# Patient Record
Sex: MALE | Race: WHITE | Employment: OTHER | URBAN - METROPOLITAN AREA
[De-identification: names, ages, dates, MRNs, and addresses within clinical notes are randomized per-mention and may not be internally consistent; named-entity substitution may affect disease eponyms.]

---

## 2017-01-25 PROBLEM — G47.33 OSA (OBSTRUCTIVE SLEEP APNEA): Status: ACTIVE | Noted: 2017-01-25

## 2017-01-28 PROBLEM — D72.825 BANDEMIA: Status: ACTIVE | Noted: 2017-01-28

## 2017-02-06 PROBLEM — I44.0 FIRST DEGREE ATRIOVENTRICULAR BLOCK: Status: ACTIVE | Noted: 2017-02-06

## 2017-09-06 PROBLEM — F44.5 PSEUDOSEIZURE: Status: ACTIVE | Noted: 2017-09-06

## 2017-10-31 PROBLEM — I47.20 VENTRICULAR TACHYCARDIA: Status: ACTIVE | Noted: 2017-10-31

## 2017-12-28 PROBLEM — I49.5 SINUS NODE DYSFUNCTION (HCC): Status: ACTIVE | Noted: 2017-12-28

## 2017-12-28 PROBLEM — Z95.0 S/P CARDIAC PACEMAKER PROCEDURE: Status: ACTIVE | Noted: 2017-12-28

## 2018-01-30 PROBLEM — G44.89 OTHER HEADACHE SYNDROME: Status: ACTIVE | Noted: 2018-01-30

## 2018-08-14 ENCOUNTER — APPOINTMENT (OUTPATIENT)
Dept: CT IMAGING | Age: 38
DRG: 053 | End: 2018-08-14
Attending: INTERNAL MEDICINE
Payer: COMMERCIAL

## 2018-08-14 ENCOUNTER — APPOINTMENT (OUTPATIENT)
Dept: GENERAL RADIOLOGY | Age: 38
DRG: 053 | End: 2018-08-14
Attending: INTERNAL MEDICINE
Payer: COMMERCIAL

## 2018-08-14 ENCOUNTER — HOSPITAL ENCOUNTER (INPATIENT)
Age: 38
LOS: 1 days | Discharge: HOME | DRG: 053 | End: 2018-08-15
Attending: INTERNAL MEDICINE | Admitting: INTERNAL MEDICINE
Payer: COMMERCIAL

## 2018-08-14 PROBLEM — I63.9 ACUTE CVA (CEREBROVASCULAR ACCIDENT) (HCC): Status: ACTIVE | Noted: 2018-08-14

## 2018-08-14 LAB
ALBUMIN SERPL-MCNC: 3.9 G/DL (ref 3.5–5.2)
ALP BLD-CCNC: 56 U/L (ref 40–129)
ALT SERPL-CCNC: 35 U/L (ref 0–40)
ANION GAP SERPL CALCULATED.3IONS-SCNC: 13 MMOL/L (ref 7–16)
AST SERPL-CCNC: 23 U/L (ref 0–39)
BASOPHILS ABSOLUTE: 0.05 E9/L (ref 0–0.2)
BASOPHILS RELATIVE PERCENT: 0.6 % (ref 0–2)
BILIRUB SERPL-MCNC: 0.7 MG/DL (ref 0–1.2)
BUN BLDV-MCNC: 14 MG/DL (ref 6–20)
CALCIUM SERPL-MCNC: 9.2 MG/DL (ref 8.6–10.2)
CHLORIDE BLD-SCNC: 105 MMOL/L (ref 98–107)
CO2: 20 MMOL/L (ref 22–29)
CREAT SERPL-MCNC: 0.9 MG/DL (ref 0.7–1.2)
EOSINOPHILS ABSOLUTE: 0.47 E9/L (ref 0.05–0.5)
EOSINOPHILS RELATIVE PERCENT: 5.2 % (ref 0–6)
GFR AFRICAN AMERICAN: >60
GFR NON-AFRICAN AMERICAN: >60 ML/MIN/1.73
GLUCOSE BLD-MCNC: 84 MG/DL (ref 74–109)
HCT VFR BLD CALC: 42.8 % (ref 37–54)
HEMOGLOBIN: 14.5 G/DL (ref 12.5–16.5)
IMMATURE GRANULOCYTES #: 0.04 E9/L
IMMATURE GRANULOCYTES %: 0.4 % (ref 0–5)
LYMPHOCYTES ABSOLUTE: 2.95 E9/L (ref 1.5–4)
LYMPHOCYTES RELATIVE PERCENT: 32.9 % (ref 20–42)
MCH RBC QN AUTO: 30.8 PG (ref 26–35)
MCHC RBC AUTO-ENTMCNC: 33.9 % (ref 32–34.5)
MCV RBC AUTO: 90.9 FL (ref 80–99.9)
MONOCYTES ABSOLUTE: 0.81 E9/L (ref 0.1–0.95)
MONOCYTES RELATIVE PERCENT: 9 % (ref 2–12)
NEUTROPHILS ABSOLUTE: 4.66 E9/L (ref 1.8–7.3)
NEUTROPHILS RELATIVE PERCENT: 51.9 % (ref 43–80)
PDW BLD-RTO: 13.2 FL (ref 11.5–15)
PLATELET # BLD: 302 E9/L (ref 130–450)
PMV BLD AUTO: 10.3 FL (ref 7–12)
POTASSIUM REFLEX MAGNESIUM: 3.8 MMOL/L (ref 3.5–5)
RBC # BLD: 4.71 E12/L (ref 3.8–5.8)
SODIUM BLD-SCNC: 138 MMOL/L (ref 132–146)
TOTAL PROTEIN: 7 G/DL (ref 6.4–8.3)
TROPONIN: <0.01 NG/ML (ref 0–0.03)
TROPONIN: <0.01 NG/ML (ref 0–0.03)
WBC # BLD: 9 E9/L (ref 4.5–11.5)

## 2018-08-14 PROCEDURE — 2580000003 HC RX 258: Performed by: INTERNAL MEDICINE

## 2018-08-14 PROCEDURE — 6360000002 HC RX W HCPCS: Performed by: INTERNAL MEDICINE

## 2018-08-14 PROCEDURE — 70450 CT HEAD/BRAIN W/O DYE: CPT

## 2018-08-14 PROCEDURE — 2060000000 HC ICU INTERMEDIATE R&B

## 2018-08-14 PROCEDURE — G0378 HOSPITAL OBSERVATION PER HR: HCPCS

## 2018-08-14 PROCEDURE — 85025 COMPLETE CBC W/AUTO DIFF WBC: CPT

## 2018-08-14 PROCEDURE — 71045 X-RAY EXAM CHEST 1 VIEW: CPT

## 2018-08-14 PROCEDURE — 36415 COLL VENOUS BLD VENIPUNCTURE: CPT

## 2018-08-14 PROCEDURE — 80053 COMPREHEN METABOLIC PANEL: CPT

## 2018-08-14 PROCEDURE — 84484 ASSAY OF TROPONIN QUANT: CPT

## 2018-08-14 PROCEDURE — 96372 THER/PROPH/DIAG INJ SC/IM: CPT

## 2018-08-14 RX ORDER — CLONAZEPAM 0.5 MG/1
1 TABLET ORAL 2 TIMES DAILY PRN
Status: DISCONTINUED | OUTPATIENT
Start: 2018-08-14 | End: 2018-08-15 | Stop reason: HOSPADM

## 2018-08-14 RX ORDER — METOPROLOL SUCCINATE 50 MG/1
50 TABLET, EXTENDED RELEASE ORAL 2 TIMES DAILY WITH MEALS
Status: DISCONTINUED | OUTPATIENT
Start: 2018-08-14 | End: 2018-08-15 | Stop reason: HOSPADM

## 2018-08-14 RX ORDER — ZONISAMIDE 50 MG/1
50 CAPSULE ORAL DAILY
Status: DISCONTINUED | OUTPATIENT
Start: 2018-08-14 | End: 2018-08-15 | Stop reason: HOSPADM

## 2018-08-14 RX ORDER — SODIUM CHLORIDE 0.9 % (FLUSH) 0.9 %
10 SYRINGE (ML) INJECTION PRN
Status: DISCONTINUED | OUTPATIENT
Start: 2018-08-14 | End: 2018-08-15 | Stop reason: HOSPADM

## 2018-08-14 RX ORDER — ONDANSETRON 2 MG/ML
4 INJECTION INTRAMUSCULAR; INTRAVENOUS EVERY 6 HOURS PRN
Status: DISCONTINUED | OUTPATIENT
Start: 2018-08-14 | End: 2018-08-15 | Stop reason: HOSPADM

## 2018-08-14 RX ORDER — SODIUM CHLORIDE 0.9 % (FLUSH) 0.9 %
10 SYRINGE (ML) INJECTION EVERY 12 HOURS SCHEDULED
Status: DISCONTINUED | OUTPATIENT
Start: 2018-08-14 | End: 2018-08-15 | Stop reason: HOSPADM

## 2018-08-14 RX ORDER — FLUOXETINE HYDROCHLORIDE 20 MG/1
20 CAPSULE ORAL DAILY
Status: DISCONTINUED | OUTPATIENT
Start: 2018-08-15 | End: 2018-08-15 | Stop reason: HOSPADM

## 2018-08-14 RX ADMIN — ZONISAMIDE 50 MG: 50 CAPSULE ORAL at 21:39

## 2018-08-14 RX ADMIN — Medication 10 ML: at 20:42

## 2018-08-14 RX ADMIN — ENOXAPARIN SODIUM 40 MG: 40 INJECTION SUBCUTANEOUS at 20:43

## 2018-08-14 ASSESSMENT — PAIN SCALES - GENERAL
PAINLEVEL_OUTOF10: 0

## 2018-08-15 VITALS
TEMPERATURE: 98.3 F | OXYGEN SATURATION: 96 % | HEART RATE: 84 BPM | DIASTOLIC BLOOD PRESSURE: 72 MMHG | WEIGHT: 255 LBS | RESPIRATION RATE: 18 BRPM | BODY MASS INDEX: 34.54 KG/M2 | SYSTOLIC BLOOD PRESSURE: 128 MMHG | HEIGHT: 72 IN

## 2018-08-15 LAB
CHOLESTEROL, TOTAL: 204 MG/DL (ref 0–199)
HDLC SERPL-MCNC: 41 MG/DL
LDL CHOLESTEROL CALCULATED: 142 MG/DL (ref 0–99)
MAGNESIUM: 2.2 MG/DL (ref 1.6–2.6)
PRO-BNP: <5 PG/ML (ref 0–125)
TRIGL SERPL-MCNC: 106 MG/DL (ref 0–149)
TROPONIN: <0.01 NG/ML (ref 0–0.03)
VLDLC SERPL CALC-MCNC: 21 MG/DL

## 2018-08-15 PROCEDURE — 97165 OT EVAL LOW COMPLEX 30 MIN: CPT

## 2018-08-15 PROCEDURE — G8978 MOBILITY CURRENT STATUS: HCPCS | Performed by: PHYSICAL THERAPIST

## 2018-08-15 PROCEDURE — 80061 LIPID PANEL: CPT

## 2018-08-15 PROCEDURE — 97161 PT EVAL LOW COMPLEX 20 MIN: CPT | Performed by: PHYSICAL THERAPIST

## 2018-08-15 PROCEDURE — G8988 SELF CARE GOAL STATUS: HCPCS

## 2018-08-15 PROCEDURE — 83735 ASSAY OF MAGNESIUM: CPT

## 2018-08-15 PROCEDURE — G0378 HOSPITAL OBSERVATION PER HR: HCPCS

## 2018-08-15 PROCEDURE — 2580000003 HC RX 258: Performed by: INTERNAL MEDICINE

## 2018-08-15 PROCEDURE — 6360000002 HC RX W HCPCS: Performed by: INTERNAL MEDICINE

## 2018-08-15 PROCEDURE — 6370000000 HC RX 637 (ALT 250 FOR IP): Performed by: INTERNAL MEDICINE

## 2018-08-15 PROCEDURE — 99253 IP/OBS CNSLTJ NEW/EST LOW 45: CPT | Performed by: PSYCHIATRY & NEUROLOGY

## 2018-08-15 PROCEDURE — 83880 ASSAY OF NATRIURETIC PEPTIDE: CPT

## 2018-08-15 PROCEDURE — 36415 COLL VENOUS BLD VENIPUNCTURE: CPT

## 2018-08-15 PROCEDURE — G8979 MOBILITY GOAL STATUS: HCPCS | Performed by: PHYSICAL THERAPIST

## 2018-08-15 PROCEDURE — APPSS180 APP SPLIT SHARED TIME > 60 MINUTES: Performed by: NURSE PRACTITIONER

## 2018-08-15 PROCEDURE — 99254 IP/OBS CNSLTJ NEW/EST MOD 60: CPT | Performed by: INTERNAL MEDICINE

## 2018-08-15 PROCEDURE — G8987 SELF CARE CURRENT STATUS: HCPCS

## 2018-08-15 PROCEDURE — 84484 ASSAY OF TROPONIN QUANT: CPT

## 2018-08-15 PROCEDURE — 96372 THER/PROPH/DIAG INJ SC/IM: CPT

## 2018-08-15 RX ADMIN — FLUOXETINE HYDROCHLORIDE 20 MG: 20 CAPSULE ORAL at 08:35

## 2018-08-15 RX ADMIN — ENOXAPARIN SODIUM 40 MG: 40 INJECTION SUBCUTANEOUS at 08:35

## 2018-08-15 RX ADMIN — METOPROLOL SUCCINATE 50 MG: 50 TABLET, FILM COATED, EXTENDED RELEASE ORAL at 13:24

## 2018-08-15 RX ADMIN — Medication 10 ML: at 08:35

## 2018-08-15 ASSESSMENT — PAIN SCALES - GENERAL
PAINLEVEL_OUTOF10: 0

## 2018-08-15 NOTE — PROGRESS NOTES
Stroke appointment request faxed to clinic. Nurse will inform patient that clinic will contact him with date and time of appointment, communicated with patient.

## 2018-08-15 NOTE — PROGRESS NOTES
OCCUPATIONAL THERAPY INITIAL EVALUATION      Date:8/15/2018  Patient Name: Dominique Vidal  MRN: [id number]  : 1980  [facility]     Evaluating OT: LUIS Dobbs     Recommended Adaptive Equipment: none  AM-PAC Daily Activity Raw Score =  2124  G-code = CJ    Modified Leeds Scale (MRS)  Score     Description  0             No symptoms  1             No significant disability despite symptoms  2             Slight disability; able to look after own affairs  3             Moderate disability; able to ambulate without assist/ requires assist with ADLs  4             Moderate/Severe disability;requires assist to ambulate/assist with ADLs  5             Severe disability;bedridden/incontinent   6               Score:   1    Diagnosis: No diagnosis found. Patient Active Problem List   Diagnosis    Anxiety    Moderate obesity    Hyperlipemia    Irritable bowel syndrome with diarrhea    First degree atrioventricular block    Pseudoseizure    Seizure-like activity (HCC)    Ventricular tachycardia (HCC)    Sinus node dysfunction (HCC)    S/P cardiac pacemaker procedure    Other headache syndrome    Acute CVA [facility]ce[facility]bro[facility] accident) [facility])      Past Medical History:   Past Medical History:   Diagnosis Date    Acute CVA [facility]ce[facility]brovascular accident) ([facility] [address] 2018    Anxiety     Arthritis     First degree AV block     GERD (gastroesophageal reflux disease)     Hyperlipidemia     IBS (irritable bowel syndrome)     LVH (left ventricular hypertrophy)     Movement disorder     Obesity     ENOC (obstructive sleep apnea)     Psychiatric problem     Seizures (HCC)        Precautions:  Falls,  Seizure? Home Living: Pt lives with mother and siblings  in a 2 story, with 1 +1 steps to enter or 4 steps with 1 rail; bed/bath on 1st  Bathroom setup: tub shower   Equipment owned: cane, ww     Prior Level of Function:  Independent  with ADLs and IADLs;  Ambulated with no

## 2018-08-15 NOTE — CONSULTS
fatigue, fevers, chills or night sweats  · Eyes: Denies visual changes or drainage  · ENT: Denies headaches or hearing loss. No mouth sores or sore throat. No epistaxis   · Cardiovascular: + L sided chest pain. Denies palpitations. No lower extremity swelling. · Respiratory: + SOB. Denies cough, orthopnea or PND. No hemoptysis   · Gastrointestinal: Denies hematemesis or anorexia. No hematochezia or melena    · Genitourinary: Denies urgency, dysuria or hematuria. · Musculoskeletal: Denies gait disturbance. BLE weakness. Denies joint complaints  · Integumentary: Denies rash, hives or pruritis   · Neurological: Denies dizziness, headaches or seizures. No numbness or tingling  · Psychiatric: + anxiety/depression  · Endocrine: Denies temperature intolerance. No recent weight change. .  · Hematologic/Lymphatic: Denies abnormal bruising or bleeding. No swollen lymph nodes    PHYSICAL EXAM:   /62   Pulse 68   Temp 97 °F (36.1 °C) (Temporal)   Resp 14   Ht 6' (1.829 m)   Wt 255 lb (115.7 kg)   SpO2 93%   BMI 34.58 kg/m²   CONST:  Well developed, well nourished  male who appears of stated age. Awake, alert and cooperative. No apparent distress. HEENT:   Head- Normocephalic, atraumatic   Eyes- Conjunctivae pink, anicteric  Throat- Oral mucosa pink and moist  Neck-  No stridor, trachea midline, no jugular venous distention. No carotid bruit. CHEST: Chest symmetrical and non-tender to palpation. No accessory muscle use or intercostal retractions  RESPIRATORY: Lung sounds - clear throughout fields   CARDIOVASCULAR:     Heart Inspection- shows no noted pulsations  Heart Palpation- no heaves or thrills; PMI is non-displaced   Heart Ausculation- Regular rate and rhythm, II/VI ALIA. No s3, s4 or rub   PV: No lower extremity edema. No varicosities. Pedal pulses palpable, no clubbing or cyanosis   ABDOMEN: Soft, non-tender to light palpation. Bowel sounds present.  No palpable masses no organomegaly; no

## 2018-08-15 NOTE — CONSULTS
Patient seen and examined. Chart, labs, imaging studies, EKG and rhythm strips reviewed. Full consult to follow. Consulted for CP    PHYSICAL EXAM:   /62   Pulse 68   Temp 97 °F (36.1 °C) (Temporal)   Resp 14   Ht 6' (1.829 m)   Wt 255 lb (115.7 kg)   SpO2 93%   BMI 34.58 kg/m²   CONST:  Well developed, well nourished  male who appears of stated age. Awake, alert and cooperative. No apparent distress. HEENT:   Head- Normocephalic, atraumatic   Eyes- Conjunctivae pink, anicteric  Throat- Oral mucosa pink and moist  Neck-  No stridor, trachea midline, no jugular venous distention. No carotid bruit. CHEST: Chest symmetrical and non-tender to palpation. No accessory muscle use or intercostal retractions  RESPIRATORY: Lung sounds - clear throughout fields   CARDIOVASCULAR:     Heart Inspection- shows no noted pulsations  Heart Palpation- no heaves or thrills; PMI is non-displaced   Heart Ausculation- Regular rate and rhythm, II/VI ALIA. No s3, s4 or rub   PV: No lower extremity edema. No varicosities. Pedal pulses palpable, no clubbing or cyanosis   ABDOMEN: Soft, non-tender to light palpation. Bowel sounds present. No palpable masses no organomegaly; no abdominal bruit  MS: Good muscle strength and tone. No atrophy or abnormal movements. : Deferred  SKIN: Warm and dry no statis dermatitis or ulcers   NEURO / PSYCH: Oriented to person, place and time. Speech clear and appropriate. Follows all commands. Flat affect     IMPRESSION:  1. Non cardiac CP  2. SSS s/p PPM ( [employer]) 12/2017  3. Hx NSVT  4. Hx of positive tilt table test  5. Unremarkable TTE in 2016  6. Low risk treadmill non nuclear stress (Knutson Score 7) 11/2017  7. T2DM recently diagnosed on Metformin  8. Anxiety/Depression  9. Pseudoseizures  10. Migraine HA  11. GERD  12. Obesity BMI 34.5    PLAN:   1. No additional recommendations/plans or indications for additional cardiac testing (from cardiology standpoint).   2. May be discharged (from cardiology standpoint) when okay with others.    3. Cardiology to sign off, please call if needed    Electronically signed by Radha Chapa MD on 8/15/2018 at 11:53 AM

## 2018-08-15 NOTE — PROGRESS NOTES
[address] [facility]  Physical Therapy Initial Evaluation    Name: Justin Rivas  : 1980  MRN: [id number]    Date of Service: 8/15/2018    Evaluating Therapist: Torito Teran PT, [id number]       Equipment Recommendations: no needs    Room #: 6414/4693-R  DIAGNOSIS: CVA  PRECAUTIONS: fall risk    Social:  Pt lives with mother and siblings in a 2 floor plan but first floor setup, 1 + 1 steps or 4 steps with 1 rails to enter. Prior to admission pt walked with no device. Initial Evaluation  Date: 8/15 Treatment      Short Term/ Long Term   Goals   Was pt agreeable to Eval/treatment? yes     Does pt have pain? No c/o pain     Bed Mobility  Rolling: independent  Supine to sit: independent  Sit to supine: independent  Scooting: independent  independent   Transfers Sit to stand: independent  Stand to sit: independent  Stand pivot: independent  independent   Ambulation    200 feet with no device supervision  200+ feet with no device independently. Stair negotiation: ascended and descended 4 steps with 2 rail with supervision  4 +  steps with 1 rail with modified independent   AM-PAC Raw Score 22/24       Pt is alert and Oriented x3  ROM:  L LE grossly WFL  R LE grossly WFL  Strength:   L LE grossly 4/5  R LE grossly 5/5  Balance: standing supervision   Sensation: no c/o numbness/tingling. Endurance: good -        ASSESSMENT  Pt displays functional ability as noted in the objective portion of this evaluation. Comments/Treatment:  Pt found laying in bed agreeable to evaluation. Pt instructed in mobility. Pt with mild unsteadiness initially but improved with further gait; no LOB. Pt would benefit from outpatient PT to address L LE weakness and mild unsteadiness. Pt left laying in bed with call button in reach end of evaluation. Patient education  Pt educated on mobility.     Patient response to education:   Pt verbalized understanding Pt demonstrated skill Pt requires further education in this area   x

## 2018-08-15 NOTE — PLAN OF CARE
Problem: Falls - Risk of:  Goal: Will remain free from falls  Will remain free from falls   Outcome: Met This Shift      Problem: ACTIVITY INTOLERANCE/IMPAIRED MOBILITY  Goal: Mobility/activity is maintained at optimum level for patient  Outcome: Met This Shift

## 2018-08-15 NOTE — H&P
no hepatosplenomegally  MUSCULOSKELETAL:  There is no redness, warmth, or swelling of the joints. Full range of motion noted. Motor strength is 5 out of 5 all extremities bilaterally. Tone is normal.  NEUROLOGIC:  Awake, alert, oriented to name, place and time. Cranial nerves II-XII are grossly intact. Motor is 5 out of 5 bilaterally. Cerebellar finger to nose, heel to shin intact. Sensory is intact.   Babinski down going, Romberg negative, and gait is normal.  SKIN:  no bruising or bleeding    DATA:  CBC:   Lab Results   Component Value Date    WBC 9.0 08/14/2018    RBC 4.71 08/14/2018    HGB 14.5 08/14/2018    HCT 42.8 08/14/2018    MCV 90.9 08/14/2018    MCH 30.8 08/14/2018    MCHC 33.9 08/14/2018    RDW 13.2 08/14/2018     08/14/2018    MPV 10.3 08/14/2018     CMP:    Lab Results   Component Value Date     08/14/2018    K 3.8 08/14/2018     08/14/2018    CO2 20 08/14/2018    BUN 14 08/14/2018    CREATININE 0.9 08/14/2018    GFRAA >60 08/14/2018    LABGLOM >60 08/14/2018    GLUCOSE 84 08/14/2018    PROT 7.0 08/14/2018    LABALBU 3.9 08/14/2018    CALCIUM 9.2 08/14/2018    BILITOT 0.7 08/14/2018    ALKPHOS 56 08/14/2018    AST 23 08/14/2018    ALT 35 08/14/2018     ASSESSMENT AND PLAN:    Looks much better  Ct head neg  Card input noted  Will dc if ok c neuro  F/u c psych

## 2018-08-15 NOTE — CONSULTS
Department of Neurological Sciences  Section of General Neurology - Adult  Consult Note          Sandrita Mayes is 42 y.o. male, right handed. Neurology was consulted for falls, dysarthria, extremity weakness    The patient is a good historian. Past Medical History:     Past Medical History:   Diagnosis Date    Acute CVA (cerebrovascular accident) ([facility] [address] 8/14/2018    Anxiety     Arthritis     First degree AV block     GERD (gastroesophageal reflux disease)     Hyperlipidemia     IBS (irritable bowel syndrome)     LVH (left ventricular hypertrophy)     Movement disorder     Obesity     ENOC (obstructive sleep apnea)     Psychiatric problem     Seizures (HCC)        Past Surgical History:     Past Surgical History:   Procedure Laterality Date    HERNIA REPAIR      INSERTABLE CARDIAC MONITOR  07/14/2017    KNEE SURGERY      TONSILLECTOMY         Allergies:     Patient has no known allergies. Medications:     Prior to Admission medications    Medication Sig Start Date End Date Taking?  Authorizing Provider   metFORMIN (GLUCOPHAGE XR) 500 MG extended release tablet Take 1 tablet by mouth daily (with breakfast) 7/17/18  Yes Andrea Ellsworth MD   metoprolol succinate (TOPROL XL) 50 MG extended release tablet Take 1 tablet by mouth 2 times daily (with meals) 12/29/17  Yes Marcel Rapp MD   zonisamide (ZONEGRAN) 50 MG capsule Take 50 mg by mouth daily  10/17/17  Yes Historical Provider, MD   FLUoxetine (PROZAC) 20 MG capsule Take 20 mg by mouth daily   Yes Historical Provider, MD   clonazePAM (KLONOPIN) 1 MG tablet Take 1 tablet by mouth 2 times daily as needed for Anxiety 9/25/17   Andrea Ellsworth MD       Social History:     Denies ETOH, tobacco, or illicit drugs    Review of Systems:       No vertigo, lightheadedness or loss of consciousness  No incontinence of bowels or bladder  No itching or bruising appreciated  No numbness, tingling or worsening focal arm/leg weakness    ROS is otherwise negative Family History:     No family history on file. History of Present Illness:   Patient is 41 yo M with significant past medical history of psychogenic non-epileptic seizures, anxiety, migraine consulted for recurrent fall, seizure like activity, dysarthria. Patient with chronic left upper extremity weakness, worsening over \"last 3 years\". Denies change in motor function. Denies focal numbness or paresthesias. Patient states that he has fallen \"2-3 times per year\" for the past 3 years. [place] yesterday, stating his right leg \"gave out\". Denies trauma to head or neck. No anticoagulants. No weakness to lower extremities following fall. Patient states that he had an episode of seizure like activity later on in the day while seated as passenger in car. Notes this was identical to previously diagnosed episodes of non-epileptiform seizures. Lasted approximately 2-3 minutes. Denies loss of bowel/bladder function. Denies post ictal symptoms. Patient is aware when he is having episodes, but is \"foggy\" in thinking. Patient states that anxiety provokes episodes, and that he has been increasingly anxious with decreased hours scheduled at work. Patient notes associated \"slurred speech\" following episode. States that speech is also frequently affected transiently when having non-epileptiform seizures. Patient denies any new complaints today, with speech deficit resolved. Patient prescribed Zonegran for HA. Objective:     /62   Pulse 68   Temp 97 °F (36.1 °C) (Temporal)   Resp 14   Ht 6' (1.829 m)   Wt 255 lb (115.7 kg)   SpO2 93%   BMI 34.58 kg/m²     General appearance: alert, appears stated age, cooperative and no distress  Head: normocephalic, without obvious abnormality, atraumatic  Eyes: conjunctivae/corneas clear.    Neck: no adenopathy, no carotid bruit, supple, symmetrical, trachea midline and thyroid not enlarged, symmetric, no tenderness/mass/nodules  Lungs: clear to auscultation Lab Results   Component Value Date    WBC 9.0 08/14/2018    RBC 4.71 08/14/2018    HGB 14.5 08/14/2018    HCT 42.8 08/14/2018     08/14/2018    MCV 90.9 08/14/2018    MCH 30.8 08/14/2018    MCHC 33.9 08/14/2018    RDW 13.2 08/14/2018    LYMPHOPCT 32.9 08/14/2018    MONOPCT 9.0 08/14/2018    BASOPCT 0.6 08/14/2018    MONOSABS 0.81 08/14/2018    LYMPHSABS 2.95 08/14/2018    EOSABS 0.47 08/14/2018    BASOSABS 0.05 08/14/2018     CMP:    Lab Results   Component Value Date     08/14/2018    K 3.8 08/14/2018     08/14/2018    CO2 20 08/14/2018    BUN 14 08/14/2018    CREATININE 0.9 08/14/2018    GFRAA >60 08/14/2018    LABGLOM >60 08/14/2018    GLUCOSE 84 08/14/2018    PROT 7.0 08/14/2018    LABALBU 3.9 08/14/2018    CALCIUM 9.2 08/14/2018    BILITOT 0.7 08/14/2018    ALKPHOS 56 08/14/2018    AST 23 08/14/2018    ALT 35 08/14/2018       CT head: No sign of acute intracranial hemorrhage or mass effect. MRI brain: n/a    CTAs head/neck: n/a    Echo w bubble: n/a    I labs and imaging studies were reviewed with attending. Assessment and Plan   1. Psychogenic non-epileptic seizure-- Known history without change in symptoms. No acute focal neurological findings on exam. Decreased left  strength-- chronic. CT head negative for acute intracranial abnormality.    Telemetry  PT/OT  Recommend neuro psych consult outpatient should symptoms persist  Will sign off          Shayy Butler PGY - 2  Emergency Medicine  9:45 AM  8/15/2018

## 2018-09-06 ENCOUNTER — HOSPITAL ENCOUNTER (OUTPATIENT)
Dept: SPEECH THERAPY | Age: 38
Setting detail: THERAPIES SERIES
Discharge: HOME | End: 2018-09-06
Payer: COMMERCIAL

## 2018-09-06 PROCEDURE — G9174 SPEECH LANG CURRENT STATUS: HCPCS

## 2018-09-06 PROCEDURE — 92523 SPEECH SOUND LANG COMPREHEN: CPT

## 2018-09-06 PROCEDURE — G9175 SPEECH LANG GOAL STATUS: HCPCS

## 2018-09-06 NOTE — PROGRESS NOTES
[]CNT      Process  Simple Written Commands:   [x]WNL []Incomplete []Latent  []Inconsistent []Perseveration []Cueing  []Unable []CNT  Process Intermediate Written Commands:   [x]WNL []Incomplete []Latent  []Inconsistent []Perseveration []Cueing  []Unable []CNT  Process Complex Written Commands:   []WNL []Incomplete []Latent  [x]Inconsistent []Perseveration [x]Cueing  []Unable []CNT    Comprehension of Conversation:     [x]WNL []Incomplete []Latent  []Inconsistent []Perseveration []Cueing  []Unable []CNT       EXPRESSIVE LANGUAGE       Serials: ([x]Functional[] Impaired)    Imitation:  Words  ([x]Functional[] Impaired)    Sentences ([x]Functional[] Impaired)    Naming:  (Modality used: [x]Verbal []Written)   Confrontation Naming ([x]Functional[] Impaired)  Functional Description ([x]Functional[] Impaired)  Response Naming: ([x]Functional[] Impaired)    Conversation:     [x]Grammatical form ([x] Intact []Disordered)    []Paraphasias ( []Literal errors []Semantic errors [] Neologistic errors []Running jargon)   []Anomia        COGNITION     Attention/Orientation  Attention: [x]Sustained attention []Easily distracted   Orientation:  [x]Person  [x]Place [x]Date [x]Reason  for hospitalization    Memory   Biographical:  [x]Address  [x]Birthdate  [x] Age  [x]Family   Delayed recall:  [x]Chair  [x]Cup   [x]Grass   []Did not recall    Organization/Problem Solving/Reasoning   Sequencing:   [x]Verbal ([x]Functional[] Impaired)      Problem solving:    [x]Verbal task ([x]Functional[] Impaired)      Mathematics:   [x]Simple arithmetic ([x]Functional[] Impaired)   [x]Function based([x]Functional[] Impaired)     CLINICAL OBSERVATIONS NOTED DURING THE EVALUATION  []Latent  responses   [x]Inconsistent responses   []Perseveration errors   []Anomic errors   []Paraphasic errors   [x]Cueing was required - at times                      [x]  Prognosis for improvements is good  []  This plan will be re-evaluated and revised in 1 week  if

## 2018-09-11 ENCOUNTER — APPOINTMENT (OUTPATIENT)
Dept: CT IMAGING | Age: 38
End: 2018-09-11
Payer: COMMERCIAL

## 2018-09-11 ENCOUNTER — HOSPITAL ENCOUNTER (EMERGENCY)
Age: 38
Discharge: HOME | End: 2018-09-11
Attending: EMERGENCY MEDICINE
Payer: COMMERCIAL

## 2018-09-11 VITALS
BODY MASS INDEX: 37.22 KG/M2 | DIASTOLIC BLOOD PRESSURE: 87 MMHG | HEART RATE: 88 BPM | SYSTOLIC BLOOD PRESSURE: 123 MMHG | RESPIRATION RATE: 14 BRPM | WEIGHT: 260 LBS | TEMPERATURE: 98 F | HEIGHT: 70 IN | OXYGEN SATURATION: 95 %

## 2018-09-11 DIAGNOSIS — R20.0 NUMBNESS AND TINGLING OF LEFT SIDE OF FACE: ICD-10-CM

## 2018-09-11 DIAGNOSIS — R51.9 NONINTRACTABLE HEADACHE, UNSPECIFIED CHRONICITY PATTERN, UNSPECIFIED HEADACHE TYPE: Primary | ICD-10-CM

## 2018-09-11 DIAGNOSIS — R20.2 NUMBNESS AND TINGLING OF LEFT SIDE OF FACE: ICD-10-CM

## 2018-09-11 LAB
ANION GAP SERPL CALCULATED.3IONS-SCNC: 15 MMOL/L (ref 7–16)
APTT: 31.5 SEC (ref 24.5–35.1)
BASOPHILS ABSOLUTE: 0.06 E9/L (ref 0–0.2)
BASOPHILS RELATIVE PERCENT: 0.6 % (ref 0–2)
BUN BLDV-MCNC: 10 MG/DL (ref 6–20)
CALCIUM SERPL-MCNC: 9.7 MG/DL (ref 8.6–10.2)
CHLORIDE BLD-SCNC: 104 MMOL/L (ref 98–107)
CO2: 19 MMOL/L (ref 22–29)
CREAT SERPL-MCNC: 0.9 MG/DL (ref 0.7–1.2)
EKG ATRIAL RATE: 69 BPM
EKG P AXIS: 38 DEGREES
EKG P-R INTERVAL: 222 MS
EKG Q-T INTERVAL: 412 MS
EKG QRS DURATION: 102 MS
EKG QTC CALCULATION (BAZETT): 441 MS
EKG R AXIS: -61 DEGREES
EKG T AXIS: -15 DEGREES
EKG VENTRICULAR RATE: 69 BPM
EOSINOPHILS ABSOLUTE: 0.21 E9/L (ref 0.05–0.5)
EOSINOPHILS RELATIVE PERCENT: 2 % (ref 0–6)
GFR AFRICAN AMERICAN: >60
GFR NON-AFRICAN AMERICAN: >60 ML/MIN/1.73
GLUCOSE BLD-MCNC: 98 MG/DL (ref 74–109)
HCT VFR BLD CALC: 48.6 % (ref 37–54)
HEMOGLOBIN: 16.4 G/DL (ref 12.5–16.5)
IMMATURE GRANULOCYTES #: 0.07 E9/L
IMMATURE GRANULOCYTES %: 0.7 % (ref 0–5)
INR BLD: 1.1
LACTIC ACID, SEPSIS: 1.5 MMOL/L (ref 0.5–1.9)
LYMPHOCYTES ABSOLUTE: 2.38 E9/L (ref 1.5–4)
LYMPHOCYTES RELATIVE PERCENT: 22.7 % (ref 20–42)
MCH RBC QN AUTO: 30.9 PG (ref 26–35)
MCHC RBC AUTO-ENTMCNC: 33.7 % (ref 32–34.5)
MCV RBC AUTO: 91.7 FL (ref 80–99.9)
METER GLUCOSE: 99 MG/DL (ref 70–110)
MONOCYTES ABSOLUTE: 0.9 E9/L (ref 0.1–0.95)
MONOCYTES RELATIVE PERCENT: 8.6 % (ref 2–12)
NEUTROPHILS ABSOLUTE: 6.87 E9/L (ref 1.8–7.3)
NEUTROPHILS RELATIVE PERCENT: 65.4 % (ref 43–80)
PDW BLD-RTO: 13.1 FL (ref 11.5–15)
PLATELET # BLD: 286 E9/L (ref 130–450)
PMV BLD AUTO: 10.4 FL (ref 7–12)
POTASSIUM SERPL-SCNC: 4.2 MMOL/L (ref 3.5–5)
PROLACTIN: 9.04 NG/ML
PROTHROMBIN TIME: 12.5 SEC (ref 9.3–12.4)
RBC # BLD: 5.3 E12/L (ref 3.8–5.8)
SODIUM BLD-SCNC: 138 MMOL/L (ref 132–146)
TROPONIN: <0.01 NG/ML (ref 0–0.03)
WBC # BLD: 10.5 E9/L (ref 4.5–11.5)

## 2018-09-11 PROCEDURE — 83605 ASSAY OF LACTIC ACID: CPT

## 2018-09-11 PROCEDURE — 70450 CT HEAD/BRAIN W/O DYE: CPT

## 2018-09-11 PROCEDURE — 84484 ASSAY OF TROPONIN QUANT: CPT

## 2018-09-11 PROCEDURE — 84146 ASSAY OF PROLACTIN: CPT

## 2018-09-11 PROCEDURE — 85025 COMPLETE CBC W/AUTO DIFF WBC: CPT

## 2018-09-11 PROCEDURE — 85730 THROMBOPLASTIN TIME PARTIAL: CPT

## 2018-09-11 PROCEDURE — 85610 PROTHROMBIN TIME: CPT

## 2018-09-11 PROCEDURE — 36415 COLL VENOUS BLD VENIPUNCTURE: CPT

## 2018-09-11 PROCEDURE — 80048 BASIC METABOLIC PNL TOTAL CA: CPT

## 2018-09-11 PROCEDURE — 82962 GLUCOSE BLOOD TEST: CPT

## 2018-09-11 PROCEDURE — 93005 ELECTROCARDIOGRAM TRACING: CPT | Performed by: EMERGENCY MEDICINE

## 2018-09-11 PROCEDURE — 99285 EMERGENCY DEPT VISIT HI MDM: CPT

## 2018-09-11 RX ORDER — ASPIRIN 81 MG/1
81 TABLET ORAL DAILY
Qty: 30 TABLET | Refills: 3 | Status: SHIPPED | OUTPATIENT
Start: 2018-09-11 | End: 2018-09-11

## 2018-09-11 RX ORDER — ASPIRIN 81 MG/1
81 TABLET, CHEWABLE ORAL ONCE
Status: DISCONTINUED | OUTPATIENT
Start: 2018-09-11 | End: 2018-09-11 | Stop reason: HOSPADM

## 2018-09-11 RX ORDER — ASPIRIN 81 MG/1
81 TABLET ORAL DAILY
Qty: 30 TABLET | Refills: 3 | Status: SHIPPED | OUTPATIENT
Start: 2018-09-11 | End: 2019-04-02 | Stop reason: ALTCHOICE

## 2018-09-11 NOTE — ED PROVIDER NOTES
>60     Calcium 9.7 8.6 - 10.2 mg/dL   POCT Glucose   Result Value Ref Range    Meter Glucose 99 70 - 110 mg/dL   EKG 12 Lead   Result Value Ref Range    Ventricular Rate 69 BPM    Atrial Rate 69 BPM    P-R Interval 222 ms    QRS Duration 102 ms    Q-T Interval 412 ms    QTc Calculation (Bazett) 441 ms    P Axis 38 degrees    R Axis -61 degrees    T Axis -15 degrees       RADIOLOGY:  Interpreted by Radiologist.  CT Head WO Contrast   Final Result    1. No identifiable acute intracranial process. ------------------------- NURSING NOTES AND VITALS REVIEWED ---------------------------   The nursing notes within the ED encounter and vital signs as below have been reviewed. /87   Pulse 88   Temp 98 °F (36.7 °C) (Oral)   Resp 14   Ht 5' 10\" (1.778 m)   Wt 260 lb (117.9 kg)   SpO2 95%   BMI 37.31 kg/m²   Oxygen Saturation Interpretation: Normal      ---------------------------------------------------PHYSICAL EXAM--------------------------------------    Constitutional/General: Alert and oriented x3, minimal distress  Head: NC/AT  Eyes: PERRL, EOMI  Mouth: Oropharynx clear, handling secretions, no trismus  Neck: Supple, full ROM, no meningeal signs  Pulmonary: Lungs clear to auscultation bilaterally, no wheezes, rales, or rhonchi. Not in respiratory distress  Cardiovascular:  Regular rate and rhythm, no murmurs, gallops, or rubs. 2+ distal pulses  Abdomen: Soft, non tender, non distended,   Extremities: Moves all extremities x 4. Warm and well perfused  Skin: warm and dry without rash  Neurologic: GCS 15,  Psych: Normal Affect    Last known well time: 1am  NIH Stroke Scale at time of initial evaluation: 0930  1A: Level of Consciousness 0 - alert; keenly responsive   1B: Ask Month and Age 0 - answers both questions correctly   1C:  Tell Patient To Open and Close Eyes, then Hand  Squeeze 0 - performs both tasks correctly   2: Test Horizontal Extraocular Movements 0 - normal platelet count <198,525 uL  - serum blood glucose is <50 mg/dL or >400 mg/dL    Relative Contraindications:  - NIH Stroke score >22  - Patient's CT shows evidence of large MCA territory infarction (>1/3 the MCA territory)    *[facility] Policy Paper      Acute CVA Core Measures:     - t-PA Eligibility: IV t-PA was considered and not given due to violations in inclusion criteria including stroke onset was greater than 3 hours prior to presentation       ------------------------------ ED COURSE/MEDICAL DECISION MAKING----------------------  Medications - No data to display    EKG: This EKG is signed and interpreted by me. Rate: 69  Rhythm: Sinus  Interpretation: Sinus rhythm with first-degree AV block, left axis, right bundle block with left anterior fascicular block, nonspecific ST-T changes that appear stable compared to 8/14/18  Comparison: stable as compared to patient's most recent EKG      Medical Decision Making:    Pt arrives with complaints of a possible stroke. Physical exam and Stroke Scale obtained. Labs and imaging results reviewed and discussed with patient. It is completely resolved, he now no longer has left facial paresthesias, he has symmetric strength bilaterally. Recent workup for similar symptoms. He is unable to get MRI secondary to pacemaker. Spoke with primary care, they would like us to start him on a daily aspirin regimen, he has follow-up with neurology       Re-evaluation. Patients symptoms are improving  Repeat physical examination is significantly improved NIH = 0     Spoke with Dr. Flor Werner for [facility] (Medicine). Discussed case. They state that the patient is okay to discharge home and will see this patient in follow-up. Counseling: The emergency provider has spoken with the patient and female family member and discussed todays results, in addition to providing specific details for the plan of care and counseling regarding the diagnosis and prognosis.   Questions are answered at this time and they are agreeable with the plan.      --------------------------------- IMPRESSION AND DISPOSITION ---------------------------------    IMPRESSION  1. Nonintractable headache, unspecified chronicity pattern, unspecified headache type    2. Numbness and tingling of left side of face        DISPOSITION  Disposition: Discharge to home  Patient condition is stable    9/11/18, 9:44 AM.    This note is prepared by Allison Joiner, acting as Scribe for Mary Jo Nguyen DO. Mary Jo Nguyen DO:  The scribe's documentation has been prepared under my direction and personally reviewed by me in its entirety. I confirm that the note above accurately reflects all work, treatment, procedures, and medical decision making performed by me.                  Mary Jo Nguyen DO  09/11/18 1742

## 2018-09-13 ENCOUNTER — HOSPITAL ENCOUNTER (OUTPATIENT)
Dept: SPEECH THERAPY | Age: 38
Setting detail: THERAPIES SERIES
Discharge: HOME | End: 2018-09-13
Payer: COMMERCIAL

## 2018-09-13 PROCEDURE — 92507 TX SP LANG VOICE COMM INDIV: CPT

## 2018-09-19 ENCOUNTER — TELEPHONE (OUTPATIENT)
Dept: NEUROLOGY | Age: 38
End: 2018-09-19

## 2018-09-19 DIAGNOSIS — R56.9 SEIZURE-LIKE ACTIVITY (HCC): Primary | ICD-10-CM

## 2018-09-20 ENCOUNTER — HOSPITAL ENCOUNTER (OUTPATIENT)
Dept: SPEECH THERAPY | Age: 38
Setting detail: THERAPIES SERIES
Discharge: HOME | End: 2018-09-20
Payer: COMMERCIAL

## 2018-09-20 PROCEDURE — 92507 TX SP LANG VOICE COMM INDIV: CPT

## 2018-09-27 ENCOUNTER — OFFICE VISIT (OUTPATIENT)
Dept: NEUROLOGY | Age: 38
End: 2018-09-27
Payer: COMMERCIAL

## 2018-09-27 ENCOUNTER — HOSPITAL ENCOUNTER (OUTPATIENT)
Dept: SPEECH THERAPY | Age: 38
Setting detail: THERAPIES SERIES
Discharge: HOME | End: 2018-09-27
Payer: COMMERCIAL

## 2018-09-27 VITALS
TEMPERATURE: 98.2 F | HEIGHT: 70 IN | WEIGHT: 262 LBS | RESPIRATION RATE: 18 BRPM | BODY MASS INDEX: 37.51 KG/M2 | HEART RATE: 77 BPM | SYSTOLIC BLOOD PRESSURE: 107 MMHG | OXYGEN SATURATION: 98 % | DIASTOLIC BLOOD PRESSURE: 69 MMHG

## 2018-09-27 DIAGNOSIS — F44.5 PSEUDOSEIZURE: Primary | ICD-10-CM

## 2018-09-27 PROCEDURE — G8427 DOCREV CUR MEDS BY ELIG CLIN: HCPCS | Performed by: PSYCHIATRY & NEUROLOGY

## 2018-09-27 PROCEDURE — 92507 TX SP LANG VOICE COMM INDIV: CPT

## 2018-09-27 PROCEDURE — G8417 CALC BMI ABV UP PARAM F/U: HCPCS | Performed by: PSYCHIATRY & NEUROLOGY

## 2018-09-27 PROCEDURE — 99214 OFFICE O/P EST MOD 30 MIN: CPT | Performed by: PSYCHIATRY & NEUROLOGY

## 2018-10-03 ENCOUNTER — TELEPHONE (OUTPATIENT)
Dept: NEUROLOGY | Age: 38
End: 2018-10-03

## 2018-10-03 NOTE — TELEPHONE ENCOUNTER
Notified pt that his 48 hr EEG is scheduled to be applied on  Nov 14 @ 11:30 and to be removed on Nov 16 @ 11:30 at [facility].  Pt must have clean hair. No PA needed  For CPT code 02418 EEG Call ref # [id number].

## 2018-10-24 ENCOUNTER — HOSPITAL ENCOUNTER (EMERGENCY)
Age: 38
Discharge: HOME | End: 2018-10-25
Attending: EMERGENCY MEDICINE
Payer: COMMERCIAL

## 2018-10-24 ENCOUNTER — APPOINTMENT (OUTPATIENT)
Dept: CT IMAGING | Age: 38
End: 2018-10-24
Payer: COMMERCIAL

## 2018-10-24 DIAGNOSIS — G45.9 TIA (TRANSIENT ISCHEMIC ATTACK): Primary | ICD-10-CM

## 2018-10-24 DIAGNOSIS — F41.1 ANXIETY STATE: ICD-10-CM

## 2018-10-24 LAB
ANION GAP SERPL CALCULATED.3IONS-SCNC: 17 MMOL/L (ref 7–16)
BUN BLDV-MCNC: 9 MG/DL (ref 6–20)
CALCIUM SERPL-MCNC: 9.7 MG/DL (ref 8.6–10.2)
CHLORIDE BLD-SCNC: 106 MMOL/L (ref 98–107)
CO2: 20 MMOL/L (ref 22–29)
CREAT SERPL-MCNC: 0.8 MG/DL (ref 0.7–1.2)
EKG ATRIAL RATE: 98 BPM
EKG P AXIS: 56 DEGREES
EKG P-R INTERVAL: 226 MS
EKG Q-T INTERVAL: 356 MS
EKG QRS DURATION: 110 MS
EKG QTC CALCULATION (BAZETT): 454 MS
EKG R AXIS: -65 DEGREES
EKG T AXIS: 52 DEGREES
EKG VENTRICULAR RATE: 98 BPM
GFR AFRICAN AMERICAN: >60
GFR NON-AFRICAN AMERICAN: >60 ML/MIN/1.73
GLUCOSE BLD-MCNC: 144 MG/DL (ref 74–109)
HCT VFR BLD CALC: 45.9 % (ref 37–54)
HEMOGLOBIN: 15.8 G/DL (ref 12.5–16.5)
MAGNESIUM: 2.1 MG/DL (ref 1.6–2.6)
MCH RBC QN AUTO: 31.4 PG (ref 26–35)
MCHC RBC AUTO-ENTMCNC: 34.4 % (ref 32–34.5)
MCV RBC AUTO: 91.3 FL (ref 80–99.9)
METER GLUCOSE: 137 MG/DL (ref 70–110)
PDW BLD-RTO: 13.2 FL (ref 11.5–15)
PLATELET # BLD: 333 E9/L (ref 130–450)
PMV BLD AUTO: 10.3 FL (ref 7–12)
POTASSIUM SERPL-SCNC: 3.8 MMOL/L (ref 3.5–5)
RBC # BLD: 5.03 E12/L (ref 3.8–5.8)
SODIUM BLD-SCNC: 143 MMOL/L (ref 132–146)
TROPONIN: <0.01 NG/ML (ref 0–0.03)
WBC # BLD: 10.3 E9/L (ref 4.5–11.5)

## 2018-10-24 PROCEDURE — 99285 EMERGENCY DEPT VISIT HI MDM: CPT

## 2018-10-24 PROCEDURE — 83735 ASSAY OF MAGNESIUM: CPT

## 2018-10-24 PROCEDURE — 85027 COMPLETE CBC AUTOMATED: CPT

## 2018-10-24 PROCEDURE — 81003 URINALYSIS AUTO W/O SCOPE: CPT

## 2018-10-24 PROCEDURE — 93005 ELECTROCARDIOGRAM TRACING: CPT | Performed by: EMERGENCY MEDICINE

## 2018-10-24 PROCEDURE — 70450 CT HEAD/BRAIN W/O DYE: CPT

## 2018-10-24 PROCEDURE — G0480 DRUG TEST DEF 1-7 CLASSES: HCPCS

## 2018-10-24 PROCEDURE — 80048 BASIC METABOLIC PNL TOTAL CA: CPT

## 2018-10-24 PROCEDURE — 36415 COLL VENOUS BLD VENIPUNCTURE: CPT

## 2018-10-24 PROCEDURE — 80307 DRUG TEST PRSMV CHEM ANLYZR: CPT

## 2018-10-24 PROCEDURE — 84484 ASSAY OF TROPONIN QUANT: CPT

## 2018-10-24 PROCEDURE — 82962 GLUCOSE BLOOD TEST: CPT

## 2018-10-24 RX ORDER — MIRTAZAPINE 15 MG/1
15 TABLET, FILM COATED ORAL NIGHTLY
COMMUNITY
End: 2020-09-17 | Stop reason: SDUPTHER

## 2018-10-25 VITALS
BODY MASS INDEX: 36.54 KG/M2 | TEMPERATURE: 98 F | RESPIRATION RATE: 16 BRPM | SYSTOLIC BLOOD PRESSURE: 119 MMHG | OXYGEN SATURATION: 98 % | HEART RATE: 92 BPM | HEIGHT: 71 IN | DIASTOLIC BLOOD PRESSURE: 68 MMHG | WEIGHT: 261 LBS

## 2018-10-25 LAB
ACETAMINOPHEN LEVEL: <5 MCG/ML (ref 10–30)
AMPHETAMINE SCREEN, URINE: NOT DETECTED
BARBITURATE SCREEN URINE: NOT DETECTED
BENZODIAZEPINE SCREEN, URINE: POSITIVE
BILIRUBIN URINE: NEGATIVE
BLOOD, URINE: NEGATIVE
CANNABINOID SCREEN URINE: NOT DETECTED
CLARITY: CLEAR
COCAINE METABOLITE SCREEN URINE: NOT DETECTED
COLOR: YELLOW
ETHANOL: 36 MG/DL (ref 0–0.08)
GLUCOSE URINE: 100 MG/DL
KETONES, URINE: ABNORMAL MG/DL
LEUKOCYTE ESTERASE, URINE: NEGATIVE
METHADONE SCREEN, URINE: NOT DETECTED
NITRITE, URINE: NEGATIVE
OPIATE SCREEN URINE: NOT DETECTED
PH UA: 6 (ref 5–9)
PHENCYCLIDINE SCREEN URINE: NOT DETECTED
PROPOXYPHENE SCREEN: NOT DETECTED
PROTEIN UA: NEGATIVE MG/DL
SALICYLATE, SERUM: <0.3 MG/DL (ref 0–30)
SPECIFIC GRAVITY UA: 1.01 (ref 1–1.03)
TRICYCLIC ANTIDEPRESSANTS SCREEN SERUM: NEGATIVE NG/ML
UROBILINOGEN, URINE: 1 E.U./DL

## 2018-10-25 PROCEDURE — 96374 THER/PROPH/DIAG INJ IV PUSH: CPT

## 2018-10-25 PROCEDURE — 6360000002 HC RX W HCPCS: Performed by: EMERGENCY MEDICINE

## 2018-10-25 RX ORDER — ACETAMINOPHEN 500 MG
TABLET ORAL
Status: DISCONTINUED
Start: 2018-10-25 | End: 2018-10-25 | Stop reason: WASHOUT

## 2018-10-25 RX ORDER — LORAZEPAM 2 MG/ML
1 INJECTION INTRAMUSCULAR ONCE
Status: COMPLETED | OUTPATIENT
Start: 2018-10-25 | End: 2018-10-25

## 2018-10-25 RX ADMIN — LORAZEPAM 1 MG: 2 INJECTION INTRAMUSCULAR; INTRAVENOUS at 01:05

## 2018-10-29 LAB
7-AMINOCLONAZEPAM, URINE: >1000 NG/ML
ALPHA-HYDROXYALPRAZOLAM, URINE: <5 NG/ML
ALPHA-HYDROXYMIDAZOLAM, URINE: <20 NG/ML
ALPRAZOLAM, URINE: <5 NG/ML
CHLORDIAZEPOXIDE, URINE: <20 NG/ML
CLONAZEPAM, URINE: 40 NG/ML
DIAZEPAM, URINE: <20 NG/ML
LORAZEPAM, URINE: <20 NG/ML
MIDAZOLAM, URINE: <20 NG/ML
NORDIAZEPAM, URINE: <20 NG/ML
OXAZEPAM, URINE: <20 NG/ML
TEMAZEPAM, URINE: <20 NG/ML

## 2018-11-14 ENCOUNTER — HOSPITAL ENCOUNTER (OUTPATIENT)
Dept: NEUROLOGY | Age: 38
Discharge: HOME | End: 2018-11-14
Payer: COMMERCIAL

## 2018-11-16 ENCOUNTER — HOSPITAL ENCOUNTER (OUTPATIENT)
Dept: NEUROLOGY | Age: 38
Discharge: HOME | End: 2018-11-16
Payer: COMMERCIAL

## 2018-11-16 PROCEDURE — 95950 PR AMBULATORY EEG MONITORING: CPT | Performed by: PSYCHIATRY & NEUROLOGY

## 2019-01-08 PROBLEM — F90.1 ATTENTION DEFICIT HYPERACTIVITY DISORDER (ADHD), PREDOMINANTLY HYPERACTIVE TYPE: Status: ACTIVE | Noted: 2019-01-08

## 2019-01-21 ENCOUNTER — OFFICE VISIT (OUTPATIENT)
Dept: NEUROLOGY | Age: 39
End: 2019-01-21
Payer: COMMERCIAL

## 2019-01-21 VITALS
HEART RATE: 95 BPM | TEMPERATURE: 97.4 F | OXYGEN SATURATION: 94 % | DIASTOLIC BLOOD PRESSURE: 76 MMHG | HEIGHT: 70 IN | BODY MASS INDEX: 38.65 KG/M2 | SYSTOLIC BLOOD PRESSURE: 119 MMHG | WEIGHT: 270 LBS | RESPIRATION RATE: 14 BRPM

## 2019-01-21 DIAGNOSIS — F44.5 PSYCHOGENIC NONEPILEPTIC SEIZURE: Primary | ICD-10-CM

## 2019-01-21 DIAGNOSIS — G43.109 MIGRAINE AURA WITHOUT HEADACHE: ICD-10-CM

## 2019-01-21 DIAGNOSIS — G25.0 ESSENTIAL TREMOR: ICD-10-CM

## 2019-01-21 DIAGNOSIS — G43.109 MIGRAINE WITH AURA AND WITHOUT STATUS MIGRAINOSUS, NOT INTRACTABLE: ICD-10-CM

## 2019-01-21 PROBLEM — R29.90 STROKE-LIKE SYMPTOMS: Status: ACTIVE | Noted: 2018-08-14

## 2019-01-21 PROCEDURE — 1036F TOBACCO NON-USER: CPT | Performed by: NURSE PRACTITIONER

## 2019-01-21 PROCEDURE — G8427 DOCREV CUR MEDS BY ELIG CLIN: HCPCS | Performed by: NURSE PRACTITIONER

## 2019-01-21 PROCEDURE — G8484 FLU IMMUNIZE NO ADMIN: HCPCS | Performed by: NURSE PRACTITIONER

## 2019-01-21 PROCEDURE — G8417 CALC BMI ABV UP PARAM F/U: HCPCS | Performed by: NURSE PRACTITIONER

## 2019-01-21 PROCEDURE — 99214 OFFICE O/P EST MOD 30 MIN: CPT | Performed by: NURSE PRACTITIONER

## 2019-01-21 RX ORDER — ZONISAMIDE 50 MG/1
100 CAPSULE ORAL DAILY
Qty: 60 CAPSULE | Refills: 3 | Status: SHIPPED
Start: 2019-01-21 | End: 2019-01-29 | Stop reason: SDUPTHER

## 2019-01-29 ENCOUNTER — TELEPHONE (OUTPATIENT)
Dept: NEUROLOGY | Age: 39
End: 2019-01-29

## 2019-01-29 DIAGNOSIS — G43.109 MIGRAINE WITH AURA AND WITHOUT STATUS MIGRAINOSUS, NOT INTRACTABLE: Primary | ICD-10-CM

## 2019-01-29 RX ORDER — ZONISAMIDE 50 MG/1
100 CAPSULE ORAL DAILY
Qty: 180 CAPSULE | Refills: 3 | Status: SHIPPED | OUTPATIENT
Start: 2019-01-29 | End: 2020-01-07 | Stop reason: SDUPTHER

## 2019-03-21 ENCOUNTER — TELEPHONE (OUTPATIENT)
Dept: NEUROLOGY | Age: 39
End: 2019-03-21

## 2019-05-22 ENCOUNTER — OFFICE VISIT (OUTPATIENT)
Dept: CARDIOLOGY CLINIC | Age: 39
End: 2019-05-22
Payer: COMMERCIAL

## 2019-05-22 VITALS
SYSTOLIC BLOOD PRESSURE: 120 MMHG | HEIGHT: 70 IN | WEIGHT: 259.8 LBS | RESPIRATION RATE: 16 BRPM | DIASTOLIC BLOOD PRESSURE: 82 MMHG | BODY MASS INDEX: 37.19 KG/M2 | HEART RATE: 89 BPM

## 2019-05-22 DIAGNOSIS — I47.20 VENTRICULAR TACHYCARDIA: Primary | ICD-10-CM

## 2019-05-22 DIAGNOSIS — E78.00 PURE HYPERCHOLESTEROLEMIA: Primary | ICD-10-CM

## 2019-05-22 DIAGNOSIS — R06.09 EXERTIONAL DYSPNEA: ICD-10-CM

## 2019-05-22 PROCEDURE — 93000 ELECTROCARDIOGRAM COMPLETE: CPT | Performed by: INTERNAL MEDICINE

## 2019-05-22 PROCEDURE — G8417 CALC BMI ABV UP PARAM F/U: HCPCS | Performed by: INTERNAL MEDICINE

## 2019-05-22 PROCEDURE — 99213 OFFICE O/P EST LOW 20 MIN: CPT | Performed by: INTERNAL MEDICINE

## 2019-05-22 PROCEDURE — G8427 DOCREV CUR MEDS BY ELIG CLIN: HCPCS | Performed by: INTERNAL MEDICINE

## 2019-05-22 PROCEDURE — 1036F TOBACCO NON-USER: CPT | Performed by: INTERNAL MEDICINE

## 2019-05-22 NOTE — PATIENT INSTRUCTIONS
· Will schedule for an echo to evaluate exertional fatigue and dyspnea  · Will get lipid panel results from Dr. Faviola Storey office and will decide about the statin therapy. Ideally, he should be on statin because of DM. · Continue current medications  · Follow up with Dr. Onel Betancur for pacer evaluation. · Follow up in 2 months.

## 2019-05-22 NOTE — PROGRESS NOTES
OUTPATIENT CARDIOLOGY FOLLOW-UP    Name: Arturo Nye    Age: 34 y.o. Primary Care Physician: Bonnie Wick MD    Date of Service: 5/22/2019    Chief Complaint:   Chief Complaint   Patient presents with    Follow-up    Tachycardia       Interim History:     He/She was seen in the office   , since his/her last visit, he has not had any ER visits or hospitalizations. He is compliant with medications, as well as salt and fluid intake. He does not take any over-the-counter arthritis medications. No new cardiac complaints since last cardiology evaluation. He/She denies recent chest pain, SOB, palpitations, lightheadedness, dizziness, syncope, PND, or orthopnea. SR on EKG. Review of Systems:   Cardiac: As per HPI  General: No fever, chills  Pulmonary: As per HPI  HEENT: No visual disturbances, difficult swallowing  GI: No nausea, vomiting  Endocrine: No thyroid disease or DM  Musculoskeletal: MAGALLON x 4, no focal motor deficits  Skin: Intact, no rashes  Neuro/Psych: No headache or seizures    Past Medical History:  Past Medical History:   Diagnosis Date    Anxiety     Arthritis     First degree AV block     GERD (gastroesophageal reflux disease)     Hyperlipidemia     IBS (irritable bowel syndrome)     LVH (left ventricular hypertrophy)     Movement disorder     Obesity     ENOC (obstructive sleep apnea)     Psychiatric problem     Seizures (HCC)     Stroke-like symptoms 08/14/2018    Type 2 diabetes mellitus with complication, without long-term current use of insulin ([facility] [address]        Past Surgical History:  Past Surgical History:   Procedure Laterality Date    HERNIA REPAIR      INSERTABLE CARDIAC MONITOR  07/14/2017    KNEE SURGERY      PACEMAKER INSERTION  12/28/2017    D-PPM   ([employer])   [address] [facility]    TONSILLECTOMY         Family History:  History reviewed. No pertinent family history.     Social History:  Social History     Socioeconomic History    Marital status: Single     Spouse name: TROPONINI <0.01 10/24/2018    TROPONINI <0.01 09/11/2018    TROPONINI <0.01 08/15/2018     Lab Results   Component Value Date    INR 1.1 09/11/2018    INR 1.2 12/28/2017    INR 1.1 07/14/2017    PROTIME 12.5 (H) 09/11/2018    PROTIME 13.6 (H) 12/28/2017    PROTIME 12.1 07/14/2017     Lab Results   Component Value Date    TSH 2.550 09/06/2017     Lab Results   Component Value Date    LABA1C 6.6 04/30/2019     No results found for: EAG  Lab Results   Component Value Date    CHOL 204 (H) 08/15/2018     Lab Results   Component Value Date    TRIG 106 08/15/2018     Lab Results   Component Value Date    HDL 41 08/15/2018     Lab Results   Component Value Date    LDLCALC 142 (H) 08/15/2018     Lab Results   Component Value Date    LABVLDL 21 08/15/2018     No results found for: CHOLHDLRATIO    Cardiac Tests:  ECG:     Echocardiogram:       Stress test:        Cardiac catheterization:     The 10-year ASCVD risk score (Olive Wisdom et al., 2013) is: 2.6%    Values used to calculate the score:      Age: 34 years      Sex: Male      Is Non- : No      Diabetic: Yes      Tobacco smoker: No      Systolic Blood Pressure: 404 mmHg      Is BP treated: No      HDL Cholesterol: 41 mg/dL      Total Cholesterol: 204 mg/dL        ASSESSMENT:  · Exertional fatigue - diastolic dysfunction vs. metoprolol     Plan:   · Will schedule for an echo to evaluate exertional fatigue and dyspnea  · Will get lipid panel results from Dr. Merritt Comment office and will decide about the statin therapy. Ideally, he should be on statin because of DM. · Continue current medications  · Follow up with Dr. Cleve Renner for pacer evaluation. · Follow up in 2 months. The patient's current medication list, allergies, problem list and results of all previously ordered testing were reviewed at today's visit.   Eric Jeffery MD  [facility]) Cardiology

## 2019-05-22 NOTE — PROGRESS NOTES
OUTPATIENT CARDIOLOGY FOLLOW-UP    Name: Cory Ahn    Age: 34 y.o. Primary Care Physician: Walter Perez MD    Date of Service: 5/22/2019    Chief Complaint:   Chief Complaint   Patient presents with    Follow-up    Tachycardia       Interim History:   Mr. Danelle Ellis is a 75-year-old gentleman with history of vasodepressive syncope, ventricular tachycardia, moderate obesity and was following with Dr. Cami Lopes for his cardiology needs. In addition, he also has history of hyperlipidemia, irritable bowel syndrome, obstructive sleep apnea seizure disorder and anxiety. He is here for follow-up visit and does not want to follow-up with Dr. Cami Lopes. He told me that he had 2 sleep studies and was told that he does not have any sleep apnea. He still getting intermittent midsternal chest pains mostly after eating and never with exertion. He is complaining of fatigue and getting tired faster on walking with occasional exertional dyspnea. Denies any exertional chest pain, palpitations, syncope or presyncope. He had a stress test in November 2017 and that was normal. He denies orthopnea or paroxysmal nocturnal dyspnea. He had a lipid panel done in August 2018 which showed a total cholesterol of 204, , HDL 41 and triglycerides 106. He denies any focal weakness or numbness. He is compliant with medications, as well as salt and fluid intake. He does not take any over-the-counter arthritis medications. No new cardiac complaints since last cardiology evaluation. He denies recent palpitations, lightheadedness, dizziness, syncope, PND, or orthopnea. SR on EKG.     Review of Systems:   Cardiac: As per HPI  General: No fever, chills  Pulmonary: As per HPI  HEENT: No visual disturbances, difficult swallowing  GI: No nausea, vomiting  Endocrine: No thyroid disease or DM  Musculoskeletal: MAGALLON x 4, no focal motor deficits  Skin: Intact, no rashes  Neuro/Psych: No headache or seizures    Past Medical History:  Past Medical History:   Diagnosis Date    Anxiety     Arthritis     First degree AV block     GERD (gastroesophageal reflux disease)     Hyperlipidemia     IBS (irritable bowel syndrome)     LVH (left ventricular hypertrophy)     Movement disorder     Obesity     ENOC (obstructive sleep apnea)     Psychiatric problem     Seizures (HCC)     Stroke-like symptoms 08/14/2018    Type 2 diabetes mellitus with complication, without long-term current use of insulin ([facility] [address]        Past Surgical History:  Past Surgical History:   Procedure Laterality Date    HERNIA REPAIR      INSERTABLE CARDIAC MONITOR  07/14/2017    KNEE SURGERY      PACEMAKER INSERTION  12/28/2017    D-PPM   ([employer])   [address] [facility]    TONSILLECTOMY         Family History:  History reviewed. No pertinent family history. Social History:  Social History     Socioeconomic History    Marital status: Single     Spouse name: Not on file    Number of children: Not on file    Years of education: Not on file    Highest education level: Not on file   Occupational History    Not on file   Social Needs    Financial resource strain: Not on file    Food insecurity:     Worry: Not on file     Inability: Not on file    Transportation needs:     Medical: Not on file     Non-medical: Not on file   Tobacco Use    Smoking status: Never Smoker    Smokeless tobacco: Never Used   Substance and Sexual Activity    Alcohol use:  Yes     Alcohol/week: 0.6 oz     Types: 1 Shots of liquor per week     Comment: one drink nightly before bed    Drug use: No    Sexual activity: Not on file   Lifestyle    Physical activity:     Days per week: Not on file     Minutes per session: Not on file    Stress: Not on file   Relationships    Social connections:     Talks on phone: Not on file     Gets together: Not on file     Attends Anabaptism service: Not on file     Active member of club or organization: Not on file     Attends meetings of clubs or organizations: Not on file Relationship status: Not on file    Intimate partner violence:     Fear of current or ex partner: Not on file     Emotionally abused: Not on file     Physically abused: Not on file     Forced sexual activity: Not on file   Other Topics Concern    Not on file   Social History Narrative    Not on file       Allergies:  No Known Allergies    Current Medications:  Current Outpatient Medications   Medication Sig Dispense Refill    metoprolol succinate (TOPROL XL) 100 MG extended release tablet TAKE 1 TABLET BY MOUTH TWICE DAILY WITH MEALS 60 tablet 0    lactulose (CHRONULAC) 10 GM/15ML solution Take 15 mLs by mouth 3 times daily 946 mL 1    amphetamine-dextroamphetamine (ADDERALL, 10MG,) 10 MG tablet Take 1 tablet by mouth 2 times daily for 30 days. 60 tablet 0    metFORMIN (GLUCOPHAGE-XR) 750 MG extended release tablet Take 1 tablet by mouth daily (with breakfast) 90 tablet 1    zonisamide (ZONEGRAN) 50 MG capsule Take 2 capsules by mouth daily 180 capsule 3    mirtazapine (REMERON) 15 MG tablet Take 15 mg by mouth nightly      FLUoxetine (PROZAC) 20 MG capsule Take 20 mg by mouth daily      clonazePAM (KLONOPIN) 1 MG tablet Take 1 tablet by mouth 2 times daily as needed for Anxiety 30 tablet 0     No current facility-administered medications for this visit. Physical Exam:  /82   Pulse 89   Resp 16   Ht 5' 10\" (1.778 m)   Wt 259 lb 12.8 oz (117.8 kg)   BMI 37.28 kg/m²   Wt Readings from Last 3 Encounters:   05/22/19 259 lb 12.8 oz (117.8 kg)   05/14/19 262 lb (118.8 kg)   04/02/19 259 lb (117.5 kg)     Appearance: Awake, alert and oriented x 3, no acute respiratory distress, he is obese. Skin: Intact, no rash  Head: Normocephalic, atraumatic  Eyes: EOMI, no conjunctival erythema  ENMT: No pharyngeal erythema, MMM, no rhinorrhea  Neck: Supple, no elevated JVP, no carotid bruits  Lungs: Clear to auscultation bilaterally. No wheezes, rales, or rhonchi.   Cardiac: Regular rate and rhythm, +S1S2, systole.   Overall ejection fraction is low normal .       Stress test-12/2/2017:    Impression:    1. Exercise EKG was normal.    2. The patient experienced no chest pain with exercise. 3. Knutson treadmill score was 7 implying low risk of acute ischemic   events.    4. Exercise capacity was average. Cardiac catheterization:     The 10-year ASCVD risk score (Jennifer Cortez, et al., 2013) is: 2.6%    Values used to calculate the score:      Age: 34 years      Sex: Male      Is Non- : No      Diabetic: Yes      Tobacco smoker: No      Systolic Blood Pressure: 698 mmHg      Is BP treated: No      HDL Cholesterol: 41 mg/dL      Total Cholesterol: 204 mg/dL        ASSESSMENT:  · Exertional fatigue - diastolic dysfunction vs. metoprolol   · Type II diabetes  · Nonanginal chest pains. · History of vasodepressor syncope   · History of for sinus node dysfunction and AV block on loop recorder, status post permanent DDD pacemaker implantation - 12/28/20107   · Obesity  · Anxiety disorder  · Hyperlipidemia  · Obstructive sleep apnea       Plan:   · Will schedule for an echo to evaluate exertional fatigue and dyspnea  · Will get lipid panel results from Dr. Natalia Hartman office and will decide about the statin therapy. Ideally, he should be on statin because of DM. · Continue current medications  · Follow up with Dr. Bing Bloch for pacer evaluation. · Follow up in 2 months. The patient's current medication list, allergies, problem list and results of all previously ordered testing were reviewed at today's visit.   Judge Madeleine MD  [facility]) Cardiology

## 2019-05-28 ENCOUNTER — HOSPITAL ENCOUNTER (OUTPATIENT)
Age: 39
Discharge: HOME | End: 2019-05-28
Payer: COMMERCIAL

## 2019-05-28 DIAGNOSIS — E78.00 PURE HYPERCHOLESTEROLEMIA: ICD-10-CM

## 2019-05-28 LAB
ALBUMIN SERPL-MCNC: 4.4 G/DL (ref 3.5–5.2)
ALP BLD-CCNC: 93 U/L (ref 40–129)
ALT SERPL-CCNC: 31 U/L (ref 0–40)
AST SERPL-CCNC: 20 U/L (ref 0–39)
BILIRUB SERPL-MCNC: 0.6 MG/DL (ref 0–1.2)
BILIRUBIN DIRECT: 0.2 MG/DL (ref 0–0.3)
BILIRUBIN, INDIRECT: 0.4 MG/DL (ref 0–1)
CHOLESTEROL, TOTAL: 203 MG/DL (ref 0–199)
HDLC SERPL-MCNC: 43 MG/DL
LDL CHOLESTEROL CALCULATED: 142 MG/DL (ref 0–99)
TOTAL PROTEIN: 7.6 G/DL (ref 6.4–8.3)
TRIGL SERPL-MCNC: 89 MG/DL (ref 0–149)
VLDLC SERPL CALC-MCNC: 18 MG/DL

## 2019-05-28 PROCEDURE — 80061 LIPID PANEL: CPT

## 2019-05-28 PROCEDURE — 80076 HEPATIC FUNCTION PANEL: CPT

## 2019-05-28 PROCEDURE — 36415 COLL VENOUS BLD VENIPUNCTURE: CPT

## 2019-06-04 ENCOUNTER — TELEPHONE (OUTPATIENT)
Dept: CARDIOLOGY | Age: 39
End: 2019-06-04

## 2019-06-04 PROBLEM — E72.20 HYPERAMMONEMIA (HCC): Status: ACTIVE | Noted: 2019-06-04

## 2019-06-11 ENCOUNTER — HOSPITAL ENCOUNTER (OUTPATIENT)
Dept: ULTRASOUND IMAGING | Age: 39
Discharge: HOME | End: 2019-06-13
Payer: COMMERCIAL

## 2019-06-11 DIAGNOSIS — E72.20 HYPERAMMONEMIA (HCC): ICD-10-CM

## 2019-06-11 PROCEDURE — 76705 ECHO EXAM OF ABDOMEN: CPT

## 2019-07-09 PROBLEM — K76.0 FATTY LIVER: Status: ACTIVE | Noted: 2019-07-09

## 2019-07-24 ENCOUNTER — HOSPITAL ENCOUNTER (OUTPATIENT)
Dept: CARDIOLOGY | Age: 39
Discharge: HOME | End: 2019-07-24
Payer: COMMERCIAL

## 2019-07-24 DIAGNOSIS — R06.00 DYSPNEA, UNSPECIFIED TYPE: Primary | ICD-10-CM

## 2019-07-24 LAB
LV EF: 58 %
LVEF MODALITY: NORMAL

## 2019-07-24 PROCEDURE — 93306 TTE W/DOPPLER COMPLETE: CPT | Performed by: PSYCHIATRY & NEUROLOGY

## 2019-07-26 ENCOUNTER — HOSPITAL ENCOUNTER (OUTPATIENT)
Age: 39
Discharge: HOME | End: 2019-07-26
Payer: COMMERCIAL

## 2019-07-26 ENCOUNTER — TELEPHONE (OUTPATIENT)
Dept: CARDIOLOGY CLINIC | Age: 39
End: 2019-07-26

## 2019-07-26 LAB
ALBUMIN SERPL-MCNC: 4.3 G/DL (ref 3.5–5.2)
ALP BLD-CCNC: 94 U/L (ref 40–129)
ALT SERPL-CCNC: 36 U/L (ref 0–40)
ANION GAP SERPL CALCULATED.3IONS-SCNC: 12 MMOL/L (ref 7–16)
AST SERPL-CCNC: 20 U/L (ref 0–39)
BASOPHILS ABSOLUTE: 0.06 E9/L (ref 0–0.2)
BASOPHILS RELATIVE PERCENT: 0.6 % (ref 0–2)
BILIRUB SERPL-MCNC: 0.7 MG/DL (ref 0–1.2)
BUN BLDV-MCNC: 7 MG/DL (ref 6–20)
CALCIUM SERPL-MCNC: 9.7 MG/DL (ref 8.6–10.2)
CHLORIDE BLD-SCNC: 103 MMOL/L (ref 98–107)
CO2: 22 MMOL/L (ref 22–29)
CREAT SERPL-MCNC: 0.8 MG/DL (ref 0.7–1.2)
EOSINOPHILS ABSOLUTE: 0.29 E9/L (ref 0.05–0.5)
EOSINOPHILS RELATIVE PERCENT: 2.7 % (ref 0–6)
FERRITIN: 178 NG/ML
GFR AFRICAN AMERICAN: >60
GFR NON-AFRICAN AMERICAN: >60 ML/MIN/1.73
GLUCOSE BLD-MCNC: 100 MG/DL (ref 74–99)
HCT VFR BLD CALC: 47.3 % (ref 37–54)
HEMOGLOBIN: 15.5 G/DL (ref 12.5–16.5)
IMMATURE GRANULOCYTES #: 0.08 E9/L
IMMATURE GRANULOCYTES %: 0.7 % (ref 0–5)
IRON SATURATION: 23 % (ref 20–55)
IRON: 76 MCG/DL (ref 59–158)
LYMPHOCYTES ABSOLUTE: 3.09 E9/L (ref 1.5–4)
LYMPHOCYTES RELATIVE PERCENT: 28.9 % (ref 20–42)
MCH RBC QN AUTO: 29.3 PG (ref 26–35)
MCHC RBC AUTO-ENTMCNC: 32.8 % (ref 32–34.5)
MCV RBC AUTO: 89.4 FL (ref 80–99.9)
MONOCYTES ABSOLUTE: 0.53 E9/L (ref 0.1–0.95)
MONOCYTES RELATIVE PERCENT: 4.9 % (ref 2–12)
NEUTROPHILS ABSOLUTE: 6.66 E9/L (ref 1.8–7.3)
NEUTROPHILS RELATIVE PERCENT: 62.2 % (ref 43–80)
PDW BLD-RTO: 14.4 FL (ref 11.5–15)
PLATELET # BLD: 308 E9/L (ref 130–450)
PMV BLD AUTO: 10.2 FL (ref 7–12)
POTASSIUM SERPL-SCNC: 4.3 MMOL/L (ref 3.5–5)
RBC # BLD: 5.29 E12/L (ref 3.8–5.8)
SODIUM BLD-SCNC: 137 MMOL/L (ref 132–146)
TOTAL IRON BINDING CAPACITY: 324 MCG/DL (ref 250–450)
TOTAL PROTEIN: 7.6 G/DL (ref 6.4–8.3)
TSH SERPL DL<=0.05 MIU/L-ACNC: 4 UIU/ML (ref 0.27–4.2)
WBC # BLD: 10.7 E9/L (ref 4.5–11.5)

## 2019-07-26 PROCEDURE — 80053 COMPREHEN METABOLIC PANEL: CPT

## 2019-07-26 PROCEDURE — 36415 COLL VENOUS BLD VENIPUNCTURE: CPT

## 2019-07-26 PROCEDURE — 84443 ASSAY THYROID STIM HORMONE: CPT

## 2019-07-26 PROCEDURE — 83550 IRON BINDING TEST: CPT

## 2019-07-26 PROCEDURE — 82728 ASSAY OF FERRITIN: CPT

## 2019-07-26 PROCEDURE — 83540 ASSAY OF IRON: CPT

## 2019-07-26 PROCEDURE — 85025 COMPLETE CBC W/AUTO DIFF WBC: CPT

## 2019-08-22 ENCOUNTER — HOSPITAL ENCOUNTER (OUTPATIENT)
Age: 39
Discharge: HOME | End: 2019-08-22
Payer: COMMERCIAL

## 2019-08-22 LAB — AMMONIA: 70.8 UMOL/L (ref 16–60)

## 2019-08-22 PROCEDURE — 82140 ASSAY OF AMMONIA: CPT

## 2019-08-22 PROCEDURE — 36415 COLL VENOUS BLD VENIPUNCTURE: CPT

## 2019-09-04 ENCOUNTER — OFFICE VISIT (OUTPATIENT)
Dept: NEUROLOGY | Age: 39
End: 2019-09-04
Payer: COMMERCIAL

## 2019-09-04 VITALS
HEART RATE: 104 BPM | SYSTOLIC BLOOD PRESSURE: 121 MMHG | TEMPERATURE: 97.7 F | WEIGHT: 255 LBS | RESPIRATION RATE: 12 BRPM | DIASTOLIC BLOOD PRESSURE: 78 MMHG | OXYGEN SATURATION: 95 % | BODY MASS INDEX: 36.51 KG/M2 | HEIGHT: 70 IN

## 2019-09-04 DIAGNOSIS — G43.109 MIGRAINE WITH AURA AND WITHOUT STATUS MIGRAINOSUS, NOT INTRACTABLE: ICD-10-CM

## 2019-09-04 DIAGNOSIS — G25.0 ESSENTIAL TREMOR: ICD-10-CM

## 2019-09-04 DIAGNOSIS — G43.109 MIGRAINE AURA WITHOUT HEADACHE: ICD-10-CM

## 2019-09-04 DIAGNOSIS — F44.5 PSYCHOGENIC NONEPILEPTIC SEIZURE: Primary | ICD-10-CM

## 2019-09-04 PROBLEM — R29.90 STROKE-LIKE SYMPTOMS: Status: RESOLVED | Noted: 2018-08-14 | Resolved: 2019-09-04

## 2019-09-04 PROCEDURE — 1036F TOBACCO NON-USER: CPT | Performed by: NURSE PRACTITIONER

## 2019-09-04 PROCEDURE — G8417 CALC BMI ABV UP PARAM F/U: HCPCS | Performed by: NURSE PRACTITIONER

## 2019-09-04 PROCEDURE — 99213 OFFICE O/P EST LOW 20 MIN: CPT | Performed by: NURSE PRACTITIONER

## 2019-09-04 PROCEDURE — G8427 DOCREV CUR MEDS BY ELIG CLIN: HCPCS | Performed by: NURSE PRACTITIONER

## 2019-09-04 SDOH — HEALTH STABILITY: MENTAL HEALTH: HOW OFTEN DO YOU HAVE A DRINK CONTAINING ALCOHOL?: MONTHLY OR LESS

## 2019-09-04 NOTE — PATIENT INSTRUCTIONS
Patient Education        NO DRIVING UNTIL CLEARED BY NEUROLOGY            Migraine Headache: Care Instructions  Your Care Instructions  Migraines are painful, throbbing headaches that often start on one side of the head. They may cause nausea and vomiting and make you sensitive to light, sound, or smell. Without treatment, migraines can last from 4 hours to a few days. Medicines can help prevent migraines or stop them after they have started. Your doctor can help you find which ones work best for you. Follow-up care is a key part of your treatment and safety. Be sure to make and go to all appointments, and call your doctor if you are having problems. It's also a good idea to know your test results and keep a list of the medicines you take. How can you care for yourself at home? · Do not drive if you have taken a prescription pain medicine. · Rest in a quiet, dark room until your headache is gone. Close your eyes, and try to relax or go to sleep. Don't watch TV or read. · Put a cold, moist cloth or cold pack on the painful area for 10 to 20 minutes at a time. Put a thin cloth between the cold pack and your skin. · Use a warm, moist towel or a heating pad set on low to relax tight shoulder and neck muscles. · Have someone gently massage your neck and shoulders. · Take your medicines exactly as prescribed. Call your doctor if you think you are having a problem with your medicine. You will get more details on the specific medicines your doctor prescribes. · Be careful not to take pain medicine more often than the instructions allow. You could get worse or more frequent headaches when the medicine wears off. To prevent migraines  · Keep a headache diary so you can figure out what triggers your headaches. Avoiding triggers may help you prevent headaches. Record when each headache began, how long it lasted, and what the pain was like.  (Was it throbbing, aching, stabbing, or dull?) Write down any other symptoms have a lot of stress, mental illness, or emotional trauma may be more likely to have PNES. Even though PNES doesn't have a physical cause, it is a real condition. The seizures can be scary. And not knowing why you're having them can be frustrating. What happens during PNES? PNES may look like epileptic seizures. But epileptic seizures usually follow the same pattern every time. With PNES, each episode may be different. During a PNES episode, you may have jerky movements, tingling skin, or problems with coordination. You may notice changes in your vision or sense of smell. Some people have episodes often. Others have them only once in a while. For some people, episodes stop over time. Other people keep having them. How is PNES diagnosed? Your doctor will do tests to find out if you have epilepsy. An EEG test lets your doctor see the electrical activity of your brain. The test is often used to diagnose epilepsy. It helps your doctor know what types of seizures you are having. Your doctor also may do blood tests. PNES can be mistaken for epilepsy at first. As a result, some people with PNES are treated with epilepsy medicines. But most of the time, these medicines don't help. The right diagnosis allows your doctor to give you treatments that will help with the stress and other issues that may be related to PNES. How is PNES treated? Treatment varies with each person. The goals of treatment are to relieve stress and to help you learn ways to cope with difficult areas of your life. You may get medicines or counseling. A type of counseling called cognitive-behavioral therapy (CBT) may help with the stress and emotional issues. In CBT, you learn to identify and change thinking styles that may be adding to your stress. CBT is done by licensed mental health providers, such as psychologists, social workers, and therapists. It can be done in one-on-one sessions or in a group setting.   Care at home  Lowering your stress may help with PNES. Here are some things you can do. How to relax your mind  · Write. It may help to write about things that are bothering you. This helps you find out how much stress you feel and what is causing it. When you know this, you can find better ways to cope. · Let your feelings out. Talk, laugh, cry, and express anger when you need to. Talking with friends, family, a counselor, or a member of the clergy about your feelings is a healthy way to relieve stress. · Do something you enjoy. For example, listen to music or go to a movie. Practice your hobby or do volunteer work. · Meditate. This can help you relax, because you are not worrying about what happened before or what may happen in the future. · Do guided imagery. Imagine yourself in any setting that helps you feel calm. You can use audiotapes, books, or a teacher to guide you. How to relax your body  · Do something active. Exercise or activity can help reduce stress. Walking is a great way to get started. Even everyday activities such as housecleaning or yard work can help. · Do breathing exercises. For example:  ? From a standing position, bend forward from the waist with your knees slightly bent. Let your arms dangle close to the floor. ? Breathe in slowly and deeply as you return to a standing position. Roll up slowly, and lift your head last.  ? Hold your breath for just a few seconds in the standing position. ? Breathe out slowly and bend forward from the waist.  · Try yoga or jeff chi. These techniques combine exercise and meditation. You may need some training at first to learn them. Talk to your doctor about whether it is safe to do certain activities, such as drive or swim. Follow-up care is a key part of your treatment and safety. Be sure to make and go to all appointments, and call your doctor if you are having problems. It's also a good idea to know your test results and keep a list of the medicines you take.   Where can

## 2019-09-11 ENCOUNTER — OFFICE VISIT (OUTPATIENT)
Dept: CARDIOLOGY CLINIC | Age: 39
End: 2019-09-11
Payer: COMMERCIAL

## 2019-09-11 VITALS
HEIGHT: 70 IN | DIASTOLIC BLOOD PRESSURE: 78 MMHG | SYSTOLIC BLOOD PRESSURE: 118 MMHG | RESPIRATION RATE: 16 BRPM | HEART RATE: 78 BPM | WEIGHT: 256.5 LBS | BODY MASS INDEX: 36.72 KG/M2

## 2019-09-11 DIAGNOSIS — I47.20 VENTRICULAR TACHYCARDIA: Primary | ICD-10-CM

## 2019-09-11 PROCEDURE — 1036F TOBACCO NON-USER: CPT | Performed by: INTERNAL MEDICINE

## 2019-09-11 PROCEDURE — 93000 ELECTROCARDIOGRAM COMPLETE: CPT | Performed by: INTERNAL MEDICINE

## 2019-09-11 PROCEDURE — G8417 CALC BMI ABV UP PARAM F/U: HCPCS | Performed by: INTERNAL MEDICINE

## 2019-09-11 PROCEDURE — 99213 OFFICE O/P EST LOW 20 MIN: CPT | Performed by: INTERNAL MEDICINE

## 2019-09-11 PROCEDURE — G8427 DOCREV CUR MEDS BY ELIG CLIN: HCPCS | Performed by: INTERNAL MEDICINE

## 2019-09-11 NOTE — PROGRESS NOTES
OUTPATIENT CARDIOLOGY FOLLOW-UP    Name: Mane Aguilar    Age: 34 y.o. Primary Care Physician: Fredis Chahal MD    Date of Service: 9/11/2019    Chief Complaint:   Chief Complaint   Patient presents with    Tachycardia    Follow-up       Interim History:   Mr. Oneal Knox is a 53-year-old gentleman with history of vasodepressive syncope, ventricular tachycardia, moderate obesity and was following with Dr. Kalee Lane for his cardiology needs and now he changed service with me. In addition, he also has history of hyperlipidemia, irritable bowel syndrome, obstructive sleep apnea seizure disorder and anxiety. He is here for follow-up visit. He told me that he had 2 sleep studies and was told that he does not have any sleep apnea. He denies any further episodes chest pain. He is complaining of fatigue and getting tired faster on walking with occasional exertional dyspnea. Denies any exertional chest pain, palpitations, syncope or presyncope. He had a stress test in November 2017 and that was normal. He denies orthopnea or paroxysmal nocturnal dyspnea. He denies any focal weakness or numbness. He is compliant with medications, as well as salt and fluid intake. He does not take any over-the-counter arthritis medications. He was seen in the office on 5/22/2019, since his last visit, he has not had any ER visits or hospitalizations. No new cardiac complaints since last cardiology evaluation. He denies recent palpitations, lightheadedness, dizziness, syncope, PND, or orthopnea. SR on EKG.     Review of Systems:   Cardiac: As per HPI  General: No fever, chills  Pulmonary: As per HPI  HEENT: No visual disturbances, difficult swallowing  GI: No nausea, vomiting  Endocrine: No thyroid disease or DM  Musculoskeletal: MAGALLON x 4, no focal motor deficits  Skin: Intact, no rashes  Neuro/Psych: No headache or seizures    Past Medical History:  Past Medical History:   Diagnosis Date    Anxiety     Arthritis     First degree +bowel sounds  Extremities: Moves all extremities x 4, no lower extremity edema  Neurologic: No focal motor deficits apparent, normal mood and affect, alert and oriented x 3  Peripheral Pulses: Intact posterior tibial pulses bilaterally    Laboratory Tests:  Lab Results   Component Value Date    CREATININE 0.8 07/26/2019    BUN 7 07/26/2019     07/26/2019    K 4.3 07/26/2019     07/26/2019    CO2 22 07/26/2019     Lab Results   Component Value Date    MG 2.1 10/24/2018     Lab Results   Component Value Date    WBC 10.7 07/26/2019    HGB 15.5 07/26/2019    HCT 47.3 07/26/2019    MCV 89.4 07/26/2019     07/26/2019     Lab Results   Component Value Date    ALT 36 07/26/2019    AST 20 07/26/2019    ALKPHOS 94 07/26/2019    BILITOT 0.7 07/26/2019     Lab Results   Component Value Date    CKTOTAL 188 06/27/2017    CKMB 3.4 04/19/2016    TROPONINI <0.01 10/24/2018    TROPONINI <0.01 09/11/2018    TROPONINI <0.01 08/15/2018     Lab Results   Component Value Date    INR 1.1 09/11/2018    INR 1.2 12/28/2017    INR 1.1 07/14/2017    PROTIME 12.5 (H) 09/11/2018    PROTIME 13.6 (H) 12/28/2017    PROTIME 12.1 07/14/2017     Lab Results   Component Value Date    TSH 4.000 07/26/2019     Lab Results   Component Value Date    LABA1C 7.4 07/09/2019     No results found for: EAG  Lab Results   Component Value Date    CHOL 203 (H) 05/28/2019    CHOL 204 (H) 08/15/2018     Lab Results   Component Value Date    TRIG 89 05/28/2019    TRIG 106 08/15/2018     Lab Results   Component Value Date    HDL 43 05/28/2019    HDL 41 08/15/2018     Lab Results   Component Value Date    LDLCALC 142 (H) 05/28/2019    LDLCALC 142 (H) 08/15/2018     Lab Results   Component Value Date    LABVLDL 18 05/28/2019    LABVLDL 21 08/15/2018     No results found for: CHOLHDLRATIO    Cardiac Tests:  ECG: NSR, incomplete right bundle branch block, left anterior fascicular block, left atrial enlargement, abnormal EKG.  No of all previously ordered testing were reviewed at today's visit.   Ousmane Orellana MD  [facility]) Cardiology

## 2019-09-11 NOTE — PATIENT INSTRUCTIONS
· Echo results discussed with him . · Lipid results discussed with the patient and his ASCVD score is 5.3%. D/w him about statin therapy and he would like to wait for now and would like to try on his diet for now. · Continue current medications  · Follow up with Dr. Kiah Lafleur for pacer evaluation as scheduled. · Follow up in 6 months.

## 2019-09-19 ENCOUNTER — HOSPITAL ENCOUNTER (OUTPATIENT)
Age: 39
Discharge: HOME | End: 2019-09-19
Payer: COMMERCIAL

## 2019-09-19 LAB — AMMONIA: 52.2 UMOL/L (ref 16–60)

## 2019-09-19 PROCEDURE — 36415 COLL VENOUS BLD VENIPUNCTURE: CPT

## 2019-09-19 PROCEDURE — 82140 ASSAY OF AMMONIA: CPT

## 2019-10-10 PROBLEM — R56.9 SEIZURE (HCC): Status: ACTIVE | Noted: 2019-10-10

## 2019-10-24 ENCOUNTER — HOSPITAL ENCOUNTER (OUTPATIENT)
Age: 39
Discharge: HOME | End: 2019-10-24
Payer: COMMERCIAL

## 2019-10-24 LAB — AMMONIA: 69 UMOL/L (ref 16–60)

## 2019-10-24 PROCEDURE — 82140 ASSAY OF AMMONIA: CPT

## 2019-10-24 PROCEDURE — 36415 COLL VENOUS BLD VENIPUNCTURE: CPT

## 2019-12-03 ENCOUNTER — HOSPITAL ENCOUNTER (OUTPATIENT)
Age: 39
Discharge: HOME | End: 2019-12-03
Payer: COMMERCIAL

## 2019-12-03 LAB — AMMONIA: 42.9 UMOL/L (ref 16–60)

## 2019-12-03 PROCEDURE — 36415 COLL VENOUS BLD VENIPUNCTURE: CPT

## 2019-12-03 PROCEDURE — 82140 ASSAY OF AMMONIA: CPT

## 2020-01-17 RX ORDER — ZONISAMIDE 50 MG/1
100 CAPSULE ORAL NIGHTLY
Qty: 180 CAPSULE | Refills: 3 | Status: SHIPPED | OUTPATIENT
Start: 2020-01-17 | End: 2020-07-01 | Stop reason: SDUPTHER

## 2020-02-09 ENCOUNTER — HOSPITAL ENCOUNTER (EMERGENCY)
Age: 40
Discharge: HOME | End: 2020-02-09
Attending: EMERGENCY MEDICINE
Payer: COMMERCIAL

## 2020-02-09 ENCOUNTER — APPOINTMENT (OUTPATIENT)
Dept: CT IMAGING | Age: 40
End: 2020-02-09
Payer: COMMERCIAL

## 2020-02-09 ENCOUNTER — APPOINTMENT (OUTPATIENT)
Dept: GENERAL RADIOLOGY | Age: 40
End: 2020-02-09
Payer: COMMERCIAL

## 2020-02-09 VITALS
BODY MASS INDEX: 35.7 KG/M2 | WEIGHT: 255 LBS | OXYGEN SATURATION: 95 % | TEMPERATURE: 97.8 F | SYSTOLIC BLOOD PRESSURE: 134 MMHG | RESPIRATION RATE: 16 BRPM | DIASTOLIC BLOOD PRESSURE: 77 MMHG | HEIGHT: 71 IN | HEART RATE: 85 BPM

## 2020-02-09 LAB
ACETAMINOPHEN LEVEL: <5 MCG/ML (ref 10–30)
ALBUMIN SERPL-MCNC: 4.3 G/DL (ref 3.5–5.2)
ALP BLD-CCNC: 75 U/L (ref 40–129)
ALT SERPL-CCNC: 37 U/L (ref 0–40)
AMMONIA: 59.9 UMOL/L (ref 16–60)
ANION GAP SERPL CALCULATED.3IONS-SCNC: 10 MMOL/L (ref 7–16)
AST SERPL-CCNC: 25 U/L (ref 0–39)
BASOPHILS ABSOLUTE: 0.06 E9/L (ref 0–0.2)
BASOPHILS RELATIVE PERCENT: 0.5 % (ref 0–2)
BILIRUB SERPL-MCNC: 0.7 MG/DL (ref 0–1.2)
BILIRUBIN DIRECT: <0.2 MG/DL (ref 0–0.3)
BILIRUBIN, INDIRECT: NORMAL MG/DL (ref 0–1)
BUN BLDV-MCNC: 12 MG/DL (ref 6–20)
CALCIUM SERPL-MCNC: 9.5 MG/DL (ref 8.6–10.2)
CHLORIDE BLD-SCNC: 102 MMOL/L (ref 98–107)
CO2: 22 MMOL/L (ref 22–29)
CREAT SERPL-MCNC: 0.8 MG/DL (ref 0.7–1.2)
EOSINOPHILS ABSOLUTE: 0.43 E9/L (ref 0.05–0.5)
EOSINOPHILS RELATIVE PERCENT: 3.5 % (ref 0–6)
ETHANOL: <10 MG/DL (ref 0–0.08)
GFR AFRICAN AMERICAN: >60
GFR NON-AFRICAN AMERICAN: >60 ML/MIN/1.73
GLUCOSE BLD-MCNC: 86 MG/DL (ref 74–99)
HCT VFR BLD CALC: 47.7 % (ref 37–54)
HEMOGLOBIN: 15.9 G/DL (ref 12.5–16.5)
IMMATURE GRANULOCYTES #: 0.09 E9/L
IMMATURE GRANULOCYTES %: 0.7 % (ref 0–5)
LYMPHOCYTES ABSOLUTE: 3.12 E9/L (ref 1.5–4)
LYMPHOCYTES RELATIVE PERCENT: 25.6 % (ref 20–42)
MCH RBC QN AUTO: 29.8 PG (ref 26–35)
MCHC RBC AUTO-ENTMCNC: 33.3 % (ref 32–34.5)
MCV RBC AUTO: 89.5 FL (ref 80–99.9)
MONOCYTES ABSOLUTE: 1.08 E9/L (ref 0.1–0.95)
MONOCYTES RELATIVE PERCENT: 8.9 % (ref 2–12)
NEUTROPHILS ABSOLUTE: 7.41 E9/L (ref 1.8–7.3)
NEUTROPHILS RELATIVE PERCENT: 60.8 % (ref 43–80)
PDW BLD-RTO: 14.1 FL (ref 11.5–15)
PLATELET # BLD: 348 E9/L (ref 130–450)
PMV BLD AUTO: 10 FL (ref 7–12)
POTASSIUM SERPL-SCNC: 3.9 MMOL/L (ref 3.5–5)
RBC # BLD: 5.33 E12/L (ref 3.8–5.8)
SALICYLATE, SERUM: <0.3 MG/DL (ref 0–30)
SODIUM BLD-SCNC: 134 MMOL/L (ref 132–146)
TOTAL PROTEIN: 7.4 G/DL (ref 6.4–8.3)
TRICYCLIC ANTIDEPRESSANTS SCREEN SERUM: NEGATIVE NG/ML
TROPONIN: <0.01 NG/ML (ref 0–0.03)
WBC # BLD: 12.2 E9/L (ref 4.5–11.5)

## 2020-02-09 PROCEDURE — G0480 DRUG TEST DEF 1-7 CLASSES: HCPCS

## 2020-02-09 PROCEDURE — 80307 DRUG TEST PRSMV CHEM ANLYZR: CPT

## 2020-02-09 PROCEDURE — 80048 BASIC METABOLIC PNL TOTAL CA: CPT

## 2020-02-09 PROCEDURE — 85025 COMPLETE CBC W/AUTO DIFF WBC: CPT

## 2020-02-09 PROCEDURE — 82140 ASSAY OF AMMONIA: CPT

## 2020-02-09 PROCEDURE — 84484 ASSAY OF TROPONIN QUANT: CPT

## 2020-02-09 PROCEDURE — 80076 HEPATIC FUNCTION PANEL: CPT

## 2020-02-09 PROCEDURE — 70450 CT HEAD/BRAIN W/O DYE: CPT

## 2020-02-09 PROCEDURE — 93005 ELECTROCARDIOGRAM TRACING: CPT | Performed by: EMERGENCY MEDICINE

## 2020-02-09 PROCEDURE — 99285 EMERGENCY DEPT VISIT HI MDM: CPT

## 2020-02-09 PROCEDURE — 71045 X-RAY EXAM CHEST 1 VIEW: CPT

## 2020-02-09 ASSESSMENT — ENCOUNTER SYMPTOMS
BLURRED VISION: 0
WHEEZING: 0
EYE REDNESS: 0
DIARRHEA: 0
SHORTNESS OF BREATH: 0
NAUSEA: 0
SINUS PRESSURE: 0
COUGH: 0
EYE PAIN: 0
VOMITING: 0
BACK PAIN: 0
DIFFICULTY BREATHING: 0
ABDOMINAL PAIN: 0
EYE DISCHARGE: 0
DOUBLE VISION: 0
SORE THROAT: 0

## 2020-02-09 NOTE — ED PROVIDER NOTES
E9/L    MPV 10.0 7.0 - 12.0 fL    Neutrophils % 60.8 43.0 - 80.0 %    Immature Granulocytes % 0.7 0.0 - 5.0 %    Lymphocytes % 25.6 20.0 - 42.0 %    Monocytes % 8.9 2.0 - 12.0 %    Eosinophils % 3.5 0.0 - 6.0 %    Basophils % 0.5 0.0 - 2.0 %    Neutrophils Absolute 7.41 (H) 1.80 - 7.30 E9/L    Immature Granulocytes # 0.09 E9/L    Lymphocytes Absolute 3.12 1.50 - 4.00 E9/L    Monocytes Absolute 1.08 (H) 0.10 - 0.95 E9/L    Eosinophils Absolute 0.43 0.05 - 0.50 E9/L    Basophils Absolute 0.06 0.00 - 0.20 P5/I   Basic Metabolic Panel   Result Value Ref Range    Sodium 134 132 - 146 mmol/L    Potassium 3.9 3.5 - 5.0 mmol/L    Chloride 102 98 - 107 mmol/L    CO2 22 22 - 29 mmol/L    Anion Gap 10 7 - 16 mmol/L    Glucose 86 74 - 99 mg/dL    BUN 12 6 - 20 mg/dL    CREATININE 0.8 0.7 - 1.2 mg/dL    GFR Non-African American >60 >=60 mL/min/1.73    GFR African American >60     Calcium 9.5 8.6 - 10.2 mg/dL   Hepatic Function Panel   Result Value Ref Range    Total Protein 7.4 6.4 - 8.3 g/dL    Alb 4.3 3.5 - 5.2 g/dL    Alkaline Phosphatase 75 40 - 129 U/L    ALT 37 0 - 40 U/L    AST 25 0 - 39 U/L    Total Bilirubin 0.7 0.0 - 1.2 mg/dL    Bilirubin, Direct <0.2 0.0 - 0.3 mg/dL    Bilirubin, Indirect see below 0.0 - 1.0 mg/dL   Troponin   Result Value Ref Range    Troponin <0.01 0.00 - 0.03 ng/mL   Serum Drug Screen   Result Value Ref Range    Ethanol Lvl <10 mg/dL    Acetaminophen Level <5.0 (L) 10.0 - 48.1 mcg/mL    Salicylate, Serum <4.8 0.0 - 30.0 mg/dL   Ammonia   Result Value Ref Range    Ammonia 59.9 16.0 - 60.0 umol/L   EKG 12 Lead   Result Value Ref Range    Ventricular Rate 90 BPM    Atrial Rate 90 BPM    P-R Interval 210 ms    QRS Duration 118 ms    Q-T Interval 354 ms    QTc Calculation (Bazett) 433 ms    P Axis 57 degrees    R Axis -63 degrees    T Axis 20 degrees       Radiology:  XR CHEST PORTABLE   Final Result   No definite evidence of acute pathology in this patient with a   left-sided bipolar pacemaker. 2:28 PM          Diagnosis:  1. Transient alteration of awareness    2. Closed head injury, initial encounter        Disposition:  Patient's disposition: Discharge to home  Patient's condition is stable.            Jolene Waddell DO  02/09/20 1524

## 2020-02-10 LAB
EKG ATRIAL RATE: 90 BPM
EKG P AXIS: 57 DEGREES
EKG P-R INTERVAL: 210 MS
EKG Q-T INTERVAL: 354 MS
EKG QRS DURATION: 118 MS
EKG QTC CALCULATION (BAZETT): 433 MS
EKG R AXIS: -63 DEGREES
EKG T AXIS: 20 DEGREES
EKG VENTRICULAR RATE: 90 BPM

## 2020-02-10 PROCEDURE — 93010 ELECTROCARDIOGRAM REPORT: CPT | Performed by: INTERNAL MEDICINE

## 2020-03-13 ENCOUNTER — HOSPITAL ENCOUNTER (OUTPATIENT)
Age: 40
Discharge: HOME | End: 2020-03-13
Payer: COMMERCIAL

## 2020-03-13 LAB
ALBUMIN SERPL-MCNC: 4.3 G/DL (ref 3.5–5.2)
ALP BLD-CCNC: 74 U/L (ref 40–129)
ALT SERPL-CCNC: 44 U/L (ref 0–40)
AMMONIA: 76.5 UMOL/L (ref 16–60)
ANION GAP SERPL CALCULATED.3IONS-SCNC: 12 MMOL/L (ref 7–16)
AST SERPL-CCNC: 27 U/L (ref 0–39)
BASOPHILS ABSOLUTE: 0.05 E9/L (ref 0–0.2)
BASOPHILS RELATIVE PERCENT: 0.5 % (ref 0–2)
BILIRUB SERPL-MCNC: 0.8 MG/DL (ref 0–1.2)
BUN BLDV-MCNC: 7 MG/DL (ref 6–20)
CALCIUM SERPL-MCNC: 9.1 MG/DL (ref 8.6–10.2)
CHLORIDE BLD-SCNC: 103 MMOL/L (ref 98–107)
CO2: 21 MMOL/L (ref 22–29)
CREAT SERPL-MCNC: 0.8 MG/DL (ref 0.7–1.2)
EOSINOPHILS ABSOLUTE: 0.4 E9/L (ref 0.05–0.5)
EOSINOPHILS RELATIVE PERCENT: 4.1 % (ref 0–6)
GFR AFRICAN AMERICAN: >60
GFR NON-AFRICAN AMERICAN: >60 ML/MIN/1.73
GLUCOSE BLD-MCNC: 202 MG/DL (ref 74–99)
HCT VFR BLD CALC: 47.7 % (ref 37–54)
HEMOGLOBIN: 16.1 G/DL (ref 12.5–16.5)
IMMATURE GRANULOCYTES #: 0.07 E9/L
IMMATURE GRANULOCYTES %: 0.7 % (ref 0–5)
LYMPHOCYTES ABSOLUTE: 2.35 E9/L (ref 1.5–4)
LYMPHOCYTES RELATIVE PERCENT: 24.2 % (ref 20–42)
MCH RBC QN AUTO: 30.3 PG (ref 26–35)
MCHC RBC AUTO-ENTMCNC: 33.8 % (ref 32–34.5)
MCV RBC AUTO: 89.7 FL (ref 80–99.9)
MONOCYTES ABSOLUTE: 0.57 E9/L (ref 0.1–0.95)
MONOCYTES RELATIVE PERCENT: 5.9 % (ref 2–12)
NEUTROPHILS ABSOLUTE: 6.29 E9/L (ref 1.8–7.3)
NEUTROPHILS RELATIVE PERCENT: 64.6 % (ref 43–80)
PDW BLD-RTO: 13.9 FL (ref 11.5–15)
PLATELET # BLD: 307 E9/L (ref 130–450)
PMV BLD AUTO: 10.2 FL (ref 7–12)
POTASSIUM SERPL-SCNC: 3.7 MMOL/L (ref 3.5–5)
RBC # BLD: 5.32 E12/L (ref 3.8–5.8)
SODIUM BLD-SCNC: 136 MMOL/L (ref 132–146)
TOTAL PROTEIN: 7.4 G/DL (ref 6.4–8.3)
WBC # BLD: 9.7 E9/L (ref 4.5–11.5)

## 2020-03-13 PROCEDURE — 85025 COMPLETE CBC W/AUTO DIFF WBC: CPT

## 2020-03-13 PROCEDURE — 80053 COMPREHEN METABOLIC PANEL: CPT

## 2020-03-13 PROCEDURE — 36415 COLL VENOUS BLD VENIPUNCTURE: CPT

## 2020-03-13 PROCEDURE — 82140 ASSAY OF AMMONIA: CPT

## 2020-04-01 ENCOUNTER — TELEPHONE (OUTPATIENT)
Dept: CARDIOLOGY CLINIC | Age: 40
End: 2020-04-01

## 2020-04-06 ENCOUNTER — VIRTUAL VISIT (OUTPATIENT)
Dept: CARDIOLOGY CLINIC | Age: 40
End: 2020-04-06
Payer: COMMERCIAL

## 2020-04-06 VITALS
HEART RATE: 76 BPM | BODY MASS INDEX: 36.51 KG/M2 | WEIGHT: 255 LBS | HEIGHT: 70 IN | DIASTOLIC BLOOD PRESSURE: 62 MMHG | SYSTOLIC BLOOD PRESSURE: 106 MMHG

## 2020-04-06 PROCEDURE — 99442 PR PHYS/QHP TELEPHONE EVALUATION 11-20 MIN: CPT | Performed by: INTERNAL MEDICINE

## 2020-04-06 NOTE — PROGRESS NOTES
Nguyen Leblanc is a 37 y.o. male evaluated via telephone on 4/6/2020. Consent:  He and/or health care decision maker is aware that that he may receive a bill for this telephone service, depending on his insurance coverage, and has provided verbal consent to proceed: Yes      Documentation:  I communicated with the patient and/or health care decision maker about non anginal chest pain and syncope. He is feeling good without further episodes of chest pain or syncope. He is going to see his PCP next week and getting lab work for cholesterol. He also went to see Dr. Keily Longoria for pacer evaluation and was told that he does not have issues with his pacer. Blood pressure 106/62, pulse 76, height 5' 10\" (1.778 m), weight 255 lb (115.7 kg). Details of this discussion including any medical advice provided:  · Continue metoprolol  · Lab work as scheduled   · Follow up with me in 6 months. I affirm this is a Patient Initiated Episode with an Established Patient who has not had a related appointment within my department in the past 7 days or scheduled within the next 24 hours.     Total Time: minutes: 11-20 minutes    Note: not billable if this call serves to triage the patient into an appointment for the relevant concern      Flaquita Jimenes

## 2020-05-14 ENCOUNTER — TELEMEDICINE (OUTPATIENT)
Dept: NEUROLOGY | Age: 40
End: 2020-05-14
Payer: COMMERCIAL

## 2020-05-14 PROBLEM — E72.20 HYPERAMMONEMIA (HCC): Status: RESOLVED | Noted: 2019-06-04 | Resolved: 2020-05-14

## 2020-05-14 PROBLEM — R56.9 SEIZURE (HCC): Status: RESOLVED | Noted: 2019-10-10 | Resolved: 2020-05-14

## 2020-05-14 PROCEDURE — 99213 OFFICE O/P EST LOW 20 MIN: CPT | Performed by: NURSE PRACTITIONER

## 2020-05-14 PROCEDURE — G8427 DOCREV CUR MEDS BY ELIG CLIN: HCPCS | Performed by: NURSE PRACTITIONER

## 2020-05-14 NOTE — PATIENT INSTRUCTIONS
Patient Education        Migraine Headache: Care Instructions  Your Care Instructions  Migraines are painful, throbbing headaches that often start on one side of the head. They may cause nausea and vomiting and make you sensitive to light, sound, or smell. Without treatment, migraines can last from 4 hours to a few days. Medicines can help prevent migraines or stop them after they have started. Your doctor can help you find which ones work best for you. Follow-up care is a key part of your treatment and safety. Be sure to make and go to all appointments, and call your doctor if you are having problems. It's also a good idea to know your test results and keep a list of the medicines you take. How can you care for yourself at home? · Do not drive if you have taken a prescription pain medicine. · Rest in a quiet, dark room until your headache is gone. Close your eyes, and try to relax or go to sleep. Don't watch TV or read. · Put a cold, moist cloth or cold pack on the painful area for 10 to 20 minutes at a time. Put a thin cloth between the cold pack and your skin. · Use a warm, moist towel or a heating pad set on low to relax tight shoulder and neck muscles. · Have someone gently massage your neck and shoulders. · Take your medicines exactly as prescribed. Call your doctor if you think you are having a problem with your medicine. You will get more details on the specific medicines your doctor prescribes. · Be careful not to take pain medicine more often than the instructions allow. You could get worse or more frequent headaches when the medicine wears off. To prevent migraines  · Keep a headache diary so you can figure out what triggers your headaches. Avoiding triggers may help you prevent headaches. Record when each headache began, how long it lasted, and what the pain was like.  (Was it throbbing, aching, stabbing, or dull?) Write down any other symptoms you had with the headache, such as nausea, good.  · If you are taking birth control pills or hormone therapy, talk to your doctor about whether they are triggering your migraines. When should you call for help? Call 911 anytime you think you may need emergency care. For example, call if:    · You have signs of a stroke. These may include:  ? Sudden numbness, paralysis, or weakness in your face, arm, or leg, especially on only one side of your body. ? Sudden vision changes. ? Sudden trouble speaking. ? Sudden confusion or trouble understanding simple statements. ? Sudden problems with walking or balance. ? A sudden, severe headache that is different from past headaches.    Call your doctor now or seek immediate medical care if:    · You have new or worse nausea and vomiting.     · You have a new or higher fever.     · Your headache gets much worse.    Watch closely for changes in your health, and be sure to contact your doctor if:    · You are not getting better after 2 days (48 hours). Where can you learn more? Go to [email or web address].[email or web address] org and sign in to your [employer] account. Enter M929 in the [employer] box to learn more about \"Migraine Headache: Care Instructions. \"     If you do not have an account, please click on the \"Sign Up Now\" link. Current as of: November 19, 2019Content Version: 12.4  © 4627-6349 Healthwise, Incorporated. Care instructions adapted under license by [facility] ([facility]). If you have questions about a medical condition or this instruction, always ask your healthcare professional. [place] [address] any warranty or liability for your use of this information. Patient Education        Benign Essential Tremor: Care Instructions  Your Care Instructions    Benign essential tremor is a medical term for shaking that you can't control. Your hand or fingers may shake when you lift a cup or point at something. Or your voice may shake when you speak. This type of tremor is not harmful. more?  Go to https://chpepiceweb.[email or web address] org and sign in to your [employer]le[employer] account. Enter B746 in the [facility] box to learn more about \"Benign Essential Tremor: Care Instructions. \"     If you do not have an account, please click on the \"Sign Up Now\" link. Current as of: November 19, 2019Content Version: 12.4  © 7217-7805 Healthwise, Incorporated. Care instructions adapted under license by [facility] ([facility]). If you have questions about a medical condition or this instruction, always ask your healthcare professional. [address] any warranty or liability for your use of this information.

## 2020-05-14 NOTE — PROGRESS NOTES
[facility]. Tremayne Juárez M.D., F.A.C.P. Isabel Mejia, PRINCE, APRN, CNS  Susiejes Rose. Dimas Fletcher, MSN, APRN-FNP-C  Sophy Vernon MSN, APRN, FNP-C  Ollie GAR, BRET  [address] MSN, APRN, FNP-C  [address], [facility]  [place], [address] [place]  Phone: [phone]  Fax: [phone]    TeleMedicine Patient Consent    This visit was performed as a virtual video visit using a synchronous, two-way, audio-video telehealth technology platform. Patient identification was verified at the start of the visit, including the patient's telephone number and physical location. I discussed with the patient the nature of our telehealth visits, that:     1. Due to the nature of an audio- video modality, the only components of a physical exam that could be done are the elements supported by direct observation. 2. I would evaluate the patient and recommend diagnostics and treatments based on my assessment. 3. If it was felt that the patient should be evaluated in clinic or an emergency room setting, then they would be directed there. 4. Our sessions are not being recorded and that personal health information is protected. 5. Our team would provide follow up care in person if/when the patient needs it. Patient does agree to proceed with telemedicine consultation. Patient's location: home address in [facility]  Physician  location home address in [facility] other people involved in call--none      Time spent: Greater than Not billed by time    This visit was completed virtually using Doxy. me    TORIBIO     Serafin Gimenez a 37 y.o. 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     He admits to a stressful period of time in the fall regarding the death of a loved one, and states he was having increased falls and weakness during that time. No seizure-like spells. Prozac was increased which was effective. Migraines have been well controlled on Zonegran 100 mg daily. He will begin to feel a migraine aura when he is overstimulated, such as in areas of bright lights and loud noises, he then removes himself from the situations before developing the headache. He is now following with GI for his fluctuating ammonia levels, with no definitive cause found. He is on rifaximin. He did have one episode of transient confusion in February, felt to possibly be a REM sleep disturbance. He feels his depression and anxiety are well controlled. No cardiac issues. Remains on Adderall 10 mg twice daily-- no worsening of his essential tremor. Diabetes very well controlled with hemoglobin A1c 6.7. He has been observing the CDC guidelines regarding social distancing and denies any fever or respiratory symptoms    No chest pain or palpitations   No SOB  No incontinence of bowels or bladder  No itching or bruising appreciated  No focal limb weakness or paresthesias  No speech or swallowing troubles    ROS otherwise negative     Current Outpatient Medications   Medication Sig Dispense Refill    lactulose (CHRONULAC) 10 GM/15ML solution take 15 ml by mouth 3 times a day 946 mL 0    metFORMIN (GLUCOPHAGE-XR) 750 MG extended release tablet TAKE ONE TABLET BY MOUTH DAILY WITH BREAKFAST 30 tablet 1    amphetamine-dextroamphetamine (ADDERALL, 10MG,) 10 MG tablet Take 1 tablet by mouth 2 times daily for 30 days. Earliest fill date:12/18/19 60 tablet 0    atorvastatin (LIPITOR) 40 MG tablet Take 1 tablet by mouth daily 30 tablet 3    medical marijuana Take by mouth as needed.       FLUoxetine (PROZAC) 20 MG capsule Take 2 capsules by mouth daily 60 capsule 0    rifaximin (XIFAXAN) 550 MG tablet Take 550 mg by mouth 2 times daily      zonisamide (ZONEGRAN) 50 MG capsule Take 2 capsules by mouth nightly 180 capsule 3    metoprolol succinate (TOPROL XL)

## 2020-06-11 NOTE — TELEPHONE ENCOUNTER
?? Periodic paralysis---is very rare----I must see the pt first. Thanks
MA spoke with pt who is agreeable to see Dr. Bear Jesus. Pt was scheduled for Dr. Bear Jesus' first available on 1/28/18 at 10:00am but was informed that MA and also Reed Stinson RN will let pt know of first available cancellation with Dr. Bear Jesus. Pt is currently off of work and can come any day or time.   Electronically signed by Osvaldo Villalta on 9/20/18 at 1:31 PM
Received a call from Dr. Gisela Cardoso to discuss patient--he expressed concerns about possible periodic paralysis/Jelani's syndrome and inquired about additional testing with EMG. Will discuss care with Dr. Rj Reynolds and team and update him on our thoughts.
Yes - the patient is able to be screened

## 2020-06-15 ENCOUNTER — OFFICE VISIT (OUTPATIENT)
Dept: PRIMARY CARE CLINIC | Age: 40
End: 2020-06-15
Payer: COMMERCIAL

## 2020-06-15 VITALS
HEIGHT: 70 IN | RESPIRATION RATE: 16 BRPM | HEART RATE: 97 BPM | TEMPERATURE: 97.3 F | BODY MASS INDEX: 35.93 KG/M2 | SYSTOLIC BLOOD PRESSURE: 110 MMHG | WEIGHT: 251 LBS | OXYGEN SATURATION: 98 % | DIASTOLIC BLOOD PRESSURE: 70 MMHG

## 2020-06-15 PROCEDURE — 2022F DILAT RTA XM EVC RTNOPTHY: CPT | Performed by: FAMILY MEDICINE

## 2020-06-15 PROCEDURE — G8427 DOCREV CUR MEDS BY ELIG CLIN: HCPCS | Performed by: FAMILY MEDICINE

## 2020-06-15 PROCEDURE — G8417 CALC BMI ABV UP PARAM F/U: HCPCS | Performed by: FAMILY MEDICINE

## 2020-06-15 PROCEDURE — 3044F HG A1C LEVEL LT 7.0%: CPT | Performed by: FAMILY MEDICINE

## 2020-06-15 PROCEDURE — 1036F TOBACCO NON-USER: CPT | Performed by: FAMILY MEDICINE

## 2020-06-15 PROCEDURE — 99214 OFFICE O/P EST MOD 30 MIN: CPT | Performed by: FAMILY MEDICINE

## 2020-06-15 RX ORDER — DEXTROAMPHETAMINE SACCHARATE, AMPHETAMINE ASPARTATE, DEXTROAMPHETAMINE SULFATE AND AMPHETAMINE SULFATE 2.5; 2.5; 2.5; 2.5 MG/1; MG/1; MG/1; MG/1
10 TABLET ORAL 2 TIMES DAILY
Qty: 60 TABLET | Refills: 0 | Status: SHIPPED
Start: 2020-07-14 | End: 2020-08-24 | Stop reason: SDUPTHER

## 2020-06-15 RX ORDER — DEXTROAMPHETAMINE SACCHARATE, AMPHETAMINE ASPARTATE, DEXTROAMPHETAMINE SULFATE AND AMPHETAMINE SULFATE 2.5; 2.5; 2.5; 2.5 MG/1; MG/1; MG/1; MG/1
10 TABLET ORAL 2 TIMES DAILY
Qty: 30 TABLET | Refills: 0 | Status: SHIPPED
Start: 2020-08-13 | End: 2020-07-16 | Stop reason: SDUPTHER

## 2020-06-15 RX ORDER — DESONIDE 0.5 MG/G
CREAM TOPICAL
Qty: 60 G | Refills: 0 | Status: SHIPPED | OUTPATIENT
Start: 2020-06-15

## 2020-06-15 RX ORDER — DEXTROAMPHETAMINE SACCHARATE, AMPHETAMINE ASPARTATE, DEXTROAMPHETAMINE SULFATE AND AMPHETAMINE SULFATE 2.5; 2.5; 2.5; 2.5 MG/1; MG/1; MG/1; MG/1
10 TABLET ORAL 2 TIMES DAILY
Qty: 60 TABLET | Refills: 0 | Status: SHIPPED
Start: 2020-06-15 | End: 2020-07-16

## 2020-06-15 RX ORDER — METRONIDAZOLE 7.5 MG/G
GEL TOPICAL
Qty: 45 G | Refills: 0 | Status: SHIPPED | OUTPATIENT
Start: 2020-06-15

## 2020-06-15 ASSESSMENT — ENCOUNTER SYMPTOMS
DIARRHEA: 0
BLOOD IN STOOL: 0
SHORTNESS OF BREATH: 0
CONSTIPATION: 0
ABDOMINAL PAIN: 0
PHOTOPHOBIA: 0
VOMITING: 0
ABDOMINAL DISTENTION: 1
SORE THROAT: 0
NAUSEA: 0
COUGH: 0
BACK PAIN: 1

## 2020-06-15 NOTE — PROGRESS NOTES
capsules by mouth daily Yes Shabbir Villanueva MD   rifaximin (XIFAXAN) 550 MG tablet Take 550 mg by mouth 2 times daily Yes Historical Provider, MD   zonisamide (ZONEGRAN) 50 MG capsule Take 2 capsules by mouth nightly Yes NADIA Stevens CNP   metoprolol succinate (TOPROL XL) 100 MG extended release tablet TAKE 1 TABLET BY MOUTH TWICE DAILY WITH MEALS Yes Shabbir Villanueva MD   mirtazapine (REMERON) 15 MG tablet Take 15 mg by mouth nightly Yes Historical Provider, MD   clonazePAM (KLONOPIN) 1 MG tablet Take 1 tablet by mouth 2 times daily as needed for Anxiety Yes Shabbir Villanueva MD        Allergies   Allergen Reactions    Fish-Derived Products        Past Medical History:   Diagnosis Date    Anxiety     Arthritis     First degree AV block     GERD (gastroesophageal reflux disease)     Hyperlipidemia     IBS (irritable bowel syndrome)     LVH (left ventricular hypertrophy)     Obesity     ENOC (obstructive sleep apnea)     Psychiatric problem     Psychogenic nonepileptic seizure     Stroke-like symptoms 08/14/2018    Type 2 diabetes mellitus with complication, without long-term current use of insulin ([facility] [address]        Past Surgical History:   Procedure Laterality Date    HERNIA REPAIR      INSERTABLE CARDIAC MONITOR  07/14/2017    KNEE SURGERY      PACEMAKER INSERTION  12/28/2017    D-PPM   ([employer])   Virlinda Nett    TONSILLECTOMY         Social History     Socioeconomic History    Marital status: Single     Spouse name: Not on file    Number of children: Not on file    Years of education: Not on file    Highest education level: Not on file   Occupational History    Not on file   Social Needs    Financial resource strain: Not on file    Food insecurity     Worry: Not on file     Inability: Not on file    Transportation needs     Medical: Not on file     Non-medical: Not on file   Tobacco Use    Smoking status: Never Smoker    Smokeless tobacco: Never Used   Substance and Sexual Activity   

## 2020-07-01 RX ORDER — ZONISAMIDE 50 MG/1
100 CAPSULE ORAL NIGHTLY
Qty: 180 CAPSULE | Refills: 3 | Status: SHIPPED
Start: 2020-07-01 | End: 2021-06-24

## 2020-07-07 ENCOUNTER — TELEPHONE (OUTPATIENT)
Dept: PRIMARY CARE CLINIC | Age: 40
End: 2020-07-07

## 2020-07-07 RX ORDER — METFORMIN HYDROCHLORIDE 750 MG/1
750 TABLET, EXTENDED RELEASE ORAL
Qty: 90 TABLET | Refills: 3 | Status: SHIPPED
Start: 2020-07-07 | End: 2021-04-08 | Stop reason: SDUPTHER

## 2020-07-07 RX ORDER — ATORVASTATIN CALCIUM 40 MG/1
40 TABLET, FILM COATED ORAL DAILY
Qty: 90 TABLET | Refills: 3 | Status: SHIPPED
Start: 2020-07-07 | End: 2021-08-02

## 2020-07-07 RX ORDER — METOPROLOL SUCCINATE 100 MG/1
TABLET, EXTENDED RELEASE ORAL
Qty: 180 TABLET | Refills: 3 | Status: SHIPPED
Start: 2020-07-07 | End: 2021-06-28

## 2020-07-07 NOTE — TELEPHONE ENCOUNTER
Patient called and stated that he was on metformin, metoprolol and atorvastatin prescribed from his previous doctor. He stated these were not refilled at his new patient appointment. Patient doesn't know if he should keep taking these. If patient should remain on these they will need sent to the pharmacy as he is almost out. Do you want to continue these? Please advise.

## 2020-07-15 ENCOUNTER — HOSPITAL ENCOUNTER (EMERGENCY)
Age: 40
Discharge: LEFT BEFORE DISCHARGE | End: 2020-07-15
Attending: EMERGENCY MEDICINE
Payer: COMMERCIAL

## 2020-07-15 ENCOUNTER — APPOINTMENT (OUTPATIENT)
Dept: GENERAL RADIOLOGY | Age: 40
End: 2020-07-15
Payer: COMMERCIAL

## 2020-07-15 ENCOUNTER — APPOINTMENT (OUTPATIENT)
Dept: CT IMAGING | Age: 40
End: 2020-07-15
Payer: COMMERCIAL

## 2020-07-15 VITALS
HEART RATE: 65 BPM | BODY MASS INDEX: 35.87 KG/M2 | SYSTOLIC BLOOD PRESSURE: 103 MMHG | RESPIRATION RATE: 18 BRPM | DIASTOLIC BLOOD PRESSURE: 61 MMHG | TEMPERATURE: 98.2 F | OXYGEN SATURATION: 99 % | WEIGHT: 250 LBS

## 2020-07-15 LAB
ACETAMINOPHEN LEVEL: <5 MCG/ML (ref 10–30)
ALBUMIN SERPL-MCNC: 3.9 G/DL (ref 3.5–5.2)
ALP BLD-CCNC: 90 U/L (ref 40–129)
ALT SERPL-CCNC: 31 U/L (ref 0–40)
AMMONIA: 37 UMOL/L (ref 16–60)
AMPHETAMINE SCREEN, URINE: POSITIVE
ANION GAP SERPL CALCULATED.3IONS-SCNC: 14 MMOL/L (ref 7–16)
APTT: 35.2 SEC (ref 24.5–35.1)
AST SERPL-CCNC: 44 U/L (ref 0–39)
B.E.: -5.3 MMOL/L (ref -3–3)
BARBITURATE SCREEN URINE: NOT DETECTED
BASOPHILS ABSOLUTE: 0.04 E9/L (ref 0–0.2)
BASOPHILS RELATIVE PERCENT: 0.3 % (ref 0–2)
BENZODIAZEPINE SCREEN, URINE: NOT DETECTED
BILIRUB SERPL-MCNC: 0.6 MG/DL (ref 0–1.2)
BUN BLDV-MCNC: 9 MG/DL (ref 6–20)
CALCIUM SERPL-MCNC: 9.2 MG/DL (ref 8.6–10.2)
CANNABINOID SCREEN URINE: POSITIVE
CHLORIDE BLD-SCNC: 102 MMOL/L (ref 98–107)
CO2: 19 MMOL/L (ref 22–29)
COCAINE METABOLITE SCREEN URINE: NOT DETECTED
COHB: 0.8 % (ref 0–1.5)
CREAT SERPL-MCNC: 0.8 MG/DL (ref 0.7–1.2)
CRITICAL: ABNORMAL
DATE ANALYZED: ABNORMAL
DATE OF COLLECTION: ABNORMAL
EOSINOPHILS ABSOLUTE: 0.49 E9/L (ref 0.05–0.5)
EOSINOPHILS RELATIVE PERCENT: 4.1 % (ref 0–6)
ETHANOL: <10 MG/DL (ref 0–0.08)
FENTANYL SCREEN, URINE: NOT DETECTED
GFR AFRICAN AMERICAN: >60
GFR NON-AFRICAN AMERICAN: >60 ML/MIN/1.73
GLUCOSE BLD-MCNC: 106 MG/DL (ref 74–99)
HCO3: 19.5 MMOL/L (ref 22–26)
HCT VFR BLD CALC: 49.4 % (ref 37–54)
HEMOGLOBIN: 16.3 G/DL (ref 12.5–16.5)
HHB: 3.6 % (ref 0–5)
IMMATURE GRANULOCYTES #: 0.09 E9/L
IMMATURE GRANULOCYTES %: 0.8 % (ref 0–5)
INR BLD: 1.2
LAB: ABNORMAL
LYMPHOCYTES ABSOLUTE: 3.55 E9/L (ref 1.5–4)
LYMPHOCYTES RELATIVE PERCENT: 29.9 % (ref 20–42)
Lab: ABNORMAL
Lab: ABNORMAL
MCH RBC QN AUTO: 29.9 PG (ref 26–35)
MCHC RBC AUTO-ENTMCNC: 33 % (ref 32–34.5)
MCV RBC AUTO: 90.6 FL (ref 80–99.9)
METER GLUCOSE: 106 MG/DL (ref 74–99)
METHADONE SCREEN, URINE: NOT DETECTED
METHB: 0.2 % (ref 0–1.5)
MODE: ABNORMAL
MONOCYTES ABSOLUTE: 0.8 E9/L (ref 0.1–0.95)
MONOCYTES RELATIVE PERCENT: 6.7 % (ref 2–12)
NEUTROPHILS ABSOLUTE: 6.91 E9/L (ref 1.8–7.3)
NEUTROPHILS RELATIVE PERCENT: 58.2 % (ref 43–80)
O2 CONTENT: 21 ML/DL
O2 SATURATION: 96.4 % (ref 92–98.5)
O2HB: 95.4 % (ref 94–97)
OPERATOR ID: 1023
OPIATE SCREEN URINE: NOT DETECTED
OXYCODONE URINE: NOT DETECTED
PATIENT TEMP: 37 C
PCO2: 36.1 MMHG (ref 35–45)
PDW BLD-RTO: 13.7 FL (ref 11.5–15)
PH BLOOD GAS: 7.35 (ref 7.35–7.45)
PHENCYCLIDINE SCREEN URINE: NOT DETECTED
PLATELET # BLD: 345 E9/L (ref 130–450)
PMV BLD AUTO: 10.5 FL (ref 7–12)
PO2: 88.2 MMHG (ref 75–100)
POTASSIUM REFLEX MAGNESIUM: 5.4 MMOL/L (ref 3.5–5)
PROTHROMBIN TIME: 13.3 SEC (ref 9.3–12.4)
RBC # BLD: 5.45 E12/L (ref 3.8–5.8)
SALICYLATE, SERUM: <0.3 MG/DL (ref 0–30)
SODIUM BLD-SCNC: 135 MMOL/L (ref 132–146)
SOURCE, BLOOD GAS: ABNORMAL
THB: 15.6 G/DL (ref 11.5–16.5)
TIME ANALYZED: 1339
TOTAL PROTEIN: 7.4 G/DL (ref 6.4–8.3)
TRICYCLIC ANTIDEPRESSANTS SCREEN SERUM: NEGATIVE NG/ML
TROPONIN: <0.01 NG/ML (ref 0–0.03)
WBC # BLD: 11.9 E9/L (ref 4.5–11.5)

## 2020-07-15 PROCEDURE — 2580000003 HC RX 258: Performed by: RADIOLOGY

## 2020-07-15 PROCEDURE — 85610 PROTHROMBIN TIME: CPT

## 2020-07-15 PROCEDURE — 36415 COLL VENOUS BLD VENIPUNCTURE: CPT

## 2020-07-15 PROCEDURE — 6360000004 HC RX CONTRAST MEDICATION: Performed by: RADIOLOGY

## 2020-07-15 PROCEDURE — 80307 DRUG TEST PRSMV CHEM ANLYZR: CPT

## 2020-07-15 PROCEDURE — 82140 ASSAY OF AMMONIA: CPT

## 2020-07-15 PROCEDURE — 70496 CT ANGIOGRAPHY HEAD: CPT

## 2020-07-15 PROCEDURE — 70498 CT ANGIOGRAPHY NECK: CPT

## 2020-07-15 PROCEDURE — 85025 COMPLETE CBC W/AUTO DIFF WBC: CPT

## 2020-07-15 PROCEDURE — 85730 THROMBOPLASTIN TIME PARTIAL: CPT

## 2020-07-15 PROCEDURE — 71045 X-RAY EXAM CHEST 1 VIEW: CPT

## 2020-07-15 PROCEDURE — 84484 ASSAY OF TROPONIN QUANT: CPT

## 2020-07-15 PROCEDURE — 93005 ELECTROCARDIOGRAM TRACING: CPT | Performed by: EMERGENCY MEDICINE

## 2020-07-15 PROCEDURE — 80053 COMPREHEN METABOLIC PANEL: CPT

## 2020-07-15 PROCEDURE — 99285 EMERGENCY DEPT VISIT HI MDM: CPT

## 2020-07-15 PROCEDURE — 82805 BLOOD GASES W/O2 SATURATION: CPT

## 2020-07-15 PROCEDURE — G0480 DRUG TEST DEF 1-7 CLASSES: HCPCS

## 2020-07-15 PROCEDURE — 82962 GLUCOSE BLOOD TEST: CPT

## 2020-07-15 PROCEDURE — G0426 INPT/ED TELECONSULT50: HCPCS | Performed by: PSYCHIATRY & NEUROLOGY

## 2020-07-15 PROCEDURE — 0042T CT BRAIN PERFUSION: CPT

## 2020-07-15 PROCEDURE — 70450 CT HEAD/BRAIN W/O DYE: CPT

## 2020-07-15 RX ORDER — SODIUM CHLORIDE 0.9 % (FLUSH) 0.9 %
10 SYRINGE (ML) INJECTION
Status: COMPLETED | OUTPATIENT
Start: 2020-07-15 | End: 2020-07-15

## 2020-07-15 RX ADMIN — Medication 10 ML: at 13:03

## 2020-07-15 RX ADMIN — IOPAMIDOL 100 ML: 755 INJECTION, SOLUTION INTRAVENOUS at 13:03

## 2020-07-15 NOTE — ED NOTES
Blue top obtained, labeled with pt optio and sent to lab in yellow tube per Stroke protocol     Dulce Hester, CAMI  39/19/23 [id number]

## 2020-07-15 NOTE — VIRTUAL HEALTH
Consults  Patient Location:  [address]  [address] [facility] Emergency Department    Provider Location ([facility]):   [place], [place]    This virtual visit was conducted via interactive/real-time audio/video. [facility] Stroke and Vascular Neurology Consult for  [address] Stroke Alert through [address] @ 12:54  7/15/2020 1:02 PM  Pt Name: Ivet Valle  MRN: [id number]  YOB: 1980  Date of evaluation: 7/15/2020  Primary Care Physician: Kailyn Morfin DO  Reason for Evaluation: Stroke Evaluation with Discussion with Ed or primary team with Telemedicine and stroke evaluation with Review of imaging and labs    Ivet Valle is a 37 y.o. male who presents with hx of DM, HTN, pseudoseizure, IBS,   He c/o left arm/leg face weakness, mild dysarthria while brushing his teeth at 6:50am. He also c/o headache. Patient stated this happened before and usually lasting a few hour up to a day. He was told he had non-epileptic seizures. By the time I saw patient on monitor, patient is almost back to himself, he still has subjective c/o of his left side feeling weak. Allergies  is allergic to fish-derived products. Medications  Prior to Admission medications    Medication Sig Start Date End Date Taking? Authorizing Provider   atorvastatin (LIPITOR) 40 MG tablet Take 1 tablet by mouth daily 7/7/20   Drake Mauro,    metoprolol succinate (TOPROL XL) 100 MG extended release tablet TAKE 1 TABLET BY MOUTH TWICE DAILY WITH MEALS 7/7/20   Drake Mauro,    metFORMIN (GLUCOPHAGE-XR) 750 MG extended release tablet Take 1 tablet by mouth daily (with breakfast) 7/7/20   Drake Mauro,    zonisamide (ZONEGRAN) 50 MG capsule Take 2 capsules by mouth nightly 7/1/20   Drake Mauro,    amphetamine-dextroamphetamine (ADDERALL, 10MG,) 10 MG tablet Take 1 tablet by mouth 2 times daily for 30 days.  6/15/20 7/15/20 level: Not on file   Occupational History    Not on file   Social Needs    Financial resource strain: Not on file    Food insecurity     Worry: Not on file     Inability: Not on file    Transportation needs     Medical: Not on file     Non-medical: Not on file   Tobacco Use    Smoking status: Never Smoker    Smokeless tobacco: Never Used   Substance and Sexual Activity    Alcohol use: Yes     Alcohol/week: 1.0 standard drinks     Types: 1 Shots of liquor per week     Frequency: Monthly or less    Drug use: No    Sexual activity: Not on file   Lifestyle    Physical activity     Days per week: Not on file     Minutes per session: Not on file    Stress: Not on file   Relationships    Social connections     Talks on phone: Not on file     Gets together: Not on file     Attends Gnosticism service: Not on file     Active member of club or organization: Not on file     Attends meetings of clubs or organizations: Not on file     Relationship status: Not on file    Intimate partner violence     Fear of current or ex partner: Not on file     Emotionally abused: Not on file     Physically abused: Not on file     Forced sexual activity: Not on file   Other Topics Concern    Not on file   Social History Narrative    Not on file     Family History  No family history on file. OBJECTIVE  BP 99/63   Pulse 76   Temp 98.2 °F (36.8 °C)   Resp 18   Wt 250 lb (113.4 kg)   SpO2 99%   BMI 35.87 kg/m²        Pre-Morbid mRS: 0    AAO x 4, speech mildly dysarthric  No drift  No sensation deficit    Imaging:  Images were personally reviewed including:  CT brain without contrast: normal  CTA imaging: normal  CTP: normal    Assessment    37year old man with HTN, DM, pseudoseizure, now with transient episode of headache, left hemparesis and dysarthria      Recommendations:  1. NIH 1  2. Recommend Inpatient Neurology Consult for further assessment and evaluation   3.  Not a tPA  Candidate, I do not think this is a stroke  4. I believe he likely had another non-epileptic seizure. 5. Recommend inpatient neurology follow up  6. Patient may benefit from outpatient neuropsychology evaluation. Discussed with ED Physician  Dr. Kody Gongora    At least 64 min of Telemedicine and time in conversation directly with ED staff and physician for the patient who is in imminent and life threatening deterioration without further treatment and evaluation. This Virtual Visit was conducted with patient's (and/or legal guardian's) consent, to provide telestroke consultation and necessary medical care.   Time spent examining patient, reviewing the images personally, reviewing the chart, perform high complexity decision making and speaking with the nursing staff regarding recommendations      Kayla Dave MD   Stroke, Neurocritical Care And/or [address] [place] [address] Stroke [address]  Electronically signed 7/15/2020 at 1:02 PM

## 2020-07-15 NOTE — ED PROVIDER NOTES
Department of Emergency Medicine   ED  Provider Note  Admit Date/RoomTime: 7/15/2020 12:16 PM  ED Room: 01/01        7/15/20  12:31 PM EDT    Anu Alert called: [phone]    HISTORY OF PRESENT ILLNESS:  (Nurses Notes Reviewed)    Chief Complaint:   Extremity Weakness (left sided weakness, aphasic, LKW  today )      Source of history provided by:  patient and relative(s). History/Exam Limitations: none. Nelida Gaona is a 37 y.o. old male presenting to the emergency department by private vehicle, with gradual onset of speech difficulty with left arm weakness and numbness, which began at 6:50 AM.  Last known well time: .  6:50 AM the episode occurred at home. Since recognized the symptoms have been improving. The patient has not had any treatments prior to arrival.  The patient states he was brushing his teeth when this began. He has history of pseudoseizures and conversion disorder. He has stroke risk factors of: diabetes mellitus and hyperlipidemia. There have been associated symptoms of pseudoseizure. There has been no history of recent trauma. Patient states that he has had similar symptoms in the past and has been told he has \"anxiety. \"  Otherwise limited secondary to the acuity of the patient's condition. Code Status on file: Prior. NIH Stroke Scale at time of initial evaluation:   NIH/MNHISS Stroke Scale  Interval: Baseline  Level of Consciousness (1a. ): Alert  LOC Questions (1b. ):  Answers both correctly  LOC Commands (1c. ): Performs both tasks correctly  Best Gaze (2. ): Normal  Visual (3. ): No visual loss  Facial Palsy (4. ): Normal symmetrical movement  Motor Arm, Left (5a. ): Drift, but does not hit bed  Motor Arm, Right (5b. ): No drift  Motor Leg, Left (6a. ): No drift  Motor Leg, Right (6b. ): No drift  Limb Ataxia (7. ): Absent  Sensory (8. ): (!) Mild to Moderate  Best Language (9. ): No aphasia  Dysarthria (10. ): Normal  Extinction and Inattention (11): No abnormality  Total: 2      Past Medical History:  has a past medical history of Anxiety, Arthritis, Class 1 obesity due to excess calories with serious comorbidity and body mass index (BMI) of 34.0 to 34.9 in adult, First degree AV block, GERD (gastroesophageal reflux disease), Hyperlipidemia, IBS (irritable bowel syndrome), LVH (left ventricular hypertrophy), Obesity, ENOC (obstructive sleep apnea), Psychiatric problem, Psychogenic nonepileptic seizure, Stroke-like symptoms, and Type 2 diabetes mellitus with complication, without long-term current use of insulin ([facility] [address] Past Surgical History:  has a past surgical history that includes Tonsillectomy; hernia repair; knee surgery; Insertable Cardiac Monitor (07/14/2017); and Pacemaker insertion (12/28/2017). Social History:  reports that he has never smoked. He has never used smokeless tobacco. He reports current alcohol use of about 1.0 standard drinks of alcohol per week. He reports that he does not use drugs. Prior Functional Status(Modified [place] Scale):  0=No symptoms at all    Family History: family history is not on file. The patients home medications have been reviewed. Prior to Admission medications    Medication Sig Start Date End Date Taking? Authorizing Provider   atorvastatin (LIPITOR) 40 MG tablet Take 1 tablet by mouth daily 7/7/20   Drake Mauro, DO   metoprolol succinate (TOPROL XL) 100 MG extended release tablet TAKE 1 TABLET BY MOUTH TWICE DAILY WITH MEALS 7/7/20   Drake Mauro, DO   metFORMIN (GLUCOPHAGE-XR) 750 MG extended release tablet Take 1 tablet by mouth daily (with breakfast) 7/7/20   Drake Mauro, DO   zonisamide (ZONEGRAN) 50 MG capsule Take 2 capsules by mouth nightly 7/1/20   Drake Mauro, DO   amphetamine-dextroamphetamine (ADDERALL, 10MG,) 10 MG tablet Take 1 tablet by mouth 2 times daily for 30 days.  6/15/20 7/15/20  Drake Mauro, DO   amphetamine-dextroamphetamine (ADDERALL, 10MG,) 10 MG tablet Take 1 tablet by mouth 2 times daily for 30 days. 7/14/20 8/13/20  Drake Mauro, DO   amphetamine-dextroamphetamine (ADDERALL, 10MG,) 10 MG tablet Take 1 tablet by mouth 2 times daily for 30 days. 8/13/20 9/12/20  Drake Mauro, DO   desonide (DESOWEN) 0.05 % cream Apply topically 2 times daily. 6/15/20   Darke Mauro, DO   metroNIDAZOLE (METROGEL) 0.75 % gel Apply topically 2 times daily. 6/15/20   Drake Mauro, DO   lactulose (CHRONULAC) 10 GM/15ML solution TAKE 15 ML BY MOUTH 3 TIMES DAILY 5/20/20   Andres Arthur MD   medical marijuana Take by mouth as needed. Historical Provider, MD   FLUoxetine (PROZAC) 20 MG capsule Take 2 capsules by mouth daily 3/23/20   Andres Arthur MD   rifaximin (XIFAXAN) 550 MG tablet Take 550 mg by mouth 2 times daily 2/2/20   Historical Provider, MD   mirtazapine (REMERON) 15 MG tablet Take 15 mg by mouth nightly    Historical Provider, MD   clonazePAM (KLONOPIN) 1 MG tablet Take 1 tablet by mouth 2 times daily as needed for Anxiety 9/25/17   Andres Arthur MD       Allergies: Fish-derived products       Review of Systems:   Pertinent positives and negatives are stated within HPI, all other systems reviewed and are negative.    ---------------------------------------------------PHYSICAL EXAM--------------------------------------    Constitutional/General: Alert and oriented x3, well appearing, non toxic in NAD  Head: Normocephalic and atraumatic  Eyes: PERRL, EOMI  Mouth: Oropharynx clear, handling secretions, no trismus. No facial droop  Neck: Supple, full ROM, non tender to palpation in the midline, no stridor, no crepitus, no meningeal signs  Pulmonary: Lungs clear to auscultation bilaterally, no wheezes, rales, or rhonchi. Not in respiratory distress  Cardiovascular:  Regular rate. Regular rhythm. No murmurs, gallops, or rubs. 2+ distal pulses  Chest: no chest wall tenderness  Abdomen: Soft. Non tender. Non distended. +BS. No rebound, guarding, or rigidity.  No pulsatile masses appreciated. Musculoskeletal: Moves all extremities x 4. Warm and well perfused, no clubbing, cyanosis, or edema. Capillary refill <3 seconds  Skin: warm and dry. No rashes. Neurologic: GCS 15, CN 2-12 grossly intact, no focal deficits, there is 5 out of 5 muscle strength of the right upper and lower extremity, there is 4-5 muscle strength in the left upper  Speech mildly aphasia with dysarthria. Normal finger to nose with right upper extremity but limited with left upper extremity, left-sided drift. Please also see NIH stroke scale. Psych: Normal Affect    NIH Stroke Scale/Score at time of initial evaluation:  1A: Level of Consciousness 0 - alert; keenly responsive   1B: Ask Month and Age 0 - answers both questions correctly   1C: Tell Patient To Open and Close Eyes, then Hand  Squeeze 0 - performs both tasks correctly   2: Test Horizontal Extraocular Movements 0 - normal   3: Test Visual Fields 0 - no visual loss   4: Test Facial Palsy 0 - normal symmetric movement   5A: Test Left Arm Motor Drift 1 - drift, limb holds 90 (or 45) degrees but drifts down before full 10 seconds: does not hit bed   5B: Test Right Arm Motor Drift 0 - no drift, limb holds 90 (or 45) degrees for full 10 seconds   6A: Test Left Leg Motor Drift 0 - no drift; leg holds 30 degree position for full 5 seconds   6B: Test Right Leg Motor Drift 0 - no drift; leg holds 30 degree position for full 5 seconds   7: Test Limb Ataxia   (FNF/Heel-Shin) 1 - present in one limb   8: Test Sensation 1 - mild to moderate sensory loss; patient feels pinprick is less sharp or is dull on the affected side; there is a loss of superficial pain with pinprick but patient is aware of being touched    9: Test Language/Aphasia 1 - mild to moderate aphasia; some obvious loss of fluency or facility of comprehension without significant limitation on ideas expressed or form of expression.   Reduction of speech and/or comprehension, however, makes conversation about provided materials difficult or impossible. For example, in conversation about provided materials, examiner can identify picture or naming card content from patient's response. 10: Test Dysarthria 1 - mild to moderate, patient slurs at least some words and at worst, can be understood with some difficulty   11: Test Extinction/Inattention 0 - no abnormality   Total Score: 5   7/15/20 at 12:31 PM EDT.       Acute CVA Core Measures:       - t-PA Eligibility: IV t-PA was considered and not given due to violations in inclusion criteria including stroke onset was greater than 3 hours prior to presentation   - The case has been discussed with Dr. Marci Mccullough, they agree this patient is NOT t-PA eligible.         -------------------------------------------------- RESULTS -------------------------------------------------  All laboratory and imaging studies have been reviewed by myself    LABS:  Results for orders placed or performed during the hospital encounter of 07/15/20   CBC Auto Differential   Result Value Ref Range    WBC 11.9 (H) 4.5 - 11.5 E9/L    RBC 5.45 3.80 - 5.80 E12/L    Hemoglobin 16.3 12.5 - 16.5 g/dL    Hematocrit 49.4 37.0 - 54.0 %    MCV 90.6 80.0 - 99.9 fL    MCH 29.9 26.0 - 35.0 pg    MCHC 33.0 32.0 - 34.5 %    RDW 13.7 11.5 - 15.0 fL    Platelets 154 290 - 922 E9/L    MPV 10.5 7.0 - 12.0 fL    Neutrophils % 58.2 43.0 - 80.0 %    Immature Granulocytes % 0.8 0.0 - 5.0 %    Lymphocytes % 29.9 20.0 - 42.0 %    Monocytes % 6.7 2.0 - 12.0 %    Eosinophils % 4.1 0.0 - 6.0 %    Basophils % 0.3 0.0 - 2.0 %    Neutrophils Absolute 6.91 1.80 - 7.30 E9/L    Immature Granulocytes # 0.09 E9/L    Lymphocytes Absolute 3.55 1.50 - 4.00 E9/L    Monocytes Absolute 0.80 0.10 - 0.95 E9/L    Eosinophils Absolute 0.49 0.05 - 0.50 E9/L    Basophils Absolute 0.04 0.00 - 0.20 E9/L   Comprehensive Metabolic Panel w/ Reflex to MG   Result Value Ref Range    Sodium 135 132 - 146 mmol/L    Potassium reflex Magnesium 5.4 (H) 3.5 - 5.0 mmol/L    Chloride 102 98 - 107 mmol/L    CO2 19 (L) 22 - 29 mmol/L    Anion Gap 14 7 - 16 mmol/L    Glucose 106 (H) 74 - 99 mg/dL    BUN 9 6 - 20 mg/dL    CREATININE 0.8 0.7 - 1.2 mg/dL    GFR Non-African American >60 >=60 mL/min/1.73    GFR African American >60     Calcium 9.2 8.6 - 10.2 mg/dL    Total Protein 7.4 6.4 - 8.3 g/dL    Alb 3.9 3.5 - 5.2 g/dL    Total Bilirubin 0.6 0.0 - 1.2 mg/dL    Alkaline Phosphatase 90 40 - 129 U/L    ALT 31 0 - 40 U/L    AST 44 (H) 0 - 39 U/L   Troponin   Result Value Ref Range    Troponin <0.01 0.00 - 0.03 ng/mL   Serum Drug Screen   Result Value Ref Range    Ethanol Lvl <10 mg/dL    Acetaminophen Level <5.0 (L) 10.0 - 69.1 mcg/mL    Salicylate, Serum <3.9 0.0 - 30.0 mg/dL    TCA Scrn NEGATIVE Cutoff:300 ng/mL   URINE DRUG SCREEN   Result Value Ref Range    Drug Screen Comment: see below    Ammonia   Result Value Ref Range    Ammonia 37.0 16.0 - 60.0 umol/L   Protime-INR   Result Value Ref Range    Protime 13.3 (H) 9.3 - 12.4 sec    INR 1.2    APTT   Result Value Ref Range    aPTT 35.2 (H) 24.5 - 35.1 sec   Blood Gas, Arterial   Result Value Ref Range    Date Analyzed [id number]     Time Analyzed 7377     Source: Blood Arterial     pH, Blood Gas 7.351 7.350 - 7.450    PCO2 36.1 35.0 - 45.0 mmHg    PO2 88.2 75.0 - 100.0 mmHg    HCO3 19.5 (L) 22.0 - 26.0 mmol/L    B.E. -5.3 (L) -3.0 - 3.0 mmol/L    O2 Sat 96.4 92.0 - 98.5 %    O2Hb 95.4 94.0 - 97.0 %    COHb 0.8 0.0 - 1.5 %    MetHb 0.2 0.0 - 1.5 %    O2 Content 21.0 mL/dL    HHb 3.6 0.0 - 5.0 %    tHb (est) 15.6 11.5 - 16.5 g/dL    Mode RA     Date Of Collection      Time Collected      Pt Temp 37.0 C     ID [id number]     Lab [id number]     Critical(s) Notified . No Critical Values    POCT Glucose   Result Value Ref Range    Meter Glucose 106 (H) 74 - 99 mg/dL       RADIOLOGY:  Interpreted by Radiologist.  XR CHEST PORTABLE   Final Result      No definite acute radiographic abnormality.           CTA HEAD W CONTRAST   Final Result considered and not given due to violations in inclusion criteria including  No indication for TPA. Medical Decision Making:    Patient is a 49-year-old male presenting to the emergency department the chief complaint of left-sided weakness as well as difficulty with speech. Patient was called as a daughter alert upon arrival.  The patient did have labs and imaging which were reviewed. Tele-stroke consultation did does feel patient requires inpatient admission but symptoms are likely secondary to conversion disorder versus pseudoseizure. The patient was counseled regarding the results of today. The patient states he wants to sign out [address] He does understand he can die or be severely partly disabled. His power of  is present and does as well understand. The patient will sign out against medical vice. Re-Evaluations:  ED Course as of Jul 15 1514   Wed Jul 15, 2020   1255 Spoke with Dr. Isaak Ham. Imaging negative. [MT]   0 Spoke with stroke neurologist.  No acute neurologic intervention at this time. He does agree patient needs admitted for neurology evaluation. [MT]   1 Spoke with Dr. Laurent Paniagua for stroke neurology. He did evaluate the patient over the monitor. No TPA. He states the patient likely is experiencing pseudoseizure. [MT]   [phone] with the patient at bedside. The patient is not agreeable to admission, the patient and power of  were counseled on leaving [address] They do understand that the patient can die or be severely permanently disabled. Power of  is present as well and they both do have the capacity to make her own decision. The patient will be dispositioned AGAINST MEDICAL ADVICE.     [MT]      ED Course User Index  [MT] Riri Whatley DO       This patient's ED course included: a personal history and physicial examination, re-evaluation prior to disposition, multiple bedside re-evaluations, cardiac monitoring, continuous pulse oximetry, complex medical decision making and emergency management and a personal history and physicial eaxmination    This patient has remained hemodynamically stable during their ED course. Consultations:  ED Course as of Jul 15 1514   Wed Jul 15, 2020   1255 Spoke with Dr. Radha Parker. Imaging negative. [MT]   0 Spoke with stroke neurologist.  No acute neurologic intervention at this time. He does agree patient needs admitted for neurology evaluation. [MT]   1 Spoke with Dr. Cary Goyal for stroke neurology. He did evaluate the patient over the monitor. No TPA. He states the patient likely is experiencing pseudoseizure. [MT]   [phone] with the patient at bedside. The patient is not agreeable to admission, the patient and power of  were counseled on leaving [address] They do understand that the patient can die or be severely permanently disabled. Power of  is present as well and they both do have the capacity to make her own decision. The patient will be dispositioned AGAINST MEDICAL ADVICE. [MT]      ED Course User Index  [MT] Ariadna Moran DO       Critical Care: Please note that the withdrawal or failure to initiate urgent interventions for this patient would likely result in a life threatening deterioration or permanent disability. Accordingly this patient received 40 minutes of critical care time, excluding separately billable procedures. ED Course as of Jul 15 1514   Wed Jul 15, 2020   1255 Spoke with Dr. Radha Parker. Imaging negative. [MT]   0 Spoke with stroke neurologist.  No acute neurologic intervention at this time. He does agree patient needs admitted for neurology evaluation. [MT]   1 Spoke with Dr. Cary Goyal for stroke neurology. He did evaluate the patient over the monitor. No TPA. He states the patient likely is experiencing pseudoseizure. [MT]   [phone] with the patient at bedside.   The patient is not agreeable Hematocrit 49.4 37.0 - 54.0 %    MCV 90.6 80.0 - 99.9 fL    MCH 29.9 26.0 - 35.0 pg    MCHC 33.0 32.0 - 34.5 %    RDW 13.7 11.5 - 15.0 fL    Platelets 448 961 - 569 E9/L    MPV 10.5 7.0 - 12.0 fL    Neutrophils % 58.2 43.0 - 80.0 %    Immature Granulocytes % 0.8 0.0 - 5.0 %    Lymphocytes % 29.9 20.0 - 42.0 %    Monocytes % 6.7 2.0 - 12.0 %    Eosinophils % 4.1 0.0 - 6.0 %    Basophils % 0.3 0.0 - 2.0 %    Neutrophils Absolute 6.91 1.80 - 7.30 E9/L    Immature Granulocytes # 0.09 E9/L    Lymphocytes Absolute 3.55 1.50 - 4.00 E9/L    Monocytes Absolute 0.80 0.10 - 0.95 E9/L    Eosinophils Absolute 0.49 0.05 - 0.50 E9/L    Basophils Absolute 0.04 0.00 - 0.20 E9/L   Comprehensive Metabolic Panel w/ Reflex to MG   Result Value Ref Range    Sodium 135 132 - 146 mmol/L    Potassium reflex Magnesium 5.4 (H) 3.5 - 5.0 mmol/L    Chloride 102 98 - 107 mmol/L    CO2 19 (L) 22 - 29 mmol/L    Anion Gap 14 7 - 16 mmol/L    Glucose 106 (H) 74 - 99 mg/dL    BUN 9 6 - 20 mg/dL    CREATININE 0.8 0.7 - 1.2 mg/dL    GFR Non-African American >60 >=60 mL/min/1.73    GFR African American >60     Calcium 9.2 8.6 - 10.2 mg/dL    Total Protein 7.4 6.4 - 8.3 g/dL    Alb 3.9 3.5 - 5.2 g/dL    Total Bilirubin 0.6 0.0 - 1.2 mg/dL    Alkaline Phosphatase 90 40 - 129 U/L    ALT 31 0 - 40 U/L    AST 44 (H) 0 - 39 U/L   Troponin   Result Value Ref Range    Troponin <0.01 0.00 - 0.03 ng/mL   Serum Drug Screen   Result Value Ref Range    Ethanol Lvl <10 mg/dL    Acetaminophen Level <5.0 (L) 10.0 - 01.2 mcg/mL    Salicylate, Serum <0.1 0.0 - 30.0 mg/dL    TCA Scrn NEGATIVE Cutoff:300 ng/mL   URINE DRUG SCREEN   Result Value Ref Range    Drug Screen Comment: see below    Ammonia   Result Value Ref Range    Ammonia 37.0 16.0 - 60.0 umol/L   Protime-INR   Result Value Ref Range    Protime 13.3 (H) 9.3 - 12.4 sec    INR 1.2    APTT   Result Value Ref Range    aPTT 35.2 (H) 24.5 - 35.1 sec   Blood Gas, Arterial   Result Value Ref Range    Date Analyzed [id number]     Time Analyzed 1339     Source: Blood Arterial     pH, Blood Gas 7.351 7.350 - 7.450    PCO2 36.1 35.0 - 45.0 mmHg    PO2 88.2 75.0 - 100.0 mmHg    HCO3 19.5 (L) 22.0 - 26.0 mmol/L    B.E. -5.3 (L) -3.0 - 3.0 mmol/L    O2 Sat 96.4 92.0 - 98.5 %    O2Hb 95.4 94.0 - 97.0 %    COHb 0.8 0.0 - 1.5 %    MetHb 0.2 0.0 - 1.5 %    O2 Content 21.0 mL/dL    HHb 3.6 0.0 - 5.0 %    tHb (est) 15.6 11.5 - 16.5 g/dL    Mode RA     Date Of Collection      Time Collected      Pt Temp 37.0 C     ID 1023     Lab [id number]     Critical(s) Notified . No Critical Values    POCT Glucose   Result Value Ref Range    Meter Glucose 106 (H) 74 - 99 mg/dL       Radiology:  Ct Head Wo Contrast    Result Date: 7/15/2020  Patient MRN:  [id number] : 1980 Age: 37 years Gender: Male Order Date:  7/15/2020 12:30 PM EXAM: CT HEAD WO CONTRAST NUMBER OF IMAGES:  109 INDICATION:  possible stroke COMPARISON: 2020 Technique: Low-dose CT  acquisition technique included one of following options; 1 . Automated exposure control, 2. Adjustment of MA and or KV according to patient's size or 3. Use of iterative reconstruction. Multiple CT sections were obtained with sagittal and coronal MPR reconstructions. The ventricles are unremarkable. The gyri and sulci appear unremarkable. The white matter appears unremarkable. There is no evidence for hemorrhage. There is no infarct identified. There is no mass effect identified. There is no mass identified. Unremarkable scan of the head. Findings were called to Dr. Vahid Duckworth at the time of dictation    Xr Chest Portable    Result Date: 7/15/2020  Patient MRN:  [id number] : 1980 Age: 37 years Gender: Male Order Date:  7/15/2020 12:45 PM EXAM: XR CHEST PORTABLE NUMBER OF IMAGES:  1 INDICATION:  possible stroke possible stroke COMPARISON: Chest radiograph 2020 RESULT: Lines, tubes, and devices:  Chest wall pacemaker with right atrial and right ventricular leads.  Lungs and pleura: Accentuation of the bronchovascular markings due to portable AP technique. No definite consolidation. No sizable pleural effusion or pneumothorax. Cardiomediastinal silhouette:  Cardiac silhouette appears within normal limits of size exaggerated by AP portable technique. No definite acute radiographic abnormality. Cta Neck W Contrast    Result Date: 7/15/2020  Patient MRN:  [id number] : 1980 Age: 37 years Gender: Male Order Date:  7/15/2020 12:30 PM EXAM: CTA NECK W CONTRAST, CTA HEAD W CONTRAST NUMBER OF IMAGES:  9:15 INDICATION:  stroke stroke COMPARISON: None Technique: Low-dose CT  acquisition technique included one of following options; 1 . Automated exposure control, 2. Adjustment of MA and or KV according to patient's size or 3. Use of iterative reconstruction. Contiguous spiral images were obtained in the axial plane, following the administration of intravenous contrast using CT angiographic protocol. Sagittal and coronal images were reconstructed from the axial plane acquisition. Additional MIP reconstructions were presented to aid in the interpretation of this study. Images were obtained from the skull base cranially. There is mild calcified plaque identified in the vessels compatible with atherosclerotic disease. There is aortic arch and great vessels demonstrate mild atherosclerotic disease. The right carotid is unremarkable. The left carotid is unremarkable. The right vertebral artery is unremarkable. The left vertebral artery is unremarkable. The basilar artery is unremarkable. The middle cerebral arteries are unremarkable. The anterior cerebral arteries are abnormal. The left A1 segment is poorly seen and may be hypoplastic The posterior cerebral arteries are unremarkable. 1.  Poorly visualized, possibly hypoplastic left A1 segment .  2. Estimated stenosis by NASCET criteria is not hemodynamically significant     Ct Brain Perfusion    Result Date: 7/15/2020  Patient MRN: [id number] : 1980 Age:  37 years Gender: Male Order Date: 7/15/2020 12:30 PM Exam: CT BRAIN PERFUSION Number of Images: 3:30 views Indication:   stroke stroke Comparison: None. Findings: Perfusion images demonstrate symmetric blood volume Blood flow images demonstrate symmetric blood flow There is no significant ischemic number identified. There is no significant core infarct identified. No significant ischemic penumbra identified     Cta Head W Contrast    Result Date: 7/15/2020  Patient MRN:  [id number] : 1980 Age: 37 years Gender: Male Order Date:  7/15/2020 12:30 PM EXAM: CTA NECK W CONTRAST, CTA HEAD W CONTRAST NUMBER OF IMAGES:  9:15 INDICATION:  stroke stroke COMPARISON: None Technique: Low-dose CT  acquisition technique included one of following options; 1 . Automated exposure control, 2. Adjustment of MA and or KV according to patient's size or 3. Use of iterative reconstruction. Contiguous spiral images were obtained in the axial plane, following the administration of intravenous contrast using CT angiographic protocol. Sagittal and coronal images were reconstructed from the axial plane acquisition. Additional MIP reconstructions were presented to aid in the interpretation of this study. Images were obtained from the skull base cranially. There is mild calcified plaque identified in the vessels compatible with atherosclerotic disease. There is aortic arch and great vessels demonstrate mild atherosclerotic disease. The right carotid is unremarkable. The left carotid is unremarkable. The right vertebral artery is unremarkable. The left vertebral artery is unremarkable. The basilar artery is unremarkable. The middle cerebral arteries are unremarkable. The anterior cerebral arteries are abnormal. The left A1 segment is poorly seen and may be hypoplastic The posterior cerebral arteries are unremarkable. 1.  Poorly visualized, possibly hypoplastic left A1 segment .  2. Estimated stenosis by NASCET criteria is not hemodynamically significant       ------------------------- NURSING NOTES AND VITALS REVIEWED ---------------------------  Date / Time Roomed:  7/15/2020 12:16 PM  ED Bed Assignment:  01/01    The nursing notes within the ED encounter and vital signs as below have been reviewed. /61   Pulse 65   Temp 98.2 °F (36.8 °C)   Resp 18   Wt 250 lb (113.4 kg)   SpO2 99%   BMI 35.87 kg/m²   Oxygen Saturation Interpretation: Normal      ------------------------------------------ PROGRESS NOTES ------------------------------------------    I have spoken with the patient and discussed todays availabe results to this point, in addition to providing specific details for the plan of care and counseling regarding the diagnosis and prognosis. Their questions are answered, however they are not agreeable to admission.     --------------------------------- ADDITIONAL PROVIDER NOTES ---------------------------------  This patient has chosen to leave against medical advice. I have personally explained to them that choosing to do so may result in permanent bodily harm or death. I discussed at length that without further evaluation and monitoring there may be unforeseen circumstances and deterioration resulting in permanent bodily harm or death as a result of their choice. They are alert, oriented, and competent at this time. They state that they are aware of the serious risks as explained, but they continue to wish to leave against medical   advice. In light of their decision to leave against medical advice, follow-up has been arranged and they are aware of the importance of following up as instructed. They have been advised that they should return to the ED immediately if they change their mind at any time, or if their condition begins to change or worsen.          The follow up plan has been discussed in detail and they are aware of the specific conditions for emergent return, as well as the

## 2020-07-16 ENCOUNTER — OFFICE VISIT (OUTPATIENT)
Dept: NEUROLOGY | Age: 40
End: 2020-07-16
Payer: COMMERCIAL

## 2020-07-16 ENCOUNTER — OFFICE VISIT (OUTPATIENT)
Dept: PRIMARY CARE CLINIC | Age: 40
End: 2020-07-16
Payer: COMMERCIAL

## 2020-07-16 VITALS
WEIGHT: 254 LBS | HEART RATE: 80 BPM | DIASTOLIC BLOOD PRESSURE: 70 MMHG | SYSTOLIC BLOOD PRESSURE: 118 MMHG | BODY MASS INDEX: 36.36 KG/M2 | RESPIRATION RATE: 16 BRPM | HEIGHT: 70 IN | TEMPERATURE: 97.4 F | OXYGEN SATURATION: 97 %

## 2020-07-16 VITALS
TEMPERATURE: 98.3 F | SYSTOLIC BLOOD PRESSURE: 110 MMHG | DIASTOLIC BLOOD PRESSURE: 68 MMHG | HEIGHT: 70 IN | HEART RATE: 72 BPM | WEIGHT: 254 LBS | RESPIRATION RATE: 12 BRPM | BODY MASS INDEX: 36.36 KG/M2

## 2020-07-16 PROBLEM — G45.9 TIA (TRANSIENT ISCHEMIC ATTACK): Status: ACTIVE | Noted: 2020-07-16

## 2020-07-16 PROBLEM — G45.9 TIA (TRANSIENT ISCHEMIC ATTACK): Status: RESOLVED | Noted: 2020-07-16 | Resolved: 2020-07-16

## 2020-07-16 LAB
EKG ATRIAL RATE: 81 BPM
EKG P AXIS: 63 DEGREES
EKG P-R INTERVAL: 226 MS
EKG Q-T INTERVAL: 384 MS
EKG QRS DURATION: 114 MS
EKG QTC CALCULATION (BAZETT): 446 MS
EKG R AXIS: -65 DEGREES
EKG T AXIS: 22 DEGREES
EKG VENTRICULAR RATE: 81 BPM

## 2020-07-16 PROCEDURE — 1036F TOBACCO NON-USER: CPT | Performed by: NURSE PRACTITIONER

## 2020-07-16 PROCEDURE — G8417 CALC BMI ABV UP PARAM F/U: HCPCS | Performed by: NURSE PRACTITIONER

## 2020-07-16 PROCEDURE — 99214 OFFICE O/P EST MOD 30 MIN: CPT | Performed by: NURSE PRACTITIONER

## 2020-07-16 PROCEDURE — 93010 ELECTROCARDIOGRAM REPORT: CPT | Performed by: INTERNAL MEDICINE

## 2020-07-16 PROCEDURE — 1036F TOBACCO NON-USER: CPT | Performed by: FAMILY MEDICINE

## 2020-07-16 PROCEDURE — G8427 DOCREV CUR MEDS BY ELIG CLIN: HCPCS | Performed by: NURSE PRACTITIONER

## 2020-07-16 PROCEDURE — G8427 DOCREV CUR MEDS BY ELIG CLIN: HCPCS | Performed by: FAMILY MEDICINE

## 2020-07-16 PROCEDURE — 1111F DSCHRG MED/CURRENT MED MERGE: CPT | Performed by: FAMILY MEDICINE

## 2020-07-16 PROCEDURE — 99214 OFFICE O/P EST MOD 30 MIN: CPT | Performed by: FAMILY MEDICINE

## 2020-07-16 PROCEDURE — G8417 CALC BMI ABV UP PARAM F/U: HCPCS | Performed by: FAMILY MEDICINE

## 2020-07-16 ASSESSMENT — ENCOUNTER SYMPTOMS
VOMITING: 0
BACK PAIN: 1
DIARRHEA: 0
COUGH: 0
BLOOD IN STOOL: 0
NAUSEA: 0
SHORTNESS OF BREATH: 0
CONSTIPATION: 0
ABDOMINAL DISTENTION: 1
ABDOMINAL PAIN: 0
SORE THROAT: 0
PHOTOPHOBIA: 0

## 2020-07-16 NOTE — PATIENT INSTRUCTIONS
Patient Education        Recurring Migraine Headache: Care Instructions  Your Care Instructions  Migraines are painful, throbbing headaches. They often start on one side of the head. They may cause nausea and vomiting and make you sensitive to light, sound, or smell. Some people may have only a few migraines throughout life. Others have them as often as several times a month. The goal of treatment is to reduce the number of migraines you have and relieve your symptoms. Even with treatment, you may continue to have migraines. You play an important role in dealing with your headaches. Work on avoiding things that seem to trigger your migraines. When you feel a headache coming on, act quickly to stop it before it gets worse. Follow-up care is a key part of your treatment and safety. Be sure to make and go to all appointments, and call your doctor if you are having problems. It's also a good idea to know your test results and keep a list of the medicines you take. How can you care for yourself at home? · Do not drive if you have taken a prescription pain medicine. · Rest in a quiet, dark room until your headache is gone. Close your eyes and try to relax or go to sleep. Do not watch TV or read. · Put a cold, moist cloth or cold pack on the painful area for 10 to 20 minutes at a time. Put a thin cloth between the cold pack and your skin. · Have someone gently massage your neck and shoulders. · Take your medicines exactly as prescribed. Call your doctor if you think you are having a problem with your medicine. You will get more details on the specific medicines your doctor prescribes. · Don't take medicine for headache pain too often. Talk to your doctor if you are taking medicine more than 2 days a week to stop a headache. Taking too much pain medicine can lead to more headaches. These are called medicine-overuse headaches.   To prevent migraines  · Keep a headache diary so you can figure out what triggers your headaches. Avoiding triggers may help you prevent headaches. Record when each headache began, how long it lasted, and what the pain was like. Write down any other symptoms you had with the headache. These may include nausea, flashing lights or dark spots, or sensitivity to bright light or loud noise. Note if the headache occurred near your period. List anything that might have triggered the headache. Triggers may include certain foods (chocolate, cheese, wine) or odors, smoke, bright light, stress, or lack of sleep. · If your doctor has prescribed medicine for your migraines, take it as directed. You may have medicine that you take only when you get a migraine and medicine that you take all the time to help prevent migraines. ? If your doctor has prescribed medicine for when you get a headache, take it at the first sign of a migraine, unless your doctor has given you other instructions. ? If your doctor has prescribed medicine to prevent migraines, take it exactly as prescribed. Call your doctor if you think you are having a problem with your medicine. · Find healthy ways to deal with stress. Migraines are most common during or right after stressful times. Try finding ways to reduce stress like practicing mindfulness or deep breathing exercises. · Get regular sleep and exercise. But be careful to not push yourself too hard during exercise. It may trigger a headache. · Eat regular meals, and avoid foods and drinks that often trigger migraines. These include chocolate and alcohol, especially red wine and port. Chemicals used in food, such as aspartame and monosodium glutamate (MSG), also can trigger migraines. So can some food additives, such as those found in hot dogs, claros, cold cuts, aged cheeses, and pickled foods. · Limit caffeine by not drinking too much coffee, tea, or soda. Do not quit caffeine suddenly, because that can also give you migraines. · Do not smoke or allow others to smoke around you.  If you need help quitting, talk to your doctor about stop-smoking programs and medicines. These can increase your chances of quitting for good. · If you are taking birth control pills or hormone therapy, talk to your doctor about whether they are triggering your migraines. When should you call for help? [id number] anytime you think you may need emergency care. For example, call if:  · You have symptoms of a stroke. These may include:  ? Sudden numbness, tingling, weakness, or loss of movement in your face, arm, or leg, especially on only one side of your body. ? Sudden vision changes. ? Sudden trouble speaking. ? Sudden confusion or trouble understanding simple statements. ? Sudden problems with walking or balance. ? A sudden, severe headache that is different from past headaches. Call your doctor now or seek immediate medical care if:  · You develop a fever and a stiff neck. · You have new nausea and vomiting, or you cannot keep down food or liquids. Watch closely for changes in your health, and be sure to contact your doctor if:  · You have a headache that does not get better within 1 or 2 days. · Your headaches get worse or happen more often. Where can you learn more? Go to [email or web address] org and sign in to your [employer] account. Enter  in the [employer] box to learn more about \"Recurring Migraine Headache: Care Instructions. \"     If you do not have an account, please click on the \"Sign Up Now\" link. Current as of: November 20, 2019               Content Version: 12.5  © 6846-1302 Healthwise, Incorporated. Care instructions adapted under license by [facility] ([facility]). If you have questions about a medical condition or this instruction, always ask your healthcare professional. [place] [address] any warranty or liability for your use of this information.

## 2020-07-16 NOTE — PROGRESS NOTES
Post-Discharge Transitional Care Management Services or Hospital Follow Up      Raquel Patton   YOB: 1980    Date of Office Visit:  7/16/2020  Date of Hospital Admission: 7/15/20s  Date of Hospital Discharge: 7/15/20  Readmission Risk Score(high >=14%. Medium >=10%):No data recorded    Care management risk score Rising risk (score 2-5) and Complex Care (Scores >=6): 5     Non face to face  following discharge, date last encounter closed (first attempt may have been earlier): *No documented post hospital discharge outreach found in the last 14 days *No documented post hospital discharge outreach found in the last 14 days    Call initiated 2 business days of discharge: *No response recorded in the last 14 days     Patient Active Problem List   Diagnosis    Anxiety    Hyperlipemia    Irritable bowel syndrome with diarrhea    First degree atrioventricular block    Pseudoseizure    [address]ricular tachycardia ([address]    Sinus node dysfunction ([address]    S/P cardiac pacemaker procedure    Other headache syndrome    Non-smoker    Panic attacks    Depression    Type 2 diabetes mellitus with complication, without long-term current use of insulin ([address]    Conversion disorder    Attention deficit hyperactivity disorder (ADHD), predominantly hyperactive type    Fatty liver       Allergies   Allergen Reactions    Fish-Derived Products        Medications listed as ordered at the time of discharge from [facility]   Home Medication Instructions YOUNG:    Printed on:07/16/20 9623   Medication Information                      amphetamine-dextroamphetamine (ADDERALL, 10MG,) 10 MG tablet  Take 1 tablet by mouth 2 times daily for 30 days. amphetamine-dextroamphetamine (ADDERALL, 10MG,) 10 MG tablet  Take 1 tablet by mouth 2 times daily for 30 days.              atorvastatin (LIPITOR) 40 MG tablet  Take 1 tablet by mouth daily             clonazePAM (KLONOPIN) 1 MG tablet  Take 1 tablet by mouth 2 times daily as needed for Anxiety             desonide (DESOWEN) 0.05 % cream  Apply topically 2 times daily. FLUoxetine (PROZAC) 20 MG capsule  Take 2 capsules by mouth daily             lactulose (CHRONULAC) 10 GM/15ML solution  TAKE 15 ML BY MOUTH 3 TIMES DAILY             medical marijuana  Take by mouth as needed. metFORMIN (GLUCOPHAGE-XR) 750 MG extended release tablet  Take 1 tablet by mouth daily (with breakfast)             metoprolol succinate (TOPROL XL) 100 MG extended release tablet  TAKE 1 TABLET BY MOUTH TWICE DAILY WITH MEALS             metroNIDAZOLE (METROGEL) 0.75 % gel  Apply topically 2 times daily. mirtazapine (REMERON) 15 MG tablet  Take 15 mg by mouth nightly             rifaximin (XIFAXAN) 550 MG tablet  Take 550 mg by mouth 2 times daily             zonisamide (ZONEGRAN) 50 MG capsule  Take 2 capsules by mouth nightly                   Medications marked \"taking\" at this time  Outpatient Medications Marked as Taking for the 7/16/20 encounter (Office Visit) with Lynn Amador DO   Medication Sig Dispense Refill    atorvastatin (LIPITOR) 40 MG tablet Take 1 tablet by mouth daily 90 tablet 3    metoprolol succinate (TOPROL XL) 100 MG extended release tablet TAKE 1 TABLET BY MOUTH TWICE DAILY WITH MEALS 180 tablet 3    metFORMIN (GLUCOPHAGE-XR) 750 MG extended release tablet Take 1 tablet by mouth daily (with breakfast) 90 tablet 3    zonisamide (ZONEGRAN) 50 MG capsule Take 2 capsules by mouth nightly 180 capsule 3    amphetamine-dextroamphetamine (ADDERALL, 10MG,) 10 MG tablet Take 1 tablet by mouth 2 times daily for 30 days. 60 tablet 0    [START ON 8/13/2020] amphetamine-dextroamphetamine (ADDERALL, 10MG,) 10 MG tablet Take 1 tablet by mouth 2 times daily for 30 days. 30 tablet 0    desonide (DESOWEN) 0.05 % cream Apply topically 2 times daily. 60 g 0    metroNIDAZOLE (METROGEL) 0.75 % gel Apply topically 2 times daily.  45 g 0  lactulose (CHRONULAC) 10 GM/15ML solution TAKE 15 ML BY MOUTH 3 TIMES DAILY (Patient taking differently: TAKE 15 ML BY MOUTH 3 TIMES DAILY PRN) 946 mL 0    medical marijuana Take by mouth as needed.  FLUoxetine (PROZAC) 20 MG capsule Take 2 capsules by mouth daily 60 capsule 0    rifaximin (XIFAXAN) 550 MG tablet Take 550 mg by mouth 2 times daily      mirtazapine (REMERON) 15 MG tablet Take 15 mg by mouth nightly      clonazePAM (KLONOPIN) 1 MG tablet Take 1 tablet by mouth 2 times daily as needed for Anxiety 30 tablet 0        Medications patient taking as of now reconciled against medications ordered at time of hospital discharge: Yes    Chief Complaint   Patient presents with    Follow-Up from Hospital     Weakness and difficulty speaking yesterday. . headache. testing completed, seeing naurology this afternoon, stated hospital told him it was a non-epileptic seizure       HPI    Inpatient course: Discharge summary reviewed- see chart. Interval history/Current status: Patient states that since yesterday after discharge his speech has improved slightly. Still having issues with left upper and lower extremity weakness. Imaging reviewed with patient. 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 No ischemic penumbra or perfusion issues. Concern with possible nonepileptic seizure activity versus TIA at this time. Mother is in the room who states that he is mostly back to his baseline. The only reason they went to the emergency department was because of the patient's new onset of headache. Review of Systems   Constitutional: Positive for fatigue. Negative for chills and fever. HENT: Negative for congestion, hearing loss, nosebleeds and sore throat. Eyes: Negative for photophobia. Respiratory: Negative for cough and shortness of breath. Cardiovascular: Negative for chest pain, palpitations and leg swelling.    Gastrointestinal: Positive for abdominal

## 2020-07-16 NOTE — PROGRESS NOTES
[address] [place] [address]. Brayan Kovacs M.D., F.A.C.P. Sharla Jean, DNP, APRN, CNS  Jose Manuel Amaya. Tyrese Crocker, MSN, APRN-FNP-C  Yarely Graves MSN, APRN, FNP-C  Monique GAR, BRET F[address] MSN, APRN, FNP-C  [address], [address] [address]  Phone: [phone]  Fax: [phone]      Jignesh Lambert a 37 y.o. 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 He is a good historian today    He was seen sooner due to an ER visit yesterday after he developed the acute onset of left-sided weakness, speech difficulties, and migraine headache. He tried to take his Zonegran earlier in the day, but this was not helpful. PCP advised him to go to the ER, and he was seen by [facility] tele-neurologist who felt he was suffering from a pseudoseizure and not a stroke. Imaging showed his hypoplastic left A1 segment but no evidence of acute stroke. He feels better today and still states that his migraines overall are rare. Over-the-counter medications do not really help when he gets one. He has not had any recent typical spells of PNES. Triptans have been avoided due to his cardiac issues, thankfully these are stable. He feels his mental health issues are also stable. He has medical marijuana as needed and continues on medications for his elevated LFTs. Recent AST 44/ALT 31, and ammonia 37.      No chest pain or palpitations  No SOB  No incontinence of bowels or bladder  No itching or bruising appreciated  No falls, tripping, or stumbling  No focal limb weakness or paresthesias  No speech or swallowing troubles    ROS otherwise negative     Current Outpatient Medications   Medication Sig Dispense Refill    atorvastatin (LIPITOR) 40 MG tablet Take 1 tablet by mouth daily 90 tablet 3    metoprolol succinate (TOPROL XL) 100 MG extended release tablet TAKE 1 TABLET BY MOUTH TWICE DAILY WITH MEALS 180 tablet 3    metFORMIN (GLUCOPHAGE-XR) 750 MG extended release tablet Take 1 tablet by mouth daily (with breakfast) 90 tablet 3    zonisamide (ZONEGRAN) 50 MG capsule Take 2 capsules by mouth nightly 180 capsule 3    amphetamine-dextroamphetamine (ADDERALL, 10MG,) 10 MG tablet Take 1 tablet by mouth 2 times daily for 30 days. 60 tablet 0    desonide (DESOWEN) 0.05 % cream Apply topically 2 times daily. 60 g 0    metroNIDAZOLE (METROGEL) 0.75 % gel Apply topically 2 times daily. 45 g 0    lactulose (CHRONULAC) 10 GM/15ML solution TAKE 15 ML BY MOUTH 3 TIMES DAILY (Patient taking differently: TAKE 15 ML BY MOUTH 3 TIMES DAILY PRN) 946 mL 0    medical marijuana Take by mouth as needed.  FLUoxetine (PROZAC) 20 MG capsule Take 2 capsules by mouth daily 60 capsule 0    rifaximin (XIFAXAN) 550 MG tablet Take 550 mg by mouth 2 times daily      mirtazapine (REMERON) 15 MG tablet Take 15 mg by mouth nightly      clonazePAM (KLONOPIN) 1 MG tablet Take 1 tablet by mouth 2 times daily as needed for Anxiety 30 tablet 0     No current facility-administered medications for this visit.       Objective:     /68 (Site: Left Upper Arm, Position: Sitting, Cuff Size: Large Adult)   Pulse 72   Temp 98.3 °F (36.8 °C) (Oral)   Resp 12   Ht 5' 10\" (1.778 m)   Wt 254 lb (115.2 kg)   BMI 36.45 kg/m²     General exam: alert, appears older than stated age, in no acute distress ---obese  Head: normocephalic/atraumatic  Eyes: sclerae clear  Neck: no carotid bruits; full ROM  Lungs: clear throughout  Heart: RRR, faint murmur appreciated  Ext: no edema or cyanosis  Pulses: 1+ throughout  Skin: intact    Mental Status: alert, oriented x4---pleasant and calm today    Appropriate attention/concentration  Intact memories and fundus of knowledge    Speech/language: no dysarthria or aphasias    Cranial Nerves:  II: visual fields full   II: pupils Slight physiologic anisocoria--R>L   III,VII: ptosis None   III,IV,VI: extraocular muscles  EOMI without nystagmus today     V: mastication intact   V: facial light touch sensation  Intact    V,VII: corneal reflex     VII: facial muscle function   Intact   VIII: hearing intact   IX: soft palate elevation  intact   IX,X: gag reflex    XI: trapezius strength  5/5   XI: sternocleidomastoid strength 5/5   XI: neck extension strength  5/5   XII: tongue strength  midline     Motor:  5/5 throughout  Obese bulk and normal tone  No drift  No abnormal movements today    Sensory:  LT intact throughout    Coordination:   FN and FFM intact b/l    Gait:  Embellished, slow and wide-based    DTR:   Right Brachioradialis reflex 1+  Left Brachioradialis reflex 1+  Right Biceps reflex 2  Left Biceps reflex 2  Triceps 2+ b/l  Right Quadriceps reflex 2+  Left Quadriceps reflex 2+  Right Achilles reflex 1  Left Achilles reflex 1    No Nichols's    Laboratory/Radiology:     Lab Results   Component Value Date    WBC 11.9 (H) 07/15/2020    HGB 16.3 07/15/2020    HCT 49.4 07/15/2020    MCV 90.6 07/15/2020     07/15/2020     Lab Results   Component Value Date     07/15/2020    K 5.4 (H) 07/15/2020     07/15/2020    CO2 19 (L) 07/15/2020    BUN 9 07/15/2020    CREATININE 0.8 07/15/2020    GLUCOSE 106 (H) 07/15/2020    CALCIUM 9.2 07/15/2020    PROT 7.4 07/15/2020    LABALBU 3.9 07/15/2020    BILITOT 0.6 07/15/2020    ALKPHOS 90 07/15/2020    AST 44 (H) 07/15/2020    ALT 31 07/15/2020    LABGLOM >60 07/15/2020    GFRAA >60 07/15/2020     Lab Results   Component Value Date    ALKPHOS 90 07/15/2020    ALT 31 07/15/2020    AST 44 07/15/2020    PROT 7.4 07/15/2020    BILITOT 0.6 07/15/2020    BILIDIR <0.2 02/09/2020    LABALBU 3.9 07/15/2020     CT head July 2020: No acute events    CTA head/neck July 2020: Hypoplastic left A1 otherwise unremarkable    CTP July 2020:  No perfusion defect    I independently reviewed the labs and imaging studies Assessment:     Suspect complicated migraine vs spell of PNES   Causing acute worsening of left-sided weakness with associated headache   No evidence to suggest underlying ischemic event or true epileptic seizure   Triptans avoided but could benefit from oral CGRP receptor for migraine rescue   His neuro exam is unremarkable today    Plan:     Start Nurtec--1 sample box given as documented in med list    Continue Zonegran 100 mg nightly    Headache diary    RTO in 6 months or sooner ROSIE GUZMAN  7:40 AM  7/16/2020

## 2020-07-20 ENCOUNTER — TELEPHONE (OUTPATIENT)
Dept: NEUROLOGY | Age: 40
End: 2020-07-20

## 2020-07-20 NOTE — TELEPHONE ENCOUNTER
Nurtec approved 7/3/20-7/17/21 with Sunday. Approval faxed to [phone]. Pt's mother, Charisma King, informed of approval info.   Electronically signed by Lindsay Cornelius on 7/20/20 at 10:22 AM EDT

## 2020-08-24 RX ORDER — DEXTROAMPHETAMINE SACCHARATE, AMPHETAMINE ASPARTATE, DEXTROAMPHETAMINE SULFATE AND AMPHETAMINE SULFATE 2.5; 2.5; 2.5; 2.5 MG/1; MG/1; MG/1; MG/1
10 TABLET ORAL 2 TIMES DAILY
Qty: 60 TABLET | Refills: 0 | Status: SHIPPED
Start: 2020-08-24 | End: 2020-10-04 | Stop reason: SDUPTHER

## 2020-09-02 ENCOUNTER — TELEPHONE (OUTPATIENT)
Dept: PRIMARY CARE CLINIC | Age: 40
End: 2020-09-02

## 2020-09-14 ENCOUNTER — HOSPITAL ENCOUNTER (OUTPATIENT)
Age: 40
Discharge: HOME | End: 2020-09-14
Payer: COMMERCIAL

## 2020-09-14 LAB
ALBUMIN SERPL-MCNC: 4.2 G/DL (ref 3.5–5.2)
ALP BLD-CCNC: 107 U/L (ref 40–129)
ALT SERPL-CCNC: 29 U/L (ref 0–40)
AMMONIA: 38.9 UMOL/L (ref 16–60)
ANION GAP SERPL CALCULATED.3IONS-SCNC: 10 MMOL/L (ref 7–16)
AST SERPL-CCNC: 19 U/L (ref 0–39)
BILIRUB SERPL-MCNC: 0.3 MG/DL (ref 0–1.2)
BUN BLDV-MCNC: 6 MG/DL (ref 6–20)
CALCIUM SERPL-MCNC: 9.7 MG/DL (ref 8.6–10.2)
CHLORIDE BLD-SCNC: 107 MMOL/L (ref 98–107)
CO2: 21 MMOL/L (ref 22–29)
CREAT SERPL-MCNC: 0.8 MG/DL (ref 0.7–1.2)
GFR AFRICAN AMERICAN: >60
GFR NON-AFRICAN AMERICAN: >60 ML/MIN/1.73
GLUCOSE BLD-MCNC: 151 MG/DL (ref 74–99)
HCT VFR BLD CALC: 48 % (ref 37–54)
HEMOGLOBIN: 15.9 G/DL (ref 12.5–16.5)
MCH RBC QN AUTO: 30.1 PG (ref 26–35)
MCHC RBC AUTO-ENTMCNC: 33.1 % (ref 32–34.5)
MCV RBC AUTO: 90.9 FL (ref 80–99.9)
PDW BLD-RTO: 13.6 FL (ref 11.5–15)
PLATELET # BLD: 331 E9/L (ref 130–450)
PMV BLD AUTO: 10.2 FL (ref 7–12)
POTASSIUM SERPL-SCNC: 4.3 MMOL/L (ref 3.5–5)
RBC # BLD: 5.28 E12/L (ref 3.8–5.8)
SODIUM BLD-SCNC: 138 MMOL/L (ref 132–146)
TOTAL PROTEIN: 7.6 G/DL (ref 6.4–8.3)
WBC # BLD: 10.1 E9/L (ref 4.5–11.5)

## 2020-09-14 PROCEDURE — 85027 COMPLETE CBC AUTOMATED: CPT

## 2020-09-14 PROCEDURE — 80053 COMPREHEN METABOLIC PANEL: CPT

## 2020-09-14 PROCEDURE — 82140 ASSAY OF AMMONIA: CPT

## 2020-09-14 PROCEDURE — 36415 COLL VENOUS BLD VENIPUNCTURE: CPT

## 2020-09-17 ENCOUNTER — OFFICE VISIT (OUTPATIENT)
Dept: PRIMARY CARE CLINIC | Age: 40
End: 2020-09-17
Payer: COMMERCIAL

## 2020-09-17 VITALS
TEMPERATURE: 97.1 F | OXYGEN SATURATION: 98 % | RESPIRATION RATE: 16 BRPM | WEIGHT: 257 LBS | DIASTOLIC BLOOD PRESSURE: 78 MMHG | BODY MASS INDEX: 36.79 KG/M2 | SYSTOLIC BLOOD PRESSURE: 118 MMHG | HEIGHT: 70 IN | HEART RATE: 87 BPM

## 2020-09-17 PROCEDURE — 20610 DRAIN/INJ JOINT/BURSA W/O US: CPT | Performed by: FAMILY MEDICINE

## 2020-09-17 PROCEDURE — G8427 DOCREV CUR MEDS BY ELIG CLIN: HCPCS | Performed by: FAMILY MEDICINE

## 2020-09-17 PROCEDURE — 99213 OFFICE O/P EST LOW 20 MIN: CPT | Performed by: FAMILY MEDICINE

## 2020-09-17 PROCEDURE — G8417 CALC BMI ABV UP PARAM F/U: HCPCS | Performed by: FAMILY MEDICINE

## 2020-09-17 PROCEDURE — 1036F TOBACCO NON-USER: CPT | Performed by: FAMILY MEDICINE

## 2020-09-17 RX ORDER — LACTULOSE 10 G/15ML
SOLUTION ORAL; RECTAL PRN
COMMUNITY
Start: 2020-09-16

## 2020-09-17 RX ORDER — METHYLPREDNISOLONE ACETATE 80 MG/ML
80 INJECTION, SUSPENSION INTRA-ARTICULAR; INTRALESIONAL; INTRAMUSCULAR; SOFT TISSUE ONCE
Status: COMPLETED | OUTPATIENT
Start: 2020-09-17 | End: 2020-09-17

## 2020-09-17 RX ORDER — MIRTAZAPINE 30 MG/1
TABLET, FILM COATED ORAL
COMMUNITY
Start: 2020-09-08

## 2020-09-17 RX ADMIN — METHYLPREDNISOLONE ACETATE 80 MG: 80 INJECTION, SUSPENSION INTRA-ARTICULAR; INTRALESIONAL; INTRAMUSCULAR; SOFT TISSUE at 10:33

## 2020-09-17 ASSESSMENT — ENCOUNTER SYMPTOMS
SHORTNESS OF BREATH: 0
ABDOMINAL PAIN: 0
CONSTIPATION: 0
SORE THROAT: 0
PHOTOPHOBIA: 0
COUGH: 0
BACK PAIN: 1
BLOOD IN STOOL: 0
DIARRHEA: 0
NAUSEA: 0
VOMITING: 0
ABDOMINAL DISTENTION: 1

## 2020-09-17 NOTE — PROGRESS NOTES
2020    Dorinda Rodríguez (:  1980) is a 37 y.o. male, here for evaluation of the following medical concerns:    HPI  Patient is here to establish with new PCP. His previous PCP is switching to [facility] and he would like to stay with all of his current specialists. Past medical history significant for ADHD, pseudoseizure, irritable bowel syndrome with diarrhea, hyperammonemia, panic attacks, type 2 diabetes, conversion disorder. Patient sees specialists for most of his issues. He presents today for several reasons however. He would like to be evaluated for left shoulder pain and right hip pain. Patient states the left shoulder pain has been steadily worsening since the pacemaker insertion for ventricular tachycardia. Denies any obvious range of motion deficit or weakness associated with this pain. Right hip pain has been worsening over the last several months as well. Denies any injury or trauma to the affected region. He has had knee surgery on that side however. Patient states that with ambulation or extended periods of standing he does have pain mostly over the lateral aspect of his right hip. He states that the hip has given out on him. Denies any numbness or tingling down the lower extremities. Denies any bowel or bladder incontinence. Patient also has a flareup of facial rash that is been present for the last several months. Mildly responsive to over-the-counter medications. Patient sees psychiatry for all of his mental issues  Patient currently follows with gastro for unknown elevations of ammonia. He would like to have refills on his ADHD medication. Currently stable on 10 mg twice daily. He states that it is still helping with his focus. State monitoring program reviewed without any signs of abuse or diversion. Continues with medical marijuana and states that it still is effective as well.     Update 2020  Patient presents today for worsening left shoulder pain.  Patient had a previous injury prior to previous ER visit. Has had several falls since which has worsened the dysfunction. Has had increasing pain and increased loss of ROM. Review of Systems   Constitutional: Positive for fatigue. Negative for chills and fever. HENT: Negative for congestion, hearing loss, nosebleeds and sore throat. Eyes: Negative for photophobia. Respiratory: Negative for cough and shortness of breath. Cardiovascular: Negative for chest pain, palpitations and leg swelling. Gastrointestinal: Positive for abdominal distention. Negative for abdominal pain, blood in stool, constipation, diarrhea, nausea and vomiting. Endocrine: Negative for polydipsia. Genitourinary: Negative for dysuria, frequency, hematuria and urgency. Musculoskeletal: Positive for arthralgias, back pain, gait problem, joint swelling and myalgias. Skin: Negative. Neurological: Positive for tremors. Negative for dizziness, weakness and headaches. Hematological: Does not bruise/bleed easily. Psychiatric/Behavioral: Positive for agitation, decreased concentration, dysphoric mood and sleep disturbance. Negative for hallucinations, self-injury and suicidal ideas. The patient is nervous/anxious. All other systems reviewed and are negative. Prior to Visit Medications    Medication Sig Taking? Authorizing Provider   ENULOSE 10 GM/15ML SOLN solution  Yes Historical Provider, MD   mirtazapine (REMERON) 30 MG tablet TAKE ONE TABLET BY MOUTH once a day AT BEDTIME Yes Historical Provider, MD   amphetamine-dextroamphetamine (ADDERALL, 10MG,) 10 MG tablet Take 1 tablet by mouth 2 times daily for 30 days.  Yes Drake Mauro, DO   Rimegepant Sulfate 75 MG TBDP Take 75 mg by mouth daily as needed (migraine) No more than 1 tab in 24 hours Yes Nick Ballard APRN - CNP   atorvastatin (LIPITOR) 40 MG tablet Take 1 tablet by mouth daily Yes Drake Mauro, DO   metoprolol succinate (TOPROL XL) 100 MG extended release tablet TAKE 1 TABLET BY MOUTH TWICE DAILY WITH MEALS Yes Drake Mauro,    metFORMIN (GLUCOPHAGE-XR) 750 MG extended release tablet Take 1 tablet by mouth daily (with breakfast) Yes Drake Mauro DO   zonisamide (ZONEGRAN) 50 MG capsule Take 2 capsules by mouth nightly Yes Drake Mauro,    desonide (DESOWEN) 0.05 % cream Apply topically 2 times daily. Yes Drake Mauro,    metroNIDAZOLE (METROGEL) 0.75 % gel Apply topically 2 times daily. Yes Drake Mauro,    lactulose (CHRONULAC) 10 GM/15ML solution TAKE 15 ML BY MOUTH 3 TIMES DAILY  Patient taking differently: TAKE 15 ML BY MOUTH 3 TIMES DAILY PRN Yes Yessy Horne MD   medical marijuana Take by mouth as needed.  Yes Historical Provider, MD   FLUoxetine (PROZAC) 20 MG capsule Take 2 capsules by mouth daily Yes Yessy Horne MD   rifaximin (XIFAXAN) 550 MG tablet Take 550 mg by mouth 2 times daily Yes Historical Provider, MD   clonazePAM (KLONOPIN) 1 MG tablet Take 1 tablet by mouth 2 times daily as needed for Anxiety Yes Yessy Horne MD        Allergies   Allergen Reactions    Fish-Derived Products        Past Medical History:   Diagnosis Date    Anxiety     Arthritis     Class 1 obesity due to excess calories with serious comorbidity and body mass index (BMI) of 34.0 to 34.9 in adult     First degree AV block     GERD (gastroesophageal reflux disease)     Hyperlipidemia     IBS (irritable bowel syndrome)     LVH (left ventricular hypertrophy)     Obesity     ENOC (obstructive sleep apnea)     Psychiatric problem     Psychogenic nonepileptic seizure     Stroke-like symptoms 08/14/2018    Type 2 diabetes mellitus with complication, without long-term current use of insulin ([facility] [address]        Past Surgical History:   Procedure Laterality Date    HERNIA REPAIR      INSERTABLE CARDIAC MONITOR  07/14/2017    KNEE SURGERY      PACEMAKER INSERTION  12/28/2017    D-PPM   ([employer])   [address] P[address]rez History     Socioeconomic History    Marital status: Single     Spouse name: Not on file    Number of children: Not on file    Years of education: Not on file    Highest education level: Not on file   Occupational History    Not on file   Social Needs    Financial resource strain: Not on file    Food insecurity     Worry: Not on file     Inability: Not on file    Transportation needs     Medical: Not on file     Non-medical: Not on file   Tobacco Use    Smoking status: Never Smoker    Smokeless tobacco: Never Used   Substance and Sexual Activity    Alcohol use: Yes     Alcohol/week: 1.0 standard drinks     Types: 1 Shots of liquor per week     Frequency: Monthly or less    Drug use: No    Sexual activity: Not on file   Lifestyle    Physical activity     Days per week: Not on file     Minutes per session: Not on file    Stress: Not on file   Relationships    Social connections     Talks on phone: Not on file     Gets together: Not on file     Attends [employer] service: Not on file     Active member of club or organization: Not on file     Attends meetings of clubs or organizations: Not on file     Relationship status: Not on file    Intimate partner violence     Fear of current or ex partner: Not on file     Emotionally abused: Not on file     Physically abused: Not on file     Forced sexual activity: Not on file   Other Topics Concern    Not on file   Social History Narrative    Not on file        History reviewed. No pertinent family history. Vitals:    09/17/20 0919   BP: 118/78   Pulse: 87   Resp: 16   Temp: 97.1 °F (36.2 °C)   SpO2: 98%   Weight: 257 lb (116.6 kg)   Height: 5' 10\" (1.778 m)     Estimated body mass index is 36.88 kg/m² as calculated from the following:    Height as of this encounter: 5' 10\" (1.778 m). Weight as of this encounter: 257 lb (116.6 kg). Physical Exam  Vitals signs reviewed. Constitutional:       Appearance: He is obese.    HENT:      Head: Normocephalic and atraumatic. Eyes:      General: No scleral icterus. Conjunctiva/sclera: Conjunctivae normal.      Pupils: Pupils are equal, round, and reactive to light. Neck:      Musculoskeletal: Neck supple. Thyroid: No thyromegaly. Cardiovascular:      Rate and Rhythm: Normal rate and regular rhythm. Heart sounds: Normal heart sounds. No murmur. Pulmonary:      Effort: Pulmonary effort is normal.      Breath sounds: Normal breath sounds. No rales. Abdominal:      General: Bowel sounds are normal. There is distension. Palpations: Abdomen is soft. Tenderness: There is no abdominal tenderness. Musculoskeletal:      Left shoulder: He exhibits decreased range of motion, tenderness, pain and spasm. Comments: Mildly positive empty can on the left. Tremor noted in left upper extremity. Pain with internal and external rotation of the left arm. Mildly positive [place] horn testing. Tenderness to palpation over the biceps insertion. Mild pain with supination and pronation of the left upper extremity   Lymphadenopathy:      Cervical: No cervical adenopathy. Skin:     General: Skin is warm and dry. Findings: Erythema and rash present. Neurological:      Mental Status: He is alert and oriented to person, place, and time. Cranial Nerves: No cranial nerve deficit. Motor: Tremor present. Gait: Gait abnormal.   Psychiatric:         Judgment: Judgment normal.       Injection Procedure Note    Indications: Pain in left shoulder area    Procedure Details   Verbal consent was obtained for the procedure. The joint was prepped in a sterile fashion. A 19 gauge 1.5\" needle was inserted into the left shoulder bursa. 4cc 2% lidocaine, 4 cc of 0.5% Marcaine, 80 mg of methylprednisolone was then injected into the joint through the same needle. The needle was removed and the area cleansed and dressed. Complications: None; patient tolerated the procedure well.     Aftercare was reviewed with the patient. Assessment/Plan:   Diagnosis Orders   1. Chronic left shoulder pain  External Referral To Orthopedic Surgery    20610 - PR DRAIN/INJECT LARGE JOINT/BURSA    methylPREDNISolone acetate (DEPO-MEDROL) injection 80 mg     Left shoulder joint injection performed today. Referral to orthopedic surgery as well for further evaluation and treatment. Pacer is  [employer] Josh BRYANT, MRI SureScan which should allow MRI evaluation to rule out rotator cuff tear. [address], D.O.   10:59 AM  9/17/2020       This document may have been prepared at least partially through the use of voice recognition software. Although effort is taken to assure the accuracy of this document, it is possible that grammatical, syntax,  or spelling errors may occur.

## 2020-10-01 ENCOUNTER — HOSPITAL ENCOUNTER (EMERGENCY)
Age: 40
Discharge: HOME | End: 2020-10-02
Attending: EMERGENCY MEDICINE
Payer: COMMERCIAL

## 2020-10-01 ENCOUNTER — APPOINTMENT (OUTPATIENT)
Dept: GENERAL RADIOLOGY | Age: 40
End: 2020-10-01
Payer: COMMERCIAL

## 2020-10-01 ENCOUNTER — APPOINTMENT (OUTPATIENT)
Dept: CT IMAGING | Age: 40
End: 2020-10-01
Payer: COMMERCIAL

## 2020-10-01 ENCOUNTER — OFFICE VISIT (OUTPATIENT)
Dept: PRIMARY CARE CLINIC | Age: 40
End: 2020-10-01
Payer: COMMERCIAL

## 2020-10-01 VITALS
HEART RATE: 97 BPM | RESPIRATION RATE: 16 BRPM | DIASTOLIC BLOOD PRESSURE: 82 MMHG | WEIGHT: 260 LBS | HEIGHT: 70 IN | TEMPERATURE: 97.7 F | BODY MASS INDEX: 37.22 KG/M2 | SYSTOLIC BLOOD PRESSURE: 128 MMHG | OXYGEN SATURATION: 96 %

## 2020-10-01 VITALS
TEMPERATURE: 97.6 F | BODY MASS INDEX: 37.31 KG/M2 | WEIGHT: 260 LBS | RESPIRATION RATE: 16 BRPM | SYSTOLIC BLOOD PRESSURE: 100 MMHG | DIASTOLIC BLOOD PRESSURE: 58 MMHG | HEART RATE: 74 BPM | OXYGEN SATURATION: 95 %

## 2020-10-01 PROBLEM — R41.82 ALTERED MENTAL STATUS: Status: ACTIVE | Noted: 2020-10-01

## 2020-10-01 LAB
ACETAMINOPHEN LEVEL: <5 MCG/ML (ref 10–30)
ALBUMIN SERPL-MCNC: 4.1 G/DL (ref 3.5–5.2)
ALP BLD-CCNC: 104 U/L (ref 40–129)
ALT SERPL-CCNC: 32 U/L (ref 0–40)
AMMONIA: 27 UMOL/L (ref 16–60)
AMPHETAMINE SCREEN, URINE: POSITIVE
ANION GAP SERPL CALCULATED.3IONS-SCNC: 12 MMOL/L (ref 7–16)
AST SERPL-CCNC: 16 U/L (ref 0–39)
BACTERIA: ABNORMAL /HPF
BARBITURATE SCREEN URINE: NOT DETECTED
BASOPHILS ABSOLUTE: 0.07 E9/L (ref 0–0.2)
BASOPHILS RELATIVE PERCENT: 0.4 % (ref 0–2)
BENZODIAZEPINE SCREEN, URINE: NOT DETECTED
BILIRUB SERPL-MCNC: 0.8 MG/DL (ref 0–1.2)
BILIRUBIN URINE: NEGATIVE
BLOOD, URINE: NEGATIVE
BUN BLDV-MCNC: 10 MG/DL (ref 6–20)
CALCIUM SERPL-MCNC: 9.6 MG/DL (ref 8.6–10.2)
CANNABINOID SCREEN URINE: POSITIVE
CHLORIDE BLD-SCNC: 103 MMOL/L (ref 98–107)
CLARITY: CLEAR
CO2: 24 MMOL/L (ref 22–29)
COCAINE METABOLITE SCREEN URINE: NOT DETECTED
COLOR: YELLOW
CREAT SERPL-MCNC: 0.9 MG/DL (ref 0.7–1.2)
EOSINOPHILS ABSOLUTE: 0.29 E9/L (ref 0.05–0.5)
EOSINOPHILS RELATIVE PERCENT: 1.6 % (ref 0–6)
ETHANOL: <10 MG/DL (ref 0–0.08)
FENTANYL SCREEN, URINE: NOT DETECTED
GFR AFRICAN AMERICAN: >60
GFR NON-AFRICAN AMERICAN: >60 ML/MIN/1.73
GLUCOSE BLD-MCNC: 94 MG/DL (ref 74–99)
GLUCOSE URINE: NEGATIVE MG/DL
HCT VFR BLD CALC: 46.6 % (ref 37–54)
HEMOGLOBIN: 15.6 G/DL (ref 12.5–16.5)
IMMATURE GRANULOCYTES #: 0.1 E9/L
IMMATURE GRANULOCYTES %: 0.6 % (ref 0–5)
KETONES, URINE: NEGATIVE MG/DL
LEUKOCYTE ESTERASE, URINE: ABNORMAL
LYMPHOCYTES ABSOLUTE: 3.52 E9/L (ref 1.5–4)
LYMPHOCYTES RELATIVE PERCENT: 19.9 % (ref 20–42)
Lab: ABNORMAL
MCH RBC QN AUTO: 30.2 PG (ref 26–35)
MCHC RBC AUTO-ENTMCNC: 33.5 % (ref 32–34.5)
MCV RBC AUTO: 90.3 FL (ref 80–99.9)
METHADONE SCREEN, URINE: NOT DETECTED
MONOCYTES ABSOLUTE: 1.03 E9/L (ref 0.1–0.95)
MONOCYTES RELATIVE PERCENT: 5.8 % (ref 2–12)
MUCUS: PRESENT /LPF
NEUTROPHILS ABSOLUTE: 12.68 E9/L (ref 1.8–7.3)
NEUTROPHILS RELATIVE PERCENT: 71.7 % (ref 43–80)
NITRITE, URINE: NEGATIVE
OPIATE SCREEN URINE: NOT DETECTED
OXYCODONE URINE: NOT DETECTED
PDW BLD-RTO: 13.5 FL (ref 11.5–15)
PH UA: 6 (ref 5–9)
PHENCYCLIDINE SCREEN URINE: NOT DETECTED
PLATELET # BLD: 368 E9/L (ref 130–450)
PMV BLD AUTO: 10.1 FL (ref 7–12)
POTASSIUM SERPL-SCNC: 4.1 MMOL/L (ref 3.5–5)
PROTEIN UA: NEGATIVE MG/DL
RBC # BLD: 5.16 E12/L (ref 3.8–5.8)
RBC UA: ABNORMAL /HPF (ref 0–2)
SALICYLATE, SERUM: <0.3 MG/DL (ref 0–30)
SEDIMENTATION RATE, ERYTHROCYTE: 12 MM/HR (ref 0–15)
SODIUM BLD-SCNC: 139 MMOL/L (ref 132–146)
SPECIFIC GRAVITY UA: 1.02 (ref 1–1.03)
TOTAL PROTEIN: 7.5 G/DL (ref 6.4–8.3)
TRICYCLIC ANTIDEPRESSANTS SCREEN SERUM: NEGATIVE NG/ML
TROPONIN: <0.01 NG/ML (ref 0–0.03)
UROBILINOGEN, URINE: 0.2 E.U./DL
WBC # BLD: 17.7 E9/L (ref 4.5–11.5)
WBC UA: ABNORMAL /HPF (ref 0–5)

## 2020-10-01 PROCEDURE — 70496 CT ANGIOGRAPHY HEAD: CPT

## 2020-10-01 PROCEDURE — G8484 FLU IMMUNIZE NO ADMIN: HCPCS | Performed by: FAMILY MEDICINE

## 2020-10-01 PROCEDURE — 99283 EMERGENCY DEPT VISIT LOW MDM: CPT

## 2020-10-01 PROCEDURE — 87040 BLOOD CULTURE FOR BACTERIA: CPT

## 2020-10-01 PROCEDURE — 71045 X-RAY EXAM CHEST 1 VIEW: CPT

## 2020-10-01 PROCEDURE — G8427 DOCREV CUR MEDS BY ELIG CLIN: HCPCS | Performed by: FAMILY MEDICINE

## 2020-10-01 PROCEDURE — 6360000004 HC RX CONTRAST MEDICATION: Performed by: STUDENT IN AN ORGANIZED HEALTH CARE EDUCATION/TRAINING PROGRAM

## 2020-10-01 PROCEDURE — 1036F TOBACCO NON-USER: CPT | Performed by: FAMILY MEDICINE

## 2020-10-01 PROCEDURE — 99284 EMERGENCY DEPT VISIT MOD MDM: CPT

## 2020-10-01 PROCEDURE — 70450 CT HEAD/BRAIN W/O DYE: CPT

## 2020-10-01 PROCEDURE — G0480 DRUG TEST DEF 1-7 CLASSES: HCPCS

## 2020-10-01 PROCEDURE — 80307 DRUG TEST PRSMV CHEM ANLYZR: CPT

## 2020-10-01 PROCEDURE — 2580000003 HC RX 258: Performed by: EMERGENCY MEDICINE

## 2020-10-01 PROCEDURE — 80053 COMPREHEN METABOLIC PANEL: CPT

## 2020-10-01 PROCEDURE — 84484 ASSAY OF TROPONIN QUANT: CPT

## 2020-10-01 PROCEDURE — G8417 CALC BMI ABV UP PARAM F/U: HCPCS | Performed by: FAMILY MEDICINE

## 2020-10-01 PROCEDURE — 82140 ASSAY OF AMMONIA: CPT

## 2020-10-01 PROCEDURE — 99213 OFFICE O/P EST LOW 20 MIN: CPT | Performed by: FAMILY MEDICINE

## 2020-10-01 PROCEDURE — 36415 COLL VENOUS BLD VENIPUNCTURE: CPT

## 2020-10-01 PROCEDURE — 85025 COMPLETE CBC W/AUTO DIFF WBC: CPT

## 2020-10-01 PROCEDURE — 93005 ELECTROCARDIOGRAM TRACING: CPT | Performed by: EMERGENCY MEDICINE

## 2020-10-01 PROCEDURE — 70498 CT ANGIOGRAPHY NECK: CPT

## 2020-10-01 PROCEDURE — 81001 URINALYSIS AUTO W/SCOPE: CPT

## 2020-10-01 PROCEDURE — 85651 RBC SED RATE NONAUTOMATED: CPT

## 2020-10-01 RX ORDER — 0.9 % SODIUM CHLORIDE 0.9 %
1000 INTRAVENOUS SOLUTION INTRAVENOUS ONCE
Status: COMPLETED | OUTPATIENT
Start: 2020-10-01 | End: 2020-10-02

## 2020-10-01 RX ADMIN — IOPAMIDOL 60 ML: 755 INJECTION, SOLUTION INTRAVENOUS at 23:34

## 2020-10-01 RX ADMIN — SODIUM CHLORIDE 1000 ML: 9 INJECTION, SOLUTION INTRAVENOUS at 22:00

## 2020-10-01 ASSESSMENT — ENCOUNTER SYMPTOMS
NAUSEA: 0
SHORTNESS OF BREATH: 0
VOMITING: 0
BLOOD IN STOOL: 0
COUGH: 0
ABDOMINAL DISTENTION: 1
BACK PAIN: 1
ABDOMINAL PAIN: 0
PHOTOPHOBIA: 0
SORE THROAT: 0
CONSTIPATION: 0
DIARRHEA: 0

## 2020-10-01 NOTE — ED PROVIDER NOTES
Department of Emergency Medicine   ED  Provider Note  Admit Date/RoomTime: 10/1/2020  3:41 PM  ED Room: [address]  Chief Complaint:       Extremity Weakness (ongoing left arm weakness since july, sent from [facility] for eval d/t photosensitivity)    History of Present Illness   Source of history provided by:  patient and relative(s)  History/Exam Limitations: none. Laila Chung is a 37 y.o. old male who has a past medical history of:   Past Medical History:   Diagnosis Date    Anxiety     Arthritis     Class 1 obesity due to excess calories with serious comorbidity and body mass index (BMI) of 34.0 to 34.9 in adult     First degree AV block     GERD (gastroesophageal reflux disease)     Hyperlipidemia     IBS (irritable bowel syndrome)     LVH (left ventricular hypertrophy)     Obesity     ENOC (obstructive sleep apnea)     Psychiatric problem     Psychogenic nonepileptic seizure     Stroke-like symptoms 08/14/2018    Type 2 diabetes mellitus with complication, without long-term current use of insulin ([facility] [address]      Patient presents to the ED for evaluation of altered mental status. History is provided by the patient and his brother whom he lives with. He states he has nonepileptic seizures and has been having them more frequently. He fell down to the ground last week and is unsure how he fell. He cannot remember if symptoms started before or after his fall but he describes a global fuzziness and some mild confusion. He states that today, he was making a sandwich and was using the wrong end of the butter knife. He has chronic weakness and tremors in his left arm. He denies any new focal symptoms. He was seen by his PCP earlier today in the office who advised him to come to the ED for further work-up. Code Status on file: Prior. .  ROS   Pertinent positives and negatives are stated within HPI, all other systems reviewed and are negative.     Past Surgical History:  has a past surgical history that includes Tonsillectomy; hernia repair; knee surgery; Insertable Cardiac Monitor (07/14/2017); and Pacemaker insertion (12/28/2017). Social History:  reports that he has never smoked. He has never used smokeless tobacco. He reports current alcohol use of about 1.0 standard drinks of alcohol per week. He reports that he does not use drugs. Family History: family history is not on file. Allergies: Fish-derived products    Physical Exam           ED Triage Vitals [10/01/20 1543]   BP Temp Temp src Pulse Resp SpO2 Height Weight   122/74 97.6 °F (36.4 °C) -- 76 16 96 % -- 260 lb (117.9 kg)      Oxygen Saturation Interpretation: Normal.    Constitutional:   Level of Consciousness: Awake and alert. ETOH: No.         Distress: none. Cooperativeness: cooperative. Eyes:  PERRL, EOMI, no discharge or conjunctival injection. Ears:  External ears without lesions. Throat:  Pharynx without injection, exudate, or tonsillar hypertrophy. Airway patient. Neck:  Normal ROM. Supple. Respiratory:  Clear to auscultation and breath sounds equal.  CV:  Regular rate and rhythm, normal heart sounds, without pathological murmurs, ectopy, gallops, or rubs. GI:  Abdomen Soft, nontender, good bowel sounds. No firm or pulsatile mass. Back:  No costovertebral tenderness. Integument:  Normal turgor. Warm, dry, without visible rash, unless noted elsewhere. Lymphatics: No lymphangitis or adenopathy noted. Neurological:       Orientation: person, place and time. Memory:             short term impairment: No.             Long term impairment: No.       CN II-XII: cranial nerves II-XII are grossly intact. Motor function:            Arm(s): Left weak. Leg(s): Left weak.        Cerebellar function:            Tremor: Yes - L arm              Past-pointing: No.             Limb Ataxia: No.       Sensory function:            Arm(s): Bilateral normal.            Leg(s): Bilateral normal.            Face: Bilateral normal.    NIH Stroke Scale/Score at time of initial evaluation:  1A: Level of Consciousness 0 - alert; keenly responsive   1B: Ask Month and Age 0 - answers both questions correctly   1C:  Tell Patient To Open and Close Eyes, then Hand  Squeeze 0 - performs both tasks correctly   2: Test Horizontal Extraocular Movements 0 - normal   3: Test Visual Fields 0 - no visual loss   4: Test Facial Palsy 0 - normal symmetric movement   5A: Test Left Arm Motor Drift 1 - drift, limb holds 90 (or 45) degrees but drifts down before full 10 seconds: does not hit bed   5B: Test Right Arm Motor Drift 0 - no drift, limb holds 90 (or 45) degrees for full 10 seconds   6A: Test Left Leg Motor Drift 1 - drift; leg falls by the end of the 5 second period but does not hit bed   6B: Test Right Leg Motor Drift 0 - no drift; leg holds 30 degree position for full 5 seconds   7: Test Limb Ataxia   (FNF/Heel-Shin) 0 - absent   8: Test Sensation 0 - normal; no sensory loss   9: Test Language/Aphasia 0 - no aphasia, normal   10: Test Dysarthria 0 - normal   11: Test Extinction/Inattention 0 - no abnormality   Total Score: 2     Lab / Imaging Results   (All laboratory and radiology results have been personally reviewed by myself)  Labs:  Results for orders placed or performed during the hospital encounter of 10/01/20   Culture, Blood 1    Specimen: Blood   Result Value Ref Range    Blood Culture, Routine 24 Hours no growth    Culture, Blood 2    Specimen: Blood   Result Value Ref Range    Culture, Blood 2 24 Hours no growth    CBC Auto Differential   Result Value Ref Range    WBC 17.7 (H) 4.5 - 11.5 E9/L    RBC 5.16 3.80 - 5.80 E12/L    Hemoglobin 15.6 12.5 - 16.5 g/dL    Hematocrit 46.6 37.0 - 54.0 %    MCV 90.3 80.0 - 99.9 fL    MCH 30.2 26.0 - 35.0 pg    MCHC 33.5 32.0 - 34.5 %    RDW 13.5 11.5 - 15.0 fL    Platelets 845 586 - 064 E9/L    MPV 10.1 7.0 - 12.0 fL    Neutrophils % 71.7 43.0 - 80.0 % Immature Granulocytes % 0.6 0.0 - 5.0 %    Lymphocytes % 19.9 (L) 20.0 - 42.0 %    Monocytes % 5.8 2.0 - 12.0 %    Eosinophils % 1.6 0.0 - 6.0 %    Basophils % 0.4 0.0 - 2.0 %    Neutrophils Absolute 12.68 (H) 1.80 - 7.30 E9/L    Immature Granulocytes # 0.10 E9/L    Lymphocytes Absolute 3.52 1.50 - 4.00 E9/L    Monocytes Absolute 1.03 (H) 0.10 - 0.95 E9/L    Eosinophils Absolute 0.29 0.05 - 0.50 E9/L    Basophils Absolute 0.07 0.00 - 0.20 E9/L   Troponin   Result Value Ref Range    Troponin <0.01 0.00 - 0.03 ng/mL   Comprehensive Metabolic Panel   Result Value Ref Range    Sodium 139 132 - 146 mmol/L    Potassium 4.1 3.5 - 5.0 mmol/L    Chloride 103 98 - 107 mmol/L    CO2 24 22 - 29 mmol/L    Anion Gap 12 7 - 16 mmol/L    Glucose 94 74 - 99 mg/dL    BUN 10 6 - 20 mg/dL    CREATININE 0.9 0.7 - 1.2 mg/dL    GFR Non-African American >60 >=60 mL/min/1.73    GFR African American >60     Calcium 9.6 8.6 - 10.2 mg/dL    Total Protein 7.5 6.4 - 8.3 g/dL    Alb 4.1 3.5 - 5.2 g/dL    Total Bilirubin 0.8 0.0 - 1.2 mg/dL    Alkaline Phosphatase 104 40 - 129 U/L    ALT 32 0 - 40 U/L    AST 16 0 - 39 U/L   Urinalysis   Result Value Ref Range    Color, UA Yellow Straw/Yellow    Clarity, UA Clear Clear    Glucose, Ur Negative Negative mg/dL    Bilirubin Urine Negative Negative    Ketones, Urine Negative Negative mg/dL    Specific Gravity, UA 1.025 1.005 - 1.030    Blood, Urine Negative Negative    pH, UA 6.0 5.0 - 9.0    Protein, UA Negative Negative mg/dL    Urobilinogen, Urine 0.2 <2.0 E.U./dL    Nitrite, Urine Negative Negative    Leukocyte Esterase, Urine TRACE (A) Negative   Ammonia   Result Value Ref Range    Ammonia 27.0 16.0 - 60.0 umol/L   Serum Drug Screen   Result Value Ref Range    Ethanol Lvl <10 mg/dL    Acetaminophen Level <5.0 (L) 10.0 - 53.2 mcg/mL    Salicylate, Serum <3.1 0.0 - 30.0 mg/dL    TCA Scrn NEGATIVE Cutoff:300 ng/mL   Urine Drug Screen   Result Value Ref Range    Amphetamine Screen, Urine POSITIVE (A) Negative <1000 ng/mL    Barbiturate Screen, Ur NOT DETECTED Negative < 200 ng/mL    Benzodiazepine Screen, Urine NOT DETECTED Negative < 200 ng/mL    Cannabinoid Scrn, Ur POSITIVE (A) Negative < 50ng/mL    Cocaine Metabolite Screen, Urine NOT DETECTED Negative < 300 ng/mL    Opiate Scrn, Ur NOT DETECTED Negative < 300ng/mL    PCP Screen, Urine NOT DETECTED Negative < 25 ng/mL    Methadone Screen, Urine NOT DETECTED Negative <300 ng/mL    Oxycodone Urine NOT DETECTED Negative <100 ng/mL    FENTANYL SCREEN, URINE NOT DETECTED Negative <1 ng/mL    Drug Screen Comment: see below    Microscopic Urinalysis   Result Value Ref Range    Mucus, UA Present (A) None Seen /LPF    WBC, UA 1-3 0 - 5 /HPF    RBC, UA NONE 0 - 2 /HPF    Bacteria, UA NONE SEEN None Seen /HPF   Sedimentation Rate   Result Value Ref Range    Sed Rate 12 0 - 15 mm/Hr   EKG 12 Lead   Result Value Ref Range    Ventricular Rate 77 BPM    Atrial Rate 77 BPM    P-R Interval 220 ms    QRS Duration 116 ms    Q-T Interval 396 ms    QTc Calculation (Bazett) 448 ms    P Axis 80 degrees    R Axis -76 degrees    T Axis 34 degrees     Imaging: All Radiology results interpreted by Radiologist unless otherwise noted. CTA NECK W CONTRAST   Final Result   Unremarkable CTA of the head and neck. CTA HEAD W CONTRAST   Final Result   Unremarkable CTA of the head and neck. CT HEAD WO CONTRAST   Final Result   No acute intracranial abnormality. XR CHEST PORTABLE   Final Result   Mild prominence of the cardiac silhouette. Mild central pulmonary vascular   congestion. No additional cardiopulmonary pathology seen. EKG #1:  Interpreted by emergency department physician unless otherwise noted. Time:  16:58    Rate: 77  Rhythm: Sinus. Interpretation: non-specific EKG. Comparison: stable as compared to patient's most recent EKG.     ED Course / Medical Decision Making     Medications   0.9 % sodium chloride bolus (0 mLs Intravenous Stopped 10/2/20 0048)   iopamidol (ISOVUE-370) 76 % injection 60 mL (60 mLs Intravenous Given 10/1/20 1020)        Re-Evaluations:  10/1/20      Patients symptoms show no change. Consultations:             None    Procedures:   none    MDM: Patient presents to the ED for evaluation of feeling off for the past few weeks. He has a bit of tremoring and weakness in his left arm. He states this is chronic and his brother confirms this. He is currently alert and oriented x3 with no focal neurologic deficits. CT head obtained and negative for acute intracranial abnormality. Unfortunately, patient's labs have not yet been drawn. Patient signed out to oncoming ED physician, plan for likely discharge if work-up is negative. Counseling:   I have spoken with the brother and patient and discussed todays results, in addition to providing specific details for the plan of care and counseling regarding the diagnosis and prognosis and are agreeable with the plan. Assessment      1. Left shoulder pain, unspecified chronicity    2. Left arm weakness      This patient's ED course included: a personal history and physicial examination  This patient has remained hemodynamically stable during their ED course. Plan   Other Disposition: signed out to oncoming ED physician. Patient condition is stable. New Medications     Discharge Medication List as of 10/2/2020 12:37 AM        Electronically signed by Natalya Camarillo DO   DD: 10/1/20  **This report was transcribed using voice recognition software. Every effort was made to ensure accuracy; however, inadvertent computerized transcription errors may be present.   END OF PROVIDER NOTE        Natalya Camarillo DO  10/03/20 1052

## 2020-10-01 NOTE — PROGRESS NOTES
10/1/2020    Machelle Tripp (:  1980) is a 37 y.o. male, here for evaluation of the following medical concerns:    HPI  Patient presents today with mother and brother for evaluation of altered mental status. Mother states that she noticed roughly around noon which was 3 hours ago that he seemed very confused while at home. He was trying to make a sandwich and was using the wrong part of the knife and did not seem to know where he was. This correlates with an increase in seizure activity per patient. Patient states he has been having 1-2 seizures per day for the past several weeks. Patient does have a history of psychogenic nonepileptic seizures, ventricular tachycardia, hyperammonemia, and anxiety. Has had TIAs in the past.    Review of Systems   Constitutional: Positive for fatigue. Negative for chills and fever. HENT: Negative for congestion, hearing loss, nosebleeds and sore throat. Eyes: Negative for photophobia. Respiratory: Negative for cough and shortness of breath. Cardiovascular: Negative for chest pain, palpitations and leg swelling. Gastrointestinal: Positive for abdominal distention. Negative for abdominal pain, blood in stool, constipation, diarrhea, nausea and vomiting. Endocrine: Negative for polydipsia. Genitourinary: Negative for dysuria, frequency, hematuria and urgency. Musculoskeletal: Positive for arthralgias, back pain, gait problem, joint swelling and myalgias. Skin: Negative. Neurological: Positive for dizziness, tremors, seizures, weakness and numbness. Negative for headaches. Hematological: Does not bruise/bleed easily. Psychiatric/Behavioral: Positive for agitation, behavioral problems, decreased concentration, dysphoric mood and sleep disturbance. Negative for hallucinations, self-injury and suicidal ideas. The patient is nervous/anxious. All other systems reviewed and are negative. Prior to Visit Medications    Medication Sig Taking? Authorizing Provider   ENULOSE 10 GM/15ML SOLN solution  Yes Historical Provider, MD   mirtazapine (REMERON) 30 MG tablet TAKE ONE TABLET BY MOUTH once a day AT BEDTIME Yes Historical Provider, MD   amphetamine-dextroamphetamine (ADDERALL, 10MG,) 10 MG tablet Take 1 tablet by mouth 2 times daily for 30 days. Yes Drake Mauro, DO   Rimegepant Sulfate 75 MG TBDP Take 75 mg by mouth daily as needed (migraine) No more than 1 tab in 24 hours Yes NADIA Harley - CNP   atorvastatin (LIPITOR) 40 MG tablet Take 1 tablet by mouth daily Yes Drake Mauro DO   metoprolol succinate (TOPROL XL) 100 MG extended release tablet TAKE 1 TABLET BY MOUTH TWICE DAILY WITH MEALS Yes Drake Mauro DO   metFORMIN (GLUCOPHAGE-XR) 750 MG extended release tablet Take 1 tablet by mouth daily (with breakfast) Yes Drake Mauro,    zonisamide (ZONEGRAN) 50 MG capsule Take 2 capsules by mouth nightly Yes Drake Mauro,    desonide (DESOWEN) 0.05 % cream Apply topically 2 times daily. Yes Drake Mauro, DO   metroNIDAZOLE (METROGEL) 0.75 % gel Apply topically 2 times daily. Yes Drake Mauro, DO   lactulose (CHRONULAC) 10 GM/15ML solution TAKE 15 ML BY MOUTH 3 TIMES DAILY  Patient taking differently: TAKE 15 ML BY MOUTH 3 TIMES DAILY PRN Yes Crystal Wilks MD   medical marijuana Take by mouth as needed.  Yes Historical Provider, MD   FLUoxetine (PROZAC) 20 MG capsule Take 2 capsules by mouth daily Yes Crystal Wilks MD   rifaximin (XIFAXAN) 550 MG tablet Take 550 mg by mouth 2 times daily Yes Historical Provider, MD   clonazePAM (KLONOPIN) 1 MG tablet Take 1 tablet by mouth 2 times daily as needed for Anxiety Yes Crystal Wilks MD        Allergies   Allergen Reactions    Fish-Derived Products        Past Medical History:   Diagnosis Date    Anxiety     Arthritis     Class 1 obesity due to excess calories with serious comorbidity and body mass index (BMI) of 34.0 to 34.9 in adult     First degree AV block     GERD activity: Not on file   Other Topics Concern    Not on file   Social History Narrative    Not on file        History reviewed. No pertinent family history. Vitals:    10/01/20 1352   BP: 128/82   Pulse: 97   Resp: 16   Temp: 97.7 °F (36.5 °C)   SpO2: 96%   Weight: 260 lb (117.9 kg)   Height: 5' 10\" (1.778 m)     Estimated body mass index is 37.31 kg/m² as calculated from the following:    Height as of this encounter: 5' 10\" (1.778 m). Weight as of this encounter: 260 lb (117.9 kg). Physical Exam  Vitals signs reviewed. Constitutional:       Appearance: He is obese. HENT:      Head: Normocephalic and atraumatic. Eyes:      General: No scleral icterus. Conjunctiva/sclera: Conjunctivae normal.      Pupils: Pupils are equal, round, and reactive to light. Neck:      Musculoskeletal: Neck supple. Thyroid: No thyromegaly. Cardiovascular:      Rate and Rhythm: Normal rate and regular rhythm. Heart sounds: Normal heart sounds. No murmur. Pulmonary:      Effort: Pulmonary effort is normal.      Breath sounds: Normal breath sounds. No rales. Abdominal:      General: Bowel sounds are normal. There is distension. Palpations: Abdomen is soft. Tenderness: There is no abdominal tenderness. Musculoskeletal:      Left shoulder: He exhibits decreased range of motion, tenderness, pain and spasm. Comments: Mildly positive empty can on the left. Tremor noted in left upper extremity. Pain with internal and external rotation of the left arm. Mildly positive [place] horn testing. Tenderness to palpation over the biceps insertion. Mild pain with supination and pronation of the left upper extremity   Lymphadenopathy:      Cervical: No cervical adenopathy. Skin:     General: Skin is warm and dry. Findings: Erythema and rash present. Neurological:      Cranial Nerves: No cranial nerve deficit. Sensory: Sensory deficit present. Motor: Weakness and tremor present. Gait: Gait abnormal.      Comments: Weakness to LUE/LLE, + pronator drift on the left. Voice and speech appear to be at his baseline. Slightly delayed responses however when compared to previous. Psychiatric:         Judgment: Judgment normal.           Assessment/Plan:   Diagnosis Orders   1. Altered mental status, unspecified altered mental status type     2. TIA (transient ischemic attack)     3. Psychogenic nonepileptic seizure       Sent by EMS to main for r/o CVA vs AMS. Pacer is  [employer] Josh BRYANT, MRI SureScan which should allow MRI evaluation to rule out rotator cuff tear. Aaron Morales D.O.   3:23 PM  10/1/2020       This document may have been prepared at least partially through the use of voice recognition software. Although effort is taken to assure the accuracy of this document, it is possible that grammatical, syntax,  or spelling errors may occur.

## 2020-10-01 NOTE — ED NOTES
Bed: HE  Expected date:   Expected time:   Means of arrival:   Comments:  juanpablo Hull RN  10/01/20 3725

## 2020-10-02 ENCOUNTER — TELEPHONE (OUTPATIENT)
Dept: PRIMARY CARE CLINIC | Age: 40
End: 2020-10-02

## 2020-10-02 LAB
EKG ATRIAL RATE: 77 BPM
EKG P AXIS: 80 DEGREES
EKG P-R INTERVAL: 220 MS
EKG Q-T INTERVAL: 396 MS
EKG QRS DURATION: 116 MS
EKG QTC CALCULATION (BAZETT): 448 MS
EKG R AXIS: -76 DEGREES
EKG T AXIS: 34 DEGREES
EKG VENTRICULAR RATE: 77 BPM

## 2020-10-02 PROCEDURE — 93010 ELECTROCARDIOGRAM REPORT: CPT | Performed by: INTERNAL MEDICINE

## 2020-10-02 NOTE — ED NOTES
CT contrast cannot be given at this time due to patient taking metformin, will continue with CT without contrast      Brandee Bergman RN  10/01/20 2651

## 2020-10-02 NOTE — TELEPHONE ENCOUNTER
Tylenol or ibuprofen is fine. Patient has a referral into orthopedic surgery for further evaluation and treatment.

## 2020-10-02 NOTE — ED NOTES
Patient was turned over to me by Dr. Sharee Harris. Patient presented here because of shoulder pain but also reportedly was having weakness in his left upper extremity. Patient reports history of this but worsening symptoms. Patient reporting no chest pain no difficulty breathing there is no abdominal pain or vomiting diarrhea reports no headache he reports no neck pain. Patient reports no fever chills or cough. Patient white count was noted to be elevated 17,000 he reports he had recent injection for his shoulder. Patient is awake alert here he is moving all extremities slightly weaker in his left upper extremity. Patient does have prior history of TIA CT the head was done it was negative I did CTA of his head and neck to rule out any other intracranial pathology those are within normal limits patient afebrile here and vital signs stable. EKG was also reviewed in sinus rhythm no acute changes.   Patient to be discharged home he is to follow-up with his neurologist and his PCP and return if symptoms worsen or persist     Jesse Ash MD  10/02/20 2905 [facility] Yamil Gilmore MD  10/02/20 0818

## 2020-10-05 RX ORDER — DEXTROAMPHETAMINE SACCHARATE, AMPHETAMINE ASPARTATE, DEXTROAMPHETAMINE SULFATE AND AMPHETAMINE SULFATE 2.5; 2.5; 2.5; 2.5 MG/1; MG/1; MG/1; MG/1
10 TABLET ORAL 2 TIMES DAILY
Qty: 60 TABLET | Refills: 0 | Status: SHIPPED
Start: 2020-10-05 | End: 2020-10-28 | Stop reason: SDUPTHER

## 2020-10-05 RX ORDER — DEXTROAMPHETAMINE SACCHARATE, AMPHETAMINE ASPARTATE, DEXTROAMPHETAMINE SULFATE AND AMPHETAMINE SULFATE 2.5; 2.5; 2.5; 2.5 MG/1; MG/1; MG/1; MG/1
10 TABLET ORAL 2 TIMES DAILY
Qty: 60 TABLET | Refills: 1 | OUTPATIENT
Start: 2020-10-05 | End: 2020-11-04

## 2020-10-07 LAB
BLOOD CULTURE, ROUTINE: NORMAL
CULTURE, BLOOD 2: NORMAL

## 2020-10-28 RX ORDER — DEXTROAMPHETAMINE SACCHARATE, AMPHETAMINE ASPARTATE, DEXTROAMPHETAMINE SULFATE AND AMPHETAMINE SULFATE 2.5; 2.5; 2.5; 2.5 MG/1; MG/1; MG/1; MG/1
10 TABLET ORAL 2 TIMES DAILY
Qty: 60 TABLET | Refills: 0 | Status: SHIPPED
Start: 2020-10-28 | End: 2020-12-04 | Stop reason: SDUPTHER

## 2020-11-03 ENCOUNTER — HOSPITAL ENCOUNTER (OUTPATIENT)
Dept: GENERAL RADIOLOGY | Age: 40
Discharge: HOME | End: 2020-11-05
Payer: COMMERCIAL

## 2020-11-03 ENCOUNTER — HOSPITAL ENCOUNTER (OUTPATIENT)
Dept: CT IMAGING | Age: 40
Discharge: HOME | End: 2020-11-05
Payer: COMMERCIAL

## 2020-11-03 VITALS
RESPIRATION RATE: 16 BRPM | BODY MASS INDEX: 35.79 KG/M2 | TEMPERATURE: 97.9 F | SYSTOLIC BLOOD PRESSURE: 116 MMHG | DIASTOLIC BLOOD PRESSURE: 63 MMHG | HEIGHT: 70 IN | WEIGHT: 250 LBS | HEART RATE: 81 BPM | OXYGEN SATURATION: 98 %

## 2020-11-03 PROCEDURE — 6360000004 HC RX CONTRAST MEDICATION: Performed by: RADIOLOGY

## 2020-11-03 PROCEDURE — 73040 CONTRAST X-RAY OF SHOULDER: CPT

## 2020-11-03 PROCEDURE — 23350 INJECTION FOR SHOULDER X-RAY: CPT

## 2020-11-03 PROCEDURE — 73201 CT UPPER EXTREMITY W/DYE: CPT

## 2020-11-03 RX ORDER — SODIUM CHLORIDE 0.9 % (FLUSH) 0.9 %
10 SYRINGE (ML) INJECTION PRN
Status: CANCELLED | OUTPATIENT
Start: 2020-11-03

## 2020-11-03 RX ADMIN — IOPAMIDOL 12 ML: 612 INJECTION, SOLUTION INTRAVENOUS at 08:43

## 2020-11-03 NOTE — PROGRESS NOTES
IRFLOWSHEET    Date: 11/3/2020    Time: 8:10 AM     Exam: Image guided left shoulder arthrogram with computed tomography    Radiologist Performing Procedure: Dr. Lizzy Eugene    Nurse Reporting/Phone: [id number]     Nurse Receiving: Srinivas Turk    Permit signed:      Yes    ID Band Checklist: Yes    Allergies: Fish-derived products     TIME OUT: 5934    PROCEDURE START TIME: 0815    Puncture Site: left shoulder    Puncture Time: 0815    Catheters: 20 gauge spinal needle    LABS:   Lab Results   Component Value Date    INR 1.2 07/15/2020    PROTIME 13.3 (H) 07/15/2020           Lab Results   Component Value Date    CREATININE 0.9 10/01/2020    BUN 10 10/01/2020          Lab Results   Component Value Date    HGB 15.6 10/01/2020    HCT 46.6 10/01/2020     10/01/2020         PROCEDURE END TIME: 0818    Total Contrast: 12ml isovue 300    Fluoroscopy Time: 0.6 minutes    Complications:  None    Comments: Pt. Taken to fluoro room for left shoulder arthrogram.  Procedure explained to pt by Dr. Lizzy Eugene including risks, consent form signed. Patient placed supine on fluoro table for left shoulder arthrogram.  Area prepped and draped   Dr. Lizzy Eugene in to start procedure  Lidocaine 1% used for pt comfort. Ordered medications injected with fluroscopy guidance. Procedure completed at 0818. Site cleansed and dressed with band aid. Pt. Assisted off table and taken to CT. Discharge instructions provided and explained with understanding voiced.    Dc home per with family

## 2020-12-04 RX ORDER — DEXTROAMPHETAMINE SACCHARATE, AMPHETAMINE ASPARTATE, DEXTROAMPHETAMINE SULFATE AND AMPHETAMINE SULFATE 2.5; 2.5; 2.5; 2.5 MG/1; MG/1; MG/1; MG/1
10 TABLET ORAL 2 TIMES DAILY
Qty: 60 TABLET | Refills: 0 | Status: SHIPPED
Start: 2020-12-04 | End: 2021-01-04 | Stop reason: SDUPTHER

## 2021-01-04 ENCOUNTER — OFFICE VISIT (OUTPATIENT)
Dept: PRIMARY CARE CLINIC | Age: 41
End: 2021-01-04
Payer: COMMERCIAL

## 2021-01-04 VITALS
HEIGHT: 70 IN | WEIGHT: 260 LBS | RESPIRATION RATE: 16 BRPM | BODY MASS INDEX: 37.22 KG/M2 | SYSTOLIC BLOOD PRESSURE: 124 MMHG | OXYGEN SATURATION: 98 % | TEMPERATURE: 97.8 F | HEART RATE: 78 BPM | DIASTOLIC BLOOD PRESSURE: 72 MMHG

## 2021-01-04 DIAGNOSIS — K76.0 FATTY LIVER: ICD-10-CM

## 2021-01-04 DIAGNOSIS — F41.9 ANXIETY: ICD-10-CM

## 2021-01-04 DIAGNOSIS — E78.00 PURE HYPERCHOLESTEROLEMIA: ICD-10-CM

## 2021-01-04 DIAGNOSIS — E11.8 TYPE 2 DIABETES MELLITUS WITH COMPLICATION, WITHOUT LONG-TERM CURRENT USE OF INSULIN (HCC): ICD-10-CM

## 2021-01-04 DIAGNOSIS — F44.5 PSYCHOGENIC NONEPILEPTIC SEIZURE: ICD-10-CM

## 2021-01-04 DIAGNOSIS — F44.9 CONVERSION DISORDER: ICD-10-CM

## 2021-01-04 DIAGNOSIS — F32.A DEPRESSION, UNSPECIFIED DEPRESSION TYPE: ICD-10-CM

## 2021-01-04 DIAGNOSIS — F90.1 ATTENTION DEFICIT HYPERACTIVITY DISORDER (ADHD), PREDOMINANTLY HYPERACTIVE TYPE: ICD-10-CM

## 2021-01-04 DIAGNOSIS — G89.29 CHRONIC LEFT SHOULDER PAIN: ICD-10-CM

## 2021-01-04 DIAGNOSIS — M25.551 RIGHT HIP PAIN: ICD-10-CM

## 2021-01-04 DIAGNOSIS — I49.5 SINUS NODE DYSFUNCTION (HCC): ICD-10-CM

## 2021-01-04 DIAGNOSIS — M25.512 CHRONIC LEFT SHOULDER PAIN: ICD-10-CM

## 2021-01-04 DIAGNOSIS — L30.9 DERMATITIS, UNSPECIFIED: ICD-10-CM

## 2021-01-04 DIAGNOSIS — F44.5 PSEUDOSEIZURE: ICD-10-CM

## 2021-01-04 DIAGNOSIS — E78.00 PURE HYPERCHOLESTEROLEMIA: Primary | ICD-10-CM

## 2021-01-04 DIAGNOSIS — K58.0 IRRITABLE BOWEL SYNDROME WITH DIARRHEA: ICD-10-CM

## 2021-01-04 DIAGNOSIS — E72.20 HYPERAMMONEMIA (HCC): ICD-10-CM

## 2021-01-04 DIAGNOSIS — F41.0 PANIC ATTACKS: ICD-10-CM

## 2021-01-04 DIAGNOSIS — E66.09 CLASS 2 OBESITY DUE TO EXCESS CALORIES WITH BODY MASS INDEX (BMI) OF 36.0 TO 36.9 IN ADULT, UNSPECIFIED WHETHER SERIOUS COMORBIDITY PRESENT: ICD-10-CM

## 2021-01-04 LAB
ALBUMIN SERPL-MCNC: 4.2 G/DL (ref 3.5–5.2)
ALP BLD-CCNC: 113 U/L (ref 40–129)
ALT SERPL-CCNC: 26 U/L (ref 0–40)
ANION GAP SERPL CALCULATED.3IONS-SCNC: 14 MMOL/L (ref 7–16)
AST SERPL-CCNC: 16 U/L (ref 0–39)
BILIRUB SERPL-MCNC: 0.6 MG/DL (ref 0–1.2)
BILIRUBIN DIRECT: <0.2 MG/DL (ref 0–0.3)
BILIRUBIN, INDIRECT: NORMAL MG/DL (ref 0–1)
BUN BLDV-MCNC: 7 MG/DL (ref 6–20)
CALCIUM SERPL-MCNC: 9.5 MG/DL (ref 8.6–10.2)
CHLORIDE BLD-SCNC: 106 MMOL/L (ref 98–107)
CHOLESTEROL, TOTAL: 132 MG/DL (ref 0–199)
CO2: 20 MMOL/L (ref 22–29)
CREAT SERPL-MCNC: 1 MG/DL (ref 0.7–1.2)
CREATININE URINE: 141 MG/DL (ref 40–278)
GFR AFRICAN AMERICAN: >60
GFR NON-AFRICAN AMERICAN: >60 ML/MIN/1.73
GLUCOSE BLD-MCNC: 118 MG/DL (ref 74–99)
HBA1C MFR BLD: 7 % (ref 4–5.6)
HCT VFR BLD CALC: 48.2 % (ref 37–54)
HDLC SERPL-MCNC: 47 MG/DL
HEMOGLOBIN: 15.9 G/DL (ref 12.5–16.5)
LDL CHOLESTEROL CALCULATED: 64 MG/DL (ref 0–99)
MCH RBC QN AUTO: 30.1 PG (ref 26–35)
MCHC RBC AUTO-ENTMCNC: 33 % (ref 32–34.5)
MCV RBC AUTO: 91.1 FL (ref 80–99.9)
MICROALBUMIN UR-MCNC: <12 MG/L
MICROALBUMIN/CREAT UR-RTO: ABNORMAL (ref 0–30)
PDW BLD-RTO: 13.5 FL (ref 11.5–15)
PLATELET # BLD: 446 E9/L (ref 130–450)
PMV BLD AUTO: 10.5 FL (ref 7–12)
POTASSIUM SERPL-SCNC: 4.2 MMOL/L (ref 3.5–5)
RBC # BLD: 5.29 E12/L (ref 3.8–5.8)
SODIUM BLD-SCNC: 140 MMOL/L (ref 132–146)
TOTAL PROTEIN: 7.6 G/DL (ref 6.4–8.3)
TRIGL SERPL-MCNC: 104 MG/DL (ref 0–149)
TSH SERPL DL<=0.05 MIU/L-ACNC: 3.03 UIU/ML (ref 0.27–4.2)
URIC ACID, SERUM: 3.3 MG/DL (ref 3.4–7)
VLDLC SERPL CALC-MCNC: 21 MG/DL
WBC # BLD: 12 E9/L (ref 4.5–11.5)

## 2021-01-04 PROCEDURE — 1036F TOBACCO NON-USER: CPT | Performed by: FAMILY MEDICINE

## 2021-01-04 PROCEDURE — 3046F HEMOGLOBIN A1C LEVEL >9.0%: CPT | Performed by: FAMILY MEDICINE

## 2021-01-04 PROCEDURE — 99213 OFFICE O/P EST LOW 20 MIN: CPT | Performed by: FAMILY MEDICINE

## 2021-01-04 PROCEDURE — G8427 DOCREV CUR MEDS BY ELIG CLIN: HCPCS | Performed by: FAMILY MEDICINE

## 2021-01-04 PROCEDURE — G8417 CALC BMI ABV UP PARAM F/U: HCPCS | Performed by: FAMILY MEDICINE

## 2021-01-04 PROCEDURE — G8482 FLU IMMUNIZE ORDER/ADMIN: HCPCS | Performed by: FAMILY MEDICINE

## 2021-01-04 PROCEDURE — 2022F DILAT RTA XM EVC RTNOPTHY: CPT | Performed by: FAMILY MEDICINE

## 2021-01-04 RX ORDER — DEXTROAMPHETAMINE SACCHARATE, AMPHETAMINE ASPARTATE, DEXTROAMPHETAMINE SULFATE AND AMPHETAMINE SULFATE 2.5; 2.5; 2.5; 2.5 MG/1; MG/1; MG/1; MG/1
10 TABLET ORAL 2 TIMES DAILY
Qty: 60 TABLET | Refills: 0 | Status: SHIPPED
Start: 2021-02-02 | End: 2021-01-12

## 2021-01-04 RX ORDER — ALPRAZOLAM 1 MG/1
TABLET ORAL
COMMUNITY
Start: 2020-12-09 | End: 2021-06-29 | Stop reason: ALTCHOICE

## 2021-01-04 RX ORDER — DEXTROAMPHETAMINE SACCHARATE, AMPHETAMINE ASPARTATE, DEXTROAMPHETAMINE SULFATE AND AMPHETAMINE SULFATE 2.5; 2.5; 2.5; 2.5 MG/1; MG/1; MG/1; MG/1
10 TABLET ORAL 2 TIMES DAILY
Qty: 60 TABLET | Refills: 0 | Status: SHIPPED
Start: 2021-03-03 | End: 2021-01-12

## 2021-01-04 RX ORDER — DEXTROAMPHETAMINE SACCHARATE, AMPHETAMINE ASPARTATE, DEXTROAMPHETAMINE SULFATE AND AMPHETAMINE SULFATE 2.5; 2.5; 2.5; 2.5 MG/1; MG/1; MG/1; MG/1
10 TABLET ORAL 2 TIMES DAILY
Qty: 60 TABLET | Refills: 0 | Status: SHIPPED
Start: 2021-01-04 | End: 2021-01-28 | Stop reason: SDUPTHER

## 2021-01-04 ASSESSMENT — ENCOUNTER SYMPTOMS
ABDOMINAL DISTENTION: 1
DIARRHEA: 0
BLOOD IN STOOL: 0
NAUSEA: 0
VOMITING: 0
CONSTIPATION: 0
PHOTOPHOBIA: 0
ABDOMINAL PAIN: 0
BACK PAIN: 1
SORE THROAT: 0
COUGH: 0
SHORTNESS OF BREATH: 0

## 2021-01-04 NOTE — PROGRESS NOTES
Neurological: Positive for dizziness, tremors, seizures, weakness and numbness. Negative for headaches. Hematological: Does not bruise/bleed easily. Psychiatric/Behavioral: Positive for behavioral problems, decreased concentration, dysphoric mood and sleep disturbance. Negative for agitation, hallucinations, self-injury and suicidal ideas. The patient is nervous/anxious. All other systems reviewed and are negative. Prior to Visit Medications    Medication Sig Taking? Authorizing Provider   ALPRAZolam (XANAX) 1 MG tablet TAKE ONE TABLET BY MOUTH EVERY DAY for 1 week AS NEEDED FOR ANXIETY Yes Historical Provider, MD   amphetamine-dextroamphetamine (ADDERALL, 10MG,) 10 MG tablet Take 1 tablet by mouth 2 times daily for 30 days. Yes Drake Mauro, DO   amphetamine-dextroamphetamine (ADDERALL, 10MG,) 10 MG tablet Take 1 tablet by mouth 2 times daily for 30 days. Yes Drake Mauro, DO   amphetamine-dextroamphetamine (ADDERALL, 10MG,) 10 MG tablet Take 1 tablet by mouth 2 times daily for 30 days. Yes Drake Mauro, DO   ENULOSE 10 GM/15ML SOLN solution  Yes Historical Provider, MD   mirtazapine (REMERON) 30 MG tablet TAKE ONE TABLET BY MOUTH once a day AT BEDTIME Yes Historical Provider, MD   Rimegepant Sulfate 75 MG TBDP Take 75 mg by mouth daily as needed (migraine) No more than 1 tab in 24 hours Yes NADIA Espinosa CNP   atorvastatin (LIPITOR) 40 MG tablet Take 1 tablet by mouth daily Yes Drake Mauro, DO   metoprolol succinate (TOPROL XL) 100 MG extended release tablet TAKE 1 TABLET BY MOUTH TWICE DAILY WITH MEALS Yes Drake Mauro DO   metFORMIN (GLUCOPHAGE-XR) 750 MG extended release tablet Take 1 tablet by mouth daily (with breakfast) Yes Drake Mauro, DO   zonisamide (ZONEGRAN) 50 MG capsule Take 2 capsules by mouth nightly Yes Drake Mauro DO   desonide (DESOWEN) 0.05 % cream Apply topically 2 times daily.  Yes Drake Mauro, DO   metroNIDAZOLE (METROGEL) 0.75 % gel Apply topically 2 times daily. Yes Drake Mauro, DO   lactulose (CHRONULAC) 10 GM/15ML solution TAKE 15 ML BY MOUTH 3 TIMES DAILY  Patient taking differently: TAKE 15 ML BY MOUTH 3 TIMES DAILY PRN Yes Junaid Huerta MD   medical marijuana Take by mouth as needed.  Yes Historical Provider, MD   FLUoxetine (PROZAC) 20 MG capsule Take 2 capsules by mouth daily Yes Junaid Huerta MD   rifaximin (XIFAXAN) 550 MG tablet Take 550 mg by mouth 2 times daily Yes Historical Provider, MD   clonazePAM (KLONOPIN) 1 MG tablet Take 1 tablet by mouth 2 times daily as needed for Anxiety Yes Junaid Huerta MD        Allergies   Allergen Reactions    Fish-Derived Products        Past Medical History:   Diagnosis Date    Anxiety     Arthritis     Class 1 obesity due to excess calories with serious comorbidity and body mass index (BMI) of 34.0 to 34.9 in adult     First degree AV block     GERD (gastroesophageal reflux disease)     Hyperlipidemia     IBS (irritable bowel syndrome)     LVH (left ventricular hypertrophy)     Obesity     ENOC (obstructive sleep apnea)     Psychiatric problem     Psychogenic nonepileptic seizure     Stroke-like symptoms 08/14/2018    Type 2 diabetes mellitus with complication, without long-term current use of insulin ([facility] [address]        Past Surgical History:   Procedure Laterality Date    HERNIA REPAIR      INSERTABLE CARDIAC MONITOR  07/14/2017    KNEE SURGERY      PACEMAKER INSERTION  12/28/2017    D-PPM   ([employer])   Brenda Hawley    TONSILLECTOMY         Social History     Socioeconomic History    Marital status: Single     Spouse name: Not on file    Number of children: Not on file    Years of education: Not on file    Highest education level: Not on file   Occupational History    Not on file   Social Needs    Financial resource strain: Not on file    Food insecurity     Worry: Not on file     Inability: Not on file    Transportation needs     Medical: Not on file     Non-medical: Not on file   Tobacco Use    Smoking status: Never Smoker    Smokeless tobacco: Never Used   Substance and Sexual Activity    Alcohol use: Yes     Alcohol/week: 1.0 standard drinks     Types: 1 Shots of liquor per week     Frequency: Monthly or less    Drug use: No    Sexual activity: Not on file   Lifestyle    Physical activity     Days per week: Not on file     Minutes per session: Not on file    Stress: Not on file   Relationships    Social connections     Talks on phone: Not on file     Gets together: Not on file     Attends [employer] service: Not on file     Active member of club or organization: Not on file     Attends meetings of clubs or organizations: Not on file     Relationship status: Not on file    Intimate partner violence     Fear of current or ex partner: Not on file     Emotionally abused: Not on file     Physically abused: Not on file     Forced sexual activity: Not on file   Other Topics Concern    Not on file   Social History Narrative    Not on file        History reviewed. No pertinent family history. Vitals:    01/04/21 0849   BP: 124/72   Pulse: 78   Resp: 16   Temp: 97.8 °F (36.6 °C)   SpO2: 98%   Weight: 260 lb (117.9 kg)   Height: 5' 10\" (1.778 m)     Estimated body mass index is 37.31 kg/m² as calculated from the following:    Height as of this encounter: 5' 10\" (1.778 m). Weight as of this encounter: 260 lb (117.9 kg). Physical Exam  Vitals signs reviewed. Constitutional:       Appearance: He is obese. HENT:      Head: Normocephalic and atraumatic. Eyes:      General: No scleral icterus. Conjunctiva/sclera: Conjunctivae normal.      Pupils: Pupils are equal, round, and reactive to light. Neck:      Musculoskeletal: Neck supple. Thyroid: No thyromegaly. Cardiovascular:      Rate and Rhythm: Normal rate and regular rhythm. Heart sounds: Normal heart sounds. No murmur. Pulmonary:      Effort: Pulmonary effort is normal.      Breath sounds: Normal breath sounds.  No rales.   Abdominal:      General: Bowel sounds are normal. There is distension. Palpations: Abdomen is soft. Tenderness: There is no abdominal tenderness. Musculoskeletal:      Left shoulder: He exhibits decreased range of motion, tenderness, pain and spasm. Comments: Mildly positive empty can on the left. Tremor noted in left upper extremity. Pain with internal and external rotation of the left arm. Mildly positive [place] horn testing. Tenderness to palpation over the biceps insertion. Mild pain with supination and pronation of the left upper extremity   Lymphadenopathy:      Cervical: No cervical adenopathy. Skin:     General: Skin is warm and dry. Findings: Erythema and rash present. Neurological:      Cranial Nerves: No cranial nerve deficit. Sensory: Sensory deficit present. Motor: Weakness and tremor present. Gait: Gait abnormal.      Comments: Weakness to LUE/LLE, + pronator drift on the left. Voice and speech appear to be at his baseline. Psychiatric:         Attention and Perception: Attention normal.         Mood and Affect: Mood normal.         Speech: Speech normal.         Behavior: Behavior normal.         Judgment: Judgment normal.           Assessment/Plan:   Diagnosis Orders   1. Pure hypercholesterolemia     2. Attention deficit hyperactivity disorder (ADHD), predominantly hyperactive type  amphetamine-dextroamphetamine (ADDERALL, 10MG,) 10 MG tablet    amphetamine-dextroamphetamine (ADDERALL, 10MG,) 10 MG tablet    amphetamine-dextroamphetamine (ADDERALL, 10MG,) 10 MG tablet   3. Type 2 diabetes mellitus with complication, without long-term current use of insulin ([address]     4. Fatty liver  AMMONIA   5. Conversion disorder     6. Depression, unspecified depression type     7. Psychogenic nonepileptic seizure       EMG showed cervical radiculopathy. He has not followed up with either [facility]opedic or [facility] since October. Advised to contact previous specialist that he is still having issues with left-sided weakness/radicular pain. Currently stable regards all of his other issues. We will send for blood work today. Further evaluation and treatment will be based on response/results. See him back in 3 months. Medications refilled. State monitoring program reviewed without signs of abuse or diversion. Miladis Jung D.O.   9:21 AM  1/4/2021       This document may have been prepared at least partially through the use of voice recognition software. Although effort is taken to assure the accuracy of this document, it is possible that grammatical, syntax,  or spelling errors may occur.

## 2021-01-11 NOTE — PROGRESS NOTES
[address] [facility]. Elias Worrell M.D., F.A.C.P. Hayde Meyer, DNP, APRN, CNS  Ion Gomez. Lachelle Boothe, MSN, APRN-FNP-C  Steve Gary MSN, APRN, FNP-C  Jennifer GAR, PAXAVI  [address] MSN, APRN, FNP-C  [address] [facility] [address] [place], [address] [address]  Phone: [phone]  Fax: [phone]        Suzi Daniel a 41 y.o. right handed man    Neurology is following non-epileptic seizures (PNES), essential tremor, and migraines   PNES confirmed via EMU stay in 2017    He presents again with his mother and is a good historian. He went to the ER few months ago, due to a spell of mental fogginess and confusion. He was apparently buttering bread with the wrong side of the butter knife. He states at that time his brother, was moving furniture in the house and the pt felt he was going to drop the TV, and he became very anxious and upset. He was having more nonepileptic seizures during that time as well-- secondary to stress. Since then, he has been doing well with no recurrence of events. Headaches have increased somewhat, secondary to left neck and shoulder pain, but Nurtec is very effective for rescue. Continues to take Zonegran 100 mg daily without missing any doses. No new cardiac issues. He continues to follow with a mental health provider. No chest pain or palpitations  No SOB  No incontinence of bowels or bladder  No itching or bruising appreciated  No falls, tripping, or stumbling  No focal limb weakness or paresthesias  No speech or swallowing troubles    ROS otherwise negative     Current Outpatient Medications   Medication Sig Dispense Refill    ALPRAZolam (XANAX) 1 MG tablet TAKE ONE TABLET BY MOUTH EVERY DAY for 1 week AS NEEDED FOR ANXIETY      amphetamine-dextroamphetamine (ADDERALL, 10MG,) 10 MG tablet Take 1 tablet by mouth 2 times daily for 30 days.  60 tablet 0    [START ON 2/2/2021] amphetamine-dextroamphetamine (ADDERALL, 10MG,) 10 MG tablet Take 1 tablet by mouth 2 times daily for 30 days. 60 tablet 0    [START ON 3/3/2021] amphetamine-dextroamphetamine (ADDERALL, 10MG,) 10 MG tablet Take 1 tablet by mouth 2 times daily for 30 days. 60 tablet 0    ENULOSE 10 GM/15ML SOLN solution       mirtazapine (REMERON) 30 MG tablet TAKE ONE TABLET BY MOUTH once a day AT BEDTIME      Rimegepant Sulfate 75 MG TBDP Take 75 mg by mouth daily as needed (migraine) No more than 1 tab in 24 hours 8 tablet 3    atorvastatin (LIPITOR) 40 MG tablet Take 1 tablet by mouth daily 90 tablet 3    metoprolol succinate (TOPROL XL) 100 MG extended release tablet TAKE 1 TABLET BY MOUTH TWICE DAILY WITH MEALS 180 tablet 3    metFORMIN (GLUCOPHAGE-XR) 750 MG extended release tablet Take 1 tablet by mouth daily (with breakfast) 90 tablet 3    zonisamide (ZONEGRAN) 50 MG capsule Take 2 capsules by mouth nightly 180 capsule 3    desonide (DESOWEN) 0.05 % cream Apply topically 2 times daily. 60 g 0    metroNIDAZOLE (METROGEL) 0.75 % gel Apply topically 2 times daily. 45 g 0    lactulose (CHRONULAC) 10 GM/15ML solution TAKE 15 ML BY MOUTH 3 TIMES DAILY (Patient taking differently: TAKE 15 ML BY MOUTH 3 TIMES DAILY PRN) 946 mL 0    medical marijuana Take by mouth as needed.  FLUoxetine (PROZAC) 20 MG capsule Take 2 capsules by mouth daily 60 capsule 0    rifaximin (XIFAXAN) 550 MG tablet Take 550 mg by mouth 2 times daily      clonazePAM (KLONOPIN) 1 MG tablet Take 1 tablet by mouth 2 times daily as needed for Anxiety 30 tablet 0     No current facility-administered medications for this visit.       Objective:     /76 (Site: Left Upper Arm, Position: Sitting, Cuff Size: Large Adult)   Pulse 84   Temp 96.9 °F (36.1 °C) (Infrared)   Resp 12   Ht 5' 10\" (1.778 m)   Wt 260 lb (117.9 kg)   BMI 37.31 kg/m²     General exam: alert, appears stated age, in no acute distress  Head: normocephalic/atraumatic  Eyes: sclerae clear  Neck: no carotid bruits; full BILIDIR <0.2 01/04/2021    LABALBU 4.2 01/04/2021     CTAs head/neck October 2020: Unremarkable    CT head October 2020: Normal    I independently reviewed the labs and imaging studies     Assessment:     Chronic migraines: Stable on Zonegran; and Nurtec effective for rescue. Psychogenic nonepileptic seizures: Proven by EMU. Follows closely with mental health provider.  His neuro exam is normal.    Anxiety: Under mental health care    Pacemaker    Plan:     Continue Zonegran 100 mg nightly    Continue Nurtec    Close follow-up with mental health provider    He does not drive    Headache diary    RTO in 6 months or sooner ROSIE GUZMAN  2:36 PM  1/11/2021

## 2021-01-12 ENCOUNTER — OFFICE VISIT (OUTPATIENT)
Dept: NEUROLOGY | Age: 41
End: 2021-01-12
Payer: COMMERCIAL

## 2021-01-12 VITALS
HEIGHT: 70 IN | HEART RATE: 84 BPM | SYSTOLIC BLOOD PRESSURE: 112 MMHG | DIASTOLIC BLOOD PRESSURE: 76 MMHG | BODY MASS INDEX: 37.22 KG/M2 | TEMPERATURE: 96.9 F | WEIGHT: 260 LBS | RESPIRATION RATE: 12 BRPM

## 2021-01-12 DIAGNOSIS — F44.5 PSYCHOGENIC NONEPILEPTIC SEIZURE: ICD-10-CM

## 2021-01-12 DIAGNOSIS — G43.109 MIGRAINE WITH AURA AND WITHOUT STATUS MIGRAINOSUS, NOT INTRACTABLE: Primary | ICD-10-CM

## 2021-01-12 PROBLEM — G44.89 OTHER HEADACHE SYNDROME: Status: RESOLVED | Noted: 2018-01-30 | Resolved: 2021-01-12

## 2021-01-12 PROBLEM — G45.9 TIA (TRANSIENT ISCHEMIC ATTACK): Status: RESOLVED | Noted: 2020-07-16 | Resolved: 2021-01-12

## 2021-01-12 PROBLEM — R41.82 ALTERED MENTAL STATUS: Status: RESOLVED | Noted: 2020-10-01 | Resolved: 2021-01-12

## 2021-01-12 PROCEDURE — G8427 DOCREV CUR MEDS BY ELIG CLIN: HCPCS | Performed by: NURSE PRACTITIONER

## 2021-01-12 PROCEDURE — G8482 FLU IMMUNIZE ORDER/ADMIN: HCPCS | Performed by: NURSE PRACTITIONER

## 2021-01-12 PROCEDURE — G8417 CALC BMI ABV UP PARAM F/U: HCPCS | Performed by: NURSE PRACTITIONER

## 2021-01-12 PROCEDURE — 1036F TOBACCO NON-USER: CPT | Performed by: NURSE PRACTITIONER

## 2021-01-12 PROCEDURE — 99213 OFFICE O/P EST LOW 20 MIN: CPT | Performed by: NURSE PRACTITIONER

## 2021-01-12 NOTE — PATIENT INSTRUCTIONS

## 2021-01-28 DIAGNOSIS — F90.1 ATTENTION DEFICIT HYPERACTIVITY DISORDER (ADHD), PREDOMINANTLY HYPERACTIVE TYPE: ICD-10-CM

## 2021-01-28 RX ORDER — DEXTROAMPHETAMINE SACCHARATE, AMPHETAMINE ASPARTATE, DEXTROAMPHETAMINE SULFATE AND AMPHETAMINE SULFATE 2.5; 2.5; 2.5; 2.5 MG/1; MG/1; MG/1; MG/1
10 TABLET ORAL 2 TIMES DAILY
Qty: 60 TABLET | Refills: 0 | Status: SHIPPED
Start: 2021-01-28 | End: 2021-03-01 | Stop reason: SDUPTHER

## 2021-02-08 ENCOUNTER — TELEPHONE (OUTPATIENT)
Dept: PRIMARY CARE CLINIC | Age: 41
End: 2021-02-08

## 2021-02-08 RX ORDER — AMOXICILLIN 875 MG/1
875 TABLET, COATED ORAL 2 TIMES DAILY
Qty: 14 TABLET | Refills: 0 | Status: SHIPPED | OUTPATIENT
Start: 2021-02-08 | End: 2021-02-15

## 2021-02-08 NOTE — TELEPHONE ENCOUNTER
Received call from pt's mom. Pt's blood sugar yesterday morning was 260 fasting. This morning it was at 153. He does have some dizziness. She also said that he has had a slight sinus infection and ringing in his ears. She is not sure if this is all related. 5

## 2021-02-08 NOTE — TELEPHONE ENCOUNTER
Amoxicillin sent to pharmacy to help with the suspected sinus infection. Continue to monitor blood sugars. If they are still elevated can take 1 pill of Metformin every 12 hours until they normalize. If still elevated contact the office or go directly to emergency department.

## 2021-02-15 ENCOUNTER — HOSPITAL ENCOUNTER (OUTPATIENT)
Age: 41
Discharge: HOME | End: 2021-02-15
Payer: COMMERCIAL

## 2021-02-15 DIAGNOSIS — K76.0 FATTY LIVER: ICD-10-CM

## 2021-02-15 LAB — AMMONIA: 36.6 UMOL/L (ref 16–60)

## 2021-02-15 PROCEDURE — 82140 ASSAY OF AMMONIA: CPT

## 2021-02-15 PROCEDURE — 36415 COLL VENOUS BLD VENIPUNCTURE: CPT

## 2021-02-22 ENCOUNTER — OFFICE VISIT (OUTPATIENT)
Dept: PRIMARY CARE CLINIC | Age: 41
End: 2021-02-22
Payer: COMMERCIAL

## 2021-02-22 VITALS
SYSTOLIC BLOOD PRESSURE: 136 MMHG | DIASTOLIC BLOOD PRESSURE: 78 MMHG | WEIGHT: 262 LBS | HEIGHT: 70 IN | TEMPERATURE: 97.2 F | BODY MASS INDEX: 37.51 KG/M2

## 2021-02-22 DIAGNOSIS — E11.8 TYPE 2 DIABETES MELLITUS WITH COMPLICATION, WITHOUT LONG-TERM CURRENT USE OF INSULIN (HCC): Primary | ICD-10-CM

## 2021-02-22 PROCEDURE — 1036F TOBACCO NON-USER: CPT | Performed by: FAMILY MEDICINE

## 2021-02-22 PROCEDURE — G8427 DOCREV CUR MEDS BY ELIG CLIN: HCPCS | Performed by: FAMILY MEDICINE

## 2021-02-22 PROCEDURE — 3051F HG A1C>EQUAL 7.0%<8.0%: CPT | Performed by: FAMILY MEDICINE

## 2021-02-22 PROCEDURE — G8417 CALC BMI ABV UP PARAM F/U: HCPCS | Performed by: FAMILY MEDICINE

## 2021-02-22 PROCEDURE — G8482 FLU IMMUNIZE ORDER/ADMIN: HCPCS | Performed by: FAMILY MEDICINE

## 2021-02-22 PROCEDURE — 99213 OFFICE O/P EST LOW 20 MIN: CPT | Performed by: FAMILY MEDICINE

## 2021-02-22 PROCEDURE — 2022F DILAT RTA XM EVC RTNOPTHY: CPT | Performed by: FAMILY MEDICINE

## 2021-02-22 RX ORDER — CLONAZEPAM 1 MG/1
1 TABLET ORAL 2 TIMES DAILY PRN
COMMUNITY

## 2021-02-22 ASSESSMENT — ENCOUNTER SYMPTOMS
CONSTIPATION: 0
PHOTOPHOBIA: 0
NAUSEA: 0
SHORTNESS OF BREATH: 0
VOMITING: 0
SORE THROAT: 0
BLOOD IN STOOL: 0
COUGH: 0
BACK PAIN: 1
ABDOMINAL DISTENTION: 1
DIARRHEA: 0
ABDOMINAL PAIN: 0

## 2021-02-22 NOTE — PROGRESS NOTES
Shanta Bennett (:  1980) is a 41 y.o. male,Established patient, here for evaluation of the following chief complaint(s):  Blood Sugar Problem (states  fasting this morningm took extra metformin today, no change)      ASSESSMENT/PLAN:  1. Type 2 diabetes mellitus with complication, without long-term current use of insulin (HCC)  -     metFORMIN (GLUCOPHAGE) 1000 MG tablet; Take 1 tablet by mouth 2 times daily (with meals), Disp-180 tablet, R-1Normal  At this time we will switch from extended release Metformin to 1000 mg of regular release Metformin twice daily. Patient advised to continue checking his blood sugars at least to 3 times daily. If they do start to go low he is advised to break the pill in half and continue to monitor. We will see him back in 6 weeks or sooner if needed. No follow-ups on file. SUBJECTIVE/OBJECTIVE:  HPI  Presents today with concerns regarding blood sugar lability. Patient is currently taking 750 mg of extended release Metformin daily. Patient states over the last several weeks he has noticed fluctuating highs and lows. Patient states that he woke up today and had noticed blood sugars of over 300. He took an extra Metformin and it did come down slightly several hours later. Still running above 150 most days. Patient denies any symptoms of hypoglycemia at this time. Patient denies any recent changes to medications, diet, or activity level. Recently was treated for sinus infection on 2021. Review of Systems   Constitutional: Positive for fatigue. Negative for chills and fever. HENT: Negative for congestion, hearing loss, nosebleeds and sore throat. Eyes: Negative for photophobia. Respiratory: Negative for cough and shortness of breath. Cardiovascular: Negative for chest pain, palpitations and leg swelling. Gastrointestinal: Positive for abdominal distention.  Negative for abdominal pain, blood in stool, constipation, diarrhea, nausea and vomiting. Endocrine: Negative for polydipsia. Genitourinary: Negative for dysuria, frequency, hematuria and urgency. Musculoskeletal: Positive for arthralgias, back pain, gait problem, joint swelling and myalgias. Skin: Negative. Neurological: Positive for dizziness, tremors, seizures, weakness and numbness. Negative for headaches. Hematological: Does not bruise/bleed easily. Psychiatric/Behavioral: Positive for behavioral problems, decreased concentration, dysphoric mood and sleep disturbance. Negative for agitation, hallucinations, self-injury and suicidal ideas. The patient is nervous/anxious. All other systems reviewed and are negative. Physical Exam  Vitals signs reviewed. Constitutional:       Appearance: He is obese. HENT:      Head: Normocephalic and atraumatic. Eyes:      General: No scleral icterus. Conjunctiva/sclera: Conjunctivae normal.      Pupils: Pupils are equal, round, and reactive to light. Neck:      Musculoskeletal: Neck supple. Thyroid: No thyromegaly. Cardiovascular:      Rate and Rhythm: Normal rate and regular rhythm. Heart sounds: Normal heart sounds. No murmur. Pulmonary:      Effort: Pulmonary effort is normal.      Breath sounds: Normal breath sounds. No rales. Abdominal:      General: Bowel sounds are normal. There is no distension. Palpations: Abdomen is soft. Tenderness: There is no abdominal tenderness. Musculoskeletal:      Left shoulder: He exhibits decreased range of motion, tenderness, pain and spasm. Comments: Mildly positive empty can on the left. Tremor noted in left upper extremity. Pain with internal and external rotation of the left arm. Mildly positive [place] horn testing. Tenderness to palpation over the biceps insertion. Mild pain with supination and pronation of the left upper extremity   Lymphadenopathy:      Cervical: No cervical adenopathy. Skin:     General: Skin is warm and dry.

## 2021-03-01 DIAGNOSIS — F90.1 ATTENTION DEFICIT HYPERACTIVITY DISORDER (ADHD), PREDOMINANTLY HYPERACTIVE TYPE: ICD-10-CM

## 2021-03-01 RX ORDER — DEXTROAMPHETAMINE SACCHARATE, AMPHETAMINE ASPARTATE, DEXTROAMPHETAMINE SULFATE AND AMPHETAMINE SULFATE 2.5; 2.5; 2.5; 2.5 MG/1; MG/1; MG/1; MG/1
10 TABLET ORAL 2 TIMES DAILY
Qty: 60 TABLET | Refills: 0 | Status: SHIPPED
Start: 2021-03-01 | End: 2021-03-31 | Stop reason: SDUPTHER

## 2021-03-12 ENCOUNTER — TELEPHONE (OUTPATIENT)
Dept: ADMINISTRATIVE | Age: 41
End: 2021-03-12

## 2021-03-24 ENCOUNTER — IMMUNIZATION (OUTPATIENT)
Dept: PRIMARY CARE CLINIC | Age: 41
End: 2021-03-24
Payer: COMMERCIAL

## 2021-03-24 PROCEDURE — 91300 COVID-19, PFIZER VACCINE 30MCG/0.3ML DOSE: CPT | Performed by: NURSE PRACTITIONER

## 2021-03-24 PROCEDURE — 0001A COVID-19, PFIZER VACCINE 30MCG/0.3ML DOSE: CPT | Performed by: NURSE PRACTITIONER

## 2021-03-31 DIAGNOSIS — F90.1 ATTENTION DEFICIT HYPERACTIVITY DISORDER (ADHD), PREDOMINANTLY HYPERACTIVE TYPE: ICD-10-CM

## 2021-03-31 RX ORDER — DEXTROAMPHETAMINE SACCHARATE, AMPHETAMINE ASPARTATE, DEXTROAMPHETAMINE SULFATE AND AMPHETAMINE SULFATE 2.5; 2.5; 2.5; 2.5 MG/1; MG/1; MG/1; MG/1
10 TABLET ORAL 2 TIMES DAILY
Qty: 60 TABLET | Refills: 0 | Status: SHIPPED
Start: 2021-03-31 | End: 2021-05-10 | Stop reason: SDUPTHER

## 2021-04-08 ENCOUNTER — OFFICE VISIT (OUTPATIENT)
Dept: PRIMARY CARE CLINIC | Age: 41
End: 2021-04-08
Payer: COMMERCIAL

## 2021-04-08 VITALS
OXYGEN SATURATION: 98 % | HEIGHT: 70 IN | RESPIRATION RATE: 16 BRPM | SYSTOLIC BLOOD PRESSURE: 122 MMHG | BODY MASS INDEX: 36.79 KG/M2 | TEMPERATURE: 97.7 F | WEIGHT: 257 LBS | DIASTOLIC BLOOD PRESSURE: 76 MMHG | HEART RATE: 106 BPM

## 2021-04-08 DIAGNOSIS — I49.5 SINUS NODE DYSFUNCTION (HCC): ICD-10-CM

## 2021-04-08 DIAGNOSIS — F44.5 PSYCHOGENIC NONEPILEPTIC SEIZURE: ICD-10-CM

## 2021-04-08 DIAGNOSIS — J32.9 CHRONIC SINUSITIS, UNSPECIFIED LOCATION: ICD-10-CM

## 2021-04-08 DIAGNOSIS — E11.8 TYPE 2 DIABETES MELLITUS WITH COMPLICATION, WITHOUT LONG-TERM CURRENT USE OF INSULIN (HCC): Primary | ICD-10-CM

## 2021-04-08 DIAGNOSIS — G25.0 ESSENTIAL TREMOR: ICD-10-CM

## 2021-04-08 DIAGNOSIS — F41.9 ANXIETY: ICD-10-CM

## 2021-04-08 DIAGNOSIS — J30.2 SEASONAL ALLERGIES: ICD-10-CM

## 2021-04-08 DIAGNOSIS — F90.1 ATTENTION DEFICIT HYPERACTIVITY DISORDER (ADHD), PREDOMINANTLY HYPERACTIVE TYPE: ICD-10-CM

## 2021-04-08 DIAGNOSIS — G47.33 OSA (OBSTRUCTIVE SLEEP APNEA): ICD-10-CM

## 2021-04-08 DIAGNOSIS — F44.9 CONVERSION DISORDER: ICD-10-CM

## 2021-04-08 PROCEDURE — G8427 DOCREV CUR MEDS BY ELIG CLIN: HCPCS | Performed by: FAMILY MEDICINE

## 2021-04-08 PROCEDURE — 3051F HG A1C>EQUAL 7.0%<8.0%: CPT | Performed by: FAMILY MEDICINE

## 2021-04-08 PROCEDURE — 1036F TOBACCO NON-USER: CPT | Performed by: FAMILY MEDICINE

## 2021-04-08 PROCEDURE — 2022F DILAT RTA XM EVC RTNOPTHY: CPT | Performed by: FAMILY MEDICINE

## 2021-04-08 PROCEDURE — 99213 OFFICE O/P EST LOW 20 MIN: CPT | Performed by: FAMILY MEDICINE

## 2021-04-08 PROCEDURE — G8417 CALC BMI ABV UP PARAM F/U: HCPCS | Performed by: FAMILY MEDICINE

## 2021-04-08 RX ORDER — PRIMIDONE 50 MG/1
50 TABLET ORAL 2 TIMES DAILY
Qty: 60 TABLET | Refills: 3 | Status: SHIPPED | OUTPATIENT
Start: 2021-04-08 | End: 2021-06-29 | Stop reason: ALTCHOICE

## 2021-04-08 RX ORDER — BLOOD-GLUCOSE METER
1 KIT MISCELLANEOUS ONCE
Qty: 1 KIT | Refills: 0 | Status: SHIPPED | OUTPATIENT
Start: 2021-04-08 | End: 2021-04-12 | Stop reason: CLARIF

## 2021-04-08 ASSESSMENT — ENCOUNTER SYMPTOMS
VOMITING: 0
PHOTOPHOBIA: 0
DIARRHEA: 0
NAUSEA: 0
CONSTIPATION: 0
ABDOMINAL PAIN: 0
BLOOD IN STOOL: 0
COUGH: 0
BACK PAIN: 1
ABDOMINAL DISTENTION: 1
SHORTNESS OF BREATH: 0
SORE THROAT: 0

## 2021-04-08 NOTE — PROGRESS NOTES
Muna Resendiz (:  1980) is a 41 y.o. male,Established patient, here for evaluation of the following chief complaint(s):  3 Month Follow-Up (states would like to see an allergist, states having issues) and Blood Sugar Problem (states BS has been \"all over the place\")      ASSESSMENT/PLAN:  1. Type 2 diabetes mellitus with complication, without long-term current use of insulin (HCC)  -     glucose monitoring kit (FREESTYLE) monitoring kit; ONCE Starting u 2021, For 1 dose, Disp-1 kit, R-0, Print  -     [facility]hoana Sam DPM, Pod[facility]arlene  2. Sinus node dysfunction (HCC)  3. Psychogenic nonepileptic seizure  4. Attention deficit hyperactivity disorder (ADHD), predominantly hyperactive type  5. Anxiety  6. Conversion disorder  7. ENOC (obstructive sleep apnea)  8. Essential tremor  -     primidone (MYSOLINE) 50 MG tablet; Take 1 tablet by mouth 2 times daily, Disp-60 tablet, R-3Print  9. Seasonal allergies  -     Enma Miner MD, Allergy, [place]  10. Chronic sinusitis, unspecified location  -     Enma Miner MD, Allergy, [place]  Patient states there has been fluctuations of his blood sugar. Meter reviewed. A1c within normal range and average glucose tolerable. We will continue with current regimen. Referral to allergist per patient request.  Low-dose primidone for essential tremor. See him back in 3 months    No follow-ups on file. SUBJECTIVE/OBJECTIVE:  HPI  Patient presents today for 3-month follow-up on current medication regimen and chronic issues. Patient states he has been taking all medication as prescribed by side effect or adverse reaction. Patient has related mild fluctuations in blood sugar. Meter reviewed today. A1c was 7 at last testing. Average daily glucose in the 130s. Some moderate fluctuations however patient has been nonsymptomatic in regards to hypoglycemia. He has been having worsening seasonal allergies.   Would like to be referred to allergist for further evaluation. Also has noticed worsening tremors. He states that they worsen with stress. No other issues at this time. Review of Systems   Constitutional: Positive for fatigue. Negative for chills and fever. HENT: Negative for congestion, hearing loss, nosebleeds and sore throat. Eyes: Negative for photophobia. Respiratory: Negative for cough and shortness of breath. Cardiovascular: Negative for chest pain, palpitations and leg swelling. Gastrointestinal: Positive for abdominal distention. Negative for abdominal pain, blood in stool, constipation, diarrhea, nausea and vomiting. Endocrine: Negative for polydipsia. Genitourinary: Negative for dysuria, frequency, hematuria and urgency. Musculoskeletal: Positive for arthralgias, back pain, gait problem, joint swelling and myalgias. Skin: Negative. Neurological: Positive for dizziness, tremors, seizures, weakness and numbness. Negative for headaches. Hematological: Does not bruise/bleed easily. Psychiatric/Behavioral: Positive for behavioral problems, decreased concentration, dysphoric mood and sleep disturbance. Negative for agitation, hallucinations, self-injury and suicidal ideas. The patient is nervous/anxious. All other systems reviewed and are negative. Physical Exam  Vitals signs reviewed. Constitutional:       Appearance: He is obese. HENT:      Head: Normocephalic and atraumatic. Eyes:      General: No scleral icterus. Conjunctiva/sclera: Conjunctivae normal.      Pupils: Pupils are equal, round, and reactive to light. Neck:      Musculoskeletal: Neck supple. Thyroid: No thyromegaly. Cardiovascular:      Rate and Rhythm: Regular rhythm. Tachycardia present. Heart sounds: Normal heart sounds. No murmur. Pulmonary:      Effort: Pulmonary effort is normal.      Breath sounds: Normal breath sounds. No rales.    Abdominal:      General: Bowel sounds are normal. There is no distension. Palpations: Abdomen is soft. Tenderness: There is no abdominal tenderness. Musculoskeletal:      Left shoulder: He exhibits decreased range of motion, tenderness, pain and spasm. Comments: Mildly positive empty can on the left. Tremor noted in left upper extremity. Pain with internal and external rotation of the left arm. Mildly positive [place] horn testing. Tenderness to palpation over the biceps insertion. Mild pain with supination and pronation of the left upper extremity   Lymphadenopathy:      Cervical: No cervical adenopathy. Skin:     General: Skin is warm and dry. Findings: No erythema or rash. Neurological:      Cranial Nerves: No cranial nerve deficit. Sensory: Sensory deficit present. Motor: Weakness and tremor present. Gait: Gait abnormal.   Psychiatric:         Attention and Perception: Attention normal.         Mood and Affect: Mood normal.         Speech: Speech normal.         Behavior: Behavior normal.         Judgment: Judgment normal.                 An electronic signature was used to authenticate this note.     --Leanne Mauro, DO

## 2021-04-12 ENCOUNTER — TELEPHONE (OUTPATIENT)
Dept: PRIMARY CARE CLINIC | Age: 41
End: 2021-04-12

## 2021-04-12 RX ORDER — GLUCOSAMINE HCL/CHONDROITIN SU 500-400 MG
1 CAPSULE ORAL
COMMUNITY
End: 2021-04-12 | Stop reason: SDUPTHER

## 2021-04-12 RX ORDER — GLUCOSAMINE HCL/CHONDROITIN SU 500-400 MG
CAPSULE ORAL
Qty: 100 STRIP | Refills: 5 | Status: SHIPPED
Start: 2021-04-12 | End: 2021-12-01

## 2021-04-20 ENCOUNTER — IMMUNIZATION (OUTPATIENT)
Dept: PRIMARY CARE CLINIC | Age: 41
End: 2021-04-20
Payer: COMMERCIAL

## 2021-04-20 ENCOUNTER — TELEPHONE (OUTPATIENT)
Dept: PRIMARY CARE CLINIC | Age: 41
End: 2021-04-20

## 2021-04-20 PROCEDURE — 0002A COVID-19, PFIZER VACCINE 30MCG/0.3ML DOSE: CPT | Performed by: NURSE PRACTITIONER

## 2021-04-20 PROCEDURE — 91300 COVID-19, PFIZER VACCINE 30MCG/0.3ML DOSE: CPT | Performed by: NURSE PRACTITIONER

## 2021-04-29 ENCOUNTER — OFFICE VISIT (OUTPATIENT)
Dept: CARDIOLOGY CLINIC | Age: 41
End: 2021-04-29
Payer: COMMERCIAL

## 2021-04-29 VITALS
RESPIRATION RATE: 16 BRPM | BODY MASS INDEX: 36.94 KG/M2 | HEIGHT: 70 IN | HEART RATE: 80 BPM | DIASTOLIC BLOOD PRESSURE: 70 MMHG | SYSTOLIC BLOOD PRESSURE: 104 MMHG | WEIGHT: 258 LBS

## 2021-04-29 DIAGNOSIS — R07.9 CHEST PAIN, UNSPECIFIED TYPE: Primary | ICD-10-CM

## 2021-04-29 PROCEDURE — G8427 DOCREV CUR MEDS BY ELIG CLIN: HCPCS | Performed by: INTERNAL MEDICINE

## 2021-04-29 PROCEDURE — G8417 CALC BMI ABV UP PARAM F/U: HCPCS | Performed by: INTERNAL MEDICINE

## 2021-04-29 PROCEDURE — 99214 OFFICE O/P EST MOD 30 MIN: CPT | Performed by: INTERNAL MEDICINE

## 2021-04-29 PROCEDURE — 1036F TOBACCO NON-USER: CPT | Performed by: INTERNAL MEDICINE

## 2021-04-29 PROCEDURE — 93000 ELECTROCARDIOGRAM COMPLETE: CPT | Performed by: INTERNAL MEDICINE

## 2021-04-29 NOTE — PROGRESS NOTES
OUTPATIENT CARDIOLOGY FOLLOW-UP    Name: Beverly Buitrago    Age: 41 y.o. Primary Care Physician: Kalli Ferrer DO    Date of Service: 4/29/2021    Chief Complaint:   Chief Complaint   Patient presents with    1 Year Follow Up    Tachycardia       Interim History:   Mr. Yumi Suazo is a 67-year-old gentleman with history of vasodepressive syncope, ventricular tachycardia, moderate obesity and was following with Dr. Fran Scherer for his cardiology needs and now he changed service with me. In addition, he also has history of hyperlipidemia, irritable bowel syndrome, obstructive sleep apnea seizure disorder and anxiety. He is here for follow-up visit. He told me that he had 2 sleep studies and was told that he does not have any sleep apnea. He denies any further episodes chest pain. He is complaining of fatigue and getting tired faster on walking with occasional exertional dyspnea. Denies any exertional chest pain, palpitations, syncope or presyncope. He had a stress test in November 2017 and that was normal. He denies orthopnea or paroxysmal nocturnal dyspnea. He denies any focal weakness or numbness. He is compliant with medications, as well as salt and fluid intake. He does not take any over-the-counter arthritis medications. He was seen in the office on 4/6/2020, since his last visit, he has not had any ER visits or hospitalizations. No new cardiac complaints since last cardiology evaluation. He gets winded if he walks more than a mile but he is able to walk a block and also walk 1 flight of stairs without any dyspnea or chest pain. When he gets chest pain he takes clonidine that helps his symptoms. He denies any exertional chest pain. He denies recent palpitations, lightheadedness, dizziness, syncope, PND, or orthopnea. SR on EKG.     Review of Systems:   Cardiac: As per HPI  General: No fever, chills  Pulmonary: As per HPI  HEENT: No visual disturbances, difficult swallowing  GI: No nausea, per session: Not on file    Stress: Not on file   Relationships    Social connections     Talks on phone: Not on file     Gets together: Not on file     Attends Alevism service: Not on file     Active member of club or organization: Not on file     Attends meetings of clubs or organizations: Not on file     Relationship status: Not on file    Intimate partner violence     Fear of current or ex partner: Not on file     Emotionally abused: Not on file     Physically abused: Not on file     Forced sexual activity: Not on file   Other Topics Concern    Not on file   Social History Narrative    Not on file       Allergies: Allergies   Allergen Reactions    Fish-Derived Products        Current Medications:  Current Outpatient Medications   Medication Sig Dispense Refill    blood glucose monitor kit and supplies Use to test blood sugar as directed by your physician 1 kit 0    blood glucose monitor strips Test 3 times a day 100 strip 5    amphetamine-dextroamphetamine (ADDERALL, 10MG,) 10 MG tablet Take 1 tablet by mouth 2 times daily for 30 days. 60 tablet 0    clonazePAM (KLONOPIN) 1 MG tablet Take 1 mg by mouth 2 times daily as needed.       metFORMIN (GLUCOPHAGE) 1000 MG tablet Take 1 tablet by mouth 2 times daily (with meals) 180 tablet 1    Rimegepant Sulfate 75 MG TBDP Take 75 mg by mouth daily as needed (migraine) No more than 1 tab in 24 hours 8 tablet 3    ENULOSE 10 GM/15ML SOLN solution as needed       mirtazapine (REMERON) 30 MG tablet TAKE ONE TABLET BY MOUTH once a day AT BEDTIME      atorvastatin (LIPITOR) 40 MG tablet Take 1 tablet by mouth daily 90 tablet 3    metoprolol succinate (TOPROL XL) 100 MG extended release tablet TAKE 1 TABLET BY MOUTH TWICE DAILY WITH MEALS 180 tablet 3    zonisamide (ZONEGRAN) 50 MG capsule Take 2 capsules by mouth nightly 180 capsule 3    lactulose (CHRONULAC) 10 GM/15ML solution TAKE 15 ML BY MOUTH 3 TIMES DAILY (Patient taking differently: TAKE 15 ML BY MOUTH 3 TIMES DAILY PRN) 946 mL 0    medical marijuana Take by mouth as needed.  FLUoxetine (PROZAC) 20 MG capsule Take 2 capsules by mouth daily 60 capsule 0    primidone (MYSOLINE) 50 MG tablet Take 1 tablet by mouth 2 times daily 60 tablet 3    ALPRAZolam (XANAX) 1 MG tablet TAKE ONE TABLET BY MOUTH EVERY DAY for 1 week AS NEEDED FOR ANXIETY      desonide (DESOWEN) 0.05 % cream Apply topically 2 times daily. 60 g 0    metroNIDAZOLE (METROGEL) 0.75 % gel Apply topically 2 times daily. 45 g 0    rifaximin (XIFAXAN) 550 MG tablet Take 550 mg by mouth 2 times daily       No current facility-administered medications for this visit. Physical Exam:  /70   Pulse 80   Resp 16   Ht 5' 10\" (1.778 m)   Wt 258 lb (117 kg)   BMI 37.02 kg/m²   Wt Readings from Last 3 Encounters:   04/29/21 258 lb (117 kg)   04/08/21 257 lb (116.6 kg)   02/22/21 262 lb (118.8 kg)     Appearance: Awake, alert and oriented x 3, no acute respiratory distress, he is obese. Skin: Intact, no rash  Head: Normocephalic, atraumatic  Eyes: EOMI, no conjunctival erythema  ENMT: No pharyngeal erythema, MMM, no rhinorrhea  Neck: Supple, no elevated JVP, no carotid bruits  Lungs: Clear to auscultation bilaterally. No wheezes, rales, or rhonchi.   Cardiac: Regular rate and rhythm, +S1S2, no murmurs apparent  Abdomen: Soft, nontender, +bowel sounds  Extremities: Moves all extremities x 4, no lower extremity edema  Neurologic: No focal motor deficits apparent, normal mood and affect, alert and oriented x 3  Peripheral Pulses: Intact posterior tibial pulses bilaterally    Laboratory Tests:  Lab Results   Component Value Date    CREATININE 1.0 01/04/2021    BUN 7 01/04/2021     01/04/2021    K 4.2 01/04/2021     01/04/2021    CO2 20 (L) 01/04/2021     Lab Results   Component Value Date    MG 2.1 10/24/2018     Lab Results   Component Value Date    WBC 12.0 (H) 01/04/2021    HGB 15.9 01/04/2021    HCT 48.2 01/04/2021    MCV 91.1 01/04/2021     01/04/2021     Lab Results   Component Value Date    ALT 26 01/04/2021    AST 16 01/04/2021    ALKPHOS 113 01/04/2021    BILITOT 0.6 01/04/2021     Lab Results   Component Value Date    CKTOTAL 188 06/27/2017    CKMB 3.4 04/19/2016    TROPONINI <0.01 10/01/2020    TROPONINI <0.01 07/15/2020    TROPONINI <0.01 02/09/2020     Lab Results   Component Value Date    INR 1.2 07/15/2020    INR 1.1 09/11/2018    INR 1.2 12/28/2017    PROTIME 13.3 (H) 07/15/2020    PROTIME 12.5 (H) 09/11/2018    PROTIME 13.6 (H) 12/28/2017     Lab Results   Component Value Date    TSH 3.030 01/04/2021     Lab Results   Component Value Date    LABA1C 7.0 (H) 01/04/2021     No results found for: EAG  Lab Results   Component Value Date    CHOL 132 01/04/2021    CHOL 203 (H) 05/28/2019    CHOL 204 (H) 08/15/2018     Lab Results   Component Value Date    TRIG 104 01/04/2021    TRIG 64 04/14/2020    TRIG 89 05/28/2019     Lab Results   Component Value Date    HDL 47 01/04/2021    HDL 48 04/14/2020    HDL 43 05/28/2019     Lab Results   Component Value Date    LDLCALC 64 01/04/2021    LDLCALC 142 (H) 05/28/2019    LDLCALC 142 (H) 08/15/2018    LDLCHOLESTEROL 170 04/14/2020     Lab Results   Component Value Date    LABVLDL 21 01/04/2021    LABVLDL 18 05/28/2019    LABVLDL 21 08/15/2018    VLDL 182 04/14/2020     Lab Results   Component Value Date    CHOLHDLRATIO 3.5 04/14/2020       Cardiac Tests:  ECG: NSR first-degree AV block, incomplete right bundle branch block, left anterior fascicular block, left atrial enlargement, T wave inversion suggestive of anterior septal ischemia, abnormal EKG. Echocardiogram4/19/2016:    Left ventricular internal dimensions were normal in diastole and systole.   Overall ejection fraction is low normal .       TTE7/24/2019: EF 55 to 37%, normal diastolic function, RVSP 25 mmHg, normal RV size and function. Pacer wire was visualized in the RV, no pericardial effusion.     Stress test12/2/2017:    Impression:    1. Exercise EKG was normal.    2. The patient experienced no chest pain with exercise. 3. Knutson treadmill score was 7 implying low risk of acute ischemic   events.    4. Exercise capacity was average. Cardiac catheterization:     The 10-year ASCVD risk score (Radha Arteaga, et al., 2013) is: 0.9%    Values used to calculate the score:      Age: 41 years      Sex: Male      Is Non- : No      Diabetic: Yes      Tobacco smoker: No      Systolic Blood Pressure: 234 mmHg      Is BP treated: No      HDL Cholesterol: 47 mg/dL      Total Cholesterol: 132 mg/dL     Lipid panel5/28/2019: Cholesterol 203, triglycerides 89, HDL 43,     ASCVD 5.3%    Lipid panel1/4/2021, cholesterol 132, triglycerides 104, HDL 47, LDL 64, A1c 7.0    ASSESSMENT:  · Exertional fatigue - mostly from metoprolol but it is not bothering him now. · Type II diabetes - well controlled. · Nonanginal chest pains, stable  · History of vasodepressor syncope , stable  · History of for sinus node dysfunction and AV block on loop recorder, status post permanent DDD pacemaker implantation - 12/28/20107   · Obesity  · Anxiety disorder  · Hyperlipidemia, on atorvastatin 40 mg, LDL is at goal level not well controlled. · Obstructive sleep apnea         Plan:   · Lipid results discussed with the patient and they are well controlled. · Continue current medications atorvastatin 40 mg daily, Toprol- mg twice daily and rest of the current medications. · Follow up with Dr. Marcus Green for pacer evaluation as scheduled. · He was advised to cut down on calories and lose weight. · Follow up in 12 months. The patient's current medication list, allergies, problem list and results of all previously ordered testing were reviewed at today's visit.   Yanick Davis MD  [facility]) Cardiology

## 2021-05-10 DIAGNOSIS — F90.1 ATTENTION DEFICIT HYPERACTIVITY DISORDER (ADHD), PREDOMINANTLY HYPERACTIVE TYPE: ICD-10-CM

## 2021-05-10 RX ORDER — DEXTROAMPHETAMINE SACCHARATE, AMPHETAMINE ASPARTATE, DEXTROAMPHETAMINE SULFATE AND AMPHETAMINE SULFATE 2.5; 2.5; 2.5; 2.5 MG/1; MG/1; MG/1; MG/1
10 TABLET ORAL 2 TIMES DAILY
Qty: 60 TABLET | Refills: 0 | Status: SHIPPED
Start: 2021-05-10 | End: 2021-06-24 | Stop reason: SDUPTHER

## 2021-05-24 ENCOUNTER — OFFICE VISIT (OUTPATIENT)
Dept: PODIATRY | Age: 41
End: 2021-05-24
Payer: COMMERCIAL

## 2021-05-24 VITALS — WEIGHT: 255 LBS | HEIGHT: 70 IN | BODY MASS INDEX: 36.51 KG/M2

## 2021-05-24 DIAGNOSIS — L85.3 XEROSIS CUTIS: ICD-10-CM

## 2021-05-24 DIAGNOSIS — E11.9 TYPE 2 DIABETES MELLITUS WITHOUT COMPLICATION, UNSPECIFIED WHETHER LONG TERM INSULIN USE (HCC): Primary | ICD-10-CM

## 2021-05-24 PROCEDURE — 99203 OFFICE O/P NEW LOW 30 MIN: CPT | Performed by: PODIATRIST

## 2021-05-24 PROCEDURE — G8427 DOCREV CUR MEDS BY ELIG CLIN: HCPCS | Performed by: PODIATRIST

## 2021-05-24 PROCEDURE — 1036F TOBACCO NON-USER: CPT | Performed by: PODIATRIST

## 2021-05-24 PROCEDURE — G8417 CALC BMI ABV UP PARAM F/U: HCPCS | Performed by: PODIATRIST

## 2021-05-24 PROCEDURE — 2022F DILAT RTA XM EVC RTNOPTHY: CPT | Performed by: PODIATRIST

## 2021-05-24 PROCEDURE — 3051F HG A1C>EQUAL 7.0%<8.0%: CPT | Performed by: PODIATRIST

## 2021-05-24 RX ORDER — AMMONIUM LACTATE 12 G/100G
LOTION TOPICAL
Qty: 400 G | Refills: 2 | Status: SHIPPED | OUTPATIENT
Start: 2021-05-24

## 2021-05-24 NOTE — PROGRESS NOTES
Yareli Hassan is here today for a diabetic foot exam. his PCP is Armand Mendoza DO last OV was 21. Yareli Hassan : 1980 Sex: male  Age: 41 y.o. Patient was referred by Armand Mendoza DO    CC:    Diabetic foot ankle exam    HPI:   This pleasant 70-year-old male patient referred me today diabetic foot ankle exam.  Does have history of diabetes and is controlled with oral Metformin. Did have recent hemoglobin A1C in January of 7.0. Denies any current calf pain. No history of open skin lesions or abrasions left the right foot. Does have some dry skin on bottom both heels. Denies current diabetic inserts. No new injury foot or ankle. No additional pedal complaints at this time. ROS:  Const: Denies constitutional symptoms  Musculo: Denies symptoms other than stated above  Skin: Denies symptoms other than stated above       Current Outpatient Medications:     ammonium lactate (LAC-HYDRIN) 12 % lotion, Apply topically twice daily, Disp: 400 g, Rfl: 2    amphetamine-dextroamphetamine (ADDERALL, 10MG,) 10 MG tablet, Take 1 tablet by mouth 2 times daily for 30 days. , Disp: 60 tablet, Rfl: 0    Rimegepant Sulfate 75 MG TBDP, Take 75 mg by mouth daily as needed (migraine) No more than 1 tab in 24 hours, Disp: 8 tablet, Rfl: 3    blood glucose monitor kit and supplies, Use to test blood sugar as directed by your physician, Disp: 1 kit, Rfl: 0    blood glucose monitor strips, Test 3 times a day, Disp: 100 strip, Rfl: 5    primidone (MYSOLINE) 50 MG tablet, Take 1 tablet by mouth 2 times daily, Disp: 60 tablet, Rfl: 3    clonazePAM (KLONOPIN) 1 MG tablet, Take 1 mg by mouth 2 times daily as needed. , Disp: , Rfl:     metFORMIN (GLUCOPHAGE) 1000 MG tablet, Take 1 tablet by mouth 2 times daily (with meals), Disp: 180 tablet, Rfl: 1    ALPRAZolam (XANAX) 1 MG tablet, TAKE ONE TABLET BY MOUTH EVERY DAY for 1 week AS NEEDED FOR ANXIETY, Disp: , Rfl:     ENULOSE 10 GM/15ML SOLN solution, as needed , Disp: , Rfl:     mirtazapine (REMERON) 30 MG tablet, TAKE ONE TABLET BY MOUTH once a day AT BEDTIME, Disp: , Rfl:     atorvastatin (LIPITOR) 40 MG tablet, Take 1 tablet by mouth daily, Disp: 90 tablet, Rfl: 3    metoprolol succinate (TOPROL XL) 100 MG extended release tablet, TAKE 1 TABLET BY MOUTH TWICE DAILY WITH MEALS, Disp: 180 tablet, Rfl: 3    zonisamide (ZONEGRAN) 50 MG capsule, Take 2 capsules by mouth nightly, Disp: 180 capsule, Rfl: 3    desonide (DESOWEN) 0.05 % cream, Apply topically 2 times daily. , Disp: 60 g, Rfl: 0    metroNIDAZOLE (METROGEL) 0.75 % gel, Apply topically 2 times daily. , Disp: 45 g, Rfl: 0    lactulose (CHRONULAC) 10 GM/15ML solution, TAKE 15 ML BY MOUTH 3 TIMES DAILY (Patient taking differently: TAKE 15 ML BY MOUTH 3 TIMES DAILY PRN), Disp: 946 mL, Rfl: 0    medical marijuana, Take by mouth as needed. , Disp: , Rfl:     FLUoxetine (PROZAC) 20 MG capsule, Take 2 capsules by mouth daily, Disp: 60 capsule, Rfl: 0    rifaximin (XIFAXAN) 550 MG tablet, Take 550 mg by mouth 2 times daily, Disp: , Rfl:   Allergies   Allergen Reactions    Fish-Derived Products        Past Medical History:   Diagnosis Date    Anxiety     Arthritis     Class 1 obesity due to excess calories with serious comorbidity and body mass index (BMI) of 34.0 to 34.9 in adult     First degree AV block     GERD (gastroesophageal reflux disease)     Hyperlipidemia     IBS (irritable bowel syndrome)     LVH (left ventricular hypertrophy)     Obesity     ENOC (obstructive sleep apnea)     Psychiatric problem     Psychogenic nonepileptic seizure     Stroke-like symptoms 08/14/2018    Type 2 diabetes mellitus with complication, without long-term current use of insulin ([facility].)            Vitals:    05/24/21 1335   Weight: 255 lb (115.7 kg)   Height: 5' 10\" (1.778 m)       Work History/Social History:    Foot and ankle history:     Focused Lower Extremity Physical Exam:    Neurovascular examination: Dorsalis Pedis palpable bilateral.  Posterior tibialis palpable bilateral.    Capillary Refill Time:  Immediate return  Hair growth:  Symmetrical and bilateral   Skin:  Not atrophic  Edema: No edema bilateral feet or ankles. Neurologic:  Light touch intact bilateral.      Musculoskeletal/ Orthopedic examination:    Equinis: Absent bilateral  Dorsiflexion, plantarflexion, inversion, eversion bilateral 5 out of 5 muscle strength  Wiggling toes  Negative Homans  No pain to palpation bilateral heel. Negative [facility]    Dermatology examination:    Dry flaky skin plantar heels bilateral.  No open skin lesions or abrasions. Assessment and Plan:  Janeen Mac was seen today for diabetes. Diagnoses and all orders for this visit:    Type 2 diabetes mellitus without complication, unspecified whether long term insulin use (HCC)    Xerosis cutis    Other orders  -     ammonium lactate (LAC-HYDRIN) 12 % lotion; Apply topically twice daily        Janeen Mac seen today new referral diabetic foot ankle exam.  Did recommend ammonium lactate 12% to be applied twice daily bottom both feet. Reports avoiding barefoot. Any new skin lesion abrasions come in sooner. We did discuss previous hemoglobin A1c in January which was 7.0. Does have follow-up in July with his primary care physician Dr. Shiva Hernández his input. I will follow-up 3 months. Return in about 3 months (around 8/24/2021). Seen By:  Alee Rene DPM      Document was created using voice recognition software. Note was reviewed, however may contain grammatical errors.

## 2021-05-24 NOTE — LETTER
Mendel Marin DO   [address] [facility] [address]     Patient: Tru Adams  YOB: 1980  Date of Visit: 6/1/2021     Dear [place], DO    Thank you for referring Tru Adams to me for evaluation. Maureen Felder was seen today diabetic foot ankle exam.  I did recommend ammonium lactate 12%. I will follow-up 3 months to monitor progression. Once again, thank you very much for this kind referral and allowing me to participate in the care of this patient. If you have questions, please do not hesitate to call me.     Sincerely,        Mart Jorge, [facility]  [address] [facility] [address] [id number]  Phone: [phone]  Fax: [phone]

## 2021-06-02 VITALS
BODY MASS INDEX: 36.51 KG/M2 | WEIGHT: 255 LBS | HEART RATE: 77 BPM | TEMPERATURE: 97.8 F | DIASTOLIC BLOOD PRESSURE: 73 MMHG | RESPIRATION RATE: 14 BRPM | SYSTOLIC BLOOD PRESSURE: 114 MMHG | OXYGEN SATURATION: 97 % | HEIGHT: 70 IN

## 2021-06-02 PROCEDURE — 93005 ELECTROCARDIOGRAM TRACING: CPT | Performed by: EMERGENCY MEDICINE

## 2021-06-02 PROCEDURE — 99284 EMERGENCY DEPT VISIT MOD MDM: CPT

## 2021-06-02 ASSESSMENT — PAIN DESCRIPTION - FREQUENCY: FREQUENCY: CONTINUOUS

## 2021-06-02 ASSESSMENT — PAIN DESCRIPTION - ONSET: ONSET: SUDDEN

## 2021-06-02 ASSESSMENT — PAIN DESCRIPTION - PROGRESSION: CLINICAL_PROGRESSION: GRADUALLY WORSENING

## 2021-06-02 ASSESSMENT — PAIN DESCRIPTION - PAIN TYPE: TYPE: ACUTE PAIN

## 2021-06-02 ASSESSMENT — PAIN DESCRIPTION - DESCRIPTORS: DESCRIPTORS: ACHING;CONSTANT;DISCOMFORT

## 2021-06-02 ASSESSMENT — PAIN DESCRIPTION - LOCATION: LOCATION: HEAD

## 2021-06-02 ASSESSMENT — PAIN SCALES - GENERAL: PAINLEVEL_OUTOF10: 6

## 2021-06-03 ENCOUNTER — APPOINTMENT (OUTPATIENT)
Dept: GENERAL RADIOLOGY | Age: 41
End: 2021-06-03
Payer: COMMERCIAL

## 2021-06-03 ENCOUNTER — HOSPITAL ENCOUNTER (EMERGENCY)
Age: 41
Discharge: HOME | End: 2021-06-03
Attending: EMERGENCY MEDICINE
Payer: COMMERCIAL

## 2021-06-03 ENCOUNTER — APPOINTMENT (OUTPATIENT)
Dept: CT IMAGING | Age: 41
End: 2021-06-03
Payer: COMMERCIAL

## 2021-06-03 DIAGNOSIS — R00.2 PALPITATIONS: ICD-10-CM

## 2021-06-03 DIAGNOSIS — R51.9 ACUTE NONINTRACTABLE HEADACHE, UNSPECIFIED HEADACHE TYPE: Primary | ICD-10-CM

## 2021-06-03 LAB
ACETAMINOPHEN LEVEL: <5 MCG/ML (ref 10–30)
ALBUMIN SERPL-MCNC: 4.1 G/DL (ref 3.5–5.2)
ALP BLD-CCNC: 99 U/L (ref 40–129)
ALT SERPL-CCNC: 32 U/L (ref 0–40)
AMMONIA: 26.3 UMOL/L (ref 16–60)
AMPHETAMINE SCREEN, URINE: POSITIVE
ANION GAP SERPL CALCULATED.3IONS-SCNC: 8 MMOL/L (ref 7–16)
AST SERPL-CCNC: 20 U/L (ref 0–39)
BARBITURATE SCREEN URINE: NOT DETECTED
BASOPHILS ABSOLUTE: 0.06 E9/L (ref 0–0.2)
BASOPHILS RELATIVE PERCENT: 0.5 % (ref 0–2)
BENZODIAZEPINE SCREEN, URINE: POSITIVE
BILIRUB SERPL-MCNC: 0.6 MG/DL (ref 0–1.2)
BILIRUBIN URINE: NEGATIVE
BLOOD, URINE: NEGATIVE
BUN BLDV-MCNC: 7 MG/DL (ref 6–20)
CALCIUM SERPL-MCNC: 9.4 MG/DL (ref 8.6–10.2)
CANNABINOID SCREEN URINE: POSITIVE
CHLORIDE BLD-SCNC: 104 MMOL/L (ref 98–107)
CLARITY: CLEAR
CO2: 26 MMOL/L (ref 22–29)
COCAINE METABOLITE SCREEN URINE: NOT DETECTED
COLOR: YELLOW
CREAT SERPL-MCNC: 0.9 MG/DL (ref 0.7–1.2)
EKG ATRIAL RATE: 70 BPM
EKG P AXIS: 58 DEGREES
EKG P-R INTERVAL: 208 MS
EKG Q-T INTERVAL: 386 MS
EKG QRS DURATION: 98 MS
EKG QTC CALCULATION (BAZETT): 416 MS
EKG R AXIS: -57 DEGREES
EKG T AXIS: 0 DEGREES
EKG VENTRICULAR RATE: 70 BPM
EOSINOPHILS ABSOLUTE: 1.01 E9/L (ref 0.05–0.5)
EOSINOPHILS RELATIVE PERCENT: 8.6 % (ref 0–6)
ETHANOL: <10 MG/DL (ref 0–0.08)
FENTANYL SCREEN, URINE: NOT DETECTED
GFR AFRICAN AMERICAN: >60
GFR NON-AFRICAN AMERICAN: >60 ML/MIN/1.73
GLUCOSE BLD-MCNC: 109 MG/DL (ref 74–99)
GLUCOSE URINE: NEGATIVE MG/DL
HCT VFR BLD CALC: 44 % (ref 37–54)
HEMOGLOBIN: 14.3 G/DL (ref 12.5–16.5)
IMMATURE GRANULOCYTES #: 0.06 E9/L
IMMATURE GRANULOCYTES %: 0.5 % (ref 0–5)
KETONES, URINE: NEGATIVE MG/DL
LACTIC ACID: 1.1 MMOL/L (ref 0.5–2.2)
LEUKOCYTE ESTERASE, URINE: NEGATIVE
LYMPHOCYTES ABSOLUTE: 3.01 E9/L (ref 1.5–4)
LYMPHOCYTES RELATIVE PERCENT: 25.6 % (ref 20–42)
Lab: ABNORMAL
MAGNESIUM: 2 MG/DL (ref 1.6–2.6)
MCH RBC QN AUTO: 29.7 PG (ref 26–35)
MCHC RBC AUTO-ENTMCNC: 32.5 % (ref 32–34.5)
MCV RBC AUTO: 91.5 FL (ref 80–99.9)
METHADONE SCREEN, URINE: NOT DETECTED
MONOCYTES ABSOLUTE: 0.79 E9/L (ref 0.1–0.95)
MONOCYTES RELATIVE PERCENT: 6.7 % (ref 2–12)
NEUTROPHILS ABSOLUTE: 6.81 E9/L (ref 1.8–7.3)
NEUTROPHILS RELATIVE PERCENT: 58.1 % (ref 43–80)
NITRITE, URINE: NEGATIVE
OPIATE SCREEN URINE: NOT DETECTED
OXYCODONE URINE: NOT DETECTED
PDW BLD-RTO: 13.6 FL (ref 11.5–15)
PH UA: 5.5 (ref 5–9)
PHENCYCLIDINE SCREEN URINE: NOT DETECTED
PLATELET # BLD: 329 E9/L (ref 130–450)
PMV BLD AUTO: 10.2 FL (ref 7–12)
POTASSIUM SERPL-SCNC: 4.4 MMOL/L (ref 3.5–5)
PROTEIN UA: NEGATIVE MG/DL
RBC # BLD: 4.81 E12/L (ref 3.8–5.8)
SALICYLATE, SERUM: <0.3 MG/DL (ref 0–30)
SARS-COV-2, NAAT: NOT DETECTED
SODIUM BLD-SCNC: 138 MMOL/L (ref 132–146)
SPECIFIC GRAVITY UA: >=1.03 (ref 1–1.03)
TOTAL PROTEIN: 7 G/DL (ref 6.4–8.3)
TRICYCLIC ANTIDEPRESSANTS SCREEN SERUM: NEGATIVE NG/ML
UROBILINOGEN, URINE: 0.2 E.U./DL
WBC # BLD: 11.7 E9/L (ref 4.5–11.5)

## 2021-06-03 PROCEDURE — 80143 DRUG ASSAY ACETAMINOPHEN: CPT

## 2021-06-03 PROCEDURE — 71045 X-RAY EXAM CHEST 1 VIEW: CPT

## 2021-06-03 PROCEDURE — 85025 COMPLETE CBC W/AUTO DIFF WBC: CPT

## 2021-06-03 PROCEDURE — 81003 URINALYSIS AUTO W/O SCOPE: CPT

## 2021-06-03 PROCEDURE — 87635 SARS-COV-2 COVID-19 AMP PRB: CPT

## 2021-06-03 PROCEDURE — 70450 CT HEAD/BRAIN W/O DYE: CPT

## 2021-06-03 PROCEDURE — 83605 ASSAY OF LACTIC ACID: CPT

## 2021-06-03 PROCEDURE — 82077 ASSAY SPEC XCP UR&BREATH IA: CPT

## 2021-06-03 PROCEDURE — 93010 ELECTROCARDIOGRAM REPORT: CPT | Performed by: INTERNAL MEDICINE

## 2021-06-03 PROCEDURE — 80179 DRUG ASSAY SALICYLATE: CPT

## 2021-06-03 PROCEDURE — 80053 COMPREHEN METABOLIC PANEL: CPT

## 2021-06-03 PROCEDURE — 82140 ASSAY OF AMMONIA: CPT

## 2021-06-03 PROCEDURE — 80307 DRUG TEST PRSMV CHEM ANLYZR: CPT

## 2021-06-03 PROCEDURE — 83735 ASSAY OF MAGNESIUM: CPT

## 2021-06-03 NOTE — ED PROVIDER NOTES
HPI:  6/3/21, Time: 1:27 AM EDT         Jenelle Coley is a 41 y.o. male presenting to the ED for heart palpitations beginning today. Symptoms have been moderate in severity and intermittent. No exacerbating or alleviating factors. Reports associated symptoms of a mild headache as well as lethargy. Denies any recent falls or trauma. No recent illness, fevers, cough, chest pain, shortness of breath, dysuria, or focal numbness or weakness. No recent medication changes. States he has chronic diarrhea and emesis which is unchanged from baseline. No known exposures to COVID-19. States he was recently vaccinated for COVID-19. Review of Systems:   Pertinent positives and negatives are stated within HPI, all other systems reviewed and are negative.          --------------------------------------------- PAST HISTORY ---------------------------------------------  Past Medical History:  has a past medical history of Anxiety, Arthritis, Class 1 obesity due to excess calories with serious comorbidity and body mass index (BMI) of 34.0 to 34.9 in adult, First degree AV block, GERD (gastroesophageal reflux disease), Hyperlipidemia, IBS (irritable bowel syndrome), LVH (left ventricular hypertrophy), Obesity, ENOC (obstructive sleep apnea), Psychiatric problem, Psychogenic nonepileptic seizure, Stroke-like symptoms, and Type 2 diabetes mellitus with complication, without long-term current use of insulin ([employer] [facility] [address] Past Surgical History:  has a past surgical history that includes Tonsillectomy; hernia repair; knee surgery; Insertable Cardiac Monitor (07/14/2017); and Pacemaker insertion (12/28/2017). Social History:  reports that he has never smoked. He has never used smokeless tobacco. He reports current alcohol use. He reports previous drug use. Drug: Marijuana. Family History: family history is not on file. The patients home medications have been reviewed.     Allergies: Fish-derived Neutrophils Absolute 6.81 1.80 - 7.30 E9/L    Immature Granulocytes # 0.06 E9/L    Lymphocytes Absolute 3.01 1.50 - 4.00 E9/L    Monocytes Absolute 0.79 0.10 - 0.95 E9/L    Eosinophils Absolute 1.01 (H) 0.05 - 0.50 E9/L    Basophils Absolute 0.06 0.00 - 0.20 E9/L   Urinalysis, reflex to microscopic   Result Value Ref Range    Color, UA Yellow Straw/Yellow    Clarity, UA Clear Clear    Glucose, Ur Negative Negative mg/dL    Bilirubin Urine Negative Negative    Ketones, Urine Negative Negative mg/dL    Specific Gravity, UA >=1.030 1.005 - 1.030    Blood, Urine Negative Negative    pH, UA 5.5 5.0 - 9.0    Protein, UA Negative Negative mg/dL    Urobilinogen, Urine 0.2 <2.0 E.U./dL    Nitrite, Urine Negative Negative    Leukocyte Esterase, Urine Negative Negative   Comprehensive Metabolic Panel   Result Value Ref Range    Sodium 138 132 - 146 mmol/L    Potassium 4.4 3.5 - 5.0 mmol/L    Chloride 104 98 - 107 mmol/L    CO2 26 22 - 29 mmol/L    Anion Gap 8 7 - 16 mmol/L    Glucose 109 (H) 74 - 99 mg/dL    BUN 7 6 - 20 mg/dL    CREATININE 0.9 0.7 - 1.2 mg/dL    GFR Non-African American >60 >=60 mL/min/1.73    GFR African American >60     Calcium 9.4 8.6 - 10.2 mg/dL    Total Protein 7.0 6.4 - 8.3 g/dL    Albumin 4.1 3.5 - 5.2 g/dL    Total Bilirubin 0.6 0.0 - 1.2 mg/dL    Alkaline Phosphatase 99 40 - 129 U/L    ALT 32 0 - 40 U/L    AST 20 0 - 39 U/L   Lactic Acid, Plasma   Result Value Ref Range    Lactic Acid 1.1 0.5 - 2.2 mmol/L   EKG 12 Lead   Result Value Ref Range    Ventricular Rate 70 BPM    Atrial Rate 70 BPM    P-R Interval 208 ms    QRS Duration 98 ms    Q-T Interval 386 ms    QTc Calculation (Bazett) 416 ms    P Axis 58 degrees    R Axis -57 degrees    T Axis 0 degrees       RADIOLOGY:  Interpreted by Radiologist.  CT Head WO Contrast   Final Result   No acute intracranial abnormality. XR CHEST PORTABLE   Final Result   No acute process.              ------------------------- NURSING NOTES AND VITALS Leah Ojeda MD  06/03/21 [id number]

## 2021-06-03 NOTE — ED NOTES
Pt given d/c instructions. Pt to f.u with pcp in 3 days. Pt given instructions on when to return to ED. Pt verbalized understanding of instructions. Pt left in stable and ambulatory condition. Pt IV d/c without any complications.      Abi Hightower RN  06/03/21 3605

## 2021-06-04 ENCOUNTER — TELEPHONE (OUTPATIENT)
Dept: CARDIOLOGY CLINIC | Age: 41
End: 2021-06-04

## 2021-06-04 ENCOUNTER — TELEPHONE (OUTPATIENT)
Dept: PRIMARY CARE CLINIC | Age: 41
End: 2021-06-04

## 2021-06-04 NOTE — TELEPHONE ENCOUNTER
Patient called stating he was seen in the ER on  6/2/21 and had an abnormal EKG. Patient asking for Dr. Samir Gao to review and advise regarding result and need for F/U. Please advise.

## 2021-06-04 NOTE — TELEPHONE ENCOUNTER
Pt calling to let you know that he was in the ER yesterday. He was advised to contact PCP to let you know. There were abnormalities on his EKG he wanted you to be aware of.   He is also contacting his cardiologist.

## 2021-06-04 NOTE — TELEPHONE ENCOUNTER
Patient notified of EKG results and Dr. Garrido's recommendation. F/U scheduled for 6/29/21 at 9:00 a.m.

## 2021-06-24 DIAGNOSIS — F90.1 ATTENTION DEFICIT HYPERACTIVITY DISORDER (ADHD), PREDOMINANTLY HYPERACTIVE TYPE: ICD-10-CM

## 2021-06-24 DIAGNOSIS — G43.109 MIGRAINE WITH AURA AND WITHOUT STATUS MIGRAINOSUS, NOT INTRACTABLE: ICD-10-CM

## 2021-06-24 RX ORDER — DEXTROAMPHETAMINE SACCHARATE, AMPHETAMINE ASPARTATE, DEXTROAMPHETAMINE SULFATE AND AMPHETAMINE SULFATE 2.5; 2.5; 2.5; 2.5 MG/1; MG/1; MG/1; MG/1
10 TABLET ORAL 2 TIMES DAILY
Qty: 60 TABLET | Refills: 0 | Status: SHIPPED
Start: 2021-06-24 | End: 2021-07-22 | Stop reason: SDUPTHER

## 2021-06-24 RX ORDER — ZONISAMIDE 50 MG/1
100 CAPSULE ORAL NIGHTLY
Qty: 180 CAPSULE | Refills: 0 | Status: SHIPPED
Start: 2021-06-24 | End: 2021-10-15 | Stop reason: SDUPTHER

## 2021-06-28 RX ORDER — METOPROLOL SUCCINATE 100 MG/1
100 TABLET, EXTENDED RELEASE ORAL 2 TIMES DAILY
Qty: 180 TABLET | Refills: 3 | Status: SHIPPED
Start: 2021-06-28 | End: 2022-07-05 | Stop reason: SDUPTHER

## 2021-06-29 ENCOUNTER — OFFICE VISIT (OUTPATIENT)
Dept: CARDIOLOGY CLINIC | Age: 41
End: 2021-06-29
Payer: COMMERCIAL

## 2021-06-29 VITALS
HEIGHT: 70 IN | DIASTOLIC BLOOD PRESSURE: 70 MMHG | RESPIRATION RATE: 16 BRPM | HEART RATE: 86 BPM | SYSTOLIC BLOOD PRESSURE: 118 MMHG | BODY MASS INDEX: 37.15 KG/M2 | WEIGHT: 259.5 LBS

## 2021-06-29 DIAGNOSIS — R07.89 ATYPICAL CHEST PAIN: ICD-10-CM

## 2021-06-29 DIAGNOSIS — Z95.0 S/P PLACEMENT OF CARDIAC PACEMAKER: ICD-10-CM

## 2021-06-29 DIAGNOSIS — I47.20 VENTRICULAR TACHYCARDIA: Primary | ICD-10-CM

## 2021-06-29 PROCEDURE — 93000 ELECTROCARDIOGRAM COMPLETE: CPT | Performed by: INTERNAL MEDICINE

## 2021-06-29 PROCEDURE — 1036F TOBACCO NON-USER: CPT | Performed by: INTERNAL MEDICINE

## 2021-06-29 PROCEDURE — 99214 OFFICE O/P EST MOD 30 MIN: CPT | Performed by: INTERNAL MEDICINE

## 2021-06-29 PROCEDURE — G8427 DOCREV CUR MEDS BY ELIG CLIN: HCPCS | Performed by: INTERNAL MEDICINE

## 2021-06-29 PROCEDURE — G8417 CALC BMI ABV UP PARAM F/U: HCPCS | Performed by: INTERNAL MEDICINE

## 2021-06-29 NOTE — PROGRESS NOTES
oz (117.7 kg)   06/02/21 255 lb (115.7 kg)   05/24/21 255 lb (115.7 kg)     Appearance: Awake, alert and oriented x 3, no acute respiratory distress, he is obese. Skin: Intact, no rash  Head: Normocephalic, atraumatic  Eyes: EOMI, no conjunctival erythema  ENMT: No pharyngeal erythema, MMM, no rhinorrhea  Neck: Supple, no elevated JVP, no carotid bruits  Lungs: Clear to auscultation bilaterally. No wheezes, rales, or rhonchi.   Cardiac: Regular rate and rhythm, +S1S2, no murmurs apparent  Abdomen: Soft, nontender, +bowel sounds  Extremities: Moves all extremities x 4, no lower extremity edema  Neurologic: No focal motor deficits apparent, normal mood and affect, alert and oriented x 3  Peripheral Pulses: Intact posterior tibial pulses bilaterally    Laboratory Tests:  Lab Results   Component Value Date    CREATININE 0.9 06/03/2021    BUN 7 06/03/2021     06/03/2021    K 4.4 06/03/2021     06/03/2021    CO2 26 06/03/2021     Lab Results   Component Value Date    MG 2.0 06/03/2021     Lab Results   Component Value Date    WBC 11.7 (H) 06/03/2021    HGB 14.3 06/03/2021    HCT 44.0 06/03/2021    MCV 91.5 06/03/2021     06/03/2021     Lab Results   Component Value Date    ALT 32 06/03/2021    AST 20 06/03/2021    ALKPHOS 99 06/03/2021    BILITOT 0.6 06/03/2021     Lab Results   Component Value Date    CKTOTAL 188 06/27/2017    CKMB 3.4 04/19/2016    TROPONINI <0.01 10/01/2020    TROPONINI <0.01 07/15/2020    TROPONINI <0.01 02/09/2020     Lab Results   Component Value Date    INR 1.2 07/15/2020    INR 1.1 09/11/2018    INR 1.2 12/28/2017    PROTIME 13.3 (H) 07/15/2020    PROTIME 12.5 (H) 09/11/2018    PROTIME 13.6 (H) 12/28/2017     Lab Results   Component Value Date    TSH 3.030 01/04/2021     Lab Results   Component Value Date    LABA1C 7.0 (H) 01/04/2021     No results found for: EAG  Lab Results   Component Value Date    CHOL 132 01/04/2021    CHOL 203 (H) 05/28/2019    CHOL 204 (H) 08/15/2018 Lab Results   Component Value Date    TRIG 104 01/04/2021    TRIG 64 04/14/2020    TRIG 89 05/28/2019     Lab Results   Component Value Date    HDL 47 01/04/2021    HDL 48 04/14/2020    HDL 43 05/28/2019     Lab Results   Component Value Date    LDLCALC 64 01/04/2021    LDLCALC 142 (H) 05/28/2019    LDLCALC 142 (H) 08/15/2018    LDLCHOLESTEROL 170 04/14/2020     Lab Results   Component Value Date    LABVLDL 21 01/04/2021    LABVLDL 18 05/28/2019    LABVLDL 21 08/15/2018    VLDL 182 04/14/2020     Lab Results   Component Value Date    CHOLHDLRATIO 3.5 04/14/2020       Cardiac Tests:  ECG: Normal sinus rhythm, left anterior fascicular block, incomplete right bundle branch block, left atrial enlargement, abnormal EKG. Echocardiogram4/19/2016:    Left ventricular internal dimensions were normal in diastole and systole.   Overall ejection fraction is low normal .       TTE7/24/2019: EF 55 to 03%, normal diastolic function, RVSP 25 mmHg, normal RV size and function. Pacer wire was visualized in the RV, no pericardial effusion. Stress test12/2/2017:    Impression:    1. Exercise EKG was normal.    2. The patient experienced no chest pain with exercise. 3. Knutson treadmill score was 7 implying low risk of acute ischemic   events.    4. Exercise capacity was average. Cardiac catheterization:     The 10-year ASCVD risk score (Aminta Bahena, et al., 2013) is: 1.2%    Values used to calculate the score:      Age: 41 years      Sex: Male      Is Non- : No      Diabetic: Yes      Tobacco smoker: No      Systolic Blood Pressure: 439 mmHg      Is BP treated: No      HDL Cholesterol: 47 mg/dL      Total Cholesterol: 132 mg/dL     Lipid panel5/28/2019: Cholesterol 203, triglycerides 89, HDL 43,     ASCVD 5.3%    Lipid panel1/4/2021, cholesterol 132, triglycerides 104, HDL 47, LDL 64, A1c 7.0    ASSESSMENT:  · Atypical chest pain, rule out ischemia.   · Type II diabetes - well

## 2021-06-29 NOTE — PATIENT INSTRUCTIONS
· Schedule for an exercise nuclear stress test rule out ischemia. · Refer to EP for pacemaker follow-up. Patient would like to switch his EP care to [facility] EP from Dr. Maribell Moyer. · Lipid results discussed with the patient and they are well controlled. · Continue current medications atorvastatin 40 mg daily, Toprol- mg twice daily and rest of the current medications. · He was advised to cut down on calories and lose weight. · Follow up in 3 months.

## 2021-07-11 NOTE — PROGRESS NOTES
[address] [facility],[address] and [address] [facility] Electrophysiology  Consultation Report  PATIENT: Ulises Howell RECORD NUMBER: [id number]  DATE OF SERVICE:  7/12/2021  ATTENDING ELECTROPHYSIOLOGIST: Edgar Martinez MD  REFERRING PHYSICIAN: Evelyn Higginbotham MD and Giuliano Mauro DO  CHIEF COMPLAINT: Pacemaker in situ and NSVT    HPI: This is a 41 y.o. male with a history of   Patient Active Problem List   Diagnosis    Anxiety    Hyperlipemia    Irritable bowel syndrome with diarrhea    First degree atrioventricular block    Psychogenic nonepileptic seizure    Ventricular tachycardia (HCC)    Sinus node dysfunction (HCC)    S/P cardiac pacemaker procedure    Non-smoker    Panic attacks    Depression    Type 2 diabetes mellitus with complication, without long-term current use of insulin ([facility] [address]    Conversion disorder    Attention deficit hyperactivity disorder (ADHD), predominantly hyperactive type    Fatty liver    Migraine with aura and without status migrainosus, not intractable    ENCO (obstructive sleep apnea)    Essential tremor   who presents to cardiac electrophysiology clinic for consultation of pacemaker in situ and NSVT. The patient has history of neurocardiogenic syncope, incomplete RBBB and LAFB, dual chamber permanent pacemaker in situ, hyperlipidemia, diabetes mellitus, irritable bowel syndrome, obstructive sleep apnea, seizure disorder, obesity and anxiety. The patient has history of recurrent syncope and was seen by Dr. Gurmeet Zavala. The patient underwent tilt table test which showed vasovagal response and implantable loop recorder insertion on 7/14/21due to underlying in complete RBBB and LAFB. He was found to have sinus node dysfunction and AV block noted on the ILR and subsequently underwent dual chamber permanent pacemaker implantation and removal of ILR on 12/28/17. The patient has been followed up with Dr. Gurmeet Zavala.  Last pacemaker evaluation in April 2021 showed episode of NSVTs per patient reported. He reports atypical chest pain and was seen by Cardiology, Dr. Tommy Marte, and nuclear stress test was ordered. The patient currently feels well and offers no complaints from a device POV. The device site looks well healed and free from infection or erosion. The patient denies any dyspnea, palpitations, dizziness, syncope, orthopnea or paroxysmal nocturnal dyspnea. Device interrogation today showed 5 episodes of NSVT since March 2018 and the longest episode was 6 seconds in March 2018 and the recent episode was April 2021 for 2 seconds. I discussed with him at great length regarding to obtain echocardiogram to evaluate LV function and await for nuclear stress test to exclude cardiac ischemia. He is current;ly taking Toprol XL for vasovagal syncope which will also help for arrhythmia suppression. Life style modifications for vasovagal syncope are discussed.     Patient Active Problem List    Diagnosis Date Noted    ENOC (obstructive sleep apnea) 04/08/2021    Essential tremor 04/08/2021    Migraine with aura and without status migrainosus, not intractable 01/12/2021    Fatty liver 07/09/2019    Attention deficit hyperactivity disorder (ADHD), predominantly hyperactive type 01/08/2019    Non-smoker     Panic attacks     Depression     Type 2 diabetes mellitus with complication, without long-term current use of insulin (HCC)     Conversion disorder     Sinus node dysfunction ([address] 12/28/2017    S/P cardiac pacemaker procedure 12/28/2017    Ventricular tachycardia ([facility] [address] 10/31/2017    Psychogenic nonepileptic seizure 09/06/2017    First degree atrioventricular block 02/06/2017    Irritable bowel syndrome with diarrhea 08/30/2016    Anxiety 11/04/2015    Hyperlipemia 11/04/2015       Past Medical History:   Diagnosis Date    Anxiety     Arthritis     Class 1 obesity due to excess calories with serious comorbidity and body mass index (BMI) of 34.0 to 34.9 in adult     First degree AV block     GERD (gastroesophageal reflux disease)     Hyperlipidemia     IBS (irritable bowel syndrome)     LVH (left ventricular hypertrophy)     Obesity     ENOC (obstructive sleep apnea)     Psychiatric problem     Psychogenic nonepileptic seizure     Stroke-like symptoms 08/14/2018    Type 2 diabetes mellitus with complication, without long-term current use of insulin ([facility]        History reviewed. No pertinent family history. Social History     Tobacco Use    Smoking status: Never Smoker    Smokeless tobacco: Never Used   Substance Use Topics    Alcohol use: Yes     Comment: Socially- couple times weekly       Current Outpatient Medications   Medication Sig Dispense Refill    metoprolol succinate (TOPROL XL) 100 MG extended release tablet Take 1 tablet by mouth 2 times daily 180 tablet 3    zonisamide (ZONEGRAN) 50 MG capsule TAKE 2 capsules by mouth nightly 180 capsule 0    amphetamine-dextroamphetamine (ADDERALL, 10MG,) 10 MG tablet Take 1 tablet by mouth 2 times daily for 30 days. 60 tablet 0    ammonium lactate (LAC-HYDRIN) 12 % lotion Apply topically twice daily 400 g 2    Rimegepant Sulfate 75 MG TBDP Take 75 mg by mouth daily as needed (migraine) No more than 1 tab in 24 hours 8 tablet 3    clonazePAM (KLONOPIN) 1 MG tablet Take 1 mg by mouth 2 times daily as needed.  metFORMIN (GLUCOPHAGE) 1000 MG tablet Take 1 tablet by mouth 2 times daily (with meals) 180 tablet 1    ENULOSE 10 GM/15ML SOLN solution as needed       mirtazapine (REMERON) 30 MG tablet TAKE ONE TABLET BY MOUTH once a day AT BEDTIME      atorvastatin (LIPITOR) 40 MG tablet Take 1 tablet by mouth daily 90 tablet 3    desonide (DESOWEN) 0.05 % cream Apply topically 2 times daily. 60 g 0    metroNIDAZOLE (METROGEL) 0.75 % gel Apply topically 2 times daily.  45 g 0    lactulose (CHRONULAC) 10 GM/15ML solution TAKE 15 ML BY MOUTH 3 TIMES DAILY (Patient taking differently: TAKE 15 ML BY MOUTH 3 TIMES DAILY PRN) 946 mL 0    medical marijuana Take by mouth as needed.  FLUoxetine (PROZAC) 20 MG capsule Take 2 capsules by mouth daily 60 capsule 0    rifaximin (XIFAXAN) 550 MG tablet Take 550 mg by mouth 2 times daily      blood glucose monitor kit and supplies Use to test blood sugar as directed by your physician 1 kit 0    blood glucose monitor strips Test 3 times a day 100 strip 5     No current facility-administered medications for this visit. Allergies   Allergen Reactions    Fish-Derived Products      ROS:   Constitutional: Negative for fever, activity change and appetite change. HENT: Negative for epistaxis. Eyes: Negative for diploplia, blurred vision. Respiratory: Negative for cough, chest tightness, shortness of breath and wheezing. Cardiovascular: pertinent positives in HPI  Gastrointestinal: Negative for abdominal pain and blood in stool. All other review of systems are negative     PHYSICAL EXAM:  Vitals:    07/12/21 0857   BP: 118/72   Pulse: 81   Resp: 18   Weight: 257 lb 12.8 oz (116.9 kg)   Height: 5' 10\" (1.778 m)      Constitutional: Well-developed, no acute distress  Eyes: conjunctivae normal, no xanthelasma   Ears, Nose, Throat: oral mucosa moist, no cyanosis   CV: no JVD. Regular rate and rhythm. Normal S1S2 and no S3. No murmurs, rubs, or gallops. PMI is nondisplaced  Lungs: clear to auscultation bilaterally, normal respiratory effort without used of accessory muscles  Abdomen: soft, non-tender, bowel sounds present, no masses or hepatomegaly   Musculoskeletal: no digital clubbing, trace edema of LEs  Skin: warm, no rashes   Pacemaker site: well healed. No erosion, infection or migration    I have personally reviewed the laboratory, cardiac diagnostic and radiographic testing as outlined below:    Data:    No results for input(s): WBC, HGB, HCT, PLT in the last 72 hours.   No results for input(s): NA, K, CL, CO2, BUN, CREATININE, CALCIUM in the last 72 hours.    Invalid input(s): GLU, MAGNESIUM   Lab Results   Component Value Date    MG 2.0 06/03/2021     No results for input(s): TSH in the last 72 hours. No results for input(s): INR in the last 72 hours. CXR 6/3/21:  FINDINGS:   The lungs are without acute focal process.  There is no effusion or   pneumothorax.  A left upper chest cardiac pacer device is noted in place. The cardiomediastinal silhouette is stable. The osseous structures are stable.           Impression   No acute process. Echocardiogram 7/24/19:   Findings      Left Ventricle   Normal left ventricle size and systolic function. Ejection fraction is visually estimated at 55-60%. No regional wall motion abnormalities seen. Normal left ventricle wall thickness. Normal diastolic function. Right Ventricle   Normal right ventricular size and function. Pacer wire visualized in right ventricle. Left Atrium   Left atrium is of normal size. Interatrial septum appears intact. No evidence of patent foramen ovale. Right Atrium   Normal right atrium size. Pacemaker/AICD lead(s) was(were) visualized in RA cavity. Mitral Valve   Normal mitral valve structure and function. No evidence of mitral valve stenosis. Physiologic and/or trace mitral regurgitation is present. Tricuspid Valve   The tricuspid valve appears structurally normal.   Physiologic and/or trace tricuspid regurgitation. RVSP is 25 mmHg. Normal PA systolic pressure. Aortic Valve   Structurally normal aortic valve. The aortic valve is trileaflet with good leaflet separation. No evidence of aortic valve regurgitation. No hemodynamically significant aortic stenosis is present. Pulmonic Valve   Pulmonic valve is structurally normal.   No evidence of pulmonic valve stenosis. Physiologic and/or trace pulmonic regurgitation present. Pericardial Effusion   No evidence for hemodynamically significant pericardial effusion. Pleural Effusion   No evidence of pleural effusion. Aorta   Normal aortic root and ascending aorta. Miscellaneous   The inferior vena cava diameter is normal with normal respiratory   variation. Conclusions      Summary   Normal left ventricle size and systolic function. Ejection fraction is visually estimated at 55-60%. No regional wall motion abnormalities seen. Normal left ventricle wall thickness. Normal diastolic function. Normal right ventricular size and function. Pacer wire visualized in right ventricle. No evidence of aortic valve regurgitation. No hemodynamically significant aortic stenosis is present. RVSP is 25 mmHg. Normal PA systolic pressure. No evidence for hemodynamically significant pericardial effusion. Signature      ----------------------------------------------------------------   Electronically signed by Yoseph Menon MD(Interpreting   physician) on 07/24/2019 06:43 PM     Stress Test 11/2/17:  Impression:     1. Exercise EKG was normal.     2. The patient experienced no chest pain with exercise. 3. Knutson treadmill score was 7 implying low risk of acute ischemic   events.     4. Exercise capacity was average. Thank you for sending your patient to this [facility]. Electronically signed by Evelyne Martin MD on 11/2/17 at 1:39   PM     Device Interrogation: 7/12/21   Underlying rhythm:  AsVs  Mode: DDD   Battery Voltage/Longevity:  7 years    Pacing: A: 1.7%  RV: 0.1%    P wave: 2.1 mV  Impedance: 361 ohms   Threshold: 0.75 V @ 0.4 ms  RV R wave: 8.8 mV  Impedance: 437 ohms   Threshold: 0.75 V @ 0.4 ms  Episodes: 2 seconds VT-NS in April 2021  Reprogramming included: see below  Overall device function is normal    All device programmable settings were evaluated per above and in the scanned document, along with iterative adjustments (capture thresholds) to assess and select the most appropriate final programming to provide for consistent delivery of the appropriate therapy and to verify function of the device. I have independently reviewed all of the ECGs and rhythm strips per above     Assessment/Plan: This is a 41 y.o. male with a history of   Patient Active Problem List   Diagnosis    Anxiety    Hyperlipemia    Irritable bowel syndrome with diarrhea    First degree atrioventricular block    Psychogenic nonepileptic seizure    Ventricular tachycardia (HCC)    Sinus node dysfunction (HCC)    S/P cardiac pacemaker procedure    Non-smoker    Panic attacks    Depression    Type 2 diabetes mellitus with complication, without long-term current use of insulin (HCC)    Conversion disorder    Attention deficit hyperactivity disorder (ADHD), predominantly hyperactive type    Fatty liver    Migraine with aura and without status migrainosus, not intractable    ENOC (obstructive sleep apnea)    Essential tremor    who presents with pacemaker in situ. 1. Pacemaker in situ  - DOI 12/28/17.  - Dual chamber; [employer].  - Indication: syncope, SND and AV block   - Normal device function    2. Neurocardiogenic syncope  - History of positive TTT on 7/14/17.  - Advised life style modifications as below     - Make all postural changes from lying to sitting or sitting to standing slowly. - Drink to 2.0 -2.5 L of fluids per day. - Increase sodium in the diet to 3 - 5 g per day. - Avoid large meals which can cause low blood pressure during digestion.     - Avoid alcohol and excessive caffeine intake. - Perform lower extremity exercises to improve strength of the leg muscles. - Use physical counter maneuvers such as leg crossing, or leg raising and resting the leg on a chair.      - Raise the head of the bed by 6 to 10 inches. - Use custom fitted elastic support stockings.     3. Incomplete RBBB and LAFB  - Status post ILR implantation 7/14/17 and explantation 12/28/17.  - ILR showed long sinus pauses as well as AV block per Dr. Vaishnavi Hassan note (no rhythm strip available for review)  - Status post pacemaker implantation 12/28/17. 4. Nonsustained ventricular tachycardia  - Noted on device interrogation  - First and longest episode occurred on 3/2018 - lasting 6 seconds. - Again occurred Dec 2018, April 2019, October 2019  - Most recently occurred in April 2021 lasting 2 seconds  - Will obtain a TTE and await stress results. - Continue Toprol XL. 5. Hyperlipidemia  - On Lipitor    6. Diabetes mellitus  - On Glucophage. 7. Irritable bowel syndrome    8. Obstructive sleep apnea    9. Seizure disorder    10. Obesity   Body mass index is 36.99 kg/m². - Advised weight loss. 11. Anxiety/depression  - On Prozac    Recommendations:    1. Normal pacemaker function. 2. Echocardiogram to evaluate LV function - will call with results. 3. Await for nuclear stress test result  - will call with results. 4. Continue Toprol  mg bid. 5. Urged lifestyle modifications as outlined above. 6. Remote interrogations every 3 months, will transfer remotes from Dr. Vaishnavi Hassan office. 7. Follow up in 6 months or sooner PRN. Encouraged the patient to call the office for any questions or concerns. I have spent a total of 60 minutes with the patient and the family reviewing the above stated recommendations. And a total of >50% of that time involved face-to-face time providing counseling and or coordination of care with the other providers, preparation for the clinic visit, reviewing records/tests, counseling/education of the patient, ordering medications/tests/procedures, coordinating care, and documenting clinical information in the EHR. Thank you for allowing me to participate in your patient's care. Please call me if there are any questions or concerns.       Gia Damico MD  Cardiac Electrophysiology  [facility]) Physicians  [facility] [place]: [facility]  9:17 AM  7/12/2021

## 2021-07-12 ENCOUNTER — TELEPHONE (OUTPATIENT)
Dept: CARDIOLOGY | Age: 41
End: 2021-07-12

## 2021-07-12 ENCOUNTER — OFFICE VISIT (OUTPATIENT)
Dept: NON INVASIVE DIAGNOSTICS | Age: 41
End: 2021-07-12
Payer: COMMERCIAL

## 2021-07-12 VITALS
HEART RATE: 81 BPM | SYSTOLIC BLOOD PRESSURE: 118 MMHG | BODY MASS INDEX: 36.91 KG/M2 | WEIGHT: 257.8 LBS | RESPIRATION RATE: 18 BRPM | HEIGHT: 70 IN | DIASTOLIC BLOOD PRESSURE: 72 MMHG

## 2021-07-12 DIAGNOSIS — I47.20 VENTRICULAR TACHYCARDIA: ICD-10-CM

## 2021-07-12 DIAGNOSIS — Z95.0 S/P CARDIAC PACEMAKER PROCEDURE: Primary | ICD-10-CM

## 2021-07-12 DIAGNOSIS — I49.5 SINUS NODE DYSFUNCTION (HCC): ICD-10-CM

## 2021-07-12 PROCEDURE — 1036F TOBACCO NON-USER: CPT | Performed by: INTERNAL MEDICINE

## 2021-07-12 PROCEDURE — G8417 CALC BMI ABV UP PARAM F/U: HCPCS | Performed by: INTERNAL MEDICINE

## 2021-07-12 PROCEDURE — 93280 PM DEVICE PROGR EVAL DUAL: CPT | Performed by: INTERNAL MEDICINE

## 2021-07-12 PROCEDURE — 99215 OFFICE O/P EST HI 40 MIN: CPT | Performed by: INTERNAL MEDICINE

## 2021-07-12 PROCEDURE — G8427 DOCREV CUR MEDS BY ELIG CLIN: HCPCS | Performed by: INTERNAL MEDICINE

## 2021-07-12 NOTE — PROGRESS NOTES
[address] [facility]. Yobany Burton M.D., F.A.C.P. Shakira Savage, DNP, APRN, CNS  Rosanne Santana. Hanny Guerrero, MSN, APRN-FNP-C  Kailey Aguila MSN, APRN, FNP-C  Jocelyne GAR, PA-C  [address] MSN, APRN, FNP-C  [address], [facility]  [place], [address]  Phone: [phone]  Fax: [phone]        Jolene Rosario a 41 y.o. right handed man    We are following him for non-epileptic seizures (PNES), essential tremor, and migraines   PNES confirmed via EMU stay in 2017    He presents alone today and is a good historian    He was recently diagnosed with a \"pinched nerve in my C6/C7\" found by [facility] orthopedics and failed PT. There is discussion about injections. He denies any pain down his left arm, but will occasionally feel numbness and weakness in his left hand as well as worsening of his left hand tremor. He feels stiffness and pain in his left upper back and neck. No falls or walking issues. Apparently primidone was tried at some point for his tremor, but interacted with his statin--he is asking for alternatives if recommended. He has noticed an increase in his migraine headaches in the setting of this new neck pain. Nurtec is effective for headache rescue. He has never tried muscle relaxants. His migraines have also ncreased when he is stressed and with barometric pressure changes. Continue Zonegran 100 mg daily with no missed doses. No major worsening of his nonepileptic events. He follows closely with a mental health provider. No new cardiac issues. Now on Xifaxan    Current Outpatient Medications   Medication Sig Dispense Refill    metoprolol succinate (TOPROL XL) 100 MG extended release tablet Take 1 tablet by mouth 2 times daily 180 tablet 3    zonisamide (ZONEGRAN) 50 MG capsule TAKE 2 capsules by mouth nightly 180 capsule 0    amphetamine-dextroamphetamine (ADDERALL, 10MG,) 10 MG tablet Take 1 tablet by mouth 2 times daily for 30 days.  61 tablet 0    ammonium lactate (LAC-HYDRIN) 12 % lotion Apply topically twice daily 400 g 2    Rimegepant Sulfate 75 MG TBDP Take 75 mg by mouth daily as needed (migraine) No more than 1 tab in 24 hours 8 tablet 3    blood glucose monitor kit and supplies Use to test blood sugar as directed by your physician 1 kit 0    blood glucose monitor strips Test 3 times a day 100 strip 5    clonazePAM (KLONOPIN) 1 MG tablet Take 1 mg by mouth 2 times daily as needed.  metFORMIN (GLUCOPHAGE) 1000 MG tablet Take 1 tablet by mouth 2 times daily (with meals) 180 tablet 1    ENULOSE 10 GM/15ML SOLN solution as needed       mirtazapine (REMERON) 30 MG tablet TAKE ONE TABLET BY MOUTH once a day AT BEDTIME      atorvastatin (LIPITOR) 40 MG tablet Take 1 tablet by mouth daily 90 tablet 3    desonide (DESOWEN) 0.05 % cream Apply topically 2 times daily. 60 g 0    metroNIDAZOLE (METROGEL) 0.75 % gel Apply topically 2 times daily. 45 g 0    lactulose (CHRONULAC) 10 GM/15ML solution TAKE 15 ML BY MOUTH 3 TIMES DAILY (Patient taking differently: TAKE 15 ML BY MOUTH 3 TIMES DAILY PRN) 946 mL 0    medical marijuana Take by mouth as needed.  FLUoxetine (PROZAC) 20 MG capsule Take 2 capsules by mouth daily 60 capsule 0    rifaximin (XIFAXAN) 550 MG tablet Take 550 mg by mouth 2 times daily       No current facility-administered medications for this visit.      Objective:     /73 (Site: Left Upper Arm, Position: Sitting, Cuff Size: Medium Adult)   Pulse 71   Temp 98.1 °F (36.7 °C) (Infrared)   Ht 5' 10\" (1.778 m)   Wt 257 lb (116.6 kg)   SpO2 95%   BMI 36.88 kg/m²     General exam: alert, appears stated age, in no acute distress--wearing dark glasses, wide brimmed hat  Head: normocephalic/atraumatic  Eyes: sclerae clear  Neck: no carotid bruits; full ROM--some tenderness to left cervical paraspinals and trapezius again today  Lungs: clear throughout  Heart: RRR, faint murmur appreciated  Ext: no edema or cyanosis  Pulses: 1+ throughout  Skin: intact    Mental Status: alert, oriented x4---pleasant     Appropriate attention/concentration  Intact memories and fundus of knowledge    Speech/language: no dysarthria or aphasias    Cranial Nerves:  II: visual fields full   II: pupils Slight physiologic anisocoria--R>L   III,VII: ptosis None   III,IV,VI: extraocular muscles  EOMI without nystagmus today     V: mastication intact   V: facial light touch sensation  Intact    V,VII: corneal reflex     VII: facial muscle function   Intact   VIII: hearing intact   IX: soft palate elevation  intact   IX,X: gag reflex    XI: trapezius strength  5/5   XI: sternocleidomastoid strength 5/5   XI: neck extension strength  5/5   XII: tongue strength  midline     Motor:  5/5 throughout except 4/5 L hand  Obese bulk and normal tone  No drift  No abnormal movements today    Sensory:  LT intact throughout    Coordination:   FN and FFM intact b/l    Gait:  Slow, cautious and wide based    DTR:   2+ throughout today    No Nichols's no other pathologic reflexes    Laboratory/Radiology:     Lab Results   Component Value Date    WBC 11.7 (H) 06/03/2021    HGB 14.3 06/03/2021    HCT 44.0 06/03/2021    MCV 91.5 06/03/2021     06/03/2021     Lab Results   Component Value Date     06/03/2021    K 4.4 06/03/2021     06/03/2021    CO2 26 06/03/2021    BUN 7 06/03/2021    CREATININE 0.9 06/03/2021    GLUCOSE 109 (H) 06/03/2021    CALCIUM 9.4 06/03/2021    PROT 7.0 06/03/2021    LABALBU 4.1 06/03/2021    BILITOT 0.6 06/03/2021    ALKPHOS 99 06/03/2021    AST 20 06/03/2021    ALT 32 06/03/2021    LABGLOM >60 06/03/2021    GFRAA >60 06/03/2021     Lab Results   Component Value Date    ALKPHOS 99 06/03/2021    ALT 32 06/03/2021    AST 20 06/03/2021    PROT 7.0 06/03/2021    BILITOT 0.6 06/03/2021    BILIDIR <0.2 01/04/2021    LABALBU 4.1 06/03/2021     I independently reviewed the labs and imaging studies     Assessment:     Chronic migraines: now with cervicogenic exacerbation. He may benefit from short course of muscle relaxant while he continues workup with ortho. Migraines otherwise overall stable on Zonegran; and Nurtec effective for rescue. Exam is unchanged    Neck pain: following with orthopedics    Tremors: essential vs related to medications/cervicogenic/anxiety. Primidone interacted with statin, and no tremor noted on exam today. Hesitate to consider BB therapy with his cardiac issues--or add any other new routine medications due to his polypharmacy--- unless the tremor impairs ADLs. Psychogenic nonepileptic seizures: Proven by EMU. Follows closely with mental health provider.      Anxiety: Under mental health care    Pacemaker    Plan:     Flexeril 5 mg daily PRN for one month    F/u with [facility]    Continue Zonegran 100 mg nightly    Continue Nurtec    Close follow-up with mental health provider    He does not drive    Headache diary    RTO in 6 months or sooner PRN    NADIA Roman CNP   3:13 PM  7/12/2021

## 2021-07-12 NOTE — PATIENT INSTRUCTIONS
Advised life style modifications as below:     - Make all postural changes from lying to sitting or sitting to standing slowly. - Drink to 2.0 -2.5 L of fluids per day. With bad symptoms, drink 500 cc of water quickly. This will result in an increased blood pressure within 5 minutes of drinking the water. The effect will last up to one hour and may improve orthostatic intolerance. - Increase sodium in the diet to 3 - 5 g per day. If not helpful and BP is stable, may try 5-7 g per day. - Avoid large meals which can cause low blood pressure during digestion. It is better to eat smaller meals more often than three large meals. - Avoid alcohol. Alcohol and cause blood to pool in the legs which may worsen low blood pressure reactions when standing. Avoid excessive caffeine intake as it may increase urine production and reduce blood volume. - Perform lower extremity exercises to improve strength of the leg muscles. This will help prevent blood from a pooling in the legs when standing and walking. Preferred exercises are walking, squatting or stationery bicycling.      -Use physical counter maneuvers such as leg crossing, or leg raising and resting the leg on a chair. These maneuvers increase blood pressure and can improve orthostatic intolerance quickly and transiently. - Raise the head of the bed by 6 to 10 inches. The entire bed must be at an angle. Raising only the head portion of the bed at waist level or using pillows will not be effective. Raising the head of the bed will reduce urine formation overnight and there will be more volume in the circulation in the morning.      - Use custom fitted elastic support stockings.  These will reduce a tendency for blood to pool in the legs when standing and may improve orthostatic intolerance

## 2021-07-13 ENCOUNTER — OFFICE VISIT (OUTPATIENT)
Dept: NEUROLOGY | Age: 41
End: 2021-07-13
Payer: COMMERCIAL

## 2021-07-13 VITALS
OXYGEN SATURATION: 95 % | HEART RATE: 71 BPM | SYSTOLIC BLOOD PRESSURE: 117 MMHG | BODY MASS INDEX: 36.79 KG/M2 | WEIGHT: 257 LBS | TEMPERATURE: 98.1 F | HEIGHT: 70 IN | DIASTOLIC BLOOD PRESSURE: 73 MMHG

## 2021-07-13 DIAGNOSIS — M54.2 NECK PAIN: ICD-10-CM

## 2021-07-13 DIAGNOSIS — F44.5 PSYCHOGENIC NONEPILEPTIC SEIZURE: ICD-10-CM

## 2021-07-13 DIAGNOSIS — G43.109 MIGRAINE WITH AURA AND WITHOUT STATUS MIGRAINOSUS, NOT INTRACTABLE: Primary | ICD-10-CM

## 2021-07-13 PROCEDURE — G8427 DOCREV CUR MEDS BY ELIG CLIN: HCPCS | Performed by: NURSE PRACTITIONER

## 2021-07-13 PROCEDURE — G8417 CALC BMI ABV UP PARAM F/U: HCPCS | Performed by: NURSE PRACTITIONER

## 2021-07-13 PROCEDURE — 99214 OFFICE O/P EST MOD 30 MIN: CPT | Performed by: NURSE PRACTITIONER

## 2021-07-13 PROCEDURE — 1036F TOBACCO NON-USER: CPT | Performed by: NURSE PRACTITIONER

## 2021-07-13 RX ORDER — CYCLOBENZAPRINE HCL 5 MG
5 TABLET ORAL DAILY PRN
Qty: 30 TABLET | Refills: 0 | Status: SHIPPED | OUTPATIENT
Start: 2021-07-13 | End: 2021-08-12

## 2021-07-13 NOTE — PATIENT INSTRUCTIONS
take your medications. Tell your doctor if you have ever had:  · liver disease;  · glaucoma;  · enlarged prostate; or  · problems with urination. It is not known whether this medicine will harm an unborn baby. Tell your doctor if you are pregnant or plan to become pregnant. It may not be safe to breast-feed while using this medicine. Ask your doctor about any risk. Older adults may be more sensitive to the effects of this medicine. How should I take cyclobenzaprine? Follow all directions on your prescription label and read all medication guides or instruction sheets. Your doctor may occasionally change your dose. Use the medicine exactly as directed. Cyclobenzaprine is usually taken once daily for only 2 or 3 weeks. Follow your doctor's dosing instructions very carefully. Swallow the capsule whole and do not crush, chew, break, or open it. Take the medicine at the same time each day. Call your doctor if your symptoms do not improve after 3 weeks, or if they get worse. Store at room temperature away from moisture, heat, and light. What happens if I miss a dose? Take the medicine as soon as you can, but skip the missed dose if it is almost time for your next dose. Do not take two doses at one time. What happens if I overdose? Seek emergency medical attention or call the Poison Help line at 1-955.765.8674. An overdose of cyclobenzaprine can be fatal.  Overdose symptoms may include severe drowsiness, vomiting, fast heartbeats, tremors, agitation, or hallucinations. What should I avoid while taking cyclobenzaprine? Avoid driving or hazardous activity until you know how this medicine will affect you. Your reactions could be impaired. Avoid drinking alcohol. Dangerous side effects could occur. What are the possible side effects of cyclobenzaprine? Get emergency medical help if you have signs of an allergic reaction: hives; difficult breathing; swelling of your face, lips, tongue, or throat.   Stop using cyclobenzaprine and call your doctor at once if you have:  · fast or irregular heartbeats;  · chest pain or pressure, pain spreading to your jaw or shoulder; or  · sudden numbness or weakness (especially on one side of the body), slurred speech, balance problems. Seek medical attention right away if you have symptoms of serotonin syndrome, such as: agitation, hallucinations, fever, sweating, shivering, fast heart rate, muscle stiffness, twitching, loss of coordination, nausea, vomiting, or diarrhea. Serious side effects may be more likely in older adults. Common side effects may include:  · drowsiness, tiredness;  · headache, dizziness;  · dry mouth; or  · upset stomach, nausea, constipation. This is not a complete list of side effects and others may occur. Call your doctor for medical advice about side effects. You may report side effects to FDA at 6-594-FDA-6284. What other drugs will affect cyclobenzaprine? Using cyclobenzaprine with other drugs that make you drowsy can worsen this effect. Ask your doctor before using opioid medication, a sleeping pill, a muscle relaxer, or medicine for anxiety or seizures. Tell your doctor about all your other medicines, especially:  · bupropion (Zyban, for smoking cessation);  · meperidine;  · tramadol;  · verapamil;  · cold or allergy medicine that contains an antihistamine (Benadryl and others);  · medicine to treat Parkinson's disease;  · medicine to treat excess stomach acid, stomach ulcer, motion sickness, or irritable bowel syndrome;  · medicine to treat overactive bladder; or  · bronchodilator asthma medication. This list is not complete. Other drugs may affect cyclobenzaprine, including prescription and over-the-counter medicines, vitamins, and herbal products. Not all possible drug interactions are listed here. Where can I get more information? Your pharmacist can provide more information about cyclobenzaprine.   Remember, keep this and all other medicines out

## 2021-07-15 ENCOUNTER — OFFICE VISIT (OUTPATIENT)
Dept: PRIMARY CARE CLINIC | Age: 41
End: 2021-07-15
Payer: COMMERCIAL

## 2021-07-15 VITALS
TEMPERATURE: 98.1 F | SYSTOLIC BLOOD PRESSURE: 116 MMHG | DIASTOLIC BLOOD PRESSURE: 68 MMHG | WEIGHT: 258 LBS | OXYGEN SATURATION: 98 % | HEART RATE: 86 BPM | BODY MASS INDEX: 36.94 KG/M2 | HEIGHT: 70 IN

## 2021-07-15 DIAGNOSIS — I49.5 SINUS NODE DYSFUNCTION (HCC): ICD-10-CM

## 2021-07-15 DIAGNOSIS — E11.8 TYPE 2 DIABETES MELLITUS WITH COMPLICATION, WITHOUT LONG-TERM CURRENT USE OF INSULIN (HCC): ICD-10-CM

## 2021-07-15 DIAGNOSIS — I44.0 FIRST DEGREE ATRIOVENTRICULAR BLOCK: ICD-10-CM

## 2021-07-15 DIAGNOSIS — F44.9 CONVERSION DISORDER: ICD-10-CM

## 2021-07-15 DIAGNOSIS — F41.0 PANIC ATTACKS: ICD-10-CM

## 2021-07-15 DIAGNOSIS — J30.9 ALLERGIC RHINITIS, UNSPECIFIED SEASONALITY, UNSPECIFIED TRIGGER: Primary | ICD-10-CM

## 2021-07-15 PROCEDURE — G8417 CALC BMI ABV UP PARAM F/U: HCPCS | Performed by: FAMILY MEDICINE

## 2021-07-15 PROCEDURE — G8427 DOCREV CUR MEDS BY ELIG CLIN: HCPCS | Performed by: FAMILY MEDICINE

## 2021-07-15 PROCEDURE — 3051F HG A1C>EQUAL 7.0%<8.0%: CPT | Performed by: FAMILY MEDICINE

## 2021-07-15 PROCEDURE — 2022F DILAT RTA XM EVC RTNOPTHY: CPT | Performed by: FAMILY MEDICINE

## 2021-07-15 PROCEDURE — 1036F TOBACCO NON-USER: CPT | Performed by: FAMILY MEDICINE

## 2021-07-15 PROCEDURE — 99213 OFFICE O/P EST LOW 20 MIN: CPT | Performed by: FAMILY MEDICINE

## 2021-07-15 ASSESSMENT — ENCOUNTER SYMPTOMS
SORE THROAT: 0
PHOTOPHOBIA: 0
COUGH: 0
CONSTIPATION: 0
DIARRHEA: 0
NAUSEA: 0
BACK PAIN: 1
ABDOMINAL DISTENTION: 1
SHORTNESS OF BREATH: 0
BLOOD IN STOOL: 0
ABDOMINAL PAIN: 0
VOMITING: 0

## 2021-07-15 NOTE — PROGRESS NOTES
Elaine Leon (:  1980) is a 41 y.o. male,Established patient, here for evaluation of the following chief complaint(s):  3 Month Follow-Up (wants allergy testing) and Other (wart on right finger, states has tried OTC meds, still there)      ASSESSMENT/PLAN:  1. Allergic rhinitis, unspecified seasonality, unspecified trigger  -     Allergen, Respiratory, Region 5 Panel; Future  -     Allergen, Food, Comprehensive Profile 1; Future  -     External Referral To Allergy  2. Type 2 diabetes mellitus with complication, without long-term current use of insulin ([address]  3. Conversion disorder  4. Sinus node dysfunction (HCC)  5. First degree atrioventricular block  6. Panic attacks  7. Seizure ([address]  8. Hyperammonemia ([address]  Patient currently stable in regards to his chronic issues. Will refer to allergist for further evaluation and treatment. Blood work for allergy purposes ordered today. See him back in 3 to 6 months or as needed. No follow-ups on file. SUBJECTIVE/OBJECTIVE:  HPI  Patient presents today for 3-month follow-up on current medication regimen and chronic issues. Patient taking all medication as prescribed and denies any current issues. Patient recently was seen by a new electrophysiologist.  Scheduled for testing in the near future. Also advised regarding increased fluid and salt intake to help with symptomatic relief. Review of Systems   Constitutional: Positive for fatigue. Negative for chills and fever. HENT: Negative for congestion, hearing loss, nosebleeds and sore throat. Eyes: Negative for photophobia. Respiratory: Negative for cough and shortness of breath. Cardiovascular: Negative for chest pain, palpitations and leg swelling. Gastrointestinal: Positive for abdominal distention. Negative for abdominal pain, blood in stool, constipation, diarrhea, nausea and vomiting. Endocrine: Negative for polydipsia.    Genitourinary: Negative for dysuria, frequency, hematuria and urgency. Musculoskeletal: Positive for arthralgias, back pain, gait problem, joint swelling and myalgias. Skin: Negative. Neurological: Positive for dizziness, tremors, seizures, weakness and numbness. Negative for headaches. Hematological: Does not bruise/bleed easily. Psychiatric/Behavioral: Positive for behavioral problems, decreased concentration, dysphoric mood and sleep disturbance. Negative for agitation, hallucinations, self-injury and suicidal ideas. The patient is nervous/anxious. All other systems reviewed and are negative. Physical Exam  Vitals reviewed. Constitutional:       Appearance: He is obese. HENT:      Head: Normocephalic and atraumatic. Eyes:      General: No scleral icterus. Conjunctiva/sclera: Conjunctivae normal.      Pupils: Pupils are equal, round, and reactive to light. Neck:      Thyroid: No thyromegaly. Cardiovascular:      Rate and Rhythm: Normal rate and regular rhythm. Heart sounds: Normal heart sounds. No murmur heard. Pulmonary:      Effort: Pulmonary effort is normal.      Breath sounds: Normal breath sounds. No rales. Abdominal:      General: Bowel sounds are normal. There is no distension. Palpations: Abdomen is soft. Tenderness: There is no abdominal tenderness. Musculoskeletal:      Left shoulder: Tenderness present. Decreased range of motion. Cervical back: Neck supple. Lymphadenopathy:      Cervical: No cervical adenopathy. Skin:     General: Skin is warm and dry. Findings: No erythema or rash. Neurological:      Cranial Nerves: No cranial nerve deficit. Sensory: Sensory deficit present. Motor: Weakness and tremor present.       Gait: Gait abnormal.   Psychiatric:         Attention and Perception: Attention normal.         Mood and Affect: Mood normal.         Speech: Speech normal.         Behavior: Behavior normal.         Judgment: Judgment normal.                 An electronic signature was used to authenticate this note.     --Traceyylle Sealtara Mauro, DO

## 2021-07-21 ENCOUNTER — TELEPHONE (OUTPATIENT)
Dept: CARDIOLOGY | Age: 41
End: 2021-07-21

## 2021-07-21 NOTE — TELEPHONE ENCOUNTER
07/21/21 0835 Left message for Lavelle Torres Reminder Call for Nuclear exercise  Stress test 07/23/21 @ 0800 subsequent echocardiogram 11:00  included Pre procedure stress instructions and COVID check list  He was instructed not to take diabetic medications, check glucose am of the test.   Take remaninig medications as normally scheduled.     Encouraged to return  phone call if any questions

## 2021-07-22 DIAGNOSIS — F90.1 ATTENTION DEFICIT HYPERACTIVITY DISORDER (ADHD), PREDOMINANTLY HYPERACTIVE TYPE: ICD-10-CM

## 2021-07-22 DIAGNOSIS — E11.8 TYPE 2 DIABETES MELLITUS WITH COMPLICATION, WITHOUT LONG-TERM CURRENT USE OF INSULIN (HCC): ICD-10-CM

## 2021-07-22 RX ORDER — DEXTROAMPHETAMINE SACCHARATE, AMPHETAMINE ASPARTATE, DEXTROAMPHETAMINE SULFATE AND AMPHETAMINE SULFATE 2.5; 2.5; 2.5; 2.5 MG/1; MG/1; MG/1; MG/1
10 TABLET ORAL 2 TIMES DAILY
Qty: 60 TABLET | Refills: 0 | Status: SHIPPED
Start: 2021-07-22 | End: 2021-08-24 | Stop reason: SDUPTHER

## 2021-07-23 ENCOUNTER — HOSPITAL ENCOUNTER (OUTPATIENT)
Dept: CARDIOLOGY | Age: 41
Discharge: HOME | End: 2021-07-23
Payer: COMMERCIAL

## 2021-07-23 ENCOUNTER — TELEPHONE (OUTPATIENT)
Dept: CARDIOLOGY CLINIC | Age: 41
End: 2021-07-23

## 2021-07-23 VITALS
SYSTOLIC BLOOD PRESSURE: 118 MMHG | HEIGHT: 70 IN | BODY MASS INDEX: 36.79 KG/M2 | HEART RATE: 69 BPM | DIASTOLIC BLOOD PRESSURE: 70 MMHG | WEIGHT: 257 LBS | RESPIRATION RATE: 16 BRPM

## 2021-07-23 DIAGNOSIS — I47.20 VENTRICULAR TACHYCARDIA: ICD-10-CM

## 2021-07-23 DIAGNOSIS — I49.5 SINUS NODE DYSFUNCTION (HCC): ICD-10-CM

## 2021-07-23 DIAGNOSIS — R07.89 ATYPICAL CHEST PAIN: ICD-10-CM

## 2021-07-23 DIAGNOSIS — Z95.0 S/P CARDIAC PACEMAKER PROCEDURE: ICD-10-CM

## 2021-07-23 LAB
LV EF: 58 %
LV EF: 69 %
LVEF MODALITY: NORMAL
LVEF MODALITY: NORMAL

## 2021-07-23 PROCEDURE — 93306 TTE W/DOPPLER COMPLETE: CPT

## 2021-07-23 PROCEDURE — A9500 TC99M SESTAMIBI: HCPCS | Performed by: INTERNAL MEDICINE

## 2021-07-23 PROCEDURE — 78452 HT MUSCLE IMAGE SPECT MULT: CPT

## 2021-07-23 PROCEDURE — 3430000000 HC RX DIAGNOSTIC RADIOPHARMACEUTICAL: Performed by: INTERNAL MEDICINE

## 2021-07-23 PROCEDURE — 93017 CV STRESS TEST TRACING ONLY: CPT

## 2021-07-23 PROCEDURE — 2580000003 HC RX 258: Performed by: INTERNAL MEDICINE

## 2021-07-23 RX ORDER — SODIUM CHLORIDE 0.9 % (FLUSH) 0.9 %
10 SYRINGE (ML) INJECTION PRN
Status: DISCONTINUED | OUTPATIENT
Start: 2021-07-23 | End: 2021-07-24 | Stop reason: HOSPADM

## 2021-07-23 RX ADMIN — Medication 10 MILLICURIE: at 08:05

## 2021-07-23 RX ADMIN — Medication 33.4 MILLICURIE: at 09:49

## 2021-07-23 RX ADMIN — SODIUM CHLORIDE, PRESERVATIVE FREE 10 ML: 5 INJECTION INTRAVENOUS at 08:06

## 2021-07-23 RX ADMIN — SODIUM CHLORIDE, PRESERVATIVE FREE 10 ML: 5 INJECTION INTRAVENOUS at 09:50

## 2021-07-23 NOTE — TELEPHONE ENCOUNTER
----- Message from Laverne Hein MD sent at 7/23/2021  1:53 PM EDT -----  Stress test is normal follow-up with me as scheduled.

## 2021-07-23 NOTE — PROCEDURES
[address] [facility] [address] [address] [address]  [phone]                Exercise Stress Nuclear Gated SPECT Study    Name: Сергей Liang Account Number: [de-identified]    :  1980      Sex: male              Date of Study:  2021    Height: 5' 10\" (177.8 cm)  Weight: 257 lb (116.6 kg)     Ordering Provider: Arcenio Anderson MD          PCP: Keanu Pearl DO      Cardiologist: Arcenio Anderson MD/RACHEL Trinidad MD                        Interpreting Physician: Omkar Ramon MD  _________________________________________________________________________________    Indication:   Detecting the presence and location of coronary artery disease    Clinical History:   Patient has no known history of coronary artery disease. Resting ECG:    Sinus rhythm, 69 bpm.  Left axis deviation. Poor R wave progression. Nonspecific ST-T waves. Exercise: The patient exercised using a Domingo protocol, completing 7:15 minutes and reaching an estimated work load of 93.9 metabolic equivalents (METS). Resting HR was 69. Peak exercise heart rate was 156 ( 88% of maximum predicted heart rate for age). Baseline /70. Peak exercise /70. The blood pressure response to exercise was normal      Exercise was terminated due to heart rate attained and dyspnea. The patient experienced no chest pain with exercise. Exercise ECG:   The patient demonstrated no arrhythmias during exercise. With exercise, there were no ST segment changes of significance at the heart rate achieved. Knutson treadmill score was 7 implying low risk. IMAGING: Myocardial perfusion imaging was performed at rest 30-35 minutes following the intravenous injection of 10.0 mCi of (Tc-Sestamibi) followed by 10 ml of Normal Saline. At peak exercise, the patient was injected intravenously with 33.4 mCi of (Tc-Sestamibi) followed by 10 ml of Normal Saline.

## 2021-07-26 ENCOUNTER — TELEPHONE (OUTPATIENT)
Dept: NON INVASIVE DIAGNOSTICS | Age: 41
End: 2021-07-26

## 2021-07-26 NOTE — TELEPHONE ENCOUNTER
----- Message from Fayetta Holstein sent at 7/26/2021 10:39 AM EDT -----    ----- Message -----  From: Sina Enamorado MD  Sent: 7/25/2021   7:02 AM EDT  To: Fayetta Holstein    Please let him know that LV EF is normal. Thanks.  ----- Message -----  Mike Bedolla Incoming Cardiology Results From [facility]  Sent: 7/24/2021   5:01 PM EDT  To: Sina Enamorado MD

## 2021-08-02 RX ORDER — ATORVASTATIN CALCIUM 40 MG/1
TABLET, FILM COATED ORAL
Qty: 90 TABLET | Refills: 0 | Status: SHIPPED
Start: 2021-08-02 | End: 2021-11-09 | Stop reason: SDUPTHER

## 2021-08-10 ENCOUNTER — HOSPITAL ENCOUNTER (OUTPATIENT)
Age: 41
Discharge: HOME | End: 2021-08-10
Payer: COMMERCIAL

## 2021-08-10 DIAGNOSIS — J30.9 ALLERGIC RHINITIS, UNSPECIFIED SEASONALITY, UNSPECIFIED TRIGGER: ICD-10-CM

## 2021-08-10 PROCEDURE — 86003 ALLG SPEC IGE CRUDE XTRC EA: CPT

## 2021-08-10 PROCEDURE — 82785 ASSAY OF IGE: CPT

## 2021-08-13 LAB
2000687N OAK TREE IGE: <0.1 KU/L (ref 0–0.34)
ALLERGEN BARLEY IGE: <0.1 KU/L
ALLERGEN BEEF: <0.1 KU/L
ALLERGEN BERMUDA GRASS IGE: <0.1 KU/L (ref 0–0.34)
ALLERGEN BIRCH IGE: <0.1 KU/L (ref 0–0.34)
ALLERGEN CABBAGE IGE: <0.1 KU/L
ALLERGEN CARROT IGE: <0.1 KU/L
ALLERGEN CHICKEN IGE: <0.1 KU/L
ALLERGEN CODFISH IGE: <0.1 KU/L
ALLERGEN CORN IGE: <0.1 KU/L
ALLERGEN COW MILK IGE: <0.1 KU/L
ALLERGEN CRAB IGE: <0.1 KU/L
ALLERGEN DOG DANDER IGE: <0.1 KU/L (ref 0–0.34)
ALLERGEN EGG WHITE IGE: <0.1 KU/L
ALLERGEN GERMAN COCKROACH IGE: <0.1 KU/L (ref 0–0.34)
ALLERGEN GRAPE IGE: <0.1 KU/L
ALLERGEN HORMODENDRUM IGE: <0.1 KUL/L (ref 0–0.34)
ALLERGEN LETTUCE IGE: <0.1 KU/L
ALLERGEN MOUSE EPITHELIA IGE: <0.1 KU/L (ref 0–0.34)
ALLERGEN NAVY BEAN: <0.1 KU/L
ALLERGEN OAT: <0.1 KU/L
ALLERGEN ORANGE IGE: <0.1 KU/L
ALLERGEN PEANUT (F13) IGE: <0.1 KU/L
ALLERGEN PECAN TREE IGE: <0.1 KU/L (ref 0–0.34)
ALLERGEN PEPPER C. ANNUUM IGE: <0.1 KU/L
ALLERGEN PIGWEED ROUGH IGE: <0.1 KU/L (ref 0–0.34)
ALLERGEN PORK: <0.1 KU/L
ALLERGEN RICE IGE: <0.1 KU/L
ALLERGEN RYE IGE: <0.1 KU/L
ALLERGEN SHEEP SORREL (W18) IGE: <0.1 KU/L (ref 0–0.34)
ALLERGEN SOYBEAN IGE: <0.1 KU/L
ALLERGEN TOMATO IGE: <0.1 KU/L
ALLERGEN TREE SYCAMORE: <0.1 KU/L (ref 0–0.34)
ALLERGEN TUNA IGE: <0.1 KU/L
ALLERGEN WALNUT TREE IGE: <0.1 KU/L (ref 0–0.34)
ALLERGEN WHEAT IGE: <0.1 KU/L
ALLERGEN WHITE MULBERRY TREE, IGE: <0.1 KU/L (ref 0–0.34)
ALLERGEN, TREE, WHITE ASH IGE: <0.1 KU/L (ref 0–0.34)
ALTERNARIA ALTERNATA: <0.1 KU/L (ref 0–0.34)
ASPERGILLUS FUMIGATUS: <0.1 KU/L (ref 0–0.34)
CAT DANDER ANTIBODY: <0.1 KU/L (ref 0–0.34)
COTTONWOOD TREE: <0.1 KU/L (ref 0–0.34)
D. FARINAE: <0.1 KU/L (ref 0–0.34)
D. PTERONYSSINUS: <0.1 KU/L (ref 0–0.34)
ELM TREE: <0.1 KU/L (ref 0–0.34)
IGE: <1 IU/ML
IGE: <1 IU/ML
MAPLE/BOXELDER TREE: <0.1 KU/L (ref 0–0.34)
MOUNTAIN CEDAR TREE: <0.1 KU/L (ref 0–0.34)
MUCOR RACEMOSUS: <0.1 KU/L (ref 0–0.34)
P. NOTATUM: <0.1 KU/L (ref 0–0.34)
POTATO, IGE: <0.1 KU/L
RUSSIAN THISTLE: <0.1 KU/L (ref 0–0.34)
SHORT RAGWD(A ARTEMIS.) IGE: <0.1 KU/L (ref 0–0.34)
SHRIMP: <0.1 KU/L
TIMOTHY GRASS: <0.1 KU/L (ref 0–0.34)

## 2021-08-24 DIAGNOSIS — F90.1 ATTENTION DEFICIT HYPERACTIVITY DISORDER (ADHD), PREDOMINANTLY HYPERACTIVE TYPE: ICD-10-CM

## 2021-08-25 ENCOUNTER — HOSPITAL ENCOUNTER (OUTPATIENT)
Dept: MRI IMAGING | Age: 41
Discharge: HOME | End: 2021-08-27
Payer: COMMERCIAL

## 2021-08-25 ENCOUNTER — TELEPHONE (OUTPATIENT)
Dept: NEUROLOGY | Age: 41
End: 2021-08-25

## 2021-08-25 DIAGNOSIS — M54.12 CERVICAL RADICULOPATHY: ICD-10-CM

## 2021-08-25 DIAGNOSIS — M48.10 DISH (DISSEMINATED IDIOPATHIC SKELETAL HYPEROSTOSIS): ICD-10-CM

## 2021-08-25 DIAGNOSIS — M54.2 NECK PAIN: ICD-10-CM

## 2021-08-25 DIAGNOSIS — M47.892 OTHER SPONDYLOSIS, CERVICAL REGION: ICD-10-CM

## 2021-08-25 DIAGNOSIS — M50.30 DEGENERATION OF CERVICAL INTERVERTEBRAL DISC: ICD-10-CM

## 2021-08-25 PROCEDURE — 72141 MRI NECK SPINE W/O DYE: CPT

## 2021-08-25 RX ORDER — DEXTROAMPHETAMINE SACCHARATE, AMPHETAMINE ASPARTATE, DEXTROAMPHETAMINE SULFATE AND AMPHETAMINE SULFATE 2.5; 2.5; 2.5; 2.5 MG/1; MG/1; MG/1; MG/1
10 TABLET ORAL 2 TIMES DAILY
Qty: 60 TABLET | Refills: 0 | Status: SHIPPED
Start: 2021-08-25 | End: 2021-09-29 | Stop reason: SDUPTHER

## 2021-08-25 RX ORDER — CYCLOBENZAPRINE HCL 5 MG
5 TABLET ORAL DAILY
Qty: 30 TABLET | Refills: 0 | Status: SHIPPED | OUTPATIENT
Start: 2021-08-25 | End: 2021-09-24

## 2021-08-25 NOTE — TELEPHONE ENCOUNTER
[facility] Approval of Nurtec - [id number] Valid 7/26/2021 - 8/25/2022  Electronically signed by Renzo Lopez on 8/25/21 at 12:48 PM EDT

## 2021-08-26 ENCOUNTER — TELEPHONE (OUTPATIENT)
Dept: NEUROLOGY | Age: 41
End: 2021-08-26

## 2021-08-26 NOTE — TELEPHONE ENCOUNTER
Left message to call the office to schedule a follow up with Angi Duarte for SUSI ANGELO [facility] in January    Electronically signed by Benjamin Marte MA on 8/26/2021 at 9:38 AM

## 2021-09-13 ENCOUNTER — HOSPITAL ENCOUNTER (OUTPATIENT)
Age: 41
Discharge: HOME | End: 2021-09-13
Payer: COMMERCIAL

## 2021-09-13 LAB — AMMONIA: 30 UMOL/L (ref 16–60)

## 2021-09-13 PROCEDURE — 82140 ASSAY OF AMMONIA: CPT

## 2021-09-13 PROCEDURE — 36415 COLL VENOUS BLD VENIPUNCTURE: CPT

## 2021-09-15 ENCOUNTER — TELEPHONE (OUTPATIENT)
Dept: NEUROLOGY | Age: 41
End: 2021-09-15

## 2021-09-15 NOTE — TELEPHONE ENCOUNTER
[facility] Approval of Nurtec 75 mg Valid until 8/25/2022  Electronically signed by Darwin Mercer on 9/15/21 at 10:01 AM EDT

## 2021-09-17 ENCOUNTER — PROCEDURE VISIT (OUTPATIENT)
Dept: PODIATRY | Age: 41
End: 2021-09-17
Payer: COMMERCIAL

## 2021-09-17 VITALS — WEIGHT: 257 LBS | BODY MASS INDEX: 36.79 KG/M2 | HEIGHT: 70 IN

## 2021-09-17 DIAGNOSIS — L85.3 XEROSIS CUTIS: ICD-10-CM

## 2021-09-17 DIAGNOSIS — E11.9 TYPE 2 DIABETES MELLITUS WITHOUT COMPLICATION, UNSPECIFIED WHETHER LONG TERM INSULIN USE (HCC): Primary | ICD-10-CM

## 2021-09-17 PROCEDURE — 1036F TOBACCO NON-USER: CPT | Performed by: PODIATRIST

## 2021-09-17 PROCEDURE — 2022F DILAT RTA XM EVC RTNOPTHY: CPT | Performed by: PODIATRIST

## 2021-09-17 PROCEDURE — 99213 OFFICE O/P EST LOW 20 MIN: CPT | Performed by: PODIATRIST

## 2021-09-17 PROCEDURE — 3051F HG A1C>EQUAL 7.0%<8.0%: CPT | Performed by: PODIATRIST

## 2021-09-17 PROCEDURE — G8417 CALC BMI ABV UP PARAM F/U: HCPCS | Performed by: PODIATRIST

## 2021-09-17 PROCEDURE — G8427 DOCREV CUR MEDS BY ELIG CLIN: HCPCS | Performed by: PODIATRIST

## 2021-09-17 NOTE — PROGRESS NOTES
Linda Daugherty is here today for a diabetic foot exam. his PCP is Fadumo Singh DO last OV was 07/15/21. c/o right ankle pain. States he tripped over something. Sx x several months. Linda Daugherty : 1980 Sex: male  Age: 41 y.o. Patient was referred by Fadumo Singh DO    CC:    Follow-up diabetic foot ankle exam      HPI:   Curt Hernandez presents today follow-up diabetic foot and ankle exam.  Denies any calf pain. Has been using ammonium lactate 12% lotion has noted significant improvement since last visit. Denies any calf pain denies any foot or ankle pain. No additional pedal complaints at this time. ROS:  Const: Denies constitutional symptoms  Musculo: Denies symptoms other than stated above  Skin: Denies symptoms other than stated above       Current Outpatient Medications:     Rimegepant Sulfate 75 MG TBDP, Take 75 mg by mouth daily as needed (migraine) No more than 1 tab in 24 hours, Disp: 8 tablet, Rfl: 3    amphetamine-dextroamphetamine (ADDERALL, 10MG,) 10 MG tablet, Take 1 tablet by mouth 2 times daily for 30 days. , Disp: 60 tablet, Rfl: 0    cyclobenzaprine (FLEXERIL) 5 MG tablet, Take 1 tablet by mouth daily, Disp: 30 tablet, Rfl: 0    atorvastatin (LIPITOR) 40 MG tablet, TAKE ONE TABLET BY MOUTH DAILY, Disp: 90 tablet, Rfl: 0    metFORMIN (GLUCOPHAGE) 1000 MG tablet, Take 1 tablet by mouth 2 times daily (with meals), Disp: 180 tablet, Rfl: 1    metoprolol succinate (TOPROL XL) 100 MG extended release tablet, Take 1 tablet by mouth 2 times daily, Disp: 180 tablet, Rfl: 3    zonisamide (ZONEGRAN) 50 MG capsule, TAKE 2 capsules by mouth nightly, Disp: 180 capsule, Rfl: 0    ammonium lactate (LAC-HYDRIN) 12 % lotion, Apply topically twice daily, Disp: 400 g, Rfl: 2    blood glucose monitor kit and supplies, Use to test blood sugar as directed by your physician, Disp: 1 kit, Rfl: 0    blood glucose monitor strips, Test 3 times a day, Disp: 100 strip, Rfl: 5    clonazePAM (KLONOPIN) 1 MG tablet, Take 1 mg by mouth 2 times daily as needed. , Disp: , Rfl:     ENULOSE 10 GM/15ML SOLN solution, as needed , Disp: , Rfl:     mirtazapine (REMERON) 30 MG tablet, TAKE ONE TABLET BY MOUTH once a day AT BEDTIME, Disp: , Rfl:     desonide (DESOWEN) 0.05 % cream, Apply topically 2 times daily. , Disp: 60 g, Rfl: 0    metroNIDAZOLE (METROGEL) 0.75 % gel, Apply topically 2 times daily. , Disp: 45 g, Rfl: 0    lactulose (CHRONULAC) 10 GM/15ML solution, TAKE 15 ML BY MOUTH 3 TIMES DAILY (Patient taking differently: TAKE 15 ML BY MOUTH 3 TIMES DAILY PRN), Disp: 946 mL, Rfl: 0    medical marijuana, Take by mouth as needed. , Disp: , Rfl:     FLUoxetine (PROZAC) 20 MG capsule, Take 2 capsules by mouth daily, Disp: 60 capsule, Rfl: 0    rifaximin (XIFAXAN) 550 MG tablet, Take 550 mg by mouth 2 times daily, Disp: , Rfl:   Allergies   Allergen Reactions    Fish-Derived Products        Past Medical History:   Diagnosis Date    Anxiety     Arthritis     Class 1 obesity due to excess calories with serious comorbidity and body mass index (BMI) of 34.0 to 34.9 in adult     First degree AV block     GERD (gastroesophageal reflux disease)     Hyperlipidemia     IBS (irritable bowel syndrome)     LVH (left ventricular hypertrophy)     Obesity     ENOC (obstructive sleep apnea)     Psychiatric problem     Psychogenic nonepileptic seizure     Stroke-like symptoms 08/14/2018    Type 2 diabetes mellitus with complication, without long-term current use of insulin [facility]            Vitals:    09/17/21 1301   Weight: 257 lb (116.6 kg)   Height: 5' 10\" (1.778 m)       Work History/Social History:    Foot and ankle history:     Focused Lower Extremity Physical Exam:    Neurovascular examination:    Dorsalis Pedis palpable bilateral.  Posterior tibialis palpable bilateral.    Capillary Refill Time:  Immediate return  Hair growth:  Symmetrical and bilateral   Skin:  Not atrophic  Edema: No edema bilateral feet or ankles. Neurologic:  Light touch intact bilateral.      Musculoskeletal/ Orthopedic examination:    Equinis: Absent bilateral  Dorsiflexion, plantarflexion, inversion, eversion bilateral 5 out of 5 muscle strength    Negative Homans. No pain to palpation bilateral foot or ankle today. Wiggling toes    Dermatology examination:    No significant dryness noted plantar feet bilateral.  No open skin lesions or abrasions. Assessment and Plan:  Iván Yadav was seen today for follow-up and diabetes. Diagnoses and all orders for this visit:    Type 2 diabetes mellitus without complication, unspecified whether long term insulin use (HCC)    Xerosis cutis        Follow-up diabetic and foot and ankle exam.  Continue ammonium lactate 12% twice daily both feet. Significant improvement since last visit. Does have upcoming follow-up appoint with his primary care physician appreciate input going forward. I will follow-up 1 year for diabetic exam.  Did tell Iván Yadav today if any new skin lesions or abrasions or any new foot or ankle pain come in sooner I be happy to see him anytime. Return in about 1 year (around 9/17/2022). Seen By:  Charles Alcaraz DPM      Document was created using voice recognition software. Note was reviewed, however may contain grammatical errors.

## 2021-09-28 DIAGNOSIS — F90.1 ATTENTION DEFICIT HYPERACTIVITY DISORDER (ADHD), PREDOMINANTLY HYPERACTIVE TYPE: ICD-10-CM

## 2021-09-28 NOTE — TELEPHONE ENCOUNTER
Name of Medication(s) Requested:  ADDERALL 10MG BID    Pharmacy Requested:   [facility]    Medication(s) pended? [x] Yes  [] No    Last Appointment:  7/15/2021    Future appts:  Future Appointments   Date Time Provider Gale Yana   1/12/2022  9:40 AM NADIA Carson CNP [facility]   1/13/2022  9:00 AM DO ARIANNA Mcdonald [facility]   9/16/2022  1:00 PM BRINA Arevalo POD [facility]          Does patient need call back?   [] Yes  [x] No

## 2021-09-29 RX ORDER — DEXTROAMPHETAMINE SACCHARATE, AMPHETAMINE ASPARTATE, DEXTROAMPHETAMINE SULFATE AND AMPHETAMINE SULFATE 2.5; 2.5; 2.5; 2.5 MG/1; MG/1; MG/1; MG/1
10 TABLET ORAL 2 TIMES DAILY
Qty: 60 TABLET | Refills: 0 | Status: SHIPPED
Start: 2021-09-29 | End: 2021-11-02 | Stop reason: SDUPTHER

## 2021-10-15 DIAGNOSIS — G43.109 MIGRAINE WITH AURA AND WITHOUT STATUS MIGRAINOSUS, NOT INTRACTABLE: ICD-10-CM

## 2021-10-15 RX ORDER — ZONISAMIDE 50 MG/1
100 CAPSULE ORAL NIGHTLY
Qty: 180 CAPSULE | Refills: 0 | Status: SHIPPED
Start: 2021-10-15 | End: 2022-01-15 | Stop reason: SDUPTHER

## 2021-11-02 DIAGNOSIS — F90.1 ATTENTION DEFICIT HYPERACTIVITY DISORDER (ADHD), PREDOMINANTLY HYPERACTIVE TYPE: ICD-10-CM

## 2021-11-02 RX ORDER — DEXTROAMPHETAMINE SACCHARATE, AMPHETAMINE ASPARTATE, DEXTROAMPHETAMINE SULFATE AND AMPHETAMINE SULFATE 2.5; 2.5; 2.5; 2.5 MG/1; MG/1; MG/1; MG/1
10 TABLET ORAL 2 TIMES DAILY
Qty: 60 TABLET | Refills: 0 | Status: SHIPPED
Start: 2021-11-02 | End: 2021-12-03 | Stop reason: SDUPTHER

## 2021-11-09 RX ORDER — ATORVASTATIN CALCIUM 40 MG/1
40 TABLET, FILM COATED ORAL DAILY
Qty: 90 TABLET | Refills: 3 | Status: SHIPPED | OUTPATIENT
Start: 2021-11-09

## 2021-11-27 ENCOUNTER — OFFICE VISIT (OUTPATIENT)
Dept: FAMILY MEDICINE CLINIC | Age: 41
End: 2021-11-27
Payer: COMMERCIAL

## 2021-11-27 VITALS
SYSTOLIC BLOOD PRESSURE: 102 MMHG | HEIGHT: 70 IN | BODY MASS INDEX: 37.51 KG/M2 | RESPIRATION RATE: 18 BRPM | HEART RATE: 92 BPM | OXYGEN SATURATION: 96 % | WEIGHT: 262 LBS | DIASTOLIC BLOOD PRESSURE: 64 MMHG | TEMPERATURE: 96.7 F

## 2021-11-27 DIAGNOSIS — S83.91XA SPRAIN OF RIGHT KNEE, UNSPECIFIED LIGAMENT, INITIAL ENCOUNTER: Primary | ICD-10-CM

## 2021-11-27 PROCEDURE — G8417 CALC BMI ABV UP PARAM F/U: HCPCS | Performed by: FAMILY MEDICINE

## 2021-11-27 PROCEDURE — 99213 OFFICE O/P EST LOW 20 MIN: CPT | Performed by: FAMILY MEDICINE

## 2021-11-27 PROCEDURE — G8484 FLU IMMUNIZE NO ADMIN: HCPCS | Performed by: FAMILY MEDICINE

## 2021-11-27 PROCEDURE — 1036F TOBACCO NON-USER: CPT | Performed by: FAMILY MEDICINE

## 2021-11-27 PROCEDURE — G8427 DOCREV CUR MEDS BY ELIG CLIN: HCPCS | Performed by: FAMILY MEDICINE

## 2021-11-27 ASSESSMENT — ENCOUNTER SYMPTOMS
CONSTIPATION: 0
EYE PAIN: 0
BACK PAIN: 0
VOMITING: 0
COUGH: 0
ABDOMINAL PAIN: 0
DIARRHEA: 0
NAUSEA: 0
WHEEZING: 0
SINUS PAIN: 0
SHORTNESS OF BREATH: 0
SORE THROAT: 0

## 2021-11-27 NOTE — PROGRESS NOTES
Jocelyn Davis (:  1980) is a 35 y.o. male,Established patient, here for evaluation of the following chief complaint(s):  Knee Injury (states that it is clicking or rolling everytime he moves. not in a lot of pain)         ASSESSMENT/PLAN:  1. Sprain of right knee, unspecified ligament, initial encounter  Comments:  no pain  do  not recommend xrays  recommend losing weight and non weight bearing exercise for the knee  f/u with pcp      Return if symptoms worsen or fail to improve. Subjective   SUBJECTIVE/OBJECTIVE:  Here b/c of a weird feeling he gets in knee from time to time  Right knee  Had previous laproscopic surgeryin the knee  Feels lik eit rolls from time to time    No pain   No injury      Review of Systems   Constitutional: Negative for appetite change, fatigue and fever. HENT: Negative for congestion, ear pain, hearing loss, sinus pain and sore throat. Eyes: Negative for pain. Respiratory: Negative for cough, shortness of breath and wheezing. Cardiovascular: Negative for chest pain and leg swelling. Gastrointestinal: Negative for abdominal pain, constipation, diarrhea, nausea and vomiting. Endocrine: Negative for cold intolerance and heat intolerance. Genitourinary: Negative for difficulty urinating, hematuria, scrotal swelling, testicular pain and urgency. Musculoskeletal: Negative for arthralgias, back pain, joint swelling and myalgias. Skin: Negative for rash and wound. Neurological: Negative for dizziness, syncope and light-headedness. Hematological: Negative for adenopathy. Psychiatric/Behavioral: Negative for confusion and sleep disturbance. The patient is not nervous/anxious. Objective   Physical Exam  Vitals and nursing note reviewed. Constitutional:       General: He is not in acute distress. Appearance: He is well-developed. He is not ill-appearing. HENT:      Head: Normocephalic and atraumatic.    Eyes:      Pupils: Pupils are

## 2021-12-01 ENCOUNTER — OFFICE VISIT (OUTPATIENT)
Dept: CARDIOLOGY CLINIC | Age: 41
End: 2021-12-01
Payer: COMMERCIAL

## 2021-12-01 VITALS
WEIGHT: 261.2 LBS | DIASTOLIC BLOOD PRESSURE: 60 MMHG | SYSTOLIC BLOOD PRESSURE: 108 MMHG | BODY MASS INDEX: 37.39 KG/M2 | RESPIRATION RATE: 18 BRPM | HEIGHT: 70 IN | HEART RATE: 100 BPM

## 2021-12-01 DIAGNOSIS — I47.20 VENTRICULAR TACHYCARDIA: Primary | ICD-10-CM

## 2021-12-01 PROCEDURE — G8417 CALC BMI ABV UP PARAM F/U: HCPCS | Performed by: INTERNAL MEDICINE

## 2021-12-01 PROCEDURE — 99214 OFFICE O/P EST MOD 30 MIN: CPT | Performed by: INTERNAL MEDICINE

## 2021-12-01 PROCEDURE — 1036F TOBACCO NON-USER: CPT | Performed by: INTERNAL MEDICINE

## 2021-12-01 PROCEDURE — G8427 DOCREV CUR MEDS BY ELIG CLIN: HCPCS | Performed by: INTERNAL MEDICINE

## 2021-12-01 PROCEDURE — G8484 FLU IMMUNIZE NO ADMIN: HCPCS | Performed by: INTERNAL MEDICINE

## 2021-12-01 PROCEDURE — 93000 ELECTROCARDIOGRAM COMPLETE: CPT | Performed by: INTERNAL MEDICINE

## 2021-12-01 NOTE — PATIENT INSTRUCTIONS
· Exercise nuclear stress test results were reviewed and showed no evidence of ischemia Knutson treadmill score was 7.  · Echo results were reviewed and showed normal LV function. · Lipid results discussed with the patient and they are well controlled. · Continue current medications atorvastatin 40 mg daily, Toprol- mg twice daily and rest of the current medications. · He was advised to cut down on calories and lose weight. · Follow up in 12 months.

## 2021-12-01 NOTE — PROGRESS NOTES
OUTPATIENT CARDIOLOGY FOLLOW-UP    Name: Sergio Hernandez    Age: 35 y.o. Primary Care Physician: Armen Jo DO    Date of Service: 12/1/2021    Chief Complaint:   Chief Complaint   Patient presents with    Tachycardia     f/u- pt has complaints of dizziness       Interim History:   Mr. Jil Noel is a 49-year-old gentleman with history of vasodepressive syncope, ventricular tachycardia, moderate obesity and was following with Dr. Maria Luisa Munguia for his cardiology needs and he changed service with me. In addition, he also has history of hyperlipidemia, irritable bowel syndrome, obstructive sleep apnea seizure disorder and anxiety. He is here for follow-up visit. He told me that he had 2 sleep studies and was told that he does not have any sleep apnea. He denies any further episodes chest pain. He is complaining of fatigue and getting tired faster on walking with occasional exertional dyspnea. Denies any exertional chest pain, palpitations, syncope or presyncope. He had a stress test in November 2017 and that was normal. He denies orthopnea or paroxysmal nocturnal dyspnea. He denies any focal weakness or numbness. He is compliant with medications, as well as salt and fluid intake. He does not take any over-the-counter arthritis medications. He was seen in the office on 6/29/2021, since his last visit, he has not had any further hospitalizations or ER visits. He was evaluated in the emergency room on 6/2/2021 for palpitations and acute headache. He had a CT head which was negative for any acute findings. EKG showed normal sinus rhythm with incomplete right bundle branch block left anterior fascicular block without any abnormal rhythm. Patient was discharged home. At his last pacemaker evaluation in April he was told that he had a run of ventricular tachycardia which was not symptomatic. He was following with Dr. Beatris Stokes and now follows with [facility] EP service.   He was experiencing intermittent episodes of left-sided chest pain and felt like sharp stabbing pains, pain level 3-4/10 and did not notice any radiation of pain. He did not notice any aggravating relieving factors. He underwent exercise nuclear stress test which showed no evidence of ischemia and his Duke treadmill score was 7 without chest pain or EKG changes. No new cardiac complaints since last cardiology evaluation. He gets winded if he walks more than a mile but he is able to walk a block and also walk 1 flight of stairs without any dyspnea or chest pain. He denies any exertional chest pain. He denies recent palpitations, lightheadedness, dizziness, syncope, PND, or orthopnea. SR on EKG. Review of Systems:   Cardiac: As per HPI  General: No fever, chills  Pulmonary: As per HPI  HEENT: No visual disturbances, difficult swallowing  GI: No nausea, vomiting  Endocrine: No thyroid disease or DM  Musculoskeletal: MAGALLON x 4, no focal motor deficits  Skin: Intact, no rashes  Neuro/Psych: No headache or seizures    Past Medical History:  Past Medical History:   Diagnosis Date    Anxiety     Arthritis     Class 1 obesity due to excess calories with serious comorbidity and body mass index (BMI) of 34.0 to 34.9 in adult     First degree AV block     GERD (gastroesophageal reflux disease)     Hyperlipidemia     IBS (irritable bowel syndrome)     LVH (left ventricular hypertrophy)     Obesity     ENOC (obstructive sleep apnea)     Psychiatric problem     Psychogenic nonepileptic seizure     Stroke-like symptoms 08/14/2018    Type 2 diabetes mellitus with complication, without long-term current use of insulin ([facility] [address]        Past Surgical History:  Past Surgical History:   Procedure Laterality Date    HERNIA REPAIR      INSERTABLE CARDIAC MONITOR  07/14/2017    KNEE SURGERY      PACEMAKER INSERTION  12/28/2017    D-PPM   ([employer])   RAHEJA    TONSILLECTOMY         Family History:  History reviewed. No pertinent family history.     Social History:  Social History     Socioeconomic History    Marital status: Single     Spouse name: Not on file    Number of children: Not on file    Years of education: Not on file    Highest education level: Not on file   Occupational History    Not on file   Tobacco Use    Smoking status: Never Smoker    Smokeless tobacco: Never Used   Vaping Use    Vaping Use: Never used   Substance and Sexual Activity    Alcohol use: Yes     Comment: Socially- couple times weekly    Drug use: Yes     Types: Marijuana [place])     Comment: Medical- rarely    Sexual activity: Not on file   Other Topics Concern    Not on file   Social History Narrative    Not on file     Social Determinants of Health     Financial Resource Strain:     Difficulty of Paying Living Expenses: Not on file   Food Insecurity:     Worried About [address] in the Last Year: Not on file    [address] [employer] [place] in the Last Year: Not on file   Transportation Needs:     Lack of Transportation (Medical): Not on file    Lack of Transportation (Non-Medical):  Not on file   Physical Activity:     Days of Exercise per Week: Not on file    Minutes of Exercise per Session: Not on file   Stress:     Feeling of Stress : Not on file   Social Connections:     Frequency of Communication with Friends and Family: Not on file    Frequency of Social Gatherings with Friends and Family: Not on file    Attends Buddhist Services: Not on file    Active Member of [address] [place] [address] or Organizations: Not on file    Attends Club or Organization Meetings: Not on file    Marital Status: Not on file   Intimate Partner Violence:     Fear of Current or Ex-Partner: Not on file    Emotionally Abused: Not on file    Physically Abused: Not on file    Sexually Abused: Not on file   Housing Stability:     Unable to Pay for Housing in the Last Year: Not on file    Number of [place] in the Last Year: Not on file    Unstable Housing in the Last Year: Not on file Allergies: Allergies   Allergen Reactions    Fish-Derived Products        Current Medications:  Current Outpatient Medications   Medication Sig Dispense Refill    atorvastatin (LIPITOR) 40 MG tablet Take 1 tablet by mouth daily 90 tablet 3    amphetamine-dextroamphetamine (ADDERALL, 10MG,) 10 MG tablet Take 1 tablet by mouth 2 times daily for 30 days. 60 tablet 0    zonisamide (ZONEGRAN) 50 MG capsule Take 2 capsules by mouth nightly 180 capsule 0    Rimegepant Sulfate 75 MG TBDP Take 75 mg by mouth daily as needed (migraine) No more than 1 tab in 24 hours 8 tablet 3    metFORMIN (GLUCOPHAGE) 1000 MG tablet Take 1 tablet by mouth 2 times daily (with meals) 180 tablet 1    metoprolol succinate (TOPROL XL) 100 MG extended release tablet Take 1 tablet by mouth 2 times daily 180 tablet 3    ammonium lactate (LAC-HYDRIN) 12 % lotion Apply topically twice daily 400 g 2    clonazePAM (KLONOPIN) 1 MG tablet Take 1 mg by mouth 2 times daily as needed.  ENULOSE 10 GM/15ML SOLN solution as needed       mirtazapine (REMERON) 30 MG tablet TAKE ONE TABLET BY MOUTH once a day AT BEDTIME      metroNIDAZOLE (METROGEL) 0.75 % gel Apply topically 2 times daily. 45 g 0    medical marijuana Take by mouth as needed.  FLUoxetine (PROZAC) 20 MG capsule Take 2 capsules by mouth daily 60 capsule 0    rifaximin (XIFAXAN) 550 MG tablet Take 550 mg by mouth 2 times daily      desonide (DESOWEN) 0.05 % cream Apply topically 2 times daily. 60 g 0    lactulose (CHRONULAC) 10 GM/15ML solution TAKE 15 ML BY MOUTH 3 TIMES DAILY (Patient not taking: Reported on 12/1/2021) 946 mL 0     No current facility-administered medications for this visit.        Physical Exam:  /60   Pulse 100   Resp 18   Ht 5' 10\" (1.778 m)   Wt 261 lb 3.2 oz (118.5 kg)   BMI 37.48 kg/m²   Wt Readings from Last 3 Encounters:   12/01/21 261 lb 3.2 oz (118.5 kg)   11/27/21 262 lb (118.8 kg)   09/17/21 257 lb (116.6 kg)     Appearance: Awake, alert and oriented x 3, no acute respiratory distress, he is obese. Skin: Intact, no rash. Head: Normocephalic, atraumatic  Eyes: EOMI, no conjunctival erythema  ENMT: No pharyngeal erythema, MMM, no rhinorrhea  Neck: Supple, no elevated JVP, no carotid bruits  Lungs: Clear to auscultation bilaterally. No wheezes, rales, or rhonchi.   Cardiac: Regular rate and rhythm, +S1S2, no murmurs apparent  Abdomen: Soft, nontender, +bowel sounds  Extremities: Moves all extremities x 4, no lower extremity edema  Neurologic: No focal motor deficits apparent, normal mood and affect, alert and oriented x 3  Peripheral Pulses: Intact posterior tibial pulses bilaterally    Laboratory Tests:  Lab Results   Component Value Date    CREATININE 0.9 06/03/2021    BUN 7 06/03/2021     06/03/2021    K 4.4 06/03/2021     06/03/2021    CO2 26 06/03/2021     Lab Results   Component Value Date    MG 2.0 06/03/2021     Lab Results   Component Value Date    WBC 11.7 (H) 06/03/2021    HGB 14.3 06/03/2021    HCT 44.0 06/03/2021    MCV 91.5 06/03/2021     06/03/2021     Lab Results   Component Value Date    ALT 32 06/03/2021    AST 20 06/03/2021    ALKPHOS 99 06/03/2021    BILITOT 0.6 06/03/2021     Lab Results   Component Value Date    CKTOTAL 188 06/27/2017    CKMB 3.4 04/19/2016    TROPONINI <0.01 10/01/2020    TROPONINI <0.01 07/15/2020    TROPONINI <0.01 02/09/2020     Lab Results   Component Value Date    INR 1.2 07/15/2020    INR 1.1 09/11/2018    INR 1.2 12/28/2017    PROTIME 13.3 (H) 07/15/2020    PROTIME 12.5 (H) 09/11/2018    PROTIME 13.6 (H) 12/28/2017     Lab Results   Component Value Date    TSH 3.030 01/04/2021     Lab Results   Component Value Date    LABA1C 7.0 (H) 01/04/2021     No results found for: EAG  Lab Results   Component Value Date    CHOL 132 01/04/2021    CHOL 203 (H) 05/28/2019    CHOL 204 (H) 08/15/2018     Lab Results   Component Value Date    TRIG 104 01/04/2021    TRIG 64 04/14/2020    TRIG 89 05/28/2019     Lab Results   Component Value Date    HDL 47 01/04/2021    HDL 48 04/14/2020    HDL 43 05/28/2019     Lab Results   Component Value Date    LDLCALC 64 01/04/2021    LDLCALC 142 (H) 05/28/2019    LDLCALC 142 (H) 08/15/2018    LDLCHOLESTEROL 170 04/14/2020     Lab Results   Component Value Date    LABVLDL 21 01/04/2021    LABVLDL 18 05/28/2019    LABVLDL 21 08/15/2018    VLDL 182 04/14/2020     Lab Results   Component Value Date    CHOLHDLRATIO 3.5 04/14/2020       Cardiac Tests:  ECG: Normal sinus rhythm, left anterior fascicular block, incomplete right bundle branch block, biatrial atrial enlargement, nonspecific T wave changes, abnormal EKG. Echocardiogram4/19/2016:    Left ventricular internal dimensions were normal in diastole and systole.   Overall ejection fraction is low normal .       TTE7/24/2019: EF 55 to 84%, normal diastolic function, RVSP 25 mmHg, normal RV size and function. Pacer wire was visualized in the RV, no pericardial effusion. TTE 7/23/2020:  Normal left ventricle size and systolic function. Ejection fraction is visually estimated at 55-60%. No regional wall motion abnormalities seen. Normal left ventricle wall thickness. Indeterminate diastolic function. Normal right ventricular size and function. TAPSE 19 mm. Pacer wire visualized in right ventricle. No hemodynamically significant aortic stenosis is present. Unable to estimate PA systolic pressure. No evidence for hemodynamically significant pericardial effusion. TTE7/23/2021:   Normal left ventricle size and systolic function. Ejection fraction is visually estimated at 55-60%. No regional wall motion abnormalities seen. Normal left ventricle wall thickness. Indeterminate diastolic function. Normal right ventricular size and function. TAPSE 19 mm. Pacer wire visualized in right ventricle. No hemodynamically significant aortic stenosis is present.    Unable to estimate PA systolic pressure. No evidence for hemodynamically significant pericardial effusion. Stress test12/2/2017:    Impression:    1. Exercise EKG was normal.    2. The patient experienced no chest pain with exercise. 3. Knutson treadmill score was 7 implying low risk of acute ischemic   events.    4. Exercise capacity was average. Exercise nuclear stress test 7/23/2021  Impression:    1. Exercise EKG was  negative. 2. The patient experienced no chest pain with exercise. 3. The myocardial perfusion imaging was normal with attenuation artifact.       4. Overall left ventricular systolic function was normal without regional wall motion abnormalities. 5. Knutson treadmill score was 7 implying low risk. 6. Exercise capacity was average. 7. Low risk general exercise treadmill test.      Cardiac catheterization:     The 10-year ASCVD risk score (Alexis Kamara, et al., 2013) is: 1.2%    Values used to calculate the score:      Age: 35 years      Sex: Male      Is Non- : No      Diabetic: Yes      Tobacco smoker: No      Systolic Blood Pressure: 035 mmHg      Is BP treated: No      HDL Cholesterol: 47 mg/dL      Total Cholesterol: 132 mg/dL     Lipid panel5/28/2019: Cholesterol 203, triglycerides 89, HDL 43,     ASCVD 5.3%    Lipid panel1/4/2021, cholesterol 132, triglycerides 104, HDL 47, LDL 64, A1c 7.0    ASSESSMENT:  · Atypical chest pain resolved, no evidence of ischemia on the stress test.  · Type II diabetes - well controlled. · Nonanginal chest pains, stable  · History of vasodepressor syncope , stable  · History of for sinus node dysfunction and AV block on loop recorder, status post permanent DDD pacemaker implantation - 12/28/2017   · Obesity  · Anxiety disorder  · Hyperlipidemia, on atorvastatin 40 mg, LDL is at goal level and well controlled.   · Obstructive sleep apnea  · Hyperammonemia level          Plan:   · Exercise nuclear stress test results were reviewed and showed no evidence of ischemia Knutson treadmill score was 7.  · Echo results were reviewed and showed normal LV function. · Lipid results discussed with the patient and they are well controlled. · Continue current medications atorvastatin 40 mg daily, Toprol- mg twice daily and rest of the current medications. · He was advised to cut down on calories and lose weight. · Follow up in 12 months. The patient's current medication list, allergies, problem list and results of all previously ordered testing were reviewed at today's visit.   Martina Gimenez MD  [facility]) Cardiology

## 2021-12-03 DIAGNOSIS — F90.1 ATTENTION DEFICIT HYPERACTIVITY DISORDER (ADHD), PREDOMINANTLY HYPERACTIVE TYPE: ICD-10-CM

## 2021-12-03 RX ORDER — DEXTROAMPHETAMINE SACCHARATE, AMPHETAMINE ASPARTATE, DEXTROAMPHETAMINE SULFATE AND AMPHETAMINE SULFATE 2.5; 2.5; 2.5; 2.5 MG/1; MG/1; MG/1; MG/1
10 TABLET ORAL 2 TIMES DAILY
Qty: 60 TABLET | Refills: 0 | Status: SHIPPED
Start: 2021-12-03 | End: 2022-01-04 | Stop reason: SDUPTHER

## 2022-01-04 DIAGNOSIS — F90.1 ATTENTION DEFICIT HYPERACTIVITY DISORDER (ADHD), PREDOMINANTLY HYPERACTIVE TYPE: ICD-10-CM

## 2022-01-04 RX ORDER — DEXTROAMPHETAMINE SACCHARATE, AMPHETAMINE ASPARTATE, DEXTROAMPHETAMINE SULFATE AND AMPHETAMINE SULFATE 2.5; 2.5; 2.5; 2.5 MG/1; MG/1; MG/1; MG/1
10 TABLET ORAL 2 TIMES DAILY
Qty: 60 TABLET | Refills: 0 | Status: SHIPPED
Start: 2022-01-04 | End: 2022-02-08 | Stop reason: SDUPTHER

## 2022-01-06 DIAGNOSIS — E11.8 TYPE 2 DIABETES MELLITUS WITH COMPLICATION, WITHOUT LONG-TERM CURRENT USE OF INSULIN (HCC): ICD-10-CM

## 2022-01-11 NOTE — PROGRESS NOTES
[address] Hortencia Miller M.D., F.A.C.P. Oliverio Martinez, PRINCE, APRN, CNS  Carmenza Kamara. Keisha Banda, MSN, APRN-FNP-C  Inez Barron MSN, APRN, FNP-C  Michelle GAR PA-C  [address] MSN, APRN, FNP-C  [address] [place] [address]  [place], [address]  Phone: [phone]  Fax: [phone]        Rox Damico a 35 y.o. 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 Some of his migraines are quite debilitating, and lights and sounds trigger them. He also remains on Zonegran which has provided some headache prevention. He continues to note left-sided neck pains related to a pinched cervical nerve and this is being worked up by orthopedics. The Flexeril I gave him at his last visit was effective, and he is out of pills. Unfortunately, his left hand action tremor has worsened with his neck pains but he is able to perform all of his ADLs. He is currently on metoprolol--failed primidone in the past.  He is also taking an SSRI, therefore triptans are contraindicated. He is having a few nonepileptic seizures every month, and has identified stress and anxiety as a trigger to these. He continues to follow with a mental health provider and is trying to use his coping mechanisms. His cardiac issues are stable. He does not drive.     No chest pain or palpitations  No SOB  No vertigo, lightheadedness or loss of consciousness  No falls, tripping or stumbling  No incontinence of bowels or bladder  No itching or bruising appreciated  +L sided weakness and tingling  No speech or swallowing troubles    ROS otherwise negative     Current Outpatient Medications   Medication Sig Dispense Refill    metFORMIN (GLUCOPHAGE) 1000 MG tablet Take 1 tablet by mouth 2 times daily (with meals) 180 tablet 1    amphetamine-dextroamphetamine (ADDERALL, 10MG,) 10 MG tablet Take 1 tablet by mouth 2 times daily for 30 days. 60 tablet 0    atorvastatin (LIPITOR) 40 MG tablet Take 1 tablet by mouth daily 90 tablet 3    zonisamide (ZONEGRAN) 50 MG capsule Take 2 capsules by mouth nightly 180 capsule 0    Rimegepant Sulfate 75 MG TBDP Take 75 mg by mouth daily as needed (migraine) No more than 1 tab in 24 hours 8 tablet 3    metoprolol succinate (TOPROL XL) 100 MG extended release tablet Take 1 tablet by mouth 2 times daily 180 tablet 3    ammonium lactate (LAC-HYDRIN) 12 % lotion Apply topically twice daily 400 g 2    clonazePAM (KLONOPIN) 1 MG tablet Take 1 mg by mouth 2 times daily as needed.  ENULOSE 10 GM/15ML SOLN solution as needed       mirtazapine (REMERON) 30 MG tablet TAKE ONE TABLET BY MOUTH once a day AT BEDTIME      desonide (DESOWEN) 0.05 % cream Apply topically 2 times daily. 60 g 0    metroNIDAZOLE (METROGEL) 0.75 % gel Apply topically 2 times daily. 45 g 0    lactulose (CHRONULAC) 10 GM/15ML solution TAKE 15 ML BY MOUTH 3 TIMES DAILY (Patient not taking: Reported on 12/1/2021) 946 mL 0    medical marijuana Take by mouth as needed.  FLUoxetine (PROZAC) 20 MG capsule Take 2 capsules by mouth daily 60 capsule 0    rifaximin (XIFAXAN) 550 MG tablet Take 550 mg by mouth 2 times daily       No current facility-administered medications for this visit.      Objective:     /86 (Site: Right Upper Arm, Position: Sitting, Cuff Size: Medium Adult)   Pulse 91   Ht 5' 10\" (1.778 m)   Wt 255 lb (115.7 kg)   SpO2 97%   BMI 36.59 kg/m²     General exam: alert, appears stated age, in no acute distress--wearing dark glasses, wide brimmed hat  Head: normocephalic/atraumatic  Eyes: sclerae clear  Neck: full ROM-- tenderness to left cervical paraspinals and trapezius today  Lungs: clear throughout  Heart: RRR, faint murmur appreciated  Ext: no edema or cyanosis  Pulses: 1+ throughout  Skin: intact    Mental Status: alert, oriented x4---pleasant     Appropriate attention/concentration  Intact memories and fundus of knowledge    Speech/language: no dysarthria or aphasias    Cranial Nerves:  II: visual fields full   II: pupils Slight physiologic anisocoria--R>L   III,VII: ptosis None   III,IV,VI: extraocular muscles  EOMI without nystagmus today     V: mastication intact   V: facial light touch sensation  Intact    V,VII: corneal reflex     VII: facial muscle function   Intact   VIII: hearing intact   IX: soft palate elevation  intact   IX,X: gag reflex    XI: trapezius strength  5/5   XI: sternocleidomastoid strength 5/5   XI: neck extension strength  5/5   XII: tongue strength  midline     Motor:  5/5 throughout except 4/5 L hand--with sudden giving way  Obese bulk and normal tone  No drift  No abnormal movements today    Sensory:  LT intact throughout    Coordination:   FN and FFM intact b/l    Gait:  Slow, cautious and wide based    DTR:   2+ throughout today    No Nichols's no other pathologic reflexes    Laboratory/Radiology:     No current labs to review    MRI cervical spine August 2021 multilevel degenerative disc disease with uncovertebral and facet hypertrophy resulting in mild canal stenosis at C5-6 and C6-7. No significant foraminal narrowing. All images personally reviewed today      Assessment:     Episodic migraines without aura: Having 4-8 debilitating migraines per month and would benefit from more effective preventive treatment. We can change Nurtec to preventive dosing and continue his Zonegran. Headaches are exacerbated by his underlying mental health issues and neck pains. Muscle relaxant will help with this as well. Cervical spondylosis and mild stenosis    Tremors: Worsening in the setting of new neck pains. Suspect essential versus nonphysiologic and/or medication related. He is already on beta-blocker therapy and failed primidone.   Additionally, with his polypharmacy I hesitate to add any new medication    Psychogenic nonepileptic seizures: Proven by EMU. Follows closely with mental health provider.      Anxiety: Under mental health care    Pacemaker    Plan:     Change Nurtec to every other day    Reordered Flexeril 5 mg daily PRN for another month    F/u with [facility]    Continue Zonegran 100 mg nightly    Coping strategies and identification of triggers reviewed    Follow-up with mental health provider    Headache diary    RTO in 6 months or sooner PRN    NADIA Marie - CNP, [facility]  1:38 PM  1/11/2022

## 2022-01-12 ENCOUNTER — OFFICE VISIT (OUTPATIENT)
Dept: NEUROLOGY | Age: 42
End: 2022-01-12
Payer: COMMERCIAL

## 2022-01-12 VITALS
BODY MASS INDEX: 36.51 KG/M2 | SYSTOLIC BLOOD PRESSURE: 134 MMHG | HEIGHT: 70 IN | OXYGEN SATURATION: 97 % | WEIGHT: 255 LBS | HEART RATE: 91 BPM | DIASTOLIC BLOOD PRESSURE: 86 MMHG

## 2022-01-12 DIAGNOSIS — M47.812 CERVICAL SPONDYLOSIS: ICD-10-CM

## 2022-01-12 DIAGNOSIS — G43.109 MIGRAINE WITH AURA AND WITHOUT STATUS MIGRAINOSUS, NOT INTRACTABLE: Primary | ICD-10-CM

## 2022-01-12 DIAGNOSIS — R25.1 TREMOR OF LEFT HAND: ICD-10-CM

## 2022-01-12 DIAGNOSIS — F44.5 PSYCHOGENIC NONEPILEPTIC SEIZURE: ICD-10-CM

## 2022-01-12 PROBLEM — I49.5 SINUS NODE DYSFUNCTION (HCC): Status: RESOLVED | Noted: 2017-12-28 | Resolved: 2022-01-12

## 2022-01-12 PROBLEM — M54.2 NECK PAIN: Status: RESOLVED | Noted: 2021-07-13 | Resolved: 2022-01-12

## 2022-01-12 PROBLEM — I44.0 FIRST DEGREE ATRIOVENTRICULAR BLOCK: Status: RESOLVED | Noted: 2017-02-06 | Resolved: 2022-01-12

## 2022-01-12 PROBLEM — G25.0 ESSENTIAL TREMOR: Status: RESOLVED | Noted: 2021-04-08 | Resolved: 2022-01-12

## 2022-01-12 PROBLEM — Z95.0 S/P CARDIAC PACEMAKER PROCEDURE: Status: RESOLVED | Noted: 2017-12-28 | Resolved: 2022-01-12

## 2022-01-12 PROBLEM — I47.20 VENTRICULAR TACHYCARDIA (HCC): Status: RESOLVED | Noted: 2017-10-31 | Resolved: 2022-01-12

## 2022-01-12 PROCEDURE — 1036F TOBACCO NON-USER: CPT | Performed by: NURSE PRACTITIONER

## 2022-01-12 PROCEDURE — G8427 DOCREV CUR MEDS BY ELIG CLIN: HCPCS | Performed by: NURSE PRACTITIONER

## 2022-01-12 PROCEDURE — G8484 FLU IMMUNIZE NO ADMIN: HCPCS | Performed by: NURSE PRACTITIONER

## 2022-01-12 PROCEDURE — G8417 CALC BMI ABV UP PARAM F/U: HCPCS | Performed by: NURSE PRACTITIONER

## 2022-01-12 PROCEDURE — 99214 OFFICE O/P EST MOD 30 MIN: CPT | Performed by: NURSE PRACTITIONER

## 2022-01-12 RX ORDER — CYCLOBENZAPRINE HCL 5 MG
5 TABLET ORAL DAILY PRN
Qty: 30 TABLET | Refills: 0 | Status: SHIPPED | OUTPATIENT
Start: 2022-01-12 | End: 2022-02-11

## 2022-01-12 NOTE — PATIENT INSTRUCTIONS
Patient Education        Learning About Psychogenic Non-Epileptic Seizure  What is psychogenic non-epileptic seizure (PNES)? Psychogenic non-epileptic seizures (PNES) don't have a physical cause. They aren't caused by epilepsy. But people with epilepsy also may have PNES. People who have a lot of stress, mental illness, or emotional trauma may be more likely to have PNES. Even though PNES doesn't have a physical cause, it is a real condition. The seizures can be scary. And not knowing why you're having them can be frustrating. What happens during PNES? PNES may look like epileptic seizures. But epileptic seizures usually follow the same pattern every time. With PNES, each episode may be different. During a PNES episode, you may have jerky movements, tingling skin, or problems with coordination. You may notice changes in your vision or sense of smell. Some people have episodes often. Others have them only once in a while. For some people, episodes stop over time. Other people keep having them. How is PNES diagnosed? Your doctor will do tests to find out if you have epilepsy. An EEG test lets your doctor see the electrical activity of your brain. The test is often used to diagnose epilepsy. It helps your doctor know what types of seizures you are having. Your doctor also may do blood tests. PNES can be mistaken for epilepsy at first. As a result, some people with PNES are treated with epilepsy medicines. But most of the time, these medicines don't help. The right diagnosis allows your doctor to give you treatments that will help with the stress and other issues that may be related to PNES. How is PNES treated? Treatment varies with each person. The goals of treatment are to relieve stress and to help you learn ways to cope with difficult areas of your life. You may get medicines or counseling. A type of counseling called cognitive-behavioral therapy (CBT) may help with the stress and emotional issues. In CBT, you learn to identify and change thinking styles that may be adding to your stress. CBT is done by licensed mental health providers, such as psychologists, social workers, and therapists. It can be done in one-on-one sessions or in a group setting. Care at home   Lowering your stress may help with PNES. Here are some things you can do. How to relax your mind   · Write. It may help to write about things that are bothering you. This helps you find out how much stress you feel and what is causing it. When you know this, you can find better ways to cope. · Let your feelings out. Talk, laugh, cry, and express anger when you need to. Talking with friends, family, a counselor, or a member of the clergy about your feelings is a healthy way to relieve stress. · Do something you enjoy. For example, listen to music or go to a movie. Practice your hobby or do volunteer work. · Meditate. This can help you relax, because you are not worrying about what happened before or what may happen in the future. · Do guided imagery. Imagine yourself in any setting that helps you feel calm. You can use audiotapes, books, or a teacher to guide you. How to relax your body   · Do something active. Exercise or activity can help reduce stress. Walking is a great way to get started. Even everyday activities such as housecleaning or yard work can help. · Do breathing exercises. For example:  ? From a standing position, bend forward from the waist with your knees slightly bent. Let your arms dangle close to the floor. ? Breathe in slowly and deeply as you return to a standing position. Roll up slowly, and lift your head last.  ? Hold your breath for just a few seconds in the standing position. ? Breathe out slowly and bend forward from the waist.  · Try yoga or jeff chi. These techniques combine exercise and meditation. You may need some training at first to learn them.   Talk to your doctor about whether it is safe to do certain activities, such as drive or swim. Follow-up care is a key part of your treatment and safety. Be sure to make and go to all appointments, and call your doctor if you are having problems. It's also a good idea to know your test results and keep a list of the medicines you take. Where can you learn more? Go to https://chpepiceweb.Sxbbm. org and sign in to your [employer] account. Enter Z429 in the [employer] box to learn more about \"Learning About Psychogenic Non-Epileptic Seizure. \"     If you do not have an account, please click on the \"Sign Up Now\" link. Current as of: April 8, 2021               Content Version: 13.1  © 2006-2021 [employer]. Care instructions adapted under license by [facility] ([facility]). If you have questions about a medical condition or this instruction, always ask your healthcare professional. [place] [address] any warranty or liability for your use of this information. Patient Education        Migraine Headache: Care Instructions  Overview     Migraines are painful, throbbing headaches that often start on one side of the head. They may cause nausea and vomiting and make you sensitive to light, sound, or smell. Without treatment, migraines can last from 4 hours to a few days. Medicines can help prevent migraines or stop them after they have started. Your doctor can help you find which ones work best for you. Follow-up care is a key part of your treatment and safety. Be sure to make and go to all appointments, and call your doctor if you are having problems. It's also a good idea to know your test results and keep a list of the medicines you take. How can you care for yourself at home? · Do not drive if you have taken a prescription pain medicine. · Rest in a quiet, dark room until your headache is gone. Close your eyes, and try to relax or go to sleep. Don't watch TV or read.   · Put a cold, moist cloth or cold pack on the painful area for 10 to 20 minutes at a time. Put a thin cloth between the cold pack and your skin. · Use a warm, moist towel or a heating pad set on low to relax tight shoulder and neck muscles. · Have someone gently massage your neck and shoulders. · Take your medicines exactly as prescribed. Call your doctor if you think you are having a problem with your medicine. You will get more details on the specific medicines your doctor prescribes. · Don't take medicine for headache pain too often. Talk to your doctor if you are taking medicine more than 2 days a week to stop a headache. Taking too much pain medicine can lead to more headaches. These are called medicine-overuse headaches. To prevent migraines  · Keep a headache diary so you can figure out what triggers your headaches. Avoiding triggers may help you prevent headaches. Record when each headache began, how long it lasted, and what the pain was like. Write down any other symptoms you had with the headache, such as nausea, flashing lights or dark spots, or sensitivity to bright light or loud noise. Note if the headache occurred near your period. List anything that might have triggered the headache. Triggers may include certain foods (chocolate, cheese, wine) or odors, smoke, bright light, stress, or lack of sleep. · If your doctor has prescribed medicine for your migraines, take it as directed. You may have medicine that you take only when you get a migraine and medicine that you take all the time to help prevent migraines. ? If your doctor has prescribed medicine for when you get a headache, take it at the first sign of a migraine, unless your doctor has given you other instructions. ? If your doctor has prescribed medicine to prevent migraines, take it exactly as prescribed. Call your doctor if you think you are having a problem with your medicine. · Find healthy ways to deal with stress. Migraines are most common during or right after stressful times.  Try finding ways to reduce stress like practicing mindfulness or deep breathing exercises. · Get plenty of sleep and exercise. But be careful to not push yourself too hard during exercise. It may trigger a headache. · Eat meals on a regular schedule. Avoid foods and drinks that often trigger migraines. These include chocolate, alcohol (especially red wine and port), aspartame, monosodium glutamate (MSG), and some additives found in foods (such as hot dogs, claros, cold cuts, aged cheeses, and pickled foods). · Limit caffeine. Don't drink too much coffee, tea, or soda. But don't quit caffeine suddenly. That can also give you migraines. · Do not smoke or allow others to smoke around you. If you need help quitting, talk to your doctor about stop-smoking programs and medicines. These can increase your chances of quitting for good. · If you are taking birth control pills or hormone therapy, talk to your doctor about whether they are triggering your migraines. When should you call for help? Call 911 anytime you think you may need emergency care. For example, call if:    · You have signs of a stroke. These may include:  ? Sudden numbness, paralysis, or weakness in your face, arm, or leg, especially on only one side of your body. ? Sudden vision changes. ? Sudden trouble speaking. ? Sudden confusion or trouble understanding simple statements. ? Sudden problems with walking or balance. ? A sudden, severe headache that is different from past headaches. Call your doctor now or seek immediate medical care if:    · You have new or worse nausea and vomiting.     · You have a new or higher fever.     · Your headache gets much worse. Watch closely for changes in your health, and be sure to contact your doctor if:    · You are not getting better after 2 days (48 hours). Where can you learn more? Go to https://chpedesireeeb.health-partners. org and sign in to your [employer] account.  Enter X575 in the [address] Information box to learn more about \"Migraine Headache: Care Instructions. \"     If you do not have an account, please click on the \"Sign Up Now\" link. Current as of: April 8, 2021               Content Version: 13.1  © 3092-2019 [employer]. Care instructions adapted under license by [facility] ([facility]). If you have questions about a medical condition or this instruction, always ask your healthcare professional. [address] any warranty or liability for your use of this information.

## 2022-01-13 ENCOUNTER — OFFICE VISIT (OUTPATIENT)
Dept: CHIROPRACTIC MEDICINE | Age: 42
End: 2022-01-13
Payer: COMMERCIAL

## 2022-01-13 ENCOUNTER — OFFICE VISIT (OUTPATIENT)
Dept: PRIMARY CARE CLINIC | Age: 42
End: 2022-01-13
Payer: COMMERCIAL

## 2022-01-13 VITALS
BODY MASS INDEX: 36.22 KG/M2 | OXYGEN SATURATION: 98 % | HEART RATE: 59 BPM | WEIGHT: 253 LBS | HEIGHT: 70 IN | TEMPERATURE: 98.7 F

## 2022-01-13 VITALS
HEART RATE: 59 BPM | DIASTOLIC BLOOD PRESSURE: 62 MMHG | SYSTOLIC BLOOD PRESSURE: 118 MMHG | BODY MASS INDEX: 36.22 KG/M2 | HEIGHT: 70 IN | WEIGHT: 253 LBS | OXYGEN SATURATION: 98 % | TEMPERATURE: 98.7 F

## 2022-01-13 DIAGNOSIS — M47.812 CERVICAL SPONDYLOSIS: ICD-10-CM

## 2022-01-13 DIAGNOSIS — M25.561 CHRONIC PAIN OF RIGHT KNEE: ICD-10-CM

## 2022-01-13 DIAGNOSIS — G89.29 CHRONIC LEFT SHOULDER PAIN: ICD-10-CM

## 2022-01-13 DIAGNOSIS — M25.512 CHRONIC LEFT SHOULDER PAIN: ICD-10-CM

## 2022-01-13 DIAGNOSIS — G89.29 CHRONIC PAIN OF RIGHT KNEE: ICD-10-CM

## 2022-01-13 DIAGNOSIS — M99.01 SEGMENTAL AND SOMATIC DYSFUNCTION OF CERVICAL REGION: Primary | ICD-10-CM

## 2022-01-13 DIAGNOSIS — F44.5 PSYCHOGENIC NONEPILEPTIC SEIZURE: ICD-10-CM

## 2022-01-13 DIAGNOSIS — F41.9 ANXIETY: ICD-10-CM

## 2022-01-13 DIAGNOSIS — F90.1 ATTENTION DEFICIT HYPERACTIVITY DISORDER (ADHD), PREDOMINANTLY HYPERACTIVE TYPE: ICD-10-CM

## 2022-01-13 DIAGNOSIS — E11.8 TYPE 2 DIABETES MELLITUS WITH COMPLICATION, WITHOUT LONG-TERM CURRENT USE OF INSULIN (HCC): Primary | ICD-10-CM

## 2022-01-13 DIAGNOSIS — E78.00 PURE HYPERCHOLESTEROLEMIA: ICD-10-CM

## 2022-01-13 PROBLEM — G47.33 OSA (OBSTRUCTIVE SLEEP APNEA): Status: RESOLVED | Noted: 2021-04-08 | Resolved: 2022-01-13

## 2022-01-13 PROCEDURE — 2022F DILAT RTA XM EVC RTNOPTHY: CPT | Performed by: FAMILY MEDICINE

## 2022-01-13 PROCEDURE — 99203 OFFICE O/P NEW LOW 30 MIN: CPT | Performed by: CHIROPRACTOR

## 2022-01-13 PROCEDURE — 3046F HEMOGLOBIN A1C LEVEL >9.0%: CPT | Performed by: FAMILY MEDICINE

## 2022-01-13 PROCEDURE — G8482 FLU IMMUNIZE ORDER/ADMIN: HCPCS | Performed by: FAMILY MEDICINE

## 2022-01-13 PROCEDURE — G8427 DOCREV CUR MEDS BY ELIG CLIN: HCPCS | Performed by: FAMILY MEDICINE

## 2022-01-13 PROCEDURE — 1036F TOBACCO NON-USER: CPT | Performed by: FAMILY MEDICINE

## 2022-01-13 PROCEDURE — G8417 CALC BMI ABV UP PARAM F/U: HCPCS | Performed by: FAMILY MEDICINE

## 2022-01-13 PROCEDURE — 99214 OFFICE O/P EST MOD 30 MIN: CPT | Performed by: FAMILY MEDICINE

## 2022-01-13 PROCEDURE — G8417 CALC BMI ABV UP PARAM F/U: HCPCS | Performed by: CHIROPRACTOR

## 2022-01-13 PROCEDURE — 1036F TOBACCO NON-USER: CPT | Performed by: CHIROPRACTOR

## 2022-01-13 PROCEDURE — 98940 CHIROPRACT MANJ 1-2 REGIONS: CPT | Performed by: CHIROPRACTOR

## 2022-01-13 PROCEDURE — G8482 FLU IMMUNIZE ORDER/ADMIN: HCPCS | Performed by: CHIROPRACTOR

## 2022-01-13 PROCEDURE — G8428 CUR MEDS NOT DOCUMENT: HCPCS | Performed by: CHIROPRACTOR

## 2022-01-13 ASSESSMENT — PATIENT HEALTH QUESTIONNAIRE - PHQ9
3. TROUBLE FALLING OR STAYING ASLEEP: 3
SUM OF ALL RESPONSES TO PHQ QUESTIONS 1-9: 17
8. MOVING OR SPEAKING SO SLOWLY THAT OTHER PEOPLE COULD HAVE NOTICED. OR THE OPPOSITE, BEING SO FIGETY OR RESTLESS THAT YOU HAVE BEEN MOVING AROUND A LOT MORE THAN USUAL: 2
SUM OF ALL RESPONSES TO PHQ9 QUESTIONS 1 & 2: 3
6. FEELING BAD ABOUT YOURSELF - OR THAT YOU ARE A FAILURE OR HAVE LET YOURSELF OR YOUR FAMILY DOWN: 0
SUM OF ALL RESPONSES TO PHQ QUESTIONS 1-9: 17
9. THOUGHTS THAT YOU WOULD BE BETTER OFF DEAD, OR OF HURTING YOURSELF: 0
1. LITTLE INTEREST OR PLEASURE IN DOING THINGS: 3
4. FEELING TIRED OR HAVING LITTLE ENERGY: 3
7. TROUBLE CONCENTRATING ON THINGS, SUCH AS READING THE NEWSPAPER OR WATCHING TELEVISION: 3
10. IF YOU CHECKED OFF ANY PROBLEMS, HOW DIFFICULT HAVE THESE PROBLEMS MADE IT FOR YOU TO DO YOUR WORK, TAKE CARE OF THINGS AT HOME, OR GET ALONG WITH OTHER PEOPLE: 2
2. FEELING DOWN, DEPRESSED OR HOPELESS: 0
5. POOR APPETITE OR OVEREATING: 3

## 2022-01-13 ASSESSMENT — ENCOUNTER SYMPTOMS
SHORTNESS OF BREATH: 0
NAUSEA: 0
BLOOD IN STOOL: 0
VOMITING: 0
PHOTOPHOBIA: 0
SHORTNESS OF BREATH: 0
SORE THROAT: 0
COUGH: 0
BACK PAIN: 1
CONSTIPATION: 0
ABDOMINAL PAIN: 0
DIARRHEA: 0
ABDOMINAL DISTENTION: 1

## 2022-01-13 NOTE — PROGRESS NOTES
MHYX N MARTINEZ CHIRO    20  Bert Mood : 1980 Sex: male  Age: 35 y.o. Patient was referred by Francesco Heath DO    Chief Complaint   Patient presents with    Neck Pain     2-3 years. NKI. Difficult to turn to left. Migraines. scoliosis.  Shoulder Pain     posterior shoulder pain. stiffness. Has been through PT with no relief. 2 year history of neck and left shoulder pains. PT early on, no relief  Then had MRI on neck  They were going to try injections, but fell through with insurance. Some weakness in left hand -- maybe due to neck  Did EMG - at [facility] DR [facility]. Sees neurology for headaches  Patient does have a pacemaker    Neck Pain   This is a chronic problem. The current episode started more than 1 year ago. The problem occurs constantly. The problem has been unchanged. The pain is present in the left side. The quality of the pain is described as aching. The pain is at a severity of 6/10. The symptoms are aggravated by twisting. Stiffness is present in the morning. Associated symptoms include weakness. Pertinent negatives include no fever. He has tried muscle relaxants (physical therapy = shoulder) for the symptoms. The treatment provided mild relief. Red Flags:  none    Review of Systems   Constitutional: Negative for chills and fever. Respiratory: Negative for shortness of breath. Musculoskeletal: Positive for neck pain. Neurological: Positive for weakness.          Current Outpatient Medications:     Rimegepant Sulfate 75 MG TBDP, Take 75 mg by mouth every other day No more than 1 tab in 24 hours, Disp: 16 tablet, Rfl: 11    cyclobenzaprine (FLEXERIL) 5 MG tablet, Take 1 tablet by mouth daily as needed for Muscle spasms (neck pain), Disp: 30 tablet, Rfl: 0    metFORMIN (GLUCOPHAGE) 1000 MG tablet, Take 1 tablet by mouth 2 times daily (with meals), Disp: 180 tablet, Rfl: 1    amphetamine-dextroamphetamine (ADDERALL, 10MG,) 10 MG tablet, Take 1 tablet by mouth 2 times daily for 30 days. , Disp: 60 tablet, Rfl: 0    atorvastatin (LIPITOR) 40 MG tablet, Take 1 tablet by mouth daily, Disp: 90 tablet, Rfl: 3    zonisamide (ZONEGRAN) 50 MG capsule, Take 2 capsules by mouth nightly, Disp: 180 capsule, Rfl: 0    metoprolol succinate (TOPROL XL) 100 MG extended release tablet, Take 1 tablet by mouth 2 times daily, Disp: 180 tablet, Rfl: 3    ammonium lactate (LAC-HYDRIN) 12 % lotion, Apply topically twice daily, Disp: 400 g, Rfl: 2    clonazePAM (KLONOPIN) 1 MG tablet, Take 1 mg by mouth 2 times daily as needed. , Disp: , Rfl:     ENULOSE 10 GM/15ML SOLN solution, as needed , Disp: , Rfl:     mirtazapine (REMERON) 30 MG tablet, TAKE ONE TABLET BY MOUTH once a day AT BEDTIME, Disp: , Rfl:     desonide (DESOWEN) 0.05 % cream, Apply topically 2 times daily. , Disp: 60 g, Rfl: 0    metroNIDAZOLE (METROGEL) 0.75 % gel, Apply topically 2 times daily. , Disp: 45 g, Rfl: 0    lactulose (CHRONULAC) 10 GM/15ML solution, TAKE 15 ML BY MOUTH 3 TIMES DAILY, Disp: 946 mL, Rfl: 0    medical marijuana, Take by mouth as needed. , Disp: , Rfl:     FLUoxetine (PROZAC) 20 MG capsule, Take 2 capsules by mouth daily, Disp: 60 capsule, Rfl: 0    rifaximin (XIFAXAN) 550 MG tablet, Take 550 mg by mouth 2 times daily, Disp: , Rfl:     Allergies   Allergen Reactions    Fish-Derived Products        Past Medical History:   Diagnosis Date    Anxiety     Arthritis     Class 1 obesity due to excess calories with serious comorbidity and body mass index (BMI) of 34.0 to 34.9 in adult     First degree AV block     GERD (gastroesophageal reflux disease)     Hyperlipidemia     IBS (irritable bowel syndrome)     LVH (left ventricular hypertrophy)     Obesity     ENOC (obstructive sleep apnea)     Psychiatric problem     Psychogenic nonepileptic seizure     Stroke-like symptoms 08/14/2018    Type 2 diabetes mellitus with complication, without long-term current use of insulin (HCC)      No family history on file. Past Surgical History:   Procedure Laterality Date    HERNIA REPAIR      INSERTABLE CARDIAC MONITOR  07/14/2017    KNEE SURGERY      PACEMAKER INSERTION  12/28/2017    D-PPM   ([employer])   [facility]    TONSILLECTOMY       Social History     Socioeconomic History    Marital status: Single     Spouse name: Not on file    Number of children: Not on file    Years of education: Not on file    Highest education level: Not on file   Occupational History    Not on file   Tobacco Use    Smoking status: Never Smoker    Smokeless tobacco: Never Used   Vaping Use    Vaping Use: Never used   Substance and Sexual Activity    Alcohol use: Yes     Comment: Socially- couple times weekly    Drug use: Yes     Types: Marijuana Alvaro Free)     Comment: Medical- rarely    Sexual activity: Not on file   Other Topics Concern    Not on file   Social History Narrative    Not on file     Social Determinants of Health     Financial Resource Strain:     Difficulty of Paying Living Expenses: Not on file   Food Insecurity:     Worried About [employer] in the Last Year: Not on file    Casi of Food in the Last Year: Not on file   Transportation Needs:     Lack of Transportation (Medical): Not on file    Lack of Transportation (Non-Medical):  Not on file   Physical Activity:     Days of Exercise per Week: Not on file    Minutes of Exercise per Session: Not on file   Stress:     Feeling of Stress : Not on file   Social Connections:     Frequency of Communication with Friends and Family: Not on file    Frequency of Social Gatherings with Friends and Family: Not on file    Attends Advent Services: Not on file    Active Member of Clubs or Organizations: Not on file    Attends Club or Organization Meetings: Not on file    Marital Status: Not on file   Intimate Partner Violence:     Fear of Current or Ex-Partner: Not on file    Emotionally Abused: Not on file    Physically Abused: Not on file   Mary Sinha uncovertebral and facet hypertrophy   resulting in mild canal stenosis at C5-6 and C6-7.       No significant foraminal narrowing. Problem/Goals  Decrease pain and/or swelling and Increase ROM and/or flexibility    Today's Treatment  Diversified manipulation to listed cervical segments. Tolerated well. Following treatment, his left rotation and left lateral flexion were near normal, but with mild endrange pain still. I spoke to him regarding posttreatment soreness. Should any arise, he  may use ice for 10-15 minutes, over-the-counter NSAIDs or topical gels. Follow-up next week    Seen By:  Eddie Landers DC    * This note was created using voice recognition software.   The note was reviewed, however grammatical errors may exist.

## 2022-01-13 NOTE — PROGRESS NOTES
Arron Perez (:  1980) is a 35 y.o. male,Established patient, here for evaluation of the following chief complaint(s):  6 Month Follow-Up (wants labs, especially lipid and a1c)      ASSESSMENT/PLAN:  1. Type 2 diabetes mellitus with complication, without long-term current use of insulin (HCC)  -     CBC Auto Differential; Future  -     T4, Free; Future  -     Uric Acid; Future  -     TSH without Reflex; Future  -     Hepatic Function Panel; Future  -     Basic Metabolic Panel; Future  -     Microalbumin / Creatinine Urine Ratio; Future  -     Lipid Panel; Future  -     Hemoglobin A1C; Future  -     AMMONIA; Future  -     Urine Drug Screen; Future  2. Attention deficit hyperactivity disorder (ADHD), predominantly hyperactive type  -     CBC Auto Differential; Future  -     T4, Free; Future  -     Uric Acid; Future  -     TSH without Reflex; Future  -     Hepatic Function Panel; Future  -     Basic Metabolic Panel; Future  -     Microalbumin / Creatinine Urine Ratio; Future  -     Lipid Panel; Future  -     Hemoglobin A1C; Future  -     AMMONIA; Future  -     Urine Drug Screen; Future  3. Psychogenic nonepileptic seizure  -     CBC Auto Differential; Future  -     T4, Free; Future  -     Uric Acid; Future  -     TSH without Reflex; Future  -     Hepatic Function Panel; Future  -     Basic Metabolic Panel; Future  -     Microalbumin / Creatinine Urine Ratio; Future  -     Lipid Panel; Future  -     Hemoglobin A1C; Future  -     AMMONIA; Future  -     Urine Drug Screen; Future  4. Anxiety  -     CBC Auto Differential; Future  -     T4, Free; Future  -     Uric Acid; Future  -     TSH without Reflex; Future  -     Hepatic Function Panel; Future  -     Basic Metabolic Panel; Future  -     Microalbumin / Creatinine Urine Ratio; Future  -     Lipid Panel; Future  -     Hemoglobin A1C; Future  -     AMMONIA; Future  -     Urine Drug Screen; Future  5.  Pure hypercholesterolemia  -     CBC Auto Differential; Future  -     T4, Free; Future  -     Uric Acid; Future  -     TSH without Reflex; Future  -     Hepatic Function Panel; Future  -     Basic Metabolic Panel; Future  -     Microalbumin / Creatinine Urine Ratio; Future  -     Lipid Panel; Future  -     Hemoglobin A1C; Future  -     AMMONIA; Future  -     Urine Drug Screen; Future  6. Cervical spondylosis  -     Enma  Diana Patiño DC,Chiropractic Medicine, [facility]  -     CBC Auto Differential; Future  -     T4, Free; Future  -     Uric Acid; Future  -     TSH without Reflex; Future  -     Hepatic Function Panel; Future  -     Basic Metabolic Panel; Future  -     Microalbumin / Creatinine Urine Ratio; Future  -     Lipid Panel; Future  -     Hemoglobin A1C; Future  -     AMMONIA; Future  -     Urine Drug Screen; Future  7. Chronic left shoulder pain  -     Enma  Diana Patiño DC,Chiropractic [facility], [facility]  -     CBC Auto Differential; Future  -     T4, Free; Future  -     Uric Acid; Future  -     TSH without Reflex; Future  -     Hepatic Function Panel; Future  -     Basic Metabolic Panel; Future  -     Microalbumin / Creatinine Urine Ratio; Future  -     Lipid Panel; Future  -     Hemoglobin A1C; Future  -     AMMONIA; Future  -     Urine Drug Screen; Future  8. Chronic pain of right knee  This time we will order baseline labs for further evaluation and treatment. Patient is currently being actively treated by psychiatry for his depression and PTSD issues. Referral to chiropractor as well to help with his neck/left shoulder pain. Advised bracing for his right knee pain. We may refer to orthopedic surgery if needed. See him back in 3 months or sooner if needed. No follow-ups on file. SUBJECTIVE/OBJECTIVE:  HPI  Patient presents today for 3-month follow-up on current medication regimen and chronic issues. Patient taking all medication as prescribed and denies any current issues.   Patient recently was seen by a new electrophysiologist. Scheduled for testing in the near future. Also advised regarding increased fluid and salt intake to help with symptomatic relief. Date 1/13/2022  Patient presents today for 3-month follow-up on chronic issues and for medication refills. Patient states he is taking all medications as prescribed without any side effects or adverse reactions. He states that he feels that the Adderall is not working as well as previous. He does request that we adjust the dosing with his next refill. He states that when he was on 20 mg twice daily he did experience side effects and asks that we split the dose if possible. Patient is still having issues with right knee pain and left shoulder/neck pain. He had been seen by pain management but could not get any further injections secondary to insurance issues. Most recent MRI shows multilevel degenerative disc disease and mild canal stenosis at C5-C7 without significant foraminal stenosis. Patient continues to have nonepileptic seizures and continues to follow with psychiatry with this as well. Psychiatrist is also treating PTSD actively. Review of Systems   Constitutional: Positive for fatigue. Negative for chills and fever. HENT: Negative for congestion, hearing loss, nosebleeds and sore throat. Eyes: Negative for photophobia. Respiratory: Negative for cough and shortness of breath. Cardiovascular: Negative for chest pain, palpitations and leg swelling. Gastrointestinal: Positive for abdominal distention. Negative for abdominal pain, blood in stool, constipation, diarrhea, nausea and vomiting. Endocrine: Negative for polydipsia. Genitourinary: Negative for dysuria, frequency, hematuria and urgency. Musculoskeletal: Positive for arthralgias, back pain, gait problem, joint swelling and myalgias. Skin: Negative. Neurological: Positive for dizziness, tremors, seizures, weakness and numbness. Negative for headaches.    Hematological: Does not bruise/bleed easily. Psychiatric/Behavioral: Positive for behavioral problems, decreased concentration, dysphoric mood and sleep disturbance. Negative for agitation, hallucinations, self-injury and suicidal ideas. The patient is nervous/anxious. All other systems reviewed and are negative. Current Outpatient Medications:     Rimegepant Sulfate 75 MG TBDP, Take 75 mg by mouth every other day No more than 1 tab in 24 hours, Disp: 16 tablet, Rfl: 11    cyclobenzaprine (FLEXERIL) 5 MG tablet, Take 1 tablet by mouth daily as needed for Muscle spasms (neck pain), Disp: 30 tablet, Rfl: 0    metFORMIN (GLUCOPHAGE) 1000 MG tablet, Take 1 tablet by mouth 2 times daily (with meals), Disp: 180 tablet, Rfl: 1    amphetamine-dextroamphetamine (ADDERALL, 10MG,) 10 MG tablet, Take 1 tablet by mouth 2 times daily for 30 days. , Disp: 60 tablet, Rfl: 0    atorvastatin (LIPITOR) 40 MG tablet, Take 1 tablet by mouth daily, Disp: 90 tablet, Rfl: 3    zonisamide (ZONEGRAN) 50 MG capsule, Take 2 capsules by mouth nightly, Disp: 180 capsule, Rfl: 0    metoprolol succinate (TOPROL XL) 100 MG extended release tablet, Take 1 tablet by mouth 2 times daily, Disp: 180 tablet, Rfl: 3    ammonium lactate (LAC-HYDRIN) 12 % lotion, Apply topically twice daily, Disp: 400 g, Rfl: 2    clonazePAM (KLONOPIN) 1 MG tablet, Take 1 mg by mouth 2 times daily as needed. , Disp: , Rfl:     ENULOSE 10 GM/15ML SOLN solution, as needed , Disp: , Rfl:     mirtazapine (REMERON) 30 MG tablet, TAKE ONE TABLET BY MOUTH once a day AT BEDTIME, Disp: , Rfl:     desonide (DESOWEN) 0.05 % cream, Apply topically 2 times daily. , Disp: 60 g, Rfl: 0    metroNIDAZOLE (METROGEL) 0.75 % gel, Apply topically 2 times daily. , Disp: 45 g, Rfl: 0    lactulose (CHRONULAC) 10 GM/15ML solution, TAKE 15 ML BY MOUTH 3 TIMES DAILY, Disp: 946 mL, Rfl: 0    medical marijuana, Take by mouth as needed. , Disp: , Rfl:     FLUoxetine (PROZAC) 20 MG capsule, Take 2 capsules by mouth daily, Disp: 60 capsule, Rfl: 0    rifaximin (XIFAXAN) 550 MG tablet, Take 550 mg by mouth 2 times daily, Disp: , Rfl:    Patient Active Problem List   Diagnosis    Anxiety    Hyperlipemia    Irritable bowel syndrome with diarrhea    Psychogenic nonepileptic seizure    Non-smoker    Depression    Type 2 diabetes mellitus with complication, without long-term current use of insulin (HCC)    Conversion disorder    Attention deficit hyperactivity disorder (ADHD), predominantly hyperactive type    Fatty liver    Migraine with aura and without status migrainosus, not intractable    Cervical spondylosis    Chronic left shoulder pain    Chronic pain of right knee     Past Medical History:   Diagnosis Date    Anxiety     Arthritis     Class 1 obesity due to excess calories with serious comorbidity and body mass index (BMI) of 34.0 to 34.9 in adult     First degree AV block     GERD (gastroesophageal reflux disease)     Hyperlipidemia     IBS (irritable bowel syndrome)     LVH (left ventricular hypertrophy)     Obesity     ENOC (obstructive sleep apnea)     Psychiatric problem     Psychogenic nonepileptic seizure     Stroke-like symptoms 08/14/2018    Type 2 diabetes mellitus with complication, without long-term current use of insulin ([facility])      Past Surgical History:   Procedure Laterality Date    HERNIA REPAIR      INSERTABLE CARDIAC MONITOR  07/14/2017    KNEE SURGERY      PACEMAKER INSERTION  12/28/2017    D-PPM   ([employer])   José Miguel Buenrostro    TONSILLECTOMY       Social History     Socioeconomic History    Marital status: Single     Spouse name: Not on file    Number of children: Not on file    Years of education: Not on file    Highest education level: Not on file   Occupational History    Not on file   Tobacco Use    Smoking status: Never Smoker    Smokeless tobacco: Never Used   Vaping Use    Vaping Use: Never used   Substance and Sexual Activity    Alcohol use: Yes     Comment: Socially- couple times weekly    Drug use: Yes     Types: Marijuana Yamileth Mattson     Comment: Medical- rarely    Sexual activity: Not on file   Other Topics Concern    Not on file   Social History Narrative    Not on file     Social Determinants of Health     Financial Resource Strain:     Difficulty of Paying Living Expenses: Not on file   Food Insecurity:     Worried About [address] in the Last Year: Not on file    Casi of Food in the Last Year: Not on file   Transportation Needs:     Lack of Transportation (Medical): Not on file    Lack of Transportation (Non-Medical): Not on file   Physical Activity:     Days of Exercise per Week: Not on file    Minutes of Exercise per Session: Not on file   Stress:     Feeling of Stress : Not on file   Social Connections:     Frequency of Communication with Friends and Family: Not on file    Frequency of Social Gatherings with Friends and Family: Not on file    Attends Yazidi Services: Not on file    Active Member of [address] [place] [address] or Organizations: Not on file    Attends Club or Organization Meetings: Not on file    Marital Status: Not on file   Intimate Partner Violence:     Fear of Current or Ex-Partner: Not on file    Emotionally Abused: Not on file    Physically Abused: Not on file    Sexually Abused: Not on file   Housing Stability:     Unable to Pay for Housing in the Last Year: Not on file    Number of [place] in the Last Year: Not on file    Unstable Housing in the Last Year: Not on file     History reviewed. No pertinent family history. There are no preventive care reminders to display for this patient. There are no preventive care reminders to display for this patient.    Diabetes Management   Topic Date Due    Diabetic foot exam  Never done    Diabetic retinal exam  04/03/2020    A1C test (Diabetic or Prediabetic)  01/04/2022    Diabetic microalbuminuria test  01/04/2022    Lipid screen  01/04/2022      There are no preventive care reminders to display for this patient. Health Maintenance   Topic Date Due    Pneumococcal 0-64 years Vaccine (1 of 2 - PPSV23) Never done    Diabetic foot exam  Never done    Depression Monitoring  Never done    Hepatitis B vaccine (2 of 3 - Risk 3-dose series) 08/29/2016    Diabetic retinal exam  04/03/2020    COVID-19 Vaccine (3 - Booster for Pfizer series) 10/20/2021    A1C test (Diabetic or Prediabetic)  01/04/2022    Diabetic microalbuminuria test  01/04/2022    Lipid screen  01/04/2022    DTaP/Tdap/Td vaccine (2 - Td or Tdap) 04/20/2026    Flu vaccine  Completed    Hepatitis C screen  Completed    Hepatitis A vaccine  Aged Out    Hib vaccine  Aged Out    Meningococcal (ACWY) vaccine  Aged Out    HIV screen  Discontinued      There are no preventive care reminders to display for this patient. There are no preventive care reminders to display for this patient. /62   Pulse 59   Temp 98.7 °F (37.1 °C)   Ht 5' 10\" (1.778 m)   Wt 253 lb (114.8 kg)   SpO2 98%   BMI 36.30 kg/m²       Physical Exam  Vitals reviewed. Constitutional:       Appearance: He is obese. HENT:      Head: Normocephalic and atraumatic. Eyes:      General: No scleral icterus. Conjunctiva/sclera: Conjunctivae normal.      Pupils: Pupils are equal, round, and reactive to light. Neck:      Thyroid: No thyromegaly. Cardiovascular:      Rate and Rhythm: Normal rate and regular rhythm. Heart sounds: Normal heart sounds. No murmur heard. Pulmonary:      Effort: Pulmonary effort is normal.      Breath sounds: Normal breath sounds. No rales. Abdominal:      General: Bowel sounds are normal. There is no distension. Palpations: Abdomen is soft. Tenderness: There is no abdominal tenderness. Musculoskeletal:         General: Swelling and tenderness present. Left shoulder: Tenderness present. Decreased range of motion. Cervical back: Neck supple.       Right knee: Swelling and effusion present. Decreased range of motion. Tenderness present. Lymphadenopathy:      Cervical: No cervical adenopathy. Skin:     General: Skin is warm and dry. Findings: No erythema or rash. Neurological:      Cranial Nerves: No cranial nerve deficit. Sensory: Sensory deficit present. Motor: Weakness and tremor present. Gait: Gait abnormal.   Psychiatric:         Attention and Perception: Attention normal.         Mood and Affect: Mood normal.         Speech: Speech normal.         Behavior: Behavior normal.         Judgment: Judgment normal.                 An electronic signature was used to authenticate this note.     --Cristina Mauro, DO

## 2022-01-15 DIAGNOSIS — G43.109 MIGRAINE WITH AURA AND WITHOUT STATUS MIGRAINOSUS, NOT INTRACTABLE: ICD-10-CM

## 2022-01-18 RX ORDER — ZONISAMIDE 50 MG/1
100 CAPSULE ORAL NIGHTLY
Qty: 180 CAPSULE | Refills: 0 | Status: SHIPPED
Start: 2022-01-18 | End: 2022-04-20 | Stop reason: SDUPTHER

## 2022-01-20 ENCOUNTER — OFFICE VISIT (OUTPATIENT)
Dept: CHIROPRACTIC MEDICINE | Age: 42
End: 2022-01-20
Payer: COMMERCIAL

## 2022-01-20 VITALS
TEMPERATURE: 96.7 F | OXYGEN SATURATION: 97 % | BODY MASS INDEX: 36.22 KG/M2 | WEIGHT: 253 LBS | HEIGHT: 70 IN | HEART RATE: 66 BPM

## 2022-01-20 DIAGNOSIS — M47.812 CERVICAL SPONDYLOSIS: ICD-10-CM

## 2022-01-20 DIAGNOSIS — M99.01 SEGMENTAL AND SOMATIC DYSFUNCTION OF CERVICAL REGION: Primary | ICD-10-CM

## 2022-01-20 DIAGNOSIS — M99.02 SEGMENTAL AND SOMATIC DYSFUNCTION OF THORACIC REGION: ICD-10-CM

## 2022-01-20 DIAGNOSIS — M54.04 PANNICULITIS AFFECTING REGIONS OF NECK AND BACK, THORACIC REGION: ICD-10-CM

## 2022-01-20 PROCEDURE — 99999 PR OFFICE/OUTPT VISIT,PROCEDURE ONLY: CPT | Performed by: CHIROPRACTOR

## 2022-01-20 PROCEDURE — 98940 CHIROPRACT MANJ 1-2 REGIONS: CPT | Performed by: CHIROPRACTOR

## 2022-01-20 NOTE — PROGRESS NOTES
22  Pilar Sleeper : 1980 Sex: male  Age: 35 y.o. Chief Complaint   Patient presents with    Neck Pain    Shoulder Pain     Left       HPI:   Pain is has slightly improved. On average, pain is perceived as mild (2-3  pain scale). Patient denies new numbness, new weakness, new tingling      Current Outpatient Medications:     zonisamide (ZONEGRAN) 50 MG capsule, Take 2 capsules by mouth nightly, Disp: 180 capsule, Rfl: 0    Rimegepant Sulfate 75 MG TBDP, Take 75 mg by mouth every other day No more than 1 tab in 24 hours, Disp: 16 tablet, Rfl: 11    cyclobenzaprine (FLEXERIL) 5 MG tablet, Take 1 tablet by mouth daily as needed for Muscle spasms (neck pain), Disp: 30 tablet, Rfl: 0    metFORMIN (GLUCOPHAGE) 1000 MG tablet, Take 1 tablet by mouth 2 times daily (with meals), Disp: 180 tablet, Rfl: 1    amphetamine-dextroamphetamine (ADDERALL, 10MG,) 10 MG tablet, Take 1 tablet by mouth 2 times daily for 30 days. , Disp: 60 tablet, Rfl: 0    atorvastatin (LIPITOR) 40 MG tablet, Take 1 tablet by mouth daily, Disp: 90 tablet, Rfl: 3    metoprolol succinate (TOPROL XL) 100 MG extended release tablet, Take 1 tablet by mouth 2 times daily, Disp: 180 tablet, Rfl: 3    ammonium lactate (LAC-HYDRIN) 12 % lotion, Apply topically twice daily, Disp: 400 g, Rfl: 2    clonazePAM (KLONOPIN) 1 MG tablet, Take 1 mg by mouth 2 times daily as needed. , Disp: , Rfl:     ENULOSE 10 GM/15ML SOLN solution, as needed , Disp: , Rfl:     mirtazapine (REMERON) 30 MG tablet, TAKE ONE TABLET BY MOUTH once a day AT BEDTIME, Disp: , Rfl:     desonide (DESOWEN) 0.05 % cream, Apply topically 2 times daily. , Disp: 60 g, Rfl: 0    metroNIDAZOLE (METROGEL) 0.75 % gel, Apply topically 2 times daily. , Disp: 45 g, Rfl: 0    lactulose (CHRONULAC) 10 GM/15ML solution, TAKE 15 ML BY MOUTH 3 TIMES DAILY, Disp: 946 mL, Rfl: 0    medical marijuana, Take by mouth as needed. , Disp: , Rfl:     FLUoxetine (PROZAC) 20 MG capsule, Take 2 capsules by mouth daily, Disp: 60 capsule, Rfl: 0    rifaximin (XIFAXAN) 550 MG tablet, Take 550 mg by mouth 2 times daily, Disp: , Rfl:     Exam:   Vitals:    01/20/22 0959   Pulse: 66   Temp: 96.7 °F (35.9 °C)   SpO2: 97%     Left cervical rotation is approximately 50% of normal today. No midline pain. Trigger point left trapezius  There are hypertonic and tender fibers noted today in the cervical and thoracic paraspinal muscles. Joint fixation is noted with motion screening at C5-6, T4-5. Armida Higgins was seen today for neck pain and shoulder pain. Diagnoses and all orders for this visit:    Segmental and somatic dysfunction of cervical region    Cervical spondylosis    Segmental and somatic dysfunction of thoracic region    Panniculitis affecting regions of neck and back, thoracic region        Treatment Plan: Continued with diversified manipulation was paid to the listed cervical and thoracic segments. Tolerated well. Following treatment, left cervical rotation was near full and complete, with mild endrange pain.   We will continue his trial as scheduled      Seen By:  Caden Carlisle DC

## 2022-01-26 ENCOUNTER — OFFICE VISIT (OUTPATIENT)
Dept: CHIROPRACTIC MEDICINE | Age: 42
End: 2022-01-26
Payer: COMMERCIAL

## 2022-01-26 VITALS
TEMPERATURE: 96.6 F | OXYGEN SATURATION: 97 % | WEIGHT: 253 LBS | BODY MASS INDEX: 36.22 KG/M2 | HEART RATE: 92 BPM | HEIGHT: 70 IN

## 2022-01-26 DIAGNOSIS — M54.04 PANNICULITIS AFFECTING REGIONS OF NECK AND BACK, THORACIC REGION: ICD-10-CM

## 2022-01-26 DIAGNOSIS — M47.812 CERVICAL SPONDYLOSIS: ICD-10-CM

## 2022-01-26 DIAGNOSIS — M99.01 SEGMENTAL AND SOMATIC DYSFUNCTION OF CERVICAL REGION: Primary | ICD-10-CM

## 2022-01-26 DIAGNOSIS — M99.02 SEGMENTAL AND SOMATIC DYSFUNCTION OF THORACIC REGION: ICD-10-CM

## 2022-01-26 PROCEDURE — 99999 PR OFFICE/OUTPT VISIT,PROCEDURE ONLY: CPT | Performed by: CHIROPRACTOR

## 2022-01-26 PROCEDURE — 98940 CHIROPRACT MANJ 1-2 REGIONS: CPT | Performed by: CHIROPRACTOR

## 2022-01-26 NOTE — PROGRESS NOTES
22  Yareli Hassan : 1980 Sex: male  Age: 35 y.o. Chief Complaint   Patient presents with    Neck Pain       HPI:   Pain is has improved. On average, pain is perceived as mild (1-3  pain scale). Patient denies new numbness, new weakness, new tingling      Current Outpatient Medications:     zonisamide (ZONEGRAN) 50 MG capsule, Take 2 capsules by mouth nightly, Disp: 180 capsule, Rfl: 0    Rimegepant Sulfate 75 MG TBDP, Take 75 mg by mouth every other day No more than 1 tab in 24 hours, Disp: 16 tablet, Rfl: 11    cyclobenzaprine (FLEXERIL) 5 MG tablet, Take 1 tablet by mouth daily as needed for Muscle spasms (neck pain), Disp: 30 tablet, Rfl: 0    metFORMIN (GLUCOPHAGE) 1000 MG tablet, Take 1 tablet by mouth 2 times daily (with meals), Disp: 180 tablet, Rfl: 1    amphetamine-dextroamphetamine (ADDERALL, 10MG,) 10 MG tablet, Take 1 tablet by mouth 2 times daily for 30 days. , Disp: 60 tablet, Rfl: 0    atorvastatin (LIPITOR) 40 MG tablet, Take 1 tablet by mouth daily, Disp: 90 tablet, Rfl: 3    metoprolol succinate (TOPROL XL) 100 MG extended release tablet, Take 1 tablet by mouth 2 times daily, Disp: 180 tablet, Rfl: 3    ammonium lactate (LAC-HYDRIN) 12 % lotion, Apply topically twice daily, Disp: 400 g, Rfl: 2    clonazePAM (KLONOPIN) 1 MG tablet, Take 1 mg by mouth 2 times daily as needed. , Disp: , Rfl:     ENULOSE 10 GM/15ML SOLN solution, as needed , Disp: , Rfl:     mirtazapine (REMERON) 30 MG tablet, TAKE ONE TABLET BY MOUTH once a day AT BEDTIME, Disp: , Rfl:     desonide (DESOWEN) 0.05 % cream, Apply topically 2 times daily. , Disp: 60 g, Rfl: 0    metroNIDAZOLE (METROGEL) 0.75 % gel, Apply topically 2 times daily. , Disp: 45 g, Rfl: 0    lactulose (CHRONULAC) 10 GM/15ML solution, TAKE 15 ML BY MOUTH 3 TIMES DAILY, Disp: 946 mL, Rfl: 0    medical marijuana, Take by mouth as needed. , Disp: , Rfl:     FLUoxetine (PROZAC) 20 MG capsule, Take 2 capsules by mouth daily, Disp: 60 capsule, Rfl: 0    rifaximin (XIFAXAN) 550 MG tablet, Take 550 mg by mouth 2 times daily, Disp: , Rfl:     Exam:   Vitals:    01/26/22 0911   Pulse: 92   Temp: 96.6 °F (35.9 °C)   SpO2: 97%     Cervical rotation is approximately 75% of normal with mild endrange pain. Otherwise cervical motions are well-preserved and pain-free  There are hypertonic and tender fibers noted today in the cervical and thoracic paraspinal muscles. Joint fixation is noted with motion screening at C5-7, T4-5. Laurent Del Valle was seen today for neck pain. Diagnoses and all orders for this visit:    Segmental and somatic dysfunction of cervical region    Segmental and somatic dysfunction of thoracic region    Cervical spondylosis    Panniculitis affecting regions of neck and back, thoracic region        Treatment Plan: Continued with diversified manipulation to the affected segments today. Tolerated well, noting relief following care. Following treatment his left rotation was near full and complete, with mild endrange pain. Arm to continue with home-based self-care over the next several days. I will see him back next week for continued care.       Seen By:  Vani Mckee DC

## 2022-02-08 ENCOUNTER — OFFICE VISIT (OUTPATIENT)
Dept: CHIROPRACTIC MEDICINE | Age: 42
End: 2022-02-08
Payer: COMMERCIAL

## 2022-02-08 VITALS — TEMPERATURE: 97.3 F | HEART RATE: 92 BPM | OXYGEN SATURATION: 97 %

## 2022-02-08 DIAGNOSIS — M47.812 CERVICAL SPONDYLOSIS: ICD-10-CM

## 2022-02-08 DIAGNOSIS — M99.01 SEGMENTAL AND SOMATIC DYSFUNCTION OF CERVICAL REGION: Primary | ICD-10-CM

## 2022-02-08 DIAGNOSIS — F90.1 ATTENTION DEFICIT HYPERACTIVITY DISORDER (ADHD), PREDOMINANTLY HYPERACTIVE TYPE: ICD-10-CM

## 2022-02-08 DIAGNOSIS — M99.02 SEGMENTAL AND SOMATIC DYSFUNCTION OF THORACIC REGION: ICD-10-CM

## 2022-02-08 DIAGNOSIS — M54.04 PANNICULITIS AFFECTING REGIONS OF NECK AND BACK, THORACIC REGION: ICD-10-CM

## 2022-02-08 PROCEDURE — 98940 CHIROPRACT MANJ 1-2 REGIONS: CPT | Performed by: CHIROPRACTOR

## 2022-02-08 PROCEDURE — 99999 PR OFFICE/OUTPT VISIT,PROCEDURE ONLY: CPT | Performed by: CHIROPRACTOR

## 2022-02-08 RX ORDER — DEXTROAMPHETAMINE SACCHARATE, AMPHETAMINE ASPARTATE, DEXTROAMPHETAMINE SULFATE AND AMPHETAMINE SULFATE 2.5; 2.5; 2.5; 2.5 MG/1; MG/1; MG/1; MG/1
20 TABLET ORAL DAILY
Qty: 60 TABLET | Refills: 0 | Status: SHIPPED
Start: 2022-02-08 | End: 2022-03-01 | Stop reason: SDUPTHER

## 2022-02-08 NOTE — PROGRESS NOTES
Patient is here for follow up neck pain.  Eric Nayak DO  Electronically signed by Fernando Raines LPN on 3/8/5760 at 1:79 AM

## 2022-02-08 NOTE — PROGRESS NOTES
22  Devon Pearson : 1980 Sex: male  Age: 35 y.o. Chief Complaint   Patient presents with    Neck Pain       HPI:   Pain has improved. Note having a mild increase in his soreness the other day after all the snow-had to do a lot of shoveling. No new injury. On average, pain is perceived as mild (1-3  pain scale). Patient denies new numbness, new weakness, new tingling      Current Outpatient Medications:     zonisamide (ZONEGRAN) 50 MG capsule, Take 2 capsules by mouth nightly, Disp: 180 capsule, Rfl: 0    Rimegepant Sulfate 75 MG TBDP, Take 75 mg by mouth every other day No more than 1 tab in 24 hours, Disp: 16 tablet, Rfl: 11    cyclobenzaprine (FLEXERIL) 5 MG tablet, Take 1 tablet by mouth daily as needed for Muscle spasms (neck pain), Disp: 30 tablet, Rfl: 0    metFORMIN (GLUCOPHAGE) 1000 MG tablet, Take 1 tablet by mouth 2 times daily (with meals), Disp: 180 tablet, Rfl: 1    atorvastatin (LIPITOR) 40 MG tablet, Take 1 tablet by mouth daily, Disp: 90 tablet, Rfl: 3    metoprolol succinate (TOPROL XL) 100 MG extended release tablet, Take 1 tablet by mouth 2 times daily, Disp: 180 tablet, Rfl: 3    ammonium lactate (LAC-HYDRIN) 12 % lotion, Apply topically twice daily, Disp: 400 g, Rfl: 2    clonazePAM (KLONOPIN) 1 MG tablet, Take 1 mg by mouth 2 times daily as needed. , Disp: , Rfl:     ENULOSE 10 GM/15ML SOLN solution, as needed , Disp: , Rfl:     mirtazapine (REMERON) 30 MG tablet, TAKE ONE TABLET BY MOUTH once a day AT BEDTIME, Disp: , Rfl:     desonide (DESOWEN) 0.05 % cream, Apply topically 2 times daily. , Disp: 60 g, Rfl: 0    metroNIDAZOLE (METROGEL) 0.75 % gel, Apply topically 2 times daily. , Disp: 45 g, Rfl: 0    lactulose (CHRONULAC) 10 GM/15ML solution, TAKE 15 ML BY MOUTH 3 TIMES DAILY, Disp: 946 mL, Rfl: 0    medical marijuana, Take by mouth as needed. , Disp: , Rfl:     FLUoxetine (PROZAC) 20 MG capsule, Take 2 capsules by mouth daily, Disp: 60 capsule, Rfl: 0   rifaximin (XIFAXAN) 550 MG tablet, Take 550 mg by mouth 2 times daily, Disp: , Rfl:     amphetamine-dextroamphetamine (ADDERALL, 10MG,) 10 MG tablet, Take 1 tablet by mouth 2 times daily for 30 days. , Disp: 60 tablet, Rfl: 0    Exam:   Vitals:    02/08/22 0958   Pulse: 92   Temp: 97.3 °F (36.3 °C)   SpO2: 97%     Mild limitation of left cervical rotation, right cervical rotation today. No midline pain. There are hypertonic and tender fibers noted today in the cervical and thoracic paraspinal muscles. Joint fixation is noted with motion screening at C6-7, T2-4. Tenzin Deshpande was seen today for neck pain. Diagnoses and all orders for this visit:    Segmental and somatic dysfunction of cervical region    Segmental and somatic dysfunction of thoracic region    Cervical spondylosis    Panniculitis affecting regions of neck and back, thoracic region        Treatment Plan: Continued with diversified manipulation to listed cervical and thoracic segments today. Tolerated well. He had near full, complete and pain-free active range of motion of the cervical spine following. He is done well with care at this point. Happy with his progress. He will contact me as needed for further care.       Seen By:  Libertad García DC

## 2022-02-10 ENCOUNTER — HOSPITAL ENCOUNTER (OUTPATIENT)
Age: 42
Discharge: HOME | End: 2022-02-10
Payer: COMMERCIAL

## 2022-02-10 DIAGNOSIS — M25.512 CHRONIC LEFT SHOULDER PAIN: ICD-10-CM

## 2022-02-10 DIAGNOSIS — M47.812 CERVICAL SPONDYLOSIS: ICD-10-CM

## 2022-02-10 DIAGNOSIS — F90.1 ATTENTION DEFICIT HYPERACTIVITY DISORDER (ADHD), PREDOMINANTLY HYPERACTIVE TYPE: ICD-10-CM

## 2022-02-10 DIAGNOSIS — G89.29 CHRONIC LEFT SHOULDER PAIN: ICD-10-CM

## 2022-02-10 DIAGNOSIS — E78.00 PURE HYPERCHOLESTEROLEMIA: ICD-10-CM

## 2022-02-10 DIAGNOSIS — E11.8 TYPE 2 DIABETES MELLITUS WITH COMPLICATION, WITHOUT LONG-TERM CURRENT USE OF INSULIN (HCC): ICD-10-CM

## 2022-02-10 DIAGNOSIS — F41.9 ANXIETY: ICD-10-CM

## 2022-02-10 DIAGNOSIS — F44.5 PSYCHOGENIC NONEPILEPTIC SEIZURE: ICD-10-CM

## 2022-02-10 LAB
ALBUMIN SERPL-MCNC: 4.3 G/DL (ref 3.5–5.2)
ALP BLD-CCNC: 113 U/L (ref 40–129)
ALT SERPL-CCNC: 38 U/L (ref 0–40)
AMMONIA: 26.9 UMOL/L (ref 16–60)
AMPHETAMINE SCREEN, URINE: POSITIVE
ANION GAP SERPL CALCULATED.3IONS-SCNC: 15 MMOL/L (ref 7–16)
AST SERPL-CCNC: 25 U/L (ref 0–39)
BARBITURATE SCREEN URINE: NOT DETECTED
BASOPHILS ABSOLUTE: 0.09 E9/L (ref 0–0.2)
BASOPHILS RELATIVE PERCENT: 0.7 % (ref 0–2)
BENZODIAZEPINE SCREEN, URINE: POSITIVE
BILIRUB SERPL-MCNC: 0.5 MG/DL (ref 0–1.2)
BILIRUBIN DIRECT: <0.2 MG/DL (ref 0–0.3)
BILIRUBIN, INDIRECT: NORMAL MG/DL (ref 0–1)
BUN BLDV-MCNC: 9 MG/DL (ref 6–20)
CALCIUM SERPL-MCNC: 9.7 MG/DL (ref 8.6–10.2)
CANNABINOID SCREEN URINE: NOT DETECTED
CHLORIDE BLD-SCNC: 101 MMOL/L (ref 98–107)
CHOLESTEROL, TOTAL: 131 MG/DL (ref 0–199)
CO2: 19 MMOL/L (ref 22–29)
COCAINE METABOLITE SCREEN URINE: NOT DETECTED
CREAT SERPL-MCNC: 0.8 MG/DL (ref 0.7–1.2)
CREATININE URINE: 72 MG/DL (ref 40–278)
EOSINOPHILS ABSOLUTE: 0.85 E9/L (ref 0.05–0.5)
EOSINOPHILS RELATIVE PERCENT: 6.2 % (ref 0–6)
FENTANYL SCREEN, URINE: NOT DETECTED
GFR AFRICAN AMERICAN: >60
GFR NON-AFRICAN AMERICAN: >60 ML/MIN/1.73
GLUCOSE BLD-MCNC: 121 MG/DL (ref 74–99)
HBA1C MFR BLD: 7.1 % (ref 4–5.6)
HCT VFR BLD CALC: 48.1 % (ref 37–54)
HDLC SERPL-MCNC: 46 MG/DL
HEMOGLOBIN: 15.5 G/DL (ref 12.5–16.5)
IMMATURE GRANULOCYTES #: 0.12 E9/L
IMMATURE GRANULOCYTES %: 0.9 % (ref 0–5)
LDL CHOLESTEROL CALCULATED: 56 MG/DL (ref 0–99)
LYMPHOCYTES ABSOLUTE: 3.05 E9/L (ref 1.5–4)
LYMPHOCYTES RELATIVE PERCENT: 22.1 % (ref 20–42)
Lab: ABNORMAL
MCH RBC QN AUTO: 29.3 PG (ref 26–35)
MCHC RBC AUTO-ENTMCNC: 32.2 % (ref 32–34.5)
MCV RBC AUTO: 90.9 FL (ref 80–99.9)
METHADONE SCREEN, URINE: NOT DETECTED
MICROALBUMIN UR-MCNC: <12 MG/L
MICROALBUMIN/CREAT UR-RTO: ABNORMAL (ref 0–30)
MONOCYTES ABSOLUTE: 1.04 E9/L (ref 0.1–0.95)
MONOCYTES RELATIVE PERCENT: 7.6 % (ref 2–12)
NEUTROPHILS ABSOLUTE: 8.62 E9/L (ref 1.8–7.3)
NEUTROPHILS RELATIVE PERCENT: 62.5 % (ref 43–80)
OPIATE SCREEN URINE: NOT DETECTED
OXYCODONE URINE: NOT DETECTED
PDW BLD-RTO: 13.4 FL (ref 11.5–15)
PHENCYCLIDINE SCREEN URINE: NOT DETECTED
PLATELET # BLD: 361 E9/L (ref 130–450)
PMV BLD AUTO: 10.1 FL (ref 7–12)
POTASSIUM SERPL-SCNC: 4.2 MMOL/L (ref 3.5–5)
RBC # BLD: 5.29 E12/L (ref 3.8–5.8)
SODIUM BLD-SCNC: 135 MMOL/L (ref 132–146)
T4 FREE: 0.99 NG/DL (ref 0.93–1.7)
TOTAL PROTEIN: 7.5 G/DL (ref 6.4–8.3)
TRIGL SERPL-MCNC: 144 MG/DL (ref 0–149)
TSH SERPL DL<=0.05 MIU/L-ACNC: 3.64 UIU/ML (ref 0.27–4.2)
URIC ACID, SERUM: 3.6 MG/DL (ref 3.4–7)
VLDLC SERPL CALC-MCNC: 29 MG/DL
WBC # BLD: 13.8 E9/L (ref 4.5–11.5)

## 2022-02-10 PROCEDURE — 80076 HEPATIC FUNCTION PANEL: CPT

## 2022-02-10 PROCEDURE — 80307 DRUG TEST PRSMV CHEM ANLYZR: CPT

## 2022-02-10 PROCEDURE — 84439 ASSAY OF FREE THYROXINE: CPT

## 2022-02-10 PROCEDURE — 82140 ASSAY OF AMMONIA: CPT

## 2022-02-10 PROCEDURE — 80048 BASIC METABOLIC PNL TOTAL CA: CPT

## 2022-02-10 PROCEDURE — 84443 ASSAY THYROID STIM HORMONE: CPT

## 2022-02-10 PROCEDURE — 82044 UR ALBUMIN SEMIQUANTITATIVE: CPT

## 2022-02-10 PROCEDURE — 83036 HEMOGLOBIN GLYCOSYLATED A1C: CPT

## 2022-02-10 PROCEDURE — 82570 ASSAY OF URINE CREATININE: CPT

## 2022-02-10 PROCEDURE — 85025 COMPLETE CBC W/AUTO DIFF WBC: CPT

## 2022-02-10 PROCEDURE — 84550 ASSAY OF BLOOD/URIC ACID: CPT

## 2022-02-10 PROCEDURE — 36415 COLL VENOUS BLD VENIPUNCTURE: CPT

## 2022-02-10 PROCEDURE — 80061 LIPID PANEL: CPT

## 2022-03-01 DIAGNOSIS — F90.1 ATTENTION DEFICIT HYPERACTIVITY DISORDER (ADHD), PREDOMINANTLY HYPERACTIVE TYPE: ICD-10-CM

## 2022-03-01 RX ORDER — DEXTROAMPHETAMINE SACCHARATE, AMPHETAMINE ASPARTATE, DEXTROAMPHETAMINE SULFATE AND AMPHETAMINE SULFATE 2.5; 2.5; 2.5; 2.5 MG/1; MG/1; MG/1; MG/1
20 TABLET ORAL DAILY
Qty: 60 TABLET | Refills: 0 | Status: SHIPPED
Start: 2022-03-01 | End: 2022-04-02

## 2022-03-01 RX ORDER — GLUCOSAMINE HCL/CHONDROITIN SU 500-400 MG
CAPSULE ORAL
Qty: 300 STRIP | Refills: 1 | Status: SHIPPED | OUTPATIENT
Start: 2022-03-01

## 2022-03-01 RX ORDER — GLUCOSAMINE HCL/CHONDROITIN SU 500-400 MG
1 CAPSULE ORAL
COMMUNITY
End: 2022-03-01 | Stop reason: SDUPTHER

## 2022-03-24 ENCOUNTER — OFFICE VISIT (OUTPATIENT)
Dept: FAMILY MEDICINE CLINIC | Age: 42
End: 2022-03-24
Payer: COMMERCIAL

## 2022-03-24 VITALS
DIASTOLIC BLOOD PRESSURE: 78 MMHG | OXYGEN SATURATION: 97 % | BODY MASS INDEX: 37.68 KG/M2 | SYSTOLIC BLOOD PRESSURE: 124 MMHG | WEIGHT: 262.6 LBS | TEMPERATURE: 97.2 F | HEART RATE: 85 BPM

## 2022-03-24 DIAGNOSIS — B96.89 ACUTE BACTERIAL SINUSITIS: ICD-10-CM

## 2022-03-24 DIAGNOSIS — E11.8 TYPE 2 DIABETES MELLITUS WITH COMPLICATION, WITHOUT LONG-TERM CURRENT USE OF INSULIN (HCC): Primary | ICD-10-CM

## 2022-03-24 DIAGNOSIS — J01.90 ACUTE BACTERIAL SINUSITIS: ICD-10-CM

## 2022-03-24 LAB
CHP ED QC CHECK: NORMAL
GLUCOSE BLD-MCNC: 248 MG/DL

## 2022-03-24 PROCEDURE — 3051F HG A1C>EQUAL 7.0%<8.0%: CPT | Performed by: FAMILY MEDICINE

## 2022-03-24 PROCEDURE — G8417 CALC BMI ABV UP PARAM F/U: HCPCS | Performed by: FAMILY MEDICINE

## 2022-03-24 PROCEDURE — 99213 OFFICE O/P EST LOW 20 MIN: CPT | Performed by: FAMILY MEDICINE

## 2022-03-24 PROCEDURE — 82962 GLUCOSE BLOOD TEST: CPT | Performed by: FAMILY MEDICINE

## 2022-03-24 PROCEDURE — G8427 DOCREV CUR MEDS BY ELIG CLIN: HCPCS | Performed by: FAMILY MEDICINE

## 2022-03-24 PROCEDURE — 1036F TOBACCO NON-USER: CPT | Performed by: FAMILY MEDICINE

## 2022-03-24 PROCEDURE — G8482 FLU IMMUNIZE ORDER/ADMIN: HCPCS | Performed by: FAMILY MEDICINE

## 2022-03-24 PROCEDURE — 2022F DILAT RTA XM EVC RTNOPTHY: CPT | Performed by: FAMILY MEDICINE

## 2022-03-24 RX ORDER — PSEUDOEPHEDRINE HCL 120 MG/1
120 TABLET, FILM COATED, EXTENDED RELEASE ORAL EVERY 12 HOURS
Qty: 30 TABLET | Refills: 0 | Status: SHIPPED | OUTPATIENT
Start: 2022-03-24 | End: 2022-04-08

## 2022-03-24 RX ORDER — AMOXICILLIN AND CLAVULANATE POTASSIUM 875; 125 MG/1; MG/1
1 TABLET, FILM COATED ORAL 2 TIMES DAILY
Qty: 14 TABLET | Refills: 0 | Status: SHIPPED | OUTPATIENT
Start: 2022-03-24 | End: 2022-03-31

## 2022-03-24 ASSESSMENT — ENCOUNTER SYMPTOMS
SINUS PRESSURE: 1
GASTROINTESTINAL NEGATIVE: 1
RHINORRHEA: 1
SINUS PAIN: 1
TROUBLE SWALLOWING: 0
SORE THROAT: 1
RESPIRATORY NEGATIVE: 1
EYES NEGATIVE: 1

## 2022-03-24 NOTE — PROGRESS NOTES
Ángel Walsh (:  1980) is a 35 y.o. male,Established patient, here for evaluation of the following chief complaint(s):  Congestion         ASSESSMENT/PLAN:  1. Type 2 diabetes mellitus with complication, without long-term current use of insulin ([facility])  -     POCT Glucose  2. Acute bacterial sinusitis  -     amoxicillin-clavulanate (AUGMENTIN) 875-125 MG per tablet; Take 1 tablet by mouth 2 times daily for 7 days, Disp-14 tablet, R-0Normal  -     pseudoephedrine (SUDAFED 12 HOUR) 120 MG extended release tablet; Take 1 tablet by mouth every 12 hours for 15 days, Disp-30 tablet, R-0Normal    At this time we will treat symptomatically. Patient will continue to monitor his blood sugars. If not back to normal by Monday will contact the office for further evaluation and treatment. Patient states he has missed no doses of medication in the last week. No follow-ups on file. Subjective   SUBJECTIVE/OBJECTIVE:  HPI  Patient presents today for evaluation of possible sinus infection. Patient states he has been having more issues with sinus congestion and bilateral ear pain for the past several days. Denies any fever or chills. Denies any chest pain or shortness of breath. He has noticed an increase in his blood sugars over the last week. Blood sugar in the office today was 248. Patient states he has not skipped or missed no doses of medication in the last several weeks. Denies any known sick contact. Denies any loss of taste or smell. Denies any nausea vomiting or diarrhea. Review of Systems   HENT: Positive for congestion, ear pain, postnasal drip, rhinorrhea, sinus pressure, sinus pain and sore throat. Negative for trouble swallowing. Eyes: Negative. Respiratory: Negative. Cardiovascular: Negative. Gastrointestinal: Negative. Musculoskeletal: Negative for neck pain. Skin: Negative for rash. Neurological: Negative for headaches. Hematological: Negative for adenopathy. All other systems reviewed and are negative. Current Outpatient Medications:     amoxicillin-clavulanate (AUGMENTIN) 875-125 MG per tablet, Take 1 tablet by mouth 2 times daily for 7 days, Disp: 14 tablet, Rfl: 0    pseudoephedrine (SUDAFED 12 HOUR) 120 MG extended release tablet, Take 1 tablet by mouth every 12 hours for 15 days, Disp: 30 tablet, Rfl: 0    blood glucose monitor strips, Test 3 times a day & as needed for symptoms of irregular blood glucose. Dispense sufficient amount for indicated testing frequency plus additional to accommodate PRN testing needs. , Disp: 300 strip, Rfl: 1    amphetamine-dextroamphetamine (ADDERALL, 10MG,) 10 MG tablet, Take 2 tablets by mouth daily for 30 days. , Disp: 60 tablet, Rfl: 0    zonisamide (ZONEGRAN) 50 MG capsule, Take 2 capsules by mouth nightly, Disp: 180 capsule, Rfl: 0    Rimegepant Sulfate 75 MG TBDP, Take 75 mg by mouth every other day No more than 1 tab in 24 hours, Disp: 16 tablet, Rfl: 11    metFORMIN (GLUCOPHAGE) 1000 MG tablet, Take 1 tablet by mouth 2 times daily (with meals), Disp: 180 tablet, Rfl: 1    atorvastatin (LIPITOR) 40 MG tablet, Take 1 tablet by mouth daily, Disp: 90 tablet, Rfl: 3    metoprolol succinate (TOPROL XL) 100 MG extended release tablet, Take 1 tablet by mouth 2 times daily, Disp: 180 tablet, Rfl: 3    ammonium lactate (LAC-HYDRIN) 12 % lotion, Apply topically twice daily, Disp: 400 g, Rfl: 2    clonazePAM (KLONOPIN) 1 MG tablet, Take 1 mg by mouth 2 times daily as needed. , Disp: , Rfl:     ENULOSE 10 GM/15ML SOLN solution, as needed , Disp: , Rfl:     mirtazapine (REMERON) 30 MG tablet, TAKE ONE TABLET BY MOUTH once a day AT BEDTIME, Disp: , Rfl:     desonide (DESOWEN) 0.05 % cream, Apply topically 2 times daily. , Disp: 60 g, Rfl: 0    metroNIDAZOLE (METROGEL) 0.75 % gel, Apply topically 2 times daily. , Disp: 45 g, Rfl: 0    lactulose (CHRONULAC) 10 GM/15ML solution, TAKE 15 ML BY MOUTH 3 TIMES DAILY, Disp: 946 mL, Rfl: 0    medical marijuana, Take by mouth as needed. , Disp: , Rfl:     FLUoxetine (PROZAC) 20 MG capsule, Take 2 capsules by mouth daily, Disp: 60 capsule, Rfl: 0    rifaximin (XIFAXAN) 550 MG tablet, Take 550 mg by mouth 2 times daily, Disp: , Rfl:    Patient Active Problem List   Diagnosis    Anxiety    Hyperlipemia    Irritable bowel syndrome with diarrhea    Psychogenic nonepileptic seizure    Non-smoker    Depression    Type 2 diabetes mellitus with complication, without long-term current use of insulin ([facility])    Conversion disorder    Attention deficit hyperactivity disorder (ADHD), predominantly hyperactive type    Fatty liver    Migraine with aura and without status migrainosus, not intractable    Cervical spondylosis    Chronic left shoulder pain    Chronic pain of right knee     Past Medical History:   Diagnosis Date    Anxiety     Arthritis     Class 1 obesity due to excess calories with serious comorbidity and body mass index (BMI) of 34.0 to 34.9 in adult     First degree AV block     GERD (gastroesophageal reflux disease)     Hyperlipidemia     IBS (irritable bowel syndrome)     LVH (left ventricular hypertrophy)     Obesity     ENOC (obstructive sleep apnea)     Psychiatric problem     Psychogenic nonepileptic seizure     Stroke-like symptoms 08/14/2018    Type 2 diabetes mellitus with complication, without long-term current use of insulin ([facility]      Past Surgical History:   Procedure Laterality Date    HERNIA REPAIR      INSERTABLE CARDIAC MONITOR  07/14/2017    KNEE SURGERY      PACEMAKER INSERTION  12/28/2017    D-PPM   ([employer])   Hoa Zazueta    TONSILLECTOMY       Social History     Socioeconomic History    Marital status: Single     Spouse name: Not on file    Number of children: Not on file    Years of education: Not on file    Highest education level: Not on file   Occupational History    Not on file   Tobacco Use    Smoking status: Never Smoker    Smokeless tobacco: Never Used   Vaping Use    Vaping Use: Never used   Substance and Sexual Activity    Alcohol use: Yes     Comment: Socially- couple times weekly    Drug use: Yes     Types: Marijuana [place])     Comment: Medical- rarely    Sexual activity: Not on file   Other Topics Concern    Not on file   Social History Narrative    Not on file     Social Determinants of Health     Financial Resource Strain:     Difficulty of Paying Living Expenses: Not on file   Food Insecurity:     Worried About Running Out of Food in the Last Year: Not on file    Casi of Food in the Last Year: Not on file   Transportation Needs:     Lack of Transportation (Medical): Not on file    Lack of Transportation (Non-Medical): Not on file   Physical Activity:     Days of Exercise per Week: Not on file    Minutes of Exercise per Session: Not on file   Stress:     Feeling of Stress : Not on file   Social Connections:     Frequency of Communication with Friends and Family: Not on file    Frequency of Social Gatherings with Friends and Family: Not on file    Attends Hoahaoism Services: Not on file    Active Member of [address] [place] [address] or Organizations: Not on file    Attends Club or Organization Meetings: Not on file    Marital Status: Not on file   Intimate Partner Violence:     Fear of Current or Ex-Partner: Not on file    Emotionally Abused: Not on file    Physically Abused: Not on file    Sexually Abused: Not on file   Housing Stability:     Unable to Pay for Housing in the Last Year: Not on file    Number of [place] in the Last Year: Not on file    Unstable Housing in the Last Year: Not on file     No family history on file. There are no preventive care reminders to display for this patient. There are no preventive care reminders to display for this patient.    Diabetes Management   Topic Date Due    Diabetic foot exam  Never done    Diabetic retinal exam  04/03/2020      There are no preventive care sounds: Normal breath sounds. No wheezing. Abdominal:      General: Bowel sounds are normal. There is no distension. Palpations: Abdomen is soft. Musculoskeletal:      Cervical back: Normal range of motion and neck supple. Lymphadenopathy:      Cervical: Cervical adenopathy present. Skin:     General: Skin is warm and dry. Findings: No rash. Neurological:      Mental Status: He is alert. Psychiatric:         Behavior: Behavior normal.                  An electronic signature was used to authenticate this note.     --Judd Mauro, DO

## 2022-04-02 ENCOUNTER — OFFICE VISIT (OUTPATIENT)
Dept: FAMILY MEDICINE CLINIC | Age: 42
End: 2022-04-02
Payer: COMMERCIAL

## 2022-04-02 VITALS
SYSTOLIC BLOOD PRESSURE: 120 MMHG | TEMPERATURE: 97.2 F | DIASTOLIC BLOOD PRESSURE: 82 MMHG | HEART RATE: 101 BPM | HEIGHT: 70 IN | OXYGEN SATURATION: 97 % | BODY MASS INDEX: 37.68 KG/M2

## 2022-04-02 DIAGNOSIS — R09.81 NASAL CONGESTION: ICD-10-CM

## 2022-04-02 DIAGNOSIS — J01.90 ACUTE NON-RECURRENT SINUSITIS, UNSPECIFIED LOCATION: Primary | ICD-10-CM

## 2022-04-02 DIAGNOSIS — R09.82 POSTNASAL DRIP: ICD-10-CM

## 2022-04-02 PROCEDURE — G8427 DOCREV CUR MEDS BY ELIG CLIN: HCPCS | Performed by: PHYSICIAN ASSISTANT

## 2022-04-02 PROCEDURE — 99213 OFFICE O/P EST LOW 20 MIN: CPT | Performed by: PHYSICIAN ASSISTANT

## 2022-04-02 PROCEDURE — 1036F TOBACCO NON-USER: CPT | Performed by: PHYSICIAN ASSISTANT

## 2022-04-02 PROCEDURE — G8417 CALC BMI ABV UP PARAM F/U: HCPCS | Performed by: PHYSICIAN ASSISTANT

## 2022-04-02 RX ORDER — CEFDINIR 300 MG/1
300 CAPSULE ORAL 2 TIMES DAILY
Qty: 20 CAPSULE | Refills: 0 | Status: SHIPPED | OUTPATIENT
Start: 2022-04-02 | End: 2022-04-12

## 2022-04-02 RX ORDER — CHLORPHENIRAMINE MALEATE 4 MG/1
4 TABLET ORAL EVERY 6 HOURS PRN
Qty: 20 TABLET | Refills: 0 | Status: SHIPPED | OUTPATIENT
Start: 2022-04-02

## 2022-04-02 NOTE — PROGRESS NOTES
22  Elaine Ear : 1980 Sex: male  Age 35 y.o. Subjective:  Chief Complaint   Patient presents with    Sinusitis     was in for sinus infection 3/24, sugars off from abx         HPI:   Elaine Leon , 35 y.o. male presents to [facility] for evaluation of sinus congestion, drainage    HPI  73-year-old male presents to [facility] for evaluation of sinus congestion, drainage. The patient said the symptoms ongoing for about a week now. The patient was recently placed on Augmentin. The patient states that he still having quite a bit of congestion, drainage. Is not really coughing. His eyes have been watering. The patient has not noted any fevers. States his glucose has been fluctuating and this morning was 200. The patient was in last week and was 248. ROS:   Unless otherwise stated in this report the patient's positive and negative responses for review of systems for constitutional, eyes, ENT, cardiovascular, respiratory, gastrointestinal, neurological, , musculoskeletal, and integument systems and related systems to the presenting problem are either stated in the history of present illness or were not pertinent or were negative for the symptoms and/or complaints related to the presenting medical problem. Positives and pertinent negatives as per HPI. All others reviewed and are negative.       PMH:     Past Medical History:   Diagnosis Date    Anxiety     Arthritis     Class 1 obesity due to excess calories with serious comorbidity and body mass index (BMI) of 34.0 to 34.9 in adult     First degree AV block     GERD (gastroesophageal reflux disease)     Hyperlipidemia     IBS (irritable bowel syndrome)     LVH (left ventricular hypertrophy)     Obesity     ENOC (obstructive sleep apnea)     Psychiatric problem     Psychogenic nonepileptic seizure     Stroke-like symptoms 2018    Type 2 diabetes mellitus with complication, without long-term current use of insulin [facility])        Past Surgical History:   Procedure Laterality Date    HERNIA REPAIR      INSERTABLE CARDIAC MONITOR  07/14/2017    KNEE SURGERY      PACEMAKER INSERTION  12/28/2017    D-PPM   ([employer])   CHELSEY    TONSILLECTOMY         History reviewed. No pertinent family history. Medications:     Current Outpatient Medications:     cefdinir (OMNICEF) 300 MG capsule, Take 1 capsule by mouth 2 times daily for 10 days, Disp: 20 capsule, Rfl: 0    chlorpheniramine (ALLER-CHLOR) 4 MG tablet, Take 1 tablet by mouth every 6 hours as needed for Allergies, Disp: 20 tablet, Rfl: 0    pseudoephedrine (SUDAFED 12 HOUR) 120 MG extended release tablet, Take 1 tablet by mouth every 12 hours for 15 days, Disp: 30 tablet, Rfl: 0    blood glucose monitor strips, Test 3 times a day & as needed for symptoms of irregular blood glucose. Dispense sufficient amount for indicated testing frequency plus additional to accommodate PRN testing needs. , Disp: 300 strip, Rfl: 1    zonisamide (ZONEGRAN) 50 MG capsule, Take 2 capsules by mouth nightly, Disp: 180 capsule, Rfl: 0    Rimegepant Sulfate 75 MG TBDP, Take 75 mg by mouth every other day No more than 1 tab in 24 hours, Disp: 16 tablet, Rfl: 11    metFORMIN (GLUCOPHAGE) 1000 MG tablet, Take 1 tablet by mouth 2 times daily (with meals), Disp: 180 tablet, Rfl: 1    atorvastatin (LIPITOR) 40 MG tablet, Take 1 tablet by mouth daily, Disp: 90 tablet, Rfl: 3    metoprolol succinate (TOPROL XL) 100 MG extended release tablet, Take 1 tablet by mouth 2 times daily, Disp: 180 tablet, Rfl: 3    ammonium lactate (LAC-HYDRIN) 12 % lotion, Apply topically twice daily, Disp: 400 g, Rfl: 2    clonazePAM (KLONOPIN) 1 MG tablet, Take 1 mg by mouth 2 times daily as needed. , Disp: , Rfl:     ENULOSE 10 GM/15ML SOLN solution, as needed , Disp: , Rfl:     mirtazapine (REMERON) 30 MG tablet, TAKE ONE TABLET BY MOUTH once a day AT BEDTIME, Disp: , Rfl:     desonide (DESOWEN) 0.05 % cream, Apply topically 2 times daily. , Disp: 60 g, Rfl: 0    metroNIDAZOLE (METROGEL) 0.75 % gel, Apply topically 2 times daily. , Disp: 45 g, Rfl: 0    lactulose (CHRONULAC) 10 GM/15ML solution, TAKE 15 ML BY MOUTH 3 TIMES DAILY, Disp: 946 mL, Rfl: 0    medical marijuana, Take by mouth as needed. , Disp: , Rfl:     FLUoxetine (PROZAC) 20 MG capsule, Take 2 capsules by mouth daily, Disp: 60 capsule, Rfl: 0    rifaximin (XIFAXAN) 550 MG tablet, Take 550 mg by mouth 2 times daily, Disp: , Rfl:     Allergies: Allergies   Allergen Reactions    Fish-Derived Products        Social History:     Social History     Tobacco Use    Smoking status: Never Smoker    Smokeless tobacco: Never Used   Vaping Use    Vaping Use: Never used   Substance Use Topics    Alcohol use: Yes     Comment: Socially- couple times weekly    Drug use: Yes     Types: Marijuana [place])     Comment: Medical- rarely       Patient lives at home. Physical Exam:     Vitals:    04/02/22 0838   BP: 120/82   Pulse: 101   Temp: 97.2 °F (36.2 °C)   SpO2: 97%   Height: 5' 10\" (1.778 m)       Exam:  Physical Exam  Nurse's notes and vital signs reviewed. The patient is not hypoxic. ? General: Alert, no acute distress, patient resting comfortably Patient is not toxic or lethargic. Skin: Warm, intact, no pallor noted. There is no evidence of rash at this time. Head: Normocephalic, atraumatic  Eye: Normal conjunctiva  Ears, Nose, Throat: Right tympanic membrane clear, left tympanic membrane clear. No drainage or discharge noted. No pre- or post-auricular tenderness, erythema, or swelling noted. Nasal congestion, rhinorrhea, no epistaxis. Posterior oropharynx shows erythema but no evidence of tonsillar hypertrophy, or exudate. the uvula is midline. No trismus or drooling is noted. Moist mucous membranes. Cardiovascular: Regular Rate and Rhythm  Respiratory: No acute distress, no rhonchi, wheezing or crackles noted.  No stridor or retractions are noted. Neurological: A&O x4, normal speech  Psychiatric: Cooperative         Testing:     glucose 200      Medical Decision Making:     Vital signs reviewed    Past medical history reviewed. Allergies reviewed. Medications reviewed. Patient on arrival does not appear to be in any apparent distress or discomfort. The patient has been seen and evaluated. The patient does not appear to be toxic or lethargic. The patient will be treated with cefdinir and chlorphentermine. The patient will discontinue the Augmentin. The patient was educated on the proper dosage of motrin and tylenol and the appropriate intervals of each. The patient is to increase fluid intake over the next several days. The patient is to use OTC decongestant as needed. The patient is to return to express care or go directly to the emergency department should any of the signs or symptoms worsen. The patient is to followup with primary care physician in 2-3 days for repeat evaluation. The patient has no other questions or concerns at this time the patient will be discharged home. Clinical Impression:   Edwige Welch was seen today for sinusitis. Diagnoses and all orders for this visit:    Acute non-recurrent sinusitis, unspecified location    Postnasal drip    Nasal congestion    Other orders  -     cefdinir (OMNICEF) 300 MG capsule; Take 1 capsule by mouth 2 times daily for 10 days  -     chlorpheniramine (ALLER-CHLOR) 4 MG tablet; Take 1 tablet by mouth every 6 hours as needed for Allergies        The patient is to call for any concerns or return if any of the signs or symptoms worsen. The patient is to follow-up with PCP in the next 2-3 days for repeat evaluation repeat assessment or go directly to the emergency department.      SIGNATURE: Ever Goyal III, PA-C

## 2022-04-05 ENCOUNTER — TELEPHONE (OUTPATIENT)
Dept: PRIMARY CARE CLINIC | Age: 42
End: 2022-04-05

## 2022-04-05 NOTE — TELEPHONE ENCOUNTER
Have him continue to monitor his sugar levels over the next several days at least twice daily and if still elevated call on Friday.

## 2022-04-05 NOTE — TELEPHONE ENCOUNTER
Patient mom calling to advise patient's BS reading is now 413 and she is concerned about pt. I advised ER due to the high reading and the fluctuating numbers. She voiced understanding.

## 2022-04-05 NOTE — TELEPHONE ENCOUNTER
Seen on Saturday for sinusitis. ABX was changed to Cefdinir     Having trouble with his sugars still flucuating. His glucose level was up over 300  last night and pretty much all day yesterday. This morning his glucose level was 140's. Since the 19th of March, his sugars been have over 200 every day. Called nurse hotline on insurance card and was told to try and drink a bottle of water an hour which seems to be helping.      He has an appointment on 4/14 and doesn't think coming back into [facility] would ramona help, but patient wanted to make you aware with what is going on and if you needed to see him prior to 4/14

## 2022-04-14 ENCOUNTER — OFFICE VISIT (OUTPATIENT)
Dept: PRIMARY CARE CLINIC | Age: 42
End: 2022-04-14
Payer: COMMERCIAL

## 2022-04-14 VITALS
BODY MASS INDEX: 38.22 KG/M2 | TEMPERATURE: 97.3 F | HEART RATE: 88 BPM | SYSTOLIC BLOOD PRESSURE: 120 MMHG | WEIGHT: 267 LBS | DIASTOLIC BLOOD PRESSURE: 76 MMHG | OXYGEN SATURATION: 98 % | HEIGHT: 70 IN

## 2022-04-14 DIAGNOSIS — J32.9 CHRONIC SINUSITIS, UNSPECIFIED LOCATION: Primary | ICD-10-CM

## 2022-04-14 DIAGNOSIS — R09.81 NASAL CONGESTION: ICD-10-CM

## 2022-04-14 DIAGNOSIS — E11.8 TYPE 2 DIABETES MELLITUS WITH COMPLICATION, WITHOUT LONG-TERM CURRENT USE OF INSULIN (HCC): ICD-10-CM

## 2022-04-14 LAB
Lab: NORMAL
QC PASS/FAIL: NORMAL
SARS-COV-2 RDRP RESP QL NAA+PROBE: NEGATIVE

## 2022-04-14 PROCEDURE — 87635 SARS-COV-2 COVID-19 AMP PRB: CPT | Performed by: FAMILY MEDICINE

## 2022-04-14 PROCEDURE — 3051F HG A1C>EQUAL 7.0%<8.0%: CPT | Performed by: FAMILY MEDICINE

## 2022-04-14 PROCEDURE — 1036F TOBACCO NON-USER: CPT | Performed by: FAMILY MEDICINE

## 2022-04-14 PROCEDURE — 2022F DILAT RTA XM EVC RTNOPTHY: CPT | Performed by: FAMILY MEDICINE

## 2022-04-14 PROCEDURE — G8427 DOCREV CUR MEDS BY ELIG CLIN: HCPCS | Performed by: FAMILY MEDICINE

## 2022-04-14 PROCEDURE — 99213 OFFICE O/P EST LOW 20 MIN: CPT | Performed by: FAMILY MEDICINE

## 2022-04-14 PROCEDURE — G8417 CALC BMI ABV UP PARAM F/U: HCPCS | Performed by: FAMILY MEDICINE

## 2022-04-14 RX ORDER — LEVOFLOXACIN 750 MG/1
750 TABLET ORAL DAILY
Qty: 5 TABLET | Refills: 0 | Status: SHIPPED | OUTPATIENT
Start: 2022-04-14 | End: 2022-04-19

## 2022-04-14 RX ORDER — AZELASTINE 1 MG/ML
1 SPRAY, METERED NASAL 2 TIMES DAILY
Qty: 60 ML | Refills: 1 | Status: SHIPPED | OUTPATIENT
Start: 2022-04-14

## 2022-04-14 RX ORDER — FLUCONAZOLE 150 MG/1
150 TABLET ORAL
Qty: 2 TABLET | Refills: 0 | Status: SHIPPED | OUTPATIENT
Start: 2022-04-14 | End: 2022-04-20

## 2022-04-14 ASSESSMENT — ENCOUNTER SYMPTOMS
RESPIRATORY NEGATIVE: 1
RHINORRHEA: 1
PHOTOPHOBIA: 1
GASTROINTESTINAL NEGATIVE: 1
SINUS PRESSURE: 1
SINUS PAIN: 1
TROUBLE SWALLOWING: 0
SORE THROAT: 1

## 2022-04-14 NOTE — PROGRESS NOTES
Mel Eckert (:  1980) is a 35 y.o. male,Established patient, here for evaluation of the following chief complaint(s):  3 Month Follow-Up, Sinusitis (x1 month), and Blood Sugar Problem (elevated. fbs 150 this morning)         ASSESSMENT/PLAN:  1. Chronic sinusitis, unspecified location  -     levoFLOXacin (LEVAQUIN) 750 MG tablet; Take 1 tablet by mouth daily for 5 days, Disp-5 tablet, R-0Normal  -     fluconazole (DIFLUCAN) 150 MG tablet; Take 1 tablet by mouth every 72 hours for 6 days, Disp-2 tablet, R-0Normal  -     azelastine (ASTELIN) 0.1 % nasal spray; 1 spray by Nasal route 2 times daily Use in each nostril as directed, Disp-60 mL, R-1Normal  2. Nasal congestion  -     POCT COVID-19 Rapid, NAAT  3. Type 2 diabetes mellitus with complication, without long-term current use of insulin (HCC)    Covid negative. We will treat for sinus issues and continue to monitor his blood sugars. Follow-up in 3 months or sooner if needed. Return in about 3 months (around 2022). Subjective   SUBJECTIVE/OBJECTIVE:  HPI  Patient presents today for evaluation of possible sinus infection. Patient states he has been having more issues with sinus congestion and bilateral ear pain for the past several days. Denies any fever or chills. Denies any chest pain or shortness of breath. He has noticed an increase in his blood sugars over the last week. Blood sugar in the office today was 248. Patient states he has not skipped or missed no doses of medication in the last several weeks. Denies any known sick contact. Denies any loss of taste or smell. Denies any nausea vomiting or diarrhea. Update 2022  Patient presents today for follow-up on chronic issues. Patient states he is still having sinus congestion and issues despite previous treatment.   Sugars are slowly coming back down to normal.  Patient also has been having issues with increased photophobia over the last several weeks which may be related to his current infection. Denies any other issues at this time. As noted above Covid test in office negative today. Review of Systems   HENT: Positive for congestion, ear pain, postnasal drip, rhinorrhea, sinus pressure, sinus pain and sore throat. Negative for trouble swallowing. Eyes: Positive for photophobia. Respiratory: Negative. Cardiovascular: Negative. Gastrointestinal: Negative. Musculoskeletal: Negative for neck pain. Skin: Negative for rash. Neurological: Negative for headaches. Hematological: Negative for adenopathy. All other systems reviewed and are negative. Current Outpatient Medications:     levoFLOXacin (LEVAQUIN) 750 MG tablet, Take 1 tablet by mouth daily for 5 days, Disp: 5 tablet, Rfl: 0    fluconazole (DIFLUCAN) 150 MG tablet, Take 1 tablet by mouth every 72 hours for 6 days, Disp: 2 tablet, Rfl: 0    azelastine (ASTELIN) 0.1 % nasal spray, 1 spray by Nasal route 2 times daily Use in each nostril as directed, Disp: 60 mL, Rfl: 1    chlorpheniramine (ALLER-CHLOR) 4 MG tablet, Take 1 tablet by mouth every 6 hours as needed for Allergies, Disp: 20 tablet, Rfl: 0    blood glucose monitor strips, Test 3 times a day & as needed for symptoms of irregular blood glucose. Dispense sufficient amount for indicated testing frequency plus additional to accommodate PRN testing needs. , Disp: 300 strip, Rfl: 1    zonisamide (ZONEGRAN) 50 MG capsule, Take 2 capsules by mouth nightly, Disp: 180 capsule, Rfl: 0    Rimegepant Sulfate 75 MG TBDP, Take 75 mg by mouth every other day No more than 1 tab in 24 hours, Disp: 16 tablet, Rfl: 11    metFORMIN (GLUCOPHAGE) 1000 MG tablet, Take 1 tablet by mouth 2 times daily (with meals), Disp: 180 tablet, Rfl: 1    atorvastatin (LIPITOR) 40 MG tablet, Take 1 tablet by mouth daily, Disp: 90 tablet, Rfl: 3    metoprolol succinate (TOPROL XL) 100 MG extended release tablet, Take 1 tablet by mouth 2 times daily, Disp: 180 tablet, Rfl: 3    ammonium lactate (LAC-HYDRIN) 12 % lotion, Apply topically twice daily, Disp: 400 g, Rfl: 2    clonazePAM (KLONOPIN) 1 MG tablet, Take 1 mg by mouth 2 times daily as needed. , Disp: , Rfl:     ENULOSE 10 GM/15ML SOLN solution, as needed , Disp: , Rfl:     mirtazapine (REMERON) 30 MG tablet, TAKE ONE TABLET BY MOUTH once a day AT BEDTIME, Disp: , Rfl:     desonide (DESOWEN) 0.05 % cream, Apply topically 2 times daily. , Disp: 60 g, Rfl: 0    metroNIDAZOLE (METROGEL) 0.75 % gel, Apply topically 2 times daily. , Disp: 45 g, Rfl: 0    lactulose (CHRONULAC) 10 GM/15ML solution, TAKE 15 ML BY MOUTH 3 TIMES DAILY, Disp: 946 mL, Rfl: 0    medical marijuana, Take by mouth as needed. , Disp: , Rfl:     FLUoxetine (PROZAC) 20 MG capsule, Take 2 capsules by mouth daily, Disp: 60 capsule, Rfl: 0    rifaximin (XIFAXAN) 550 MG tablet, Take 550 mg by mouth 2 times daily, Disp: , Rfl:    Patient Active Problem List   Diagnosis    Anxiety    Hyperlipemia    Irritable bowel syndrome with diarrhea    Psychogenic nonepileptic seizure    Non-smoker    Depression    Type 2 diabetes mellitus with complication, without long-term current use of insulin ([facility])    Conversion disorder    Attention deficit hyperactivity disorder (ADHD), predominantly hyperactive type    Fatty liver    Migraine with aura and without status migrainosus, not intractable    Cervical spondylosis    Chronic left shoulder pain    Chronic pain of right knee     Past Medical History:   Diagnosis Date    Anxiety     Arthritis     Class 1 obesity due to excess calories with serious comorbidity and body mass index (BMI) of 34.0 to 34.9 in adult     First degree AV block     GERD (gastroesophageal reflux disease)     Hyperlipidemia     IBS (irritable bowel syndrome)     LVH (left ventricular hypertrophy)     Obesity     ENOC (obstructive sleep apnea)     Psychiatric problem     Psychogenic nonepileptic seizure     Stroke-like symptoms 08/14/2018    Type 2 diabetes mellitus with complication, without long-term current use of insulin ([facility])      Past Surgical History:   Procedure Laterality Date    HERNIA REPAIR      INSERTABLE CARDIAC MONITOR  07/14/2017    KNEE SURGERY      PACEMAKER INSERTION  12/28/2017    D-PPM   ([facility])   [facility]    TONSILLECTOMY       Social History     Socioeconomic History    Marital status: Single     Spouse name: Not on file    Number of children: Not on file    Years of education: Not on file    Highest education level: Not on file   Occupational History    Not on file   Tobacco Use    Smoking status: Never Smoker    Smokeless tobacco: Never Used   Vaping Use    Vaping Use: Never used   Substance and Sexual Activity    Alcohol use: Yes     Comment: Socially- couple times weekly    Drug use: Yes     Types: Marijuana Alma Whaley)     Comment: Medical- rarely    Sexual activity: Not on file   Other Topics Concern    Not on file   Social History Narrative    Not on file     Social Determinants of Health     Financial Resource Strain:     Difficulty of Paying Living Expenses: Not on file   Food Insecurity:     Worried About [address] in the Last Year: Not on file    [address] in the Last Year: Not on file   Transportation Needs:     Lack of Transportation (Medical): Not on file    Lack of Transportation (Non-Medical):  Not on file   Physical Activity:     Days of Exercise per Week: Not on file    Minutes of Exercise per Session: Not on file   Stress:     Feeling of Stress : Not on file   Social Connections:     Frequency of Communication with Friends and Family: Not on file    Frequency of Social Gatherings with Friends and Family: Not on file    Attends Bahai Services: Not on file    Active Member of Clubs or Organizations: Not on file    Attends Club or Organization Meetings: Not on file    Marital Status: Not on file   Intimate Partner Violence:     Fear of Current or Ex-Partner: Not on file    Emotionally Abused: Not on file    Physically Abused: Not on file    Sexually Abused: Not on file   Housing Stability:     Unable to Pay for Housing in the Last Year: Not on file    Number of Places Lived in the Last Year: Not on file    Unstable Housing in the Last Year: Not on file     History reviewed. No pertinent family history. There are no preventive care reminders to display for this patient. There are no preventive care reminders to display for this patient. Diabetes Management   Topic Date Due    Diabetic foot exam  Never done    Diabetic retinal exam  04/03/2020      There are no preventive care reminders to display for this patient. Health Maintenance   Topic Date Due    Pneumococcal 0-64 years Vaccine (1 - PCV) Never done    Diabetic foot exam  Never done    Hepatitis B vaccine (2 of 3 - Risk 3-dose series) 08/29/2016    Diabetic retinal exam  04/03/2020    Depression Monitoring  01/13/2023    A1C test (Diabetic or Prediabetic)  02/10/2023    Diabetic microalbuminuria test  02/10/2023    Lipid screen  02/10/2023    DTaP/Tdap/Td vaccine (2 - Td or Tdap) 04/20/2026    Flu vaccine  Completed    COVID-19 Vaccine  Completed    Hepatitis C screen  Completed    Hepatitis A vaccine  Aged Out    Hib vaccine  Aged Out    Meningococcal (ACWY) vaccine  Aged Out    HIV screen  Discontinued      There are no preventive care reminders to display for this patient. There are no preventive care reminders to display for this patient. /76   Pulse 88   Temp 97.3 °F (36.3 °C)   Ht 5' 10\" (1.778 m)   Wt 267 lb (121.1 kg)   SpO2 98%   BMI 38.31 kg/m²     Objective   Physical Exam  Vitals reviewed. Constitutional:       Appearance: He is well-developed. He is obese. He is ill-appearing. HENT:      Head: Normocephalic and atraumatic. Right Ear: Hearing normal. A middle ear effusion is present. Tympanic membrane is bulging.       Left Ear: Hearing normal. A middle ear effusion is present. Tympanic membrane is bulging. Nose: Nose normal.      Mouth/Throat:      Dentition: Normal dentition. Pharynx: Posterior oropharyngeal erythema present. No oropharyngeal exudate. Tonsils: No tonsillar exudate. Eyes:      Conjunctiva/sclera: Conjunctivae normal.      Pupils: Pupils are equal, round, and reactive to light. Cardiovascular:      Rate and Rhythm: Normal rate and regular rhythm. Heart sounds: Normal heart sounds. No murmur heard. Pulmonary:      Effort: Pulmonary effort is normal. No respiratory distress. Breath sounds: Normal breath sounds. No wheezing. Abdominal:      General: Bowel sounds are normal. There is no distension. Palpations: Abdomen is soft. Musculoskeletal:      Cervical back: Normal range of motion and neck supple. Lymphadenopathy:      Cervical: No cervical adenopathy. Skin:     General: Skin is warm and dry. Findings: No rash. Neurological:      Mental Status: He is alert. Psychiatric:         Behavior: Behavior normal.                  An electronic signature was used to authenticate this note.     --Huong Mauro DO

## 2022-04-20 DIAGNOSIS — G43.109 MIGRAINE WITH AURA AND WITHOUT STATUS MIGRAINOSUS, NOT INTRACTABLE: ICD-10-CM

## 2022-04-20 RX ORDER — ZONISAMIDE 50 MG/1
100 CAPSULE ORAL NIGHTLY
Qty: 180 CAPSULE | Refills: 0 | Status: SHIPPED
Start: 2022-04-20 | End: 2022-06-29 | Stop reason: SDUPTHER

## 2022-06-16 ENCOUNTER — TELEPHONE (OUTPATIENT)
Dept: NON INVASIVE DIAGNOSTICS | Age: 42
End: 2022-06-16

## 2022-06-20 ENCOUNTER — TELEPHONE (OUTPATIENT)
Dept: NON INVASIVE DIAGNOSTICS | Age: 42
End: 2022-06-20

## 2022-06-20 NOTE — TELEPHONE ENCOUNTER
----- Message from Mariely Chiu sent at 6/16/2022 10:34 AM EDT -----    ----- Message -----  From: Jacquelyn Botello RN  Sent: 6/16/2022  10:11 AM EDT  To: Mariely Morton [id number]    Patient needs September appointment with KAREN

## 2022-06-28 NOTE — PROGRESS NOTES
[address]. Mandi Hernandez M.D., F.A.C.P. Gil Wheeler, DNP, APRN, CNS  Margaret Dakin. Manda Hawthorne, MSN, APRN-FNP-C  Indira Willis MSN, APRN, FNP-C  Anuradha GAR, BRET  [address] MSN, APRN, FNP-C  [address], [address], [address]  Phone: [phone]  Fax: [phone]        Fany Torres a 35 y.o. right handed man    We are following him for essential tremor and migraines    He presents alone today and is a good historian    He had a very positive response to changing Nurtec to preventive dosing--- with a reduction in his headache frequency and severity. Also on Zonegran, Prozac and Remeron. Flexeril is helpful for his neck pains. He continues to follow with [facility] orthopedics for this. He still notes left hand weakness, bilateral hand tremors, and difficulty holding things in his left hand--but no worsening. No issues performing his ADLs. He is on beta-blockade. Medications failed: Primidone. Triptans are contraindicated with his current SSRI use    Still having issues with his nonepileptic seizures but he is identified political discussions with his brother as a trigger. He continues to follow with a mental health provider. He does not drive.     No chest pain or palpitations  No SOB  No vertigo, lightheadedness or loss of consciousness  No falls, tripping or stumbling  No incontinence of bowels or bladder  No itching or bruising appreciated  + Mild L sided weakness and tingling  No speech or swallowing troubles    ROS otherwise negative     Current Outpatient Medications   Medication Sig Dispense Refill    zonisamide (ZONEGRAN) 50 MG capsule Take 2 capsules by mouth nightly 180 capsule 0    azelastine (ASTELIN) 0.1 % nasal spray 1 spray by Nasal route 2 times daily Use in each nostril as directed 60 mL 1    chlorpheniramine (ALLER-CHLOR) 4 MG tablet Take 1 tablet by mouth every 6 hours as needed for Allergies 20 tablet 0  blood glucose monitor strips Test 3 times a day & as needed for symptoms of irregular blood glucose. Dispense sufficient amount for indicated testing frequency plus additional to accommodate PRN testing needs. 300 strip 1    Rimegepant Sulfate 75 MG TBDP Take 75 mg by mouth every other day No more than 1 tab in 24 hours 16 tablet 11    metFORMIN (GLUCOPHAGE) 1000 MG tablet Take 1 tablet by mouth 2 times daily (with meals) 180 tablet 1    atorvastatin (LIPITOR) 40 MG tablet Take 1 tablet by mouth daily 90 tablet 3    metoprolol succinate (TOPROL XL) 100 MG extended release tablet Take 1 tablet by mouth 2 times daily 180 tablet 3    ammonium lactate (LAC-HYDRIN) 12 % lotion Apply topically twice daily 400 g 2    clonazePAM (KLONOPIN) 1 MG tablet Take 1 mg by mouth 2 times daily as needed.  ENULOSE 10 GM/15ML SOLN solution as needed       mirtazapine (REMERON) 30 MG tablet TAKE ONE TABLET BY MOUTH once a day AT BEDTIME      desonide (DESOWEN) 0.05 % cream Apply topically 2 times daily. 60 g 0    metroNIDAZOLE (METROGEL) 0.75 % gel Apply topically 2 times daily. 45 g 0    lactulose (CHRONULAC) 10 GM/15ML solution TAKE 15 ML BY MOUTH 3 TIMES DAILY 946 mL 0    medical marijuana Take by mouth as needed.  FLUoxetine (PROZAC) 20 MG capsule Take 2 capsules by mouth daily 60 capsule 0    rifaximin (XIFAXAN) 550 MG tablet Take 550 mg by mouth 2 times daily       No current facility-administered medications for this visit.      Objective:     /76   Pulse 84   Temp 97 °F (36.1 °C)   Ht 5' 10\" (1.778 m)   Wt 262 lb (118.8 kg)   SpO2 97%   BMI 37.59 kg/m²     General exam: alert, appears stated age, in no acute distress--wearing dark glasses, wide brimmed hat  Head: normocephalic/atraumatic  Eyes: sclerae clear  Neck: full ROM-- tenderness to left cervical paraspinals and trapezius   Lungs: clear throughout  Heart: RRR, faint murmur appreciated  Ext: no edema or cyanosis  Pulses: 1+ throughout  Skin: intact    Mental Status: alert, oriented x4---pleasant     Appropriate attention/concentration  Intact memories and fundus of knowledge    Speech/language: no dysarthria or aphasias    Cranial Nerves:  II: visual fields full   II: pupils Slight physiologic anisocoria--R>L   III,VII: ptosis None   III,IV,VI: extraocular muscles  EOMI without nystagmus      V: mastication intact   V: facial light touch sensation  Intact    V,VII: corneal reflex     VII: facial muscle function   Intact   VIII: hearing intact   IX: soft palate elevation  intact   IX,X: gag reflex    XI: trapezius strength  5/5   XI: sternocleidomastoid strength 5/5   XI: neck extension strength  5/5   XII: tongue strength  midline     Motor:  5/5 throughout except 4+/5 L hand--with sudden giving way  Obese bulk and normal tone  No drift  Mild bilateral hand tremors when outstretched; worse on left  No resting tremors or cogwheeling    Sensory:  LT intact throughout    Coordination:   FN and FFM intact b/l    Gait:  Slow, cautious and wide based    DTR:   2+ throughout     No Nichols's no other pathologic reflexes    Laboratory/Radiology:     Lab Results   Component Value Date     02/10/2022    K 4.2 02/10/2022     02/10/2022    CO2 19 (L) 02/10/2022    BUN 9 02/10/2022    CREATININE 0.8 02/10/2022    GLUCOSE 248 03/24/2022    CALCIUM 9.7 02/10/2022    PROT 7.5 02/10/2022    LABALBU 4.3 02/10/2022    BILITOT 0.5 02/10/2022    ALKPHOS 113 02/10/2022    AST 25 02/10/2022    ALT 38 02/10/2022    LABGLOM >60 02/10/2022    GFRAA >60 02/10/2022       Lab Results   Component Value Date    WBC 13.8 (H) 02/10/2022    HGB 15.5 02/10/2022    HCT 48.1 02/10/2022    MCV 90.9 02/10/2022     02/10/2022    LYMPHOPCT 22.1 02/10/2022    RBC 5.29 02/10/2022    MCH 29.3 02/10/2022    MCHC 32.2 02/10/2022    RDW 13.4 02/10/2022     Lab Results   Component Value Date    LABA1C 7.1 (H) 02/10/2022       Results for Jah Ayala (MRN [id number]) as of 6/29/2022 13:27   Ref. Range 2/10/2022 08:30   CHOLESTEROL, TOTAL, 037103 Latest Ref Range: 0 - 199 mg/dL 131   HDL Cholesterol Latest Ref Range: >40 mg/dL 46   LDL Calculated Latest Ref Range: 0 - 99 mg/dL 56   Triglycerides Latest Ref Range: 0 - 149 mg/dL 144   VLDL Cholesterol Calculated Latest Units: mg/dL 29     Lab Results   Component Value Date    TSH 3.640 02/10/2022     All images personally reviewed today      Assessment:     Episodic migraines without aura: Excellent control on preventive use of Nurtec atop his other first-line medications. Cervical spondylosis and mild stenosis: Following with orthopedics. Flexeril was helpful. I find only his chronic mild lift hand weakness on exam today. Tremors: suspect essential vs functional/med induced. Currently nonproblematic    Psychogenic nonepileptic seizures: Proven by EMU. Follows closely with mental health provider.      Plan:     Continue Nurtec to every other day    Continue Flexeril 5 mg nightly PRN    Continue Zonegran 100 mg nightly    Avoid PNES and migraine triggers    Follow-up with mental health provider    Headache diary    RTO in 6 months or sooner PRN    NADIA Abdul - CNP, [facility]  9:23 AM  6/28/2022

## 2022-06-29 ENCOUNTER — OFFICE VISIT (OUTPATIENT)
Dept: NEUROLOGY | Age: 42
End: 2022-06-29
Payer: COMMERCIAL

## 2022-06-29 VITALS
HEIGHT: 70 IN | HEART RATE: 84 BPM | DIASTOLIC BLOOD PRESSURE: 76 MMHG | WEIGHT: 262 LBS | OXYGEN SATURATION: 97 % | TEMPERATURE: 97 F | SYSTOLIC BLOOD PRESSURE: 117 MMHG | BODY MASS INDEX: 37.51 KG/M2

## 2022-06-29 DIAGNOSIS — G43.109 MIGRAINE WITH AURA AND WITHOUT STATUS MIGRAINOSUS, NOT INTRACTABLE: ICD-10-CM

## 2022-06-29 DIAGNOSIS — F44.5 PSYCHOGENIC NONEPILEPTIC SEIZURE: ICD-10-CM

## 2022-06-29 DIAGNOSIS — M47.812 CERVICAL SPONDYLOSIS: ICD-10-CM

## 2022-06-29 DIAGNOSIS — R25.1 TREMOR OF LEFT HAND: Primary | ICD-10-CM

## 2022-06-29 PROCEDURE — 1036F TOBACCO NON-USER: CPT | Performed by: NURSE PRACTITIONER

## 2022-06-29 PROCEDURE — 99214 OFFICE O/P EST MOD 30 MIN: CPT | Performed by: NURSE PRACTITIONER

## 2022-06-29 PROCEDURE — G8417 CALC BMI ABV UP PARAM F/U: HCPCS | Performed by: NURSE PRACTITIONER

## 2022-06-29 PROCEDURE — G8427 DOCREV CUR MEDS BY ELIG CLIN: HCPCS | Performed by: NURSE PRACTITIONER

## 2022-06-29 RX ORDER — ZONISAMIDE 50 MG/1
100 CAPSULE ORAL NIGHTLY
Qty: 180 CAPSULE | Refills: 3 | Status: SHIPPED | OUTPATIENT
Start: 2022-06-29

## 2022-06-29 RX ORDER — CYCLOBENZAPRINE HCL 5 MG
5 TABLET ORAL NIGHTLY PRN
Qty: 30 TABLET | Refills: 11 | Status: SHIPPED | OUTPATIENT
Start: 2022-06-29 | End: 2023-06-24

## 2022-06-29 NOTE — PATIENT INSTRUCTIONS
headache, such as nausea, flashing lights or dark spots, or sensitivity to bright light or loud noise. Note if the headache occurred near your period. List anything that might have triggered the headache. Triggers may include certain foods (chocolate, cheese, wine) or odors, smoke, bright light, stress, or lack of sleep.  If your doctor has prescribed medicine for your migraines, take it as directed. You may have medicine that you take only when you get a migraine and medicine that you take all the time to help prevent migraines. ? If your doctor has prescribed medicine for when you get a headache, take it at the first sign of a migraine, unless your doctor has given you other instructions. ? If your doctor has prescribed medicine to prevent migraines, take it exactly as prescribed. Call your doctor if you think you are having a problem with your medicine.  Find healthy ways to deal with stress. Migraines are most common during or right after stressful times. Try finding ways to reduce stress like practicing mindfulness or deep breathing exercises.  Get plenty of sleep and exercise. But be careful to not push yourself too hard during exercise. It may trigger a headache.  Eat meals on a regular schedule. Avoid foods and drinks that often trigger migraines. These include chocolate, alcohol (especially red wine and port), aspartame, monosodium glutamate (MSG), and some additives found in foods (such as hot dogs, claros, cold cuts, aged cheeses, and pickled foods).  Limit caffeine. Don't drink too much coffee, tea, or soda. But don't quit caffeine suddenly. That can also give you migraines.  Do not smoke or allow others to smoke around you. If you need help quitting, talk to your doctor about stop-smoking programs and medicines. These can increase your chances of quitting for good.    If you are taking birth control pills or hormone therapy, talk to your doctor about whether they are triggering your migraines. When should you call for help? Call 911 anytime you think you may need emergency care. For example, call if:     You have signs of a stroke. These may include:  ? Sudden numbness, paralysis, or weakness in your face, arm, or leg, especially on only one side of your body. ? Sudden vision changes. ? Sudden trouble speaking. ? Sudden confusion or trouble understanding simple statements. ? Sudden problems with walking or balance. ? A sudden, severe headache that is different from past headaches. Call your doctor now or seek immediate medical care if:     You have new or worse nausea and vomiting.      You have a new or higher fever.      Your headache gets much worse. Watch closely for changes in your health, and be sure to contact your doctor if:     You are not getting better after 2 days (48 hours). Where can you learn more? Go to https://[email or web address] Entertainmentpedesireeeb.[employer]. org and sign in to your [employer] account. Enter S220 in the [employer] box to learn more about \"Migraine Headache: Care Instructions. \"     If you do not have an account, please click on the \"Sign Up Now\" link. Current as of: December 13, 2021               Content Version: 13.3  © 7109-0085 Healthwise, Inc[employer]. Care instructions adapted under license by [facility] ([facility]). If you have questions about a medical condition or this instruction, always ask your healthcare professional. [address]  any warranty or liability for your use of this information.

## 2022-07-05 RX ORDER — METOPROLOL SUCCINATE 100 MG/1
100 TABLET, EXTENDED RELEASE ORAL 2 TIMES DAILY
Qty: 180 TABLET | Refills: 3 | Status: SHIPPED | OUTPATIENT
Start: 2022-07-05

## 2022-07-07 ENCOUNTER — OFFICE VISIT (OUTPATIENT)
Dept: FAMILY MEDICINE CLINIC | Age: 42
End: 2022-07-07
Payer: COMMERCIAL

## 2022-07-07 VITALS
DIASTOLIC BLOOD PRESSURE: 78 MMHG | SYSTOLIC BLOOD PRESSURE: 122 MMHG | OXYGEN SATURATION: 98 % | BODY MASS INDEX: 37.51 KG/M2 | WEIGHT: 262 LBS | TEMPERATURE: 97.2 F | HEART RATE: 84 BPM | HEIGHT: 70 IN | RESPIRATION RATE: 18 BRPM

## 2022-07-07 DIAGNOSIS — R05.9 COUGH: ICD-10-CM

## 2022-07-07 DIAGNOSIS — R09.82 POSTNASAL DRIP: ICD-10-CM

## 2022-07-07 DIAGNOSIS — J01.90 ACUTE NON-RECURRENT SINUSITIS, UNSPECIFIED LOCATION: Primary | ICD-10-CM

## 2022-07-07 DIAGNOSIS — R09.81 NASAL CONGESTION: ICD-10-CM

## 2022-07-07 DIAGNOSIS — J06.9 ACUTE UPPER RESPIRATORY INFECTION, UNSPECIFIED: ICD-10-CM

## 2022-07-07 PROCEDURE — 1036F TOBACCO NON-USER: CPT | Performed by: PHYSICIAN ASSISTANT

## 2022-07-07 PROCEDURE — G8417 CALC BMI ABV UP PARAM F/U: HCPCS | Performed by: PHYSICIAN ASSISTANT

## 2022-07-07 PROCEDURE — G8427 DOCREV CUR MEDS BY ELIG CLIN: HCPCS | Performed by: PHYSICIAN ASSISTANT

## 2022-07-07 PROCEDURE — 99213 OFFICE O/P EST LOW 20 MIN: CPT | Performed by: PHYSICIAN ASSISTANT

## 2022-07-07 RX ORDER — METHYLPREDNISOLONE 4 MG/1
TABLET ORAL
Qty: 1 KIT | Refills: 0 | Status: SHIPPED
Start: 2022-07-07 | End: 2022-08-04

## 2022-07-07 RX ORDER — AMOXICILLIN AND CLAVULANATE POTASSIUM 875; 125 MG/1; MG/1
1 TABLET, FILM COATED ORAL 2 TIMES DAILY
Qty: 20 TABLET | Refills: 0 | Status: SHIPPED | OUTPATIENT
Start: 2022-07-07 | End: 2022-07-17

## 2022-07-07 NOTE — PROGRESS NOTES
22  Kala Gerber : 1980 Sex: male  Age 35 y.o. Subjective:  Chief Complaint   Patient presents with    Sinus Problem     does not want swabbed for COVID thinks sinus infection    Cough         HPI:   Kala Gerber , 35 y.o. male presents to express care for evaluation of sinus infection    HPI  24-year-old male presents to express care for evaluation of sinus congestion, drainage, cough. The patient has really had the symptoms ongoing since March. They wax and wane. The patient has been on a couple different rounds of medication but never seems to fully help. The patient is not any fevers or chills. He does have these intermittent coughing spells. Patient not really bringing up much sputum production. States that the sinus headache got worse last night. The patient is not currently on any antibiotics. No fever, chills, myalgias. He has had COVID-vaccine. ROS:   Unless otherwise stated in this report the patient's positive and negative responses for review of systems for constitutional, eyes, ENT, cardiovascular, respiratory, gastrointestinal, neurological, , musculoskeletal, and integument systems and related systems to the presenting problem are either stated in the history of present illness or were not pertinent or were negative for the symptoms and/or complaints related to the presenting medical problem. Positives and pertinent negatives as per HPI. All others reviewed and are negative.       PMH:     Past Medical History:   Diagnosis Date    Anxiety     Arthritis     Class 1 obesity due to excess calories with serious comorbidity and body mass index (BMI) of 34.0 to 34.9 in adult     First degree AV block     GERD (gastroesophageal reflux disease)     Hyperlipidemia     IBS (irritable bowel syndrome)     LVH (left ventricular hypertrophy)     Migraines     Obesity     ENOC (obstructive sleep apnea)     Psychiatric problem     Psychogenic nonepileptic seizure     Tremors of nervous system     Type 2 diabetes mellitus with complication, without long-term current use of insulin [facility])        Past Surgical History:   Procedure Laterality Date    HERNIA REPAIR      INSERTABLE CARDIAC MONITOR  07/14/2017    KNEE SURGERY      PACEMAKER INSERTION  12/28/2017    D-PPM   (MEDTRONIC)   JO-ANNHEDIDIER    TONSILLECTOMY         History reviewed. No pertinent family history. Medications:     Current Outpatient Medications:     amoxicillin-clavulanate (AUGMENTIN) 875-125 MG per tablet, Take 1 tablet by mouth 2 times daily for 10 days, Disp: 20 tablet, Rfl: 0    methylPREDNISolone (MEDROL DOSEPACK) 4 MG tablet, Take by mouth., Disp: 1 kit, Rfl: 0    metoprolol succinate (TOPROL XL) 100 MG extended release tablet, Take 1 tablet by mouth 2 times daily, Disp: 180 tablet, Rfl: 3    Rimegepant Sulfate 75 MG TBDP, Take 75 mg by mouth every other day No more than 1 tab in 24 hours, Disp: 16 tablet, Rfl: 11    zonisamide (ZONEGRAN) 50 MG capsule, Take 2 capsules by mouth nightly, Disp: 180 capsule, Rfl: 3    cyclobenzaprine (FLEXERIL) 5 MG tablet, Take 1 tablet by mouth nightly as needed for Muscle spasms, Disp: 30 tablet, Rfl: 11    azelastine (ASTELIN) 0.1 % nasal spray, 1 spray by Nasal route 2 times daily Use in each nostril as directed, Disp: 60 mL, Rfl: 1    chlorpheniramine (ALLER-CHLOR) 4 MG tablet, Take 1 tablet by mouth every 6 hours as needed for Allergies, Disp: 20 tablet, Rfl: 0    blood glucose monitor strips, Test 3 times a day & as needed for symptoms of irregular blood glucose. Dispense sufficient amount for indicated testing frequency plus additional to accommodate PRN testing needs. , Disp: 300 strip, Rfl: 1    metFORMIN (GLUCOPHAGE) 1000 MG tablet, Take 1 tablet by mouth 2 times daily (with meals), Disp: 180 tablet, Rfl: 1    atorvastatin (LIPITOR) 40 MG tablet, Take 1 tablet by mouth daily, Disp: 90 tablet, Rfl: 3    ammonium lactate (LAC-HYDRIN) 12 % lotion, Apply topically twice daily, Disp: 400 g, Rfl: 2    clonazePAM (KLONOPIN) 1 MG tablet, Take 1 mg by mouth 2 times daily as needed. , Disp: , Rfl:     ENULOSE 10 GM/15ML SOLN solution, as needed , Disp: , Rfl:     mirtazapine (REMERON) 30 MG tablet, TAKE ONE TABLET BY MOUTH once a day AT BEDTIME, Disp: , Rfl:     desonide (DESOWEN) 0.05 % cream, Apply topically 2 times daily. , Disp: 60 g, Rfl: 0    metroNIDAZOLE (METROGEL) 0.75 % gel, Apply topically 2 times daily. , Disp: 45 g, Rfl: 0    lactulose (CHRONULAC) 10 GM/15ML solution, TAKE 15 ML BY MOUTH 3 TIMES DAILY, Disp: 946 mL, Rfl: 0    medical marijuana, Take by mouth as needed. , Disp: , Rfl:     FLUoxetine (PROZAC) 20 MG capsule, Take 2 capsules by mouth daily, Disp: 60 capsule, Rfl: 0    rifaximin (XIFAXAN) 550 MG tablet, Take 550 mg by mouth 2 times daily, Disp: , Rfl:     Allergies: Allergies   Allergen Reactions    Fish-Derived Products        Social History:     Social History     Tobacco Use    Smoking status: Never Smoker    Smokeless tobacco: Never Used   Vaping Use    Vaping Use: Never used   Substance Use Topics    Alcohol use: Yes     Comment: Socially- couple times weekly    Drug use: Yes     Types: Marijuana Katelynn Landry)     Comment: Medical- rarely       Patient lives at home. Physical Exam:     Vitals:    07/07/22 0935   BP: 122/78   Site: Right Upper Arm   Position: Sitting   Cuff Size: Medium Adult   Pulse: 84   Resp: 18   Temp: 97.2 °F (36.2 °C)   TempSrc: Temporal   SpO2: 98%   Weight: 262 lb (118.8 kg)   Height: 5' 10\" (1.778 m)       Exam:  Physical Exam  Nurse's notes and vital signs reviewed. The patient is not hypoxic. ? General: Alert, no acute distress, patient resting comfortably Patient is not toxic or lethargic. Skin: Warm, intact, no pallor noted. There is no evidence of rash at this time.   Head: Normocephalic, atraumatic  Eye: Normal conjunctiva  Ears, Nose, Throat: Right tympanic membrane clear, left tympanic membrane clear. No drainage or discharge noted. No pre- or post-auricular tenderness, erythema, or swelling noted. Nasal congestion, rhinorrhea  Posterior oropharynx shows erythema, PND but no evidence of tonsillar hypertrophy, or exudate. the uvula is midline. No trismus or drooling is noted. Moist mucous membranes. Cardiovascular: Regular Rate and Rhythm  Respiratory: No acute distress, no rhonchi, wheezing or crackles noted. No stridor or retractions are noted. Neurological: A&O x4, normal speech  Psychiatric: Cooperative         Testing:           Medical Decision Making:     Vital signs reviewed    Past medical history reviewed. Allergies reviewed. Medications reviewed. Patient on arrival does not appear to be in any apparent distress or discomfort. The patient has been seen and evaluated. The patient does not appear to be toxic or lethargic. The patient did not want COVID swab. The patient will be treated with Augmentin and Medrol Dosepak. He will monitor his blood sugar. The patient was educated on the proper dosage of motrin and tylenol and the appropriate intervals of each. The patient is to increase fluid intake over the next several days. The patient is to use OTC decongestant as needed. The patient is to return to express care or go directly to the emergency department should any of the signs or symptoms worsen. The patient is to followup with primary care physician in 2-3 days for repeat evaluation. The patient has no other questions or concerns at this time the patient will be discharged home. Clinical Impression:   Renzo Davenport was seen today for sinus problem and cough. Diagnoses and all orders for this visit:    Acute non-recurrent sinusitis, unspecified location    Postnasal drip    Acute upper respiratory infection, unspecified    Nasal congestion    Cough    Other orders  -     amoxicillin-clavulanate (AUGMENTIN) 875-125 MG per tablet;  Take 1 tablet by mouth 2 times daily for 10 days  -     methylPREDNISolone (MEDROL DOSEPACK) 4 MG tablet; Take by mouth. The patient is to call for any concerns or return if any of the signs or symptoms worsen. The patient is to follow-up with PCP in the next 2-3 days for repeat evaluation repeat assessment or go directly to the emergency department.      SIGNATURE: Armond Kulkarni III, PA-C

## 2022-08-04 ENCOUNTER — OFFICE VISIT (OUTPATIENT)
Dept: PRIMARY CARE CLINIC | Age: 42
End: 2022-08-04
Payer: COMMERCIAL

## 2022-08-04 VITALS
OXYGEN SATURATION: 95 % | DIASTOLIC BLOOD PRESSURE: 80 MMHG | BODY MASS INDEX: 35.65 KG/M2 | HEART RATE: 75 BPM | WEIGHT: 249 LBS | TEMPERATURE: 98.5 F | HEIGHT: 70 IN | SYSTOLIC BLOOD PRESSURE: 116 MMHG

## 2022-08-04 DIAGNOSIS — Z13.6 SCREENING FOR CARDIOVASCULAR CONDITION: ICD-10-CM

## 2022-08-04 DIAGNOSIS — R73.03 PREDIABETES: ICD-10-CM

## 2022-08-04 DIAGNOSIS — R79.89 OTHER SPECIFIED ABNORMAL FINDINGS OF BLOOD CHEMISTRY: ICD-10-CM

## 2022-08-04 DIAGNOSIS — Z00.00 ENCOUNTER FOR WELL ADULT EXAM WITHOUT ABNORMAL FINDINGS: Primary | ICD-10-CM

## 2022-08-04 PROCEDURE — G8417 CALC BMI ABV UP PARAM F/U: HCPCS | Performed by: FAMILY MEDICINE

## 2022-08-04 PROCEDURE — 99213 OFFICE O/P EST LOW 20 MIN: CPT | Performed by: FAMILY MEDICINE

## 2022-08-04 PROCEDURE — G0446 INTENS BEHAVE THER CARDIO DX: HCPCS | Performed by: FAMILY MEDICINE

## 2022-08-04 PROCEDURE — 1036F TOBACCO NON-USER: CPT | Performed by: FAMILY MEDICINE

## 2022-08-04 PROCEDURE — G8427 DOCREV CUR MEDS BY ELIG CLIN: HCPCS | Performed by: FAMILY MEDICINE

## 2022-08-04 PROCEDURE — G0447 BEHAVIOR COUNSEL OBESITY 15M: HCPCS | Performed by: FAMILY MEDICINE

## 2022-08-04 ASSESSMENT — ENCOUNTER SYMPTOMS
CONSTIPATION: 0
BLOOD IN STOOL: 0
DIARRHEA: 0
BACK PAIN: 1
ABDOMINAL PAIN: 0
SORE THROAT: 0
ABDOMINAL DISTENTION: 0
PHOTOPHOBIA: 0
VOMITING: 0
COUGH: 0
NAUSEA: 0
SHORTNESS OF BREATH: 0

## 2022-08-04 NOTE — PROGRESS NOTES
Well Adult Note  Name: Chata Godwin Date: 2022   MRN: [id number] Sex: Male   Age: 35 y.o. Ethnicity: Non- / Non    : 1980 Race: White (non-)      Moo Brooks is here for well adult exam.  History:  Patient is here for yearly physical exam.  Would like baseline labs. Having slight increase in dizziness over the last several weeks. States that his previous upper respiratory infection has cleared however he is having issues with increased fatigue as well. Review of Systems   Constitutional:  Positive for fatigue. Negative for chills and fever. HENT:  Negative for congestion, hearing loss, nosebleeds and sore throat. Eyes:  Negative for photophobia. Respiratory:  Negative for cough and shortness of breath. Cardiovascular:  Negative for chest pain, palpitations and leg swelling. Gastrointestinal:  Negative for abdominal distention, abdominal pain, blood in stool, constipation, diarrhea, nausea and vomiting. Endocrine: Negative for polydipsia. Genitourinary:  Negative for dysuria, frequency, hematuria and urgency. Musculoskeletal:  Positive for arthralgias, back pain, gait problem, joint swelling and myalgias. Skin: Negative. Neurological:  Positive for dizziness, tremors, seizures and numbness. Negative for weakness and headaches. Hematological:  Does not bruise/bleed easily. Psychiatric/Behavioral:  Positive for behavioral problems, decreased concentration, dysphoric mood and sleep disturbance. Negative for agitation, hallucinations, self-injury and suicidal ideas. The patient is nervous/anxious. All other systems reviewed and are negative. Allergies   Allergen Reactions    Fish-Derived Products          Prior to Visit Medications    Medication Sig Taking?  Authorizing Provider   metoprolol succinate (TOPROL XL) 100 MG extended release tablet Take 1 tablet by mouth 2 times daily  Drake Mauro,    Rimegepant Sulfate 75 MG TBDP Take 75 mg by mouth every other day No more than 1 tab in 24 hours  [place] Presto, APRN - CNP   zonisamide (ZONEGRAN) 50 MG capsule Take 2 capsules by mouth nightly  Aria Presto, APRN - CNP   cyclobenzaprine (FLEXERIL) 5 MG tablet Take 1 tablet by mouth nightly as needed for Muscle spasms  Aria Presto, APRN - CNP   azelastine (ASTELIN) 0.1 % nasal spray 1 spray by Nasal route 2 times daily Use in each nostril as directed  Drake Mauro DO   chlorpheniramine (ALLER-CHLOR) 4 MG tablet Take 1 tablet by mouth every 6 hours as needed for Allergies  COLETTE Golden III   blood glucose monitor strips Test 3 times a day & as needed for symptoms of irregular blood glucose. Dispense sufficient amount for indicated testing frequency plus additional to accommodate PRN testing needs. Drake Mauro DO   metFORMIN (GLUCOPHAGE) 1000 MG tablet Take 1 tablet by mouth 2 times daily (with meals)  Drake Mauro DO   atorvastatin (LIPITOR) 40 MG tablet Take 1 tablet by mouth daily  Drake Mauro DO   ammonium lactate (LAC-HYDRIN) 12 % lotion Apply topically twice daily  Torres Mancini DPM   clonazePAM (KLONOPIN) 1 MG tablet Take 1 mg by mouth 2 times daily as needed. Historical Provider, MD   ENULOSE 10 GM/15ML SOLN solution as needed   Historical Provider, MD   mirtazapine (REMERON) 30 MG tablet TAKE ONE TABLET BY MOUTH once a day AT BEDTIME  Historical Provider, MD   desonide (DESOWEN) 0.05 % cream Apply topically 2 times daily. Drake Mauro DO   metroNIDAZOLE (METROGEL) 0.75 % gel Apply topically 2 times daily. Drake Mauro DO   lactulose (CHRONULAC) 10 GM/15ML solution TAKE 15 ML BY MOUTH 3 TIMES DAILY  Josephine Valdez MD   medical marijuana Take by mouth as needed.   Historical Provider, MD   FLUoxetine (PROZAC) 20 MG capsule Take 2 capsules by mouth daily  Dixon Maya MD   rifaximin (XIFAXAN) 550 MG tablet Take 550 mg by mouth 2 times daily  Historical Provider, MD         Past Medical History: Diagnosis Date    Anxiety     Arthritis     Class 1 obesity due to excess calories with serious comorbidity and body mass index (BMI) of 34.0 to 34.9 in adult     First degree AV block     GERD (gastroesophageal reflux disease)     Hyperlipidemia     IBS (irritable bowel syndrome)     LVH (left ventricular hypertrophy)     Migraines     Obesity     ENOC (obstructive sleep apnea)     Psychiatric problem     Psychogenic nonepileptic seizure     Tremors of nervous system     Type 2 diabetes mellitus with complication, without long-term current use of insulin [facility])        Past Surgical History:   Procedure Laterality Date    HERNIA REPAIR      INSERTABLE CARDIAC MONITOR  07/14/2017    KNEE SURGERY      PACEMAKER INSERTION  12/28/2017    D-PPM   (MEDTRONIC)   [address] [place]         History reviewed. No pertinent family history. Social History     Tobacco Use    Smoking status: Never    Smokeless tobacco: Never   Vaping Use    Vaping Use: Never used   Substance Use Topics    Alcohol use: Yes     Comment: Socially- couple times weekly    Drug use: Yes     Types: Marijuana Jonathan Rust)     Comment: Medical- rarely       Objective   /80   Pulse 75   Temp 98.5 °F (36.9 °C)   Ht 5' 10\" (1.778 m)   Wt 249 lb (112.9 kg)   SpO2 95%   BMI 35.73 kg/m²   Wt Readings from Last 3 Encounters:   08/04/22 249 lb (112.9 kg)   07/07/22 262 lb (118.8 kg)   06/29/22 262 lb (118.8 kg)     There were no vitals filed for this visit. Physical Exam  Vitals reviewed. Constitutional:       Appearance: He is obese. HENT:      Head: Normocephalic and atraumatic. Right Ear: There is impacted cerumen. Left Ear: There is impacted cerumen. Eyes:      General: No scleral icterus. Conjunctiva/sclera: Conjunctivae normal.      Pupils: Pupils are equal, round, and reactive to light. Neck:      Thyroid: No thyromegaly. Cardiovascular:      Rate and Rhythm: Normal rate and regular rhythm.       Heart sounds: Normal heart sounds. No murmur heard. Pulmonary:      Effort: Pulmonary effort is normal.      Breath sounds: Normal breath sounds. No rales. Abdominal:      General: Bowel sounds are normal. There is no distension. Palpations: Abdomen is soft. Tenderness: There is no abdominal tenderness. Musculoskeletal:         General: Swelling and tenderness present. Left shoulder: Tenderness present. Decreased range of motion. Cervical back: Neck supple. Right knee: Swelling and effusion present. Decreased range of motion. Tenderness present. Lymphadenopathy:      Cervical: No cervical adenopathy. Skin:     General: Skin is warm and dry. Findings: No erythema or rash. Neurological:      Cranial Nerves: No cranial nerve deficit. Sensory: Sensory deficit present. Motor: Weakness and tremor present. Gait: Gait normal.   Psychiatric:         Attention and Perception: Attention normal.         Mood and Affect: Mood normal.         Speech: Speech normal.         Behavior: Behavior normal.         Judgment: Judgment normal.         Assessment   Plan   1. Encounter for well adult exam without abnormal findings  -     NM Behavior  obesity 15m []  -     CBC with Auto Differential; Future  -     T4, Free; Future  -     Uric Acid; Future  -     Vitamin B12 & Folate; Future  -     TSH; Future  -     Hepatic Function Panel; Future  -     Basic Metabolic Panel; Future  -     Lipid Panel; Future  -     Hemoglobin A1C; Future  -     Urinalysis with Microscopic; Future  -     Ammonia; Future  -     Covid-19, Antibody; Future  2. Body mass index (BMI) 35.0-35.9, adult   -     NM Behavior  obesity 15m []  3. Screening for cardiovascular condition  -     NM Behavior  obesity 15m []  -     NM Intens behave ther cardio dx, 15 minutes []  -     CBC with Auto Differential; Future  -     T4, Free; Future  -     Uric Acid;  Future  -     Vitamin B12 & Folate; Future  -     TSH; Future  -     Hepatic Function Panel; Future  -     Basic Metabolic Panel; Future  -     Lipid Panel; Future  -     Hemoglobin A1C; Future  -     Urinalysis with Microscopic; Future  -     Ammonia; Future  -     Covid-19, Antibody; Future  4. Other specified abnormal findings of blood chemistry   -     CBC with Auto Differential; Future  -     T4, Free; Future  -     TSH; Future  -     Hemoglobin A1C; Future  5. Prediabetes   -     CBC with Auto Differential; Future  -     T4, Free; Future  -     TSH;  Future  -     Hemoglobin A1C; Future         Personalized Preventive Plan   Current Health Maintenance Status  Immunization History   Administered Date(s) Administered    COVID-19, PFIZER GRAY top, DO NOT Dilute, (age 15 y+), IM, 30 mcg/0.3 mL 03/09/2022    COVID-19, PFIZER PURPLE top, DILUTE for use, (age 15 y+), 30mcg/0.3mL 03/24/2021, 04/20/2021    Hepatitis B 07/11/2016    Hepatitis B (Engerix-B) 07/11/2016    Hepatitis B vaccine 07/11/2016, 08/01/2016    Influenza Vaccine, unspecified formulation 11/16/2017    Influenza Virus Vaccine 10/03/2016, 02/21/2019    Influenza, MDCK Quadv, IM, PF (Flucelvax 2 yrs and older) 11/12/2019, 10/11/2021    Influenza, Quadv, IM, PF (6 mo and older Fluzone, Flulaval, Fluarix, and 3 yrs and older Afluria) 10/27/2018, 09/11/2020    PPD Test 05/24/2016, 07/11/2016    Tdap (Boostrix, Adacel) 04/20/2016        Health Maintenance   Topic Date Due    Pneumococcal 0-64 years Vaccine (1 - PCV) Never done    Diabetic foot exam  Never done    Hepatitis B vaccine (2 of 3 - Risk 3-dose series) 08/29/2016    Diabetic retinal exam  04/03/2020    Flu vaccine (1) 09/01/2022    Depression Monitoring  01/13/2023    A1C test (Diabetic or Prediabetic)  02/10/2023    Diabetic microalbuminuria test  02/10/2023    Lipids  02/10/2023    DTaP/Tdap/Td vaccine (2 - Td or Tdap) 04/20/2026    COVID-19 Vaccine  Completed    Hepatitis C screen  Completed    Hepatitis A vaccine  Aged Out    Hib vaccine  Aged Out    Meningococcal (ACWY) vaccine  Aged Out    HIV screen  Discontinued     Recommendations for [employer] Due: see orders and patient instructions/AVS.    Return in about 6 months (around 2/4/2023). Obesity Counseling: Assessed behavioral health risks and factors affecting choice of behavior. Suggested weight control approaches, including dietary changes behavioral modification and follow up plan. Provided educational and support documentation. Time spent (minutes): 10    Cardiovascular Disease Risk Counseling: Assessed the patient's risk to develop cardiovascular disease and reviewed main risk factors. Reviewed steps to reduce disease risk including:   Quitting tobacco use, reducing amount smoked, or not starting the habit  Making healthy food choices  Being physically active and gradualy increasing activity levels   Reduce weight and determine a healthy BMI goal  Monitor blood pressure and treat if higher than 140/90 mmHg  Maintain blood total cholesterol levels under 5 mmol/l or 190 mg/dl  Maintain LDL cholesterol levels under 3.0 mmol/l or 115 mg/dl   Control blood glucose levels  Consider taking aspirin (75 mg daily), once blood pressure is controlled   Provided a follow up plan.   Time spent (minutes): 10

## 2022-08-04 NOTE — PATIENT INSTRUCTIONS
Body Mass Index: Care Instructions  Your Care Instructions     Body mass index (BMI) can help you see if your weight is raising your risk for health problems. It uses a formula to compare how much you weigh with how tallyou are. A BMI lower than 18.5 is considered underweight. A BMI between 18.5 and 24.9 is considered healthy. A BMI between 25 and 29.9 is considered overweight. A BMI of 30 or higher is considered obese. If your BMI is in the normal range, it means that you have a lower risk for weight-related health problems. If your BMI is in the overweight or obese range, you may be at increased risk for weight-related health problems, such as high blood pressure, heart disease, stroke, arthritis or joint pain, and diabetes. If your BMI is in the underweight range, you may be at increased risk for health problems such as fatigue, lower protection (immunity) againstillness, muscle loss, bone loss, hair loss, and hormone problems. BMI is just one measure of your risk for weight-related health problems. You may be at higher risk for health problems if you are not active, you eat anunhealthy diet, or you drink too much alcohol or use tobacco products. Follow-up care is a key part of your treatment and safety. Be sure to make and go to all appointments, and call your doctor if you are having problems. It's also a good idea to know your test results and keep alist of the medicines you take. How can you care for yourself at home? Practice healthy eating habits. This includes eating plenty of fruits, vegetables, whole grains, lean protein, and low-fat dairy. If your doctor recommends it, get more exercise. Walking is a good choice. Bit by bit, increase the amount you walk every day. Try for at least 30 minutes on most days of the week. Do not smoke. Smoking can increase your risk for health problems. If you need help quitting, talk to your doctor about stop-smoking programs and medicines.  These can increase your chances of quitting for good. Limit alcohol to 2 drinks a day for men and 1 drink a day for women. Too much alcohol can cause health problems. If you have a BMI higher than 25  Your doctor may do other tests to check your risk for weight-related health problems. This may include measuring the distance around your waist. A waist measurement of more than 40 inches in men or 35 inches in women can increase the risk of weight-related health problems. Talk with your doctor about steps you can take to stay healthy or improve your health. You may need to make lifestyle changes to lose weight and stay healthy, such as changing your diet and getting regular exercise. If you have a BMI lower than 18.5  Your doctor may do other tests to check your risk for health problems. Talk with your doctor about steps you can take to stay healthy or improve your health. You may need to make lifestyle changes to gain or maintain weight and stay healthy, such as getting more healthy foods in your diet and doing exercises to build muscle. Where can you learn more? Go to https://SynapSensejacquelineEvocha.CodinGame. org and sign in to your T-ZONE account. Enter S176 in the TuneStars box to learn more about \"Body Mass Index: Care Instructions. \"     If you do not have an account, please click on the \"Sign Up Now\" link. Current as of: December 27, 2021               Content Version: 13.3  © 2006-2022 Healthwise, Incorporated. Care instructions adapted under license by [facility] ([facility]). If you have questions about a medical condition or this instruction, always ask your healthcare professional. [place] [address] any warranty or liability for your use of this information. Learning About Low-Carbohydrate Diets  What is a low-carbohydrate diet? A low-carbohydrate (or \"low-carb\") diet limits foods and drinks that have carbohydrates.  This includes grains, fruits, milk and yogurt, and starchy vegetables healthy foods can help lower your risk for disease. Healthy food gives you energy and keeps your heart strong, your brain active, your musclesworking, and your bones strong. A healthy diet includes a variety of foods from the basic food groups: grains, vegetables, fruits, milk and milk products, and meat and beans. Some people may eat more of their favorite foods from only one food group and, as a result, miss getting the nutrients they need. So, it is important to pay attention not only to what you eat but also to what you are missing from your diet. You caneat a healthy, balanced diet by making a few small changes. Follow-up care is a key part of your treatment and safety. Be sure to make and go to all appointments, and call your doctor if you are having problems. It's also a good idea to know your test results and keep alist of the medicines you take. How can you care for yourself at home? Look at what you eat  Keep a food diary for a week or two and record everything you eat or drink. Track the number of servings you eat from each food group. For a balanced diet every day, eat a variety of:  6 or more ounce-equivalents of grains, such as cereals, breads, crackers, rice, or pasta, every day. An ounce-equivalent is 1 slice of bread, 1 cup of ready-to-eat cereal, or ½ cup of cooked rice, cooked pasta, or cooked cereal.  2½ cups of vegetables, especially:  Dark-green vegetables such as broccoli and spinach. Orange vegetables such as carrots and sweet potatoes. Dry beans (such as brannon and kidney beans) and peas (such as lentils). 2 cups of fresh, frozen, or canned fruit. A small apple or 1 banana or orange equals 1 cup.  3 cups of nonfat or low-fat milk, yogurt, or other milk products. 5½ ounces of meat and beans, such as chicken, fish, lean meat, beans, nuts, and seeds. One egg, 1 tablespoon of peanut butter, ½ ounce nuts or seeds, or ¼ cup of cooked beans equals 1 ounce of meat.   Learn how to read food labels for serving sizes and ingredients. Fast-food and convenience-food meals often contain few or no fruits or vegetables. Make sure you eat some fruits and vegetables to make the meal more nutritious. Look at your food diary. For each food group, add up what you have eaten and then divide the total by the number of days. This will give you an idea of how much you are eating from each food group. See if you can find some ways to change your diet to make it more healthy. Start small  Do not try to make dramatic changes to your diet all at once. You might feel that you are missing out on your favorite foods and then be more likely to fail. Start slowly, and gradually change your habits. Try some of the following:  Use whole wheat bread instead of white bread. Use nonfat or low-fat milk instead of whole milk. Eat brown rice instead of white rice, and eat whole wheat pasta instead of white-flour pasta. Try low-fat cheeses and low-fat yogurt. Add more fruits and vegetables to meals and have them for snacks. Add lettuce, tomato, cucumber, and onion to sandwiches. Add fruit to yogurt and cereal.  Enjoy food  You can still eat your favorite foods. You just may need to eat less of them. If your favorite foods are high in fat, salt, and sugar, limit how often you eat them, but do not cut them out entirely. Eat a wide variety of foods. Make healthy choices when eating out  The type of restaurant you choose can help you make healthy choices. Even fast-food chains are now offering more low-fat or healthier choices on the menu. Choose smaller portions, or take half of your meal home. When eating out, try:  A veggie pizza with a whole wheat crust or grilled chicken (instead of sausage or pepperoni). Pasta with roasted vegetables, grilled chicken, or marinara sauce instead of cream sauce. A vegetable wrap or grilled chicken wrap. Broiled or poached food instead of fried or breaded items.   Make healthy choices easy  Buy packaged, prewashed, ready-to-eat fresh vegetables and fruits, such as baby carrots, salad mixes, and chopped or shredded broccoli and cauliflower. Buy packaged, presliced fruits, such as melon or pineapple. Choose 100% fruit or vegetable juice instead of soda. Limit juice intake to 4 to 6 oz (½ to ¾ cup) a day. Blend low-fat yogurt, fruit juice, and canned or frozen fruit to make a smoothie for breakfast or a snack. Where can you learn more? Go to https://MoneyspyderpeWysteweb.Tingz. org and sign in to your [employer] account. Enter U292 in the Priceonomics box to learn more about \"Eating Healthy Foods: Care Instructions. \"     If you do not have an account, please click on the \"Sign Up Now\" link. Current as of: September 8, 2021               Content Version: 13.3  © 2006-2022 [employer]. Care instructions adapted under license by [facility] ([facility]). If you have questions about a medical condition or this instruction, always ask your healthcare professional. [place] [address] any warranty or liability for your use of this information. Well Visit, Ages 25 to 48: Care Instructions  Overview     Well visits can help you stay healthy. Your doctor has checked your overall health and may have suggested ways to take good care of yourself. Your doctor also may have recommended tests. At home, you can help prevent illness withhealthy eating, regular exercise, and other steps. Follow-up care is a key part of your treatment and safety. Be sure to make and go to all appointments, and call your doctor if you are having problems. It's also a good idea to know your test results and keep alist of the medicines you take. How can you care for yourself at home? Get screening tests that you and your doctor decide on. Screening helps find diseases before any symptoms appear. Eat healthy foods. Choose fruits, vegetables, whole grains, protein, and low-fat dairy foods. Limit fat, especially saturated fat. Reduce salt in your diet. Limit alcohol. If you are a man, have no more than 2 drinks a day or 14 drinks a week. If you are a woman, have no more than 1 drink a day or 7 drinks a week. Get at least 30 minutes of physical activity on most days of the week. Walking is a good choice. You also may want to do other activities, such as running, swimming, cycling, or playing tennis or team sports. Discuss any changes in your exercise program with your doctor. Reach and stay at a healthy weight. This will lower your risk for many problems, such as obesity, diabetes, heart disease, and high blood pressure. Do not smoke or allow others to smoke around you. If you need help quitting, talk to your doctor about stop-smoking programs and medicines. These can increase your chances of quitting for good. Care for your mental health. It is easy to get weighed down by worry and stress. Learn strategies to manage stress, like deep breathing and mindfulness, and stay connected with your family and community. If you find you often feel sad or hopeless, talk with your doctor. Treatment can help. Talk to your doctor about whether you have any risk factors for sexually transmitted infections (STIs). You can help prevent STIs if you wait to have sex with a new partner (or partners) until you've each been tested for STIs. It also helps if you use condoms (male or female condoms) and if you limit your sex partners to one person who only has sex with you. Vaccines are available for some STIs, such as HPV. Use birth control if it's important to you to prevent pregnancy. Talk with your doctor about the choices available and what might be best for you. If you think you may have a problem with alcohol or drug use, talk to your doctor. This includes prescription medicines (such as amphetamines and opioids) and illegal drugs (such as cocaine and methamphetamine).  Your doctor can help you figure out what type of treatment is best for you. Protect your skin from too much sun. When you're outdoors from 10 a.m. to 4 p.m., stay in the shade or cover up with clothing and a hat with a wide brim. Wear sunglasses that block UV rays. Even when it's cloudy, put broad-spectrum sunscreen (SPF 30 or higher) on any exposed skin. See a dentist one or two times a year for checkups and to have your teeth cleaned. Wear a seat belt in the car. When should you call for help? Watch closely for changes in your health, and be sure to contact your doctor if you have any problems or symptoms that concern you. Where can you learn more? Go to https://Castlight HealthpeCryptonatoreb.EBOOKAPLACE. org and sign in to your Yap account. Enter P072 in the Careerise box to learn more about \"Well Visit, Ages 25 to 48: Care Instructions. \"     If you do not have an account, please click on the \"Sign Up Now\" link. Current as of: October 6, 2021               Content Version: 13.3  © 2537-3593 [employer]. Care instructions adapted under license by [facility] [place] ([facility]). If you have questions about a medical condition or this instruction, always ask your healthcare professional. [address] any warranty or liability for your use of this information. Heart-Healthy Diet   Sodium, Fat, and Cholesterol Controlled Diet       What Is a Heart Healthy Diet? A heart-healthy diet is one that limits sodium , certain types of fat , and cholesterol . This type of diet is recommended for:   People with any form of cardiovascular disease (eg, coronary heart disease , peripheral vascular disease , previous heart attack , previous stroke )   People with risk factors for cardiovascular disease, such as high blood pressure , high cholesterol , or diabetes   Anyone who wants to lower their risk of developing cardiovascular disease   Sodium    Sodium is a mineral found in many foods.  In general, most people consume much more sodium than they need. Diets high in sodium can increase blood pressure and lead to edema (water retention). On a heart-healthy diet, you should consume no more than 2,300 mg (milligrams) of sodium per dayabout the amount in one teaspoon of table salt. The foods highest in sodium include table salt (about 50% sodium), processed foods, convenience foods, and preserved foods. Cholesterol    Cholesterol is a fat-like, waxy substance in your blood. Our bodies make some cholesterol. It is also found in animal products, with the highest amounts in fatty meat, egg yolks, whole milk, cheese, shellfish, and organ meats. On a heart-healthy diet, you should limit your cholesterol intake to less than 200 mg per day. It is normal and important to have some cholesterol in your bloodstream. But too much cholesterol can cause plaque to build up within your arteries, which can eventually lead to a heart attack or stroke. The two types of cholesterol that are most commonly referred to are:   Low-density lipoprotein (LDL) cholesterol  Also known as bad cholesterol, this is the cholesterol that tends to build up along your arteries. Bad cholesterol levels are increased by eating fats that are saturated or hydrogenated. Optimal level of this cholesterol is less than 100. Over 130 starts to get risky for heart disease. High-density lipoprotein (HDL) cholesterol  Also known as good cholesterol, this type of cholesterol actually carries cholesterol away from your arteries and may, therefore, help lower your risk of having a heart attack. You want this level to be high (ideally greater than 60). It is a risk to have a level less than 40. You can raise this good cholesterol by eating olive oil, canola oil, avocados, or nuts. Exercise raises this level, too. Fat    Fat is calorie dense and packs a lot of calories into a small amount of food. Even though fats should be limited due to their high calorie content, not all fats are bad. In fact, some fats are quite healthful. Fat can be broken down into four main types. The good-for-you fats are:   Monounsaturated fat  found in oils such as olive and canola, avocados, and nuts and natural nut butters; can decrease cholesterol levels, while keeping levels of HDL cholesterol high   Polyunsaturated fat  found in oils such as safflower, sunflower, soybean, corn, and sesame; can decrease total cholesterol and LDL cholesterol   Omega-3 fatty acids  particularly those found in fatty fish (such as salmon, trout, tuna, mackerel, herring, and sardines); can decrease risk of arrhythmias, decrease triglyceride levels, and slightly lower blood pressure   The fats that you want to limit are:   Saturated fat  found in animal products, many fast foods, and a few vegetables; increases total blood cholesterol, including LDL levels   Animal fats that are saturated include: butter, lard, whole-milk dairy products, meat fat, and poultry skin   Vegetable fats that are saturated include: hydrogenated shortening, palm oil, coconut oil, cocoa butter   Hydrogenated or trans fat  found in margarine and vegetable shortening, most shelf stable snack foods, and fried foods; increases LDL and decreases HDL     It is generally recommended that you limit your total fat for the day to less than 30% of your total calories. If you follow an 1800-calorie heart healthy diet, for example, this would mean 60 grams of fat or less per day. Saturated fat and trans fat in your diet raises your blood cholesterol the most, much more than dietary cholesterol does. For this reason, on a heart-healthy diet, less than 7% of your calories should come from saturated fat and ideally 0% from trans fat. On an 1800-calorie diet, this translates into less than 14 grams of saturated fat per day, leaving 46 grams of fat to come from mono- and polyunsaturated fats.    Food Choices on a Heart Healthy Diet   Food Category   Foods Recommended   Foods to Avoid   Grains   Breads and rolls without salted tops Most dry and cooked cereals Unsalted crackers and breadsticks Low-sodium or homemade breadcrumbs or stuffing All rice and pastas   Breads, rolls, and crackers with salted tops High-fat baked goods (eg, muffins, donuts, pastries) Quick breads, self-rising flour, and biscuit mixes Regular bread crumbs Instant hot cereals Commercially prepared rice, pasta, or stuffing mixes   Vegetables   Most fresh, frozen, and low-sodium canned vegetables Low-sodium and salt-free vegetable juices Canned vegetables if unsalted or rinsed   Regular canned vegetables and juices, including sauerkraut and pickled vegetables Frozen vegetables with sauces Commercially prepared potato and vegetable mixes   Fruits   Most fresh, frozen, and canned fruits All fruit juices   Fruits processed with salt or sodium   Milk   Nonfat or low-fat (1%) milk Nonfat or low-fat yogurt Cottage cheese, low-fat ricotta, cheeses labeled as low-fat and low-sodium   Whole milk Reduced-fat (2%) milk Malted and chocolate milk Full fat yogurt Most cheeses (unless low-fat and low salt) Buttermilk (no more than 1 cup per week)   Meats and Beans   Lean cuts of fresh or frozen beef, veal, lamb, or pork (look for the word loin) Fresh or frozen poultry without the skin Fresh or frozen fish and some shellfish Egg whites and egg substitutes (Limit whole eggs to three per week) Tofu Nuts or seeds (unsalted, dry-roasted), low-sodium peanut butter Dried peas, beans, and lentils   Any smoked, cured, salted, or canned meat, fish, or poultry (including claros, chipped beef, cold cuts, hot dogs, sausages, sardines, and anchovies) Poultry skins Breaded and/or fried fish or meats Canned peas, beans, and lentils Salted nuts   Fats and Oils   Olive oil and canola oil Low-sodium, low-fat salad dressings and mayonnaise   Butter, margarine, coconut and palm oils, claros fat   Snacks, Sweets, and Condiments   Low-sodium or unsalted versions of broths, soups, soy sauce, and condiments Pepper, herbs, and spices; vinegar, lemon, or lime juice Low-fat frozen desserts (yogurt, sherbet, fruit bars) Sugar, cocoa powder, honey, syrup, jam, and preserves Low-fat, trans-fat free cookies, cakes, and pies Marcial and animal crackers, fig bars, vadim snaps   High-fat desserts Broth, soups, gravies, and sauces, made from instant mixes or other high-sodium ingredients Salted snack foods Canned olives Meat tenderizers, seasoning salt, and most flavored vinegars   Beverages   Low-sodium carbonated beverages Tea and coffee in moderation Soy milk   Commercially softened water   Suggestions   Make whole grains, fruits, and vegetables the base of your diet. Choose heart-healthy fats such as canola, olive, and flaxseed oil, and foods high in heart-healthy fats, such as nuts, seeds, soybeans, tofu, and fish. Eat fish at least twice per week; the fish highest in omega-3 fatty acids and lowest in mercury include salmon, herring, mackerel, sardines, and canned chunk light tuna. If you eat fish less than twice per week or have high triglycerides, talk to your doctor about taking fish oil supplements. Read food labels. For products low in fat and cholesterol, look for fat free, low-fat, cholesterol free, saturated fat free, and trans fat freeAlso scan the Nutrition Facts Label, which lists saturated fat, trans fat, and cholesterol amounts. For products low in sodium, look for sodium free, very low sodium, low sodium, no added salt, and unsalted   Skip the salt when cooking or at the table; if food needs more flavor, get creative and try out different herbs and spices. Garlic and onion also add substantial flavor to foods. Trim any visible fat off meat and poultry before cooking, and drain the fat off after ramirez.     Use cooking methods that require little or no added fat, such as grilling, boiling, baking, poaching, broiling, roasting, steaming, stir-frying, and sauting. Avoid fast food and convenience food. They tend to be high in saturated and trans fat and have a lot of added salt. Talk to a registered dietitian for individualized diet advice. Last Reviewed: March 2011 Paresh Bhat MS, MPH, RD   Updated: 3/29/2011     Heart-Healthy Diet   Sodium, Fat, and Cholesterol Controlled Diet       What Is a Heart Healthy Diet? A heart-healthy diet is one that limits sodium , certain types of fat , and cholesterol . This type of diet is recommended for:   People with any form of cardiovascular disease (eg, coronary heart disease , peripheral vascular disease , previous heart attack , previous stroke )   People with risk factors for cardiovascular disease, such as high blood pressure , high cholesterol , or diabetes   Anyone who wants to lower their risk of developing cardiovascular disease   Sodium    Sodium is a mineral found in many foods. In general, most people consume much more sodium than they need. Diets high in sodium can increase blood pressure and lead to edema (water retention). On a heart-healthy diet, you should consume no more than 2,300 mg (milligrams) of sodium per dayabout the amount in one teaspoon of table salt. The foods highest in sodium include table salt (about 50% sodium), processed foods, convenience foods, and preserved foods. Cholesterol    Cholesterol is a fat-like, waxy substance in your blood. Our bodies make some cholesterol. It is also found in animal products, with the highest amounts in fatty meat, egg yolks, whole milk, cheese, shellfish, and organ meats. On a heart-healthy diet, you should limit your cholesterol intake to less than 200 mg per day. It is normal and important to have some cholesterol in your bloodstream. But too much cholesterol can cause plaque to build up within your arteries, which can eventually lead to a heart attack or stroke.    The two types of cholesterol that are most commonly referred to are:   Low-density lipoprotein (LDL) cholesterol  Also known as bad cholesterol, this is the cholesterol that tends to build up along your arteries. Bad cholesterol levels are increased by eating fats that are saturated or hydrogenated. Optimal level of this cholesterol is less than 100. Over 130 starts to get risky for heart disease. High-density lipoprotein (HDL) cholesterol  Also known as good cholesterol, this type of cholesterol actually carries cholesterol away from your arteries and may, therefore, help lower your risk of having a heart attack. You want this level to be high (ideally greater than 60). It is a risk to have a level less than 40. You can raise this good cholesterol by eating olive oil, canola oil, avocados, or nuts. Exercise raises this level, too. Fat    Fat is calorie dense and packs a lot of calories into a small amount of food. Even though fats should be limited due to their high calorie content, not all fats are bad. In fact, some fats are quite healthful. Fat can be broken down into four main types.    The good-for-you fats are:   Monounsaturated fat  found in oils such as olive and canola, avocados, and nuts and natural nut butters; can decrease cholesterol levels, while keeping levels of HDL cholesterol high   Polyunsaturated fat  found in oils such as safflower, sunflower, soybean, corn, and sesame; can decrease total cholesterol and LDL cholesterol   Omega-3 fatty acids  particularly those found in fatty fish (such as salmon, trout, tuna, mackerel, herring, and sardines); can decrease risk of arrhythmias, decrease triglyceride levels, and slightly lower blood pressure   The fats that you want to limit are:   Saturated fat  found in animal products, many fast foods, and a few vegetables; increases total blood cholesterol, including LDL levels   Animal fats that are saturated include: butter, lard, whole-milk dairy products, meat fat, and poultry skin   Vegetable fats that are saturated include: hydrogenated shortening, palm oil, coconut oil, cocoa butter   Hydrogenated or trans fat  found in margarine and vegetable shortening, most shelf stable snack foods, and fried foods; increases LDL and decreases HDL     It is generally recommended that you limit your total fat for the day to less than 30% of your total calories. If you follow an 1800-calorie heart healthy diet, for example, this would mean 60 grams of fat or less per day. Saturated fat and trans fat in your diet raises your blood cholesterol the most, much more than dietary cholesterol does. For this reason, on a heart-healthy diet, less than 7% of your calories should come from saturated fat and ideally 0% from trans fat. On an 1800-calorie diet, this translates into less than 14 grams of saturated fat per day, leaving 46 grams of fat to come from mono- and polyunsaturated fats.    Food Choices on a Heart Healthy Diet   Food Category   Foods Recommended   Foods to Avoid   Grains   Breads and rolls without salted tops Most dry and cooked cereals Unsalted crackers and breadsticks Low-sodium or homemade breadcrumbs or stuffing All rice and pastas   Breads, rolls, and crackers with salted tops High-fat baked goods (eg, muffins, donuts, pastries) Quick breads, self-rising flour, and biscuit mixes Regular bread crumbs Instant hot cereals Commercially prepared rice, pasta, or stuffing mixes   Vegetables   Most fresh, frozen, and low-sodium canned vegetables Low-sodium and salt-free vegetable juices Canned vegetables if unsalted or rinsed   Regular canned vegetables and juices, including sauerkraut and pickled vegetables Frozen vegetables with sauces Commercially prepared potato and vegetable mixes   Fruits   Most fresh, frozen, and canned fruits All fruit juices   Fruits processed with salt or sodium   Milk   Nonfat or low-fat (1%) milk Nonfat or low-fat yogurt Cottage cheese, low-fat ricotta, cheeses labeled as low-fat and low-sodium Whole milk Reduced-fat (2%) milk Malted and chocolate milk Full fat yogurt Most cheeses (unless low-fat and low salt) Buttermilk (no more than 1 cup per week)   Meats and Beans   Lean cuts of fresh or frozen beef, veal, lamb, or pork (look for the word loin) Fresh or frozen poultry without the skin Fresh or frozen fish and some shellfish Egg whites and egg substitutes (Limit whole eggs to three per week) Tofu Nuts or seeds (unsalted, dry-roasted), low-sodium peanut butter Dried peas, beans, and lentils   Any smoked, cured, salted, or canned meat, fish, or poultry (including claros, chipped beef, cold cuts, hot dogs, sausages, sardines, and anchovies) Poultry skins Breaded and/or fried fish or meats Canned peas, beans, and lentils Salted nuts   Fats and Oils   Olive oil and canola oil Low-sodium, low-fat salad dressings and mayonnaise   Butter, margarine, coconut and palm oils, claros fat   Snacks, Sweets, and Condiments   Low-sodium or unsalted versions of broths, soups, soy sauce, and condiments Pepper, herbs, and spices; vinegar, lemon, or lime juice Low-fat frozen desserts (yogurt, sherbet, fruit bars) Sugar, cocoa powder, honey, syrup, jam, and preserves Low-fat, trans-fat free cookies, cakes, and pies Marcial and animal crackers, fig bars, vadim snaps   High-fat desserts Broth, soups, gravies, and sauces, made from instant mixes or other high-sodium ingredients Salted snack foods Canned olives Meat tenderizers, seasoning salt, and most flavored vinegars   Beverages   Low-sodium carbonated beverages Tea and coffee in moderation Soy milk   Commercially softened water   Suggestions   Make whole grains, fruits, and vegetables the base of your diet. Choose heart-healthy fats such as canola, olive, and flaxseed oil, and foods high in heart-healthy fats, such as nuts, seeds, soybeans, tofu, and fish.     Eat fish at least twice per week; the fish highest in omega-3 fatty acids and lowest in mercury include salmon, of several lifestyle changes your doctor may recommend to lower your high blood pressure. Your doctor may also want you to decrease the amount of sodium in your diet. Lowering sodium while following the DASH diet can lower blood pressure even further than just the DASH diet alone. Follow-up care is a key part of your treatment and safety. Be sure to make and go to all appointments, and call your doctor if you are having problems. Its also a good idea to know your test results and keep a list of the medicines you take. How can you care for yourself at home? Following the DASH diet  Eat 4 to 5 servings of fruit each day. A serving is 1 medium-sized piece of fruit, ½ cup chopped or canned fruit, 1/4 cup dried fruit, or 4 ounces (½ cup) of fruit juice. Choose fruit more often than fruit juice. Eat 4 to 5 servings of vegetables each day. A serving is 1 cup of lettuce or raw leafy vegetables, ½ cup of chopped or cooked vegetables, or 4 ounces (½ cup) of vegetable juice. Choose vegetables more often than vegetable juice. Get 2 to 3 servings of low-fat and fat-free dairy each day. A serving is 8 ounces of milk, 1 cup of yogurt, or 1 ½ ounces of cheese. Eat 7 to 8 servings of grains each day. A serving is 1 slice of bread, 1 ounce of dry cereal, or ½ cup of cooked rice, pasta, or cooked cereal. Try to choose whole-grain products as much as possible. Limit lean meat, poultry, and fish to 6 ounces each day. Six ounces is about the size of two decks of cards. Eat 4 to 5 servings of nuts, seeds, and legumes (cooked dried beans, lentils, and split peas) each week. A serving is 1/3 cup of nuts, 2 tablespoons of seeds, or ½ cup cooked dried beans or peas. Limit sweets and added sugars to 5 servings or less a week. A serving is 1 tablespoon jelly or jam, ½ cup sorbet, or 1 cup of lemonade. Tips for success  Start small. Do not try to make dramatic changes to your diet all at once.  You might feel that you are missing out on your favorite foods and then be more likely to not follow the plan. Make small changes, and stick with them. Once those changes become habit, add a few more changes. Try some of the following:  Make it a goal to eat a fruit or vegetable at every meal and at snacks. This will make it easy to get the recommended amount of fruits and vegetables each day. Try yogurt topped with fruit and nuts for a snack or healthy dessert. Add lettuce, tomato, cucumber, and onion to sandwiches. Combine a ready-made pizza crust with low-fat mozzarella cheese and lots of vegetable toppings. Try using tomatoes, squash, spinach, broccoli, carrots, cauliflower, and onions. Have a variety of cut-up vegetables with a low-fat dip as an appetizer instead of chips and dip. Sprinkle sunflower seeds or chopped almonds over salads. Or try adding chopped walnuts or almonds to cooked vegetables. Try some vegetarian meals using beans and peas. Add garbanzo or kidney beans to salads. Make burritos and tacos with mashed brannon beans or black beans    © 9743-8360 [employer]. Care instructions adapted under license by [facility]. This care instruction is for use with your licensed healthcare professional. If you have questions about a medical condition or this instruction, always ask your healthcare professional. [address] any warranty or liability for your use of this information. Content Version: 9.4.72369; Last Revised: March 15, 2012                Patient information: Weight loss treatments    INTRODUCTION -- Obesity is a major international problem, and Americans are among the heaviest people in the world. The percentage of obese people in the [place] has risen steadily. Many people find that although they initially lose weight by dieting, they quickly regain the weight after the diet ends.  Because it so hard to keep weight off over time, it is important to have as much information and support as possible before starting a diet. You are most likely to be successful in losing weight and keeping it off when you believe that your body weight can be controlled. STARTING A WEIGHT LOSS PROGRAM -- Some people like to talk to their doctor or nurse to get help choosing the best plan, monitoring progress, and getting advice and support along the way. To know what treatment (or combination of treatments) will work best, determine your body mass index (BMI) and waist circumference (measurement). The BMI is calculated from your height and weight. A person with a BMI between 25 and 29.9 is considered overweight   A person with a BMI of 30 or greater is considered to be obese  A waist circumference greater than 35 inches (88 cm) in women and 40 inches (102 cm) in men increases the risk of obesity-related complications, such as heart disease and diabetes. People who are obese and who have a larger waist size may need more aggressive weight loss treatment than others. Talk to your doctor or nurse for advice. Types of treatment -- Based on your measurements and your medical history, your doctor or nurse can determine what combination of weight loss treatments would work best for you. Treatments may include changes in lifestyle, exercise, dieting, and, in some cases, weight loss medicines or weight loss surgery. Weight loss surgery, also called bariatric surgery, is reserved for people with severe obesity who have not responded to other weight loss treatments. SETTING A WEIGHT LOSS GOAL -- It is important to set a realistic weight loss goal. Your first goal should be to avoid gaining more weight and staying at your current weight (or within 5 percent). Many people have a \"dream\" weight that is difficult or impossible to achieve.   People at high risk of developing diabetes who are able to lose 5 percent of their body weight and maintain this weight will reduce their risk of developing diabetes by about 50 percent and reduce their blood pressure. This is a success. Losing more than 15 percent of your body weight and staying at this weight is an extremely good result, even if you never reach your \"dream\" or \"ideal\" weight. LIFESTYLE CHANGES -- Programs that help you to change your lifestyle are usually run by psychologists or other professionals. The goals of lifestyle changes are to help you change your eating habits, become more active, and be more aware of how much you eat and exercise, helping you to make healthier choices. This type of treatment can be broken down into three steps: The triggers that make you want to eat   Eating   What happens after you eat  Triggers to eat -- Determining what triggers you to eat involves figuring out what foods you eat and where and when you eat. To figure out what triggers you to eat, keep a record for a few days of everything you eat, the places where you eat, how often you eat, and the emotions you were feeling when you ate. For some people, the trigger is related to a certain time of day or night. For others, the trigger is related to a certain place, like sitting at a desk working. Eating -- You can change your eating habits by breaking the chain of events between the trigger for eating and eating itself. There are many ways to do this. For instance, you can:  Limit where you eat to a few places (eg, dining room)   Restrict the number of utensils (eg, only a fork) used for eating   Drink a sip of water between each bite   Chew your food a certain number of times   Get up and stop eating every few minutes  What happens after you eat -- Rewarding yourself for good eating behaviors can help you to develop better habits. This is not a reward for weight loss; instead, it is a reward for changing unhealthy behaviors. Do not use food as a reward. Some people find money, clothing, or personal care (eg, a hair cut, manicure, or massage) to be effective rewards. Treat yourself immediately after making better eating choices to reinforce the value of the good behavior. You need to have clear behavior goals, and you must have a time frame for reaching your goals. Reward small changes along the way to your final goal.  Other factors that contribute to successful weight loss -- Changing your behavior involves more than just changing unhealthy eating habits; it also involves finding people around you to support your weight loss, reducing stress, and learning to be strong when tempted by food. Establish a \"heath\" system -- Having a friend or family member available to provide support and reinforce good behavior is very helpful. The support person needs to understand your goals. Learn to be strong -- Learning to be strong when tempted by food is an important part of losing weight. As an example, you will need to learn how to say \"no\" and continue to say no when urged to eat at parties and social gatherings. Develop strategies for events before you go, such as eating before you go or taking low-calorie snacks and drinks with you. Develop a support system -- Having a support system is helpful when losing weight. This is why many commercial groups are successful. Family support is also essential; if your family does not support your efforts to lose weight, this can slow your progress or even keep you from losing weight. Positive thinking -- People often have conversations with themselves in their head; these conversations can be positive or negative. If you eat a piece of cake that was not planned, you may respond by thinking, \"Oh, you stupid idiot, you've blown your diet! \" and as a result, you may eat more cake. A positive thought for the same event could be, \"Well, I ate cake when it was not on my plan. Now I should do something to get back on track. \" A positive approach is much more likely to be successful than a negative one.   Reduce stress -- Although stress is a part of everyday life, it can trigger uncontrolled eating in some people. It is important to find a way to get through these difficult times without eating or by eating low-calorie food, like raw vegetables. It may be helpful to imagine a relaxing place that allows you to temporarily escape from stress. With deep breaths and closed eyes, you can imagine this relaxing place for a few minutes. Self-help programs -- Self-help programs like [employer] Watchers®, Overeaters Anonymous®, and Take Off [employer])© work for some people. As with all weight loss programs, you are most likely to be successful with these plans if you make long-term changes in how you eat. CHOOSING A DIET -- A calorie is a unit of energy found in food. Your body needs calories to function. The goal of any diet is to burn up more calories than you eat. How quickly you lose weight depends upon several factors, such as your age, gender, and starting weight. Older people have a slower metabolism than young people, so they lose weight more slowly. Men lose more weight than women of similar height and weight when dieting because they use more energy. People who are extremely overweight lose weight more quickly than those who are only mildly overweight. Try not to drink alcohol or drinks with added sugar, and most sweets (candy, cakes, cookies), since they rarely contain important nutrients. Portion-controlled diets -- One simple way to diet is to buy packaged foods, like frozen low-calorie meals or meal-replacement canned drinks. A typical meal plan for one day may include:  A meal-replacement drink or breakfast bar for breakfast   A meal-replacement drink or a frozen low-calorie (250 to 350 calories) meal for lunch   A frozen low-calorie meal or other prepackaged, calorie-controlled meal, along with extra vegetables for dinner  This would give you 1000 to 1500 calories per day.   Low-fat diet -- To reduce the amount of fat in your diet, you can:  Eat low-fat foods. Low-fat foods are those that contain less than 30 percent of calories from fat. Fat is listed on the food facts label (figure 1). Count fat grams. For a 1500 calorie diet, this would mean about 45 g or fewer of fat per day. Low-carbohydrate diet -- Low- and very-low-carbohydrate diets (eg, Atkins diet, [employer]) have become popular ways to lose weight quickly. With a very-low-carbohydrate diet, you eat between 0 and 60 grams of carbohydrates per day (a standard diet contains 200 to 300 grams of carbohydrates)   With a low-carbohydrate diet, you eat between 60 and 130 grams of carbohydrates per day  Carbohydrates are found in fruits, vegetables, and grains (including breads, rice, pasta, and cereal), alcoholic beverages, and in dairy products. Meat and fish do not contain carbohydrates. Side effects of very-low-carbohydrate diets can include constipation, headache, bad breath, muscle cramps, diarrhea, and weakness. Mediterranean diet -- The term \"Mediterranean diet\" refers to a way of eating that is common in [place] regions around [facility]. Although there is some variation in Mediterranean diets, there are some similarities. Most Mediterranean diets include:  A high level of monounsaturated fats (from olive or canola oil, walnuts, pecans, almonds) and a low level of saturated fats (from butter)   A high amount of vegetables, fruits, legumes, and grains (7 to 10 servings of fruits and vegetables per day)   A moderate amount of milk and dairy products, mostly in the form of cheese. Use low-fat dairy products (skim milk, fat-free yogurt, low-fat cheese). A relatively low amount of red meat and meat products. Substitute fish or poultry for red meat. For those who drink alcohol, a modest amount (mainly as red wine) may help to protect against cardiovascular disease.  A modest amount is up to one (4 ounce) glass per day for women and up to two glasses per day for men. Which diet is best? -- Studies have compared different diets, including:  Very-low-carbohydrate (Atkins)   Macronutrient balance controlling glycemic load (Zone®)   Reduced-calorie (Weight Watchers®)   Very-low-fat (Ornish)  No one diet is \"best\" for weight loss. Any diet will help you to lose weight if you stick with the diet. Therefore, it is important to choose a diet that includes foods you like. Fad diets -- Fad diets often promise quick weight loss (more than 1 to 2 pounds per week) and may claim that you do not need to exercise or give up favorite foods. Some fad diets cost a lot of money, because you have to pay for seminars or pills. Fad diets generally lack any scientific evidence that they are safe and effective, but instead rely on \"before\" and \"after\" photos or testimonials. Diets that sound too good to be true usually are. These plans are a waste of time and money and are not recommended. A doctor, nurse, or nutritionist can help you find a safe and effective way to lose weight and keep it off. WEIGHT LOSS MEDICINES -- Taking a weight loss medicine may be helpful when used in combination with diet, exercise, and lifestyle changes. However, it is important to understand the risks and benefits of these medicines. It is also important to be realistic about your goal weight using a weight loss medicine; you may not reach your \"dream\" weight, but you may be able to reduce your risk of diabetes or heart disease. Weight loss medicines may be recommended for people who have not been able to lose weight with diet and exercise who have a:  BMI of 30 or more    BMI between 27 and 29.9 and have other medical problems, such as diabetes, high cholesterol, or high blood pressure  Two weight loss medicines are approved in the [place] for long-term use. These are sibutramine and orlistat.   Other weight loss medicines (phentermine, diethylpropion) are available but are only approved for short-term use (up to 12 weeks). Sibutramine -- Sibutramine (Meridia®, Reductil®) is a medicine that reduces your appetite. In people who take the medicine for one year, the average weight loss is 10 percent of the initial body weight (5 percent more than those who took a placebo treatment). Side effects of sibutramine include insomnia, dry mouth, and constipation. Increases in blood pressure can occur. Therefore, blood pressure is usually monitored during treatment. There is no evidence that sibutramine causes heart or lung problems (like dexfenfluramine and fenfluramine (Phen/Fen)). However, experts agree that sibutramine should not used by people with coronary heart disease, heart failure, uncontrolled hypertension, stroke, irregular heart rhythms, or peripheral vascular disease (poor circulation in the legs). Orlistat -- Orlistat (Xenical® 120 mg capsules) is a medicine that reduces the amount of fat your body absorbs from the foods you eat. A lower-dose version is now available without a prescription (James® 60 mg capsules) in many countries, including the [place]. The medicine is recommended three times per day, taken with a meal; you can skip a dose if you skip a meal or if the meal contains no fat. After one year of treatment with orlistat, the average weight loss is approximately 8 to 10 percent of initial body weight (4 percent more than in those who took a placebo). Cholesterol levels often improve, and blood pressure sometimes falls. In people with diabetes, orlistat may help control blood sugar levels. Side effects occur in 15 to 10 percent of people and may include stomach cramps, gas, diarrhea, leakage of stool, or oily stools. These problems are more likely when you take orlistat with a high-fat meal (if more than 30 percent of calories in the meal are from fat). Side effects usually improve as you learn to avoid high-fat foods.  Severe liver injury has been reported rarely in patients taking orlistat, but it is not known if orlistat caused the liver problems. Diet supplements -- Diet supplements are widely used by people who are trying to lose weight, although the safety and efficacy of these supplements are often unproven. A few of the more common diet supplements are discussed below; none of these are recommended because they have not been studied carefully, and there is no proof they are safe or effective. Chitosan and wheat dextrin are ineffective for weight loss, and their use is not recommended. Ephedra, a compound related to ephedrine, is no longer available in the [place] due to safety concerns. Many nonprescription diet pills previously contained ephedra. Although some studies have shown that ephedra helps with weight loss, there can be serious side effects (psychiatric symptoms, palpitations, and stomach upset), including death. There are not enough data about the safety and efficacy of chromium, ginseng, glucomannan, green tea, hydroxycitric acid, L carnitine, psyllium, pyruvate supplements, McGaffey wort, and conjugated linoleic acid. Two supplements from [facility], [address] [place] (also known as the Kaitlin Parmar 15 pill) and Herbathin dietary supplement, have been shown to contain prescription drugs. Hoodia gordonii is a dietary supplement derived from a plant in [place]. It is not recommended because there is no proof that it is safe or effective. Bitter orange (Citrus aurantium) can increase your heart rate and blood pressure and is not recommended.   SHOULD I HAVE SURGERY TO LOSE WEIGHT? -- Weight loss surgery is recommended ONLY for people with one of the following:  Severe obesity (body mass index above 40) (calculator 1 and calculator 2) who have not responded to diet, exercise, or weight loss medicines   Body mass index between 35 and 40, along with a serious medical problem (including diabetes, severe joint pain, or sleep apnea) that would improve with weight loss  You should be sure that you understand the potential risks and benefits of weight loss surgery. You must be motivated and willing to make lifelong changes in how you eat to reach and maintain a healthier weight after surgery. You must also be realistic about weight loss after surgery (see 'Effectiveness of weight loss surgery' below). PREPARING FOR WEIGHT LOSS SURGERY -- Most people who have weight loss surgery will meet with several specialists before surgery is scheduled. This often includes a dietitian, mental health counselor, a doctor who specializes in care of obese people, and a surgeon who performs weight loss surgery (bariatric surgeon). You may need to work with these providers for several weeks or months before surgery. The nutritionist will explain what and how much you will be able to eat after surgery. You may also need to lose a small amount of weight before surgery. The mental health specialist will help you to cope with stress and other factors that can make it harder to lose weight or trigger you to eat   The medical doctor will determine whether you need other tests, counseling, or treatment before surgery. He or she might also help you begin a medical weight loss program so that you can lose some weight before surgery. The bariatric surgeon will meet with you to discuss the surgeries available to treat obesity. He or she will also make sure you are a good candidate for surgery. TYPES OF WEIGHT LOSS SURGERY -- There are several types of weight loss surgeries, the most common being lap banding, gastric bypass, and gastric sleeve. Lap banding -- Laparoscopic adjustable gastric banding (LAGB), or lap banding, is a surgery that uses an adjustable band around the opening to the stomach (figure 1). This reduces the amount of food that you can eat at one time. Lap banding is done through small incisions, with a laparoscope. The band can be adjusted after surgery, allowing you to eat more or less food. Adjustments to the size and tightness of the band are made by using a needle to add or remove fluid from a port (a small container under the skin that is connected to the band). Adding fluid to the band makes it tighter which restricts the amount of food you can eat and may help you to lose more weight. Lap banding is a popular choice because it is relatively simple to perform, can be adjusted or removed, and has a low risk of serious complications immediately after surgery. However, weight loss with the lap band depends on your ability to follow the program closely. You will need to prepare nutritious meals that [facility] with\" the band, not against it. For example, the lap band will not work well if you eat or drink a large amount of liquid calories (like ice cream). The band will not help you to feel full when you eat/drink liquid calories. Weight loss ranges from 45 to 75 percent after two years. As an example, a person who is 120 pounds overweight could expect to lose approximately 54 to 90 pounds in the two years after lap banding. Gastric bypass -- Bettina-en-Y gastric bypass, also called gastric bypass, helps you to lose weight by reducing the amount of food you can eat and reducing the number of calories and nutrients you absorb from the food you eat. To perform gastric bypass, a surgeon creates a small stomach pouch by dividing the stomach and attaching it to the small intestine. This helps you to lose weight in two ways: The smaller stomach can hold less food than before surgery. This causes you to feel full after eating a very small amount of food or liquid. Over time, the pouch might stretch, allowing you to eat more food. The body absorbs fewer calories, since food bypasses most of the stomach as well as the upper small intestine. This new arrangement seems to decrease your appetite and change how you break down foods by changing the release of various hormones.   Gastric bypass can be performed as open surgery (through an incision on the abdomen) or laparoscopically, which uses smaller incisions and smaller instruments. Both the laparoscopic and open techniques have risks and benefits. You and your surgeon should work together to decide which surgery, if any, is right for you. Gastric bypass has a high success rate, and people lose an average of 62 to 68 percent of their excess body weight in the first year. Weight loss typically levels off after one to two years, with an overall excess weight loss between 50 and 75 percent. For a person who is 120 pounds overweight, an average of 60 to 90 pounds of weight loss would be expected. Gastric sleeve -- Gastric sleeve, also known as sleeve gastrectomy, is a surgery that reduces the size of the stomach and makes it into a narrow tube (figure 3). The new stomach is much smaller and produces less of the hormone (ghrelin) that causes hunger, helping you feel satisfied with less food. Sleeve gastrectomy is safer than gastric bypass because the intestines are not rearranged, and there is less chance of malnutrition. It also appears to control hunger better than lap banding. It might be safer than the lap banding because no foreign materials are used. The gastric sleeve has a good success rate, and people lose an average of 33 percent of their excess body weight in the first year. For a person who is 120 pounds overweight, this would mean losing about 40 pounds in the first year. WEIGHT LOSS SURGERY COMPLICATIONS -- A variety of complications can occur with weight loss surgery. The risks of surgery depend upon which surgery you have and any medical problems you had before surgery.  Some of the more common early surgical complications (one to six weeks after surgery) include:  Bleeding   Infection   Blockage or tear in the bowels   Need for further surgery  Important medical complications after surgery can include blood clots in the legs or lungs, heart attack, pneumonia, and urinary tract infection. Complications are less likely when surgery is performed in centers that are experienced in weight loss surgery. In general, centers with experience in weight loss surgery have:  Board-certified doctors and surgeons   A team of support staff (dietitians, counselors, nurses)   Long-term follow-up after surgery   Hospital staff experienced with the care of weight loss patients. This includes nurses who are trained in the care of patients immediately after surgery and anesthesiologists who are experienced in caring for the morbidly obese. EFFECTIVENESS OF WEIGHT LOSS SURGERY -- The goal of weight loss surgery is to reduce the risk of illness or death associated with obesity. Weight loss surgery can also help you to feel and look better, reduce the amount of money you spend on medicines, and cut down on sick days. As an example, weight loss surgery can improve health problems related to obesity (diabetes, high blood pressure, high cholesterol, sleep apnea) to the point that you need less or no medicine. Finally, weight loss surgery might reduce your risk of developing heart disease, cancer, and certain infections. AFTER WEIGHT LOSS SURGERY -- You will need to stay in the hospital until your team feels that it is safe for you to leave (on average, one to three days). Do not drive if you are taking prescription pain medicine. Begin exercising as soon as possible once you have healed; most weight loss centers will design an exercise program for you. Once you are home, it is important to eat and drink exactly what your doctor and dietitian recommend. You will see your doctor, nurse, and dietitian on a regular basis after surgery to monitor your health, diet, and weight loss.    You will be able to slowly increase how much you eat over time, although it will always be important to:  Eat small, frequent meals and not skip meals   Chew your food slowly and completely   Avoid eating while \"distracted\" (such as eating while watching TV)   Stop eating when you feel full   Drink liquids at least 30 minutes before or after eating   Avoid foods high in fat or sugar   Take vitamin supplements, as recommended  It can take several months to learn to listen to your body so that you know when you are hungry and when you are full. You may dislike foods you previously loved, and you may begin to prefer new foods. This can be a frustrating process for some people, so talk to your dietitian if you are having trouble. It usually takes between one and two years to lose weight after surgery. After reaching their goal weight, some people have plastic surgery (called \"body contouring\") to remove excess skin from the body, particularly in the abdominal area. Before you decide to have weight loss surgery, you must commit to staying healthy for life. This includes following up with your healthcare team, exercising most days of the week, and eating a sensible diet every day. It can be difficult to develop new eating and exercise habits after weight loss surgery, and you will have to work hard to stick to your goals. Recovering from surgery and losing weight can be stressful and emotional, and it is important to have the support of family and friends. Working with a , therapist, or support group can help you through the ups and downs. WHERE TO GET MORE INFORMATION -- Your healthcare provider is the best source of information for questions and concerns related to your medical problem.   This article will be updated as needed every four months on our Web site ([email or web address])

## 2022-08-11 ENCOUNTER — HOSPITAL ENCOUNTER (OUTPATIENT)
Age: 42
Discharge: HOME | End: 2022-08-11
Payer: COMMERCIAL

## 2022-08-11 DIAGNOSIS — R79.89 OTHER SPECIFIED ABNORMAL FINDINGS OF BLOOD CHEMISTRY: ICD-10-CM

## 2022-08-11 DIAGNOSIS — D72.829 LEUKOCYTOSIS, UNSPECIFIED TYPE: Primary | ICD-10-CM

## 2022-08-11 DIAGNOSIS — Z00.00 ENCOUNTER FOR WELL ADULT EXAM WITHOUT ABNORMAL FINDINGS: ICD-10-CM

## 2022-08-11 DIAGNOSIS — D72.829 LEUKOCYTOSIS, UNSPECIFIED TYPE: ICD-10-CM

## 2022-08-11 DIAGNOSIS — Z13.6 SCREENING FOR CARDIOVASCULAR CONDITION: ICD-10-CM

## 2022-08-11 DIAGNOSIS — R73.03 PREDIABETES: ICD-10-CM

## 2022-08-11 LAB
ALBUMIN SERPL-MCNC: 4.4 G/DL (ref 3.5–5.2)
ALP BLD-CCNC: 111 U/L (ref 40–129)
ALT SERPL-CCNC: 32 U/L (ref 0–40)
AMMONIA: 32.8 UMOL/L (ref 16–60)
ANION GAP SERPL CALCULATED.3IONS-SCNC: 11 MMOL/L (ref 7–16)
AST SERPL-CCNC: 24 U/L (ref 0–39)
BASOPHILS ABSOLUTE: 0.07 E9/L (ref 0–0.2)
BASOPHILS RELATIVE PERCENT: 0.5 % (ref 0–2)
BILIRUB SERPL-MCNC: 0.8 MG/DL (ref 0–1.2)
BILIRUBIN DIRECT: 0.2 MG/DL (ref 0–0.3)
BILIRUBIN, INDIRECT: 0.6 MG/DL (ref 0–1)
BUN BLDV-MCNC: 7 MG/DL (ref 6–20)
CALCIUM SERPL-MCNC: 9.5 MG/DL (ref 8.6–10.2)
CHLORIDE BLD-SCNC: 101 MMOL/L (ref 98–107)
CHOLESTEROL, TOTAL: 132 MG/DL (ref 0–199)
CO2: 24 MMOL/L (ref 22–29)
CREAT SERPL-MCNC: 0.9 MG/DL (ref 0.7–1.2)
EOSINOPHILS ABSOLUTE: 0.74 E9/L (ref 0.05–0.5)
EOSINOPHILS RELATIVE PERCENT: 5.7 % (ref 0–6)
FOLATE: >20 NG/ML (ref 4.8–24.2)
GFR AFRICAN AMERICAN: >60
GFR NON-AFRICAN AMERICAN: >60 ML/MIN/1.73
GLUCOSE BLD-MCNC: 102 MG/DL (ref 74–99)
HBA1C MFR BLD: 8.3 % (ref 4–5.6)
HCT VFR BLD CALC: 47.6 % (ref 37–54)
HDLC SERPL-MCNC: 48 MG/DL
HEMOGLOBIN: 15.6 G/DL (ref 12.5–16.5)
IMMATURE GRANULOCYTES #: 0.08 E9/L
IMMATURE GRANULOCYTES %: 0.6 % (ref 0–5)
LDL CHOLESTEROL CALCULATED: 58 MG/DL (ref 0–99)
LYMPHOCYTES ABSOLUTE: 3.37 E9/L (ref 1.5–4)
LYMPHOCYTES RELATIVE PERCENT: 25.8 % (ref 20–42)
MCH RBC QN AUTO: 29.7 PG (ref 26–35)
MCHC RBC AUTO-ENTMCNC: 32.8 % (ref 32–34.5)
MCV RBC AUTO: 90.7 FL (ref 80–99.9)
MONOCYTES ABSOLUTE: 0.93 E9/L (ref 0.1–0.95)
MONOCYTES RELATIVE PERCENT: 7.1 % (ref 2–12)
NEUTROPHILS ABSOLUTE: 7.88 E9/L (ref 1.8–7.3)
NEUTROPHILS RELATIVE PERCENT: 60.3 % (ref 43–80)
PATHOLOGIST REVIEW: NORMAL
PDW BLD-RTO: 13.8 FL (ref 11.5–15)
PLATELET # BLD: 328 E9/L (ref 130–450)
PMV BLD AUTO: 9.9 FL (ref 7–12)
POTASSIUM SERPL-SCNC: 3.8 MMOL/L (ref 3.5–5)
RBC # BLD: 5.25 E12/L (ref 3.8–5.8)
SODIUM BLD-SCNC: 136 MMOL/L (ref 132–146)
T4 FREE: 0.98 NG/DL (ref 0.93–1.7)
TOTAL PROTEIN: 7.5 G/DL (ref 6.4–8.3)
TRIGL SERPL-MCNC: 129 MG/DL (ref 0–149)
TSH SERPL DL<=0.05 MIU/L-ACNC: 5.37 UIU/ML (ref 0.27–4.2)
URIC ACID, SERUM: 3.4 MG/DL (ref 3.4–7)
VITAMIN B-12: 696 PG/ML (ref 211–946)
VLDLC SERPL CALC-MCNC: 26 MG/DL
WBC # BLD: 13.1 E9/L (ref 4.5–11.5)

## 2022-08-11 PROCEDURE — 82140 ASSAY OF AMMONIA: CPT

## 2022-08-11 PROCEDURE — 84443 ASSAY THYROID STIM HORMONE: CPT

## 2022-08-11 PROCEDURE — 80061 LIPID PANEL: CPT

## 2022-08-11 PROCEDURE — 84439 ASSAY OF FREE THYROXINE: CPT

## 2022-08-11 PROCEDURE — 84550 ASSAY OF BLOOD/URIC ACID: CPT

## 2022-08-11 PROCEDURE — 83036 HEMOGLOBIN GLYCOSYLATED A1C: CPT

## 2022-08-11 PROCEDURE — 80076 HEPATIC FUNCTION PANEL: CPT

## 2022-08-11 PROCEDURE — 86769 SARS-COV-2 COVID-19 ANTIBODY: CPT

## 2022-08-11 PROCEDURE — 85025 COMPLETE CBC W/AUTO DIFF WBC: CPT

## 2022-08-11 PROCEDURE — 82746 ASSAY OF FOLIC ACID SERUM: CPT

## 2022-08-11 PROCEDURE — 82607 VITAMIN B-12: CPT

## 2022-08-11 PROCEDURE — 80048 BASIC METABOLIC PNL TOTAL CA: CPT

## 2022-08-11 PROCEDURE — 36415 COLL VENOUS BLD VENIPUNCTURE: CPT

## 2022-08-12 LAB — SARS-COV-2 ANTIBODY, TOTAL: NORMAL

## 2022-08-12 RX ORDER — PIOGLITAZONEHYDROCHLORIDE 15 MG/1
15 TABLET ORAL DAILY
Qty: 30 TABLET | Refills: 3 | Status: SHIPPED | OUTPATIENT
Start: 2022-08-12

## 2022-08-28 DIAGNOSIS — E11.8 TYPE 2 DIABETES MELLITUS WITH COMPLICATION, WITHOUT LONG-TERM CURRENT USE OF INSULIN (HCC): ICD-10-CM

## 2022-09-01 ENCOUNTER — OFFICE VISIT (OUTPATIENT)
Dept: PRIMARY CARE CLINIC | Age: 42
End: 2022-09-01
Payer: COMMERCIAL

## 2022-09-01 VITALS
WEIGHT: 257.8 LBS | HEART RATE: 73 BPM | DIASTOLIC BLOOD PRESSURE: 70 MMHG | OXYGEN SATURATION: 98 % | SYSTOLIC BLOOD PRESSURE: 102 MMHG | TEMPERATURE: 97.3 F | BODY MASS INDEX: 36.99 KG/M2

## 2022-09-01 DIAGNOSIS — R55 POSTURAL DIZZINESS WITH PRESYNCOPE: ICD-10-CM

## 2022-09-01 DIAGNOSIS — R42 POSTURAL DIZZINESS WITH PRESYNCOPE: ICD-10-CM

## 2022-09-01 DIAGNOSIS — R42 DIZZINESS: Primary | ICD-10-CM

## 2022-09-01 DIAGNOSIS — E11.8 TYPE 2 DIABETES MELLITUS WITH COMPLICATION, WITHOUT LONG-TERM CURRENT USE OF INSULIN (HCC): ICD-10-CM

## 2022-09-01 DIAGNOSIS — R79.89 ELEVATED TSH: ICD-10-CM

## 2022-09-01 DIAGNOSIS — R41.0 CONFUSION: ICD-10-CM

## 2022-09-01 PROCEDURE — 3052F HG A1C>EQUAL 8.0%<EQUAL 9.0%: CPT | Performed by: FAMILY MEDICINE

## 2022-09-01 PROCEDURE — G8427 DOCREV CUR MEDS BY ELIG CLIN: HCPCS | Performed by: FAMILY MEDICINE

## 2022-09-01 PROCEDURE — 2022F DILAT RTA XM EVC RTNOPTHY: CPT | Performed by: FAMILY MEDICINE

## 2022-09-01 PROCEDURE — 1036F TOBACCO NON-USER: CPT | Performed by: FAMILY MEDICINE

## 2022-09-01 PROCEDURE — G8417 CALC BMI ABV UP PARAM F/U: HCPCS | Performed by: FAMILY MEDICINE

## 2022-09-01 PROCEDURE — 99213 OFFICE O/P EST LOW 20 MIN: CPT | Performed by: FAMILY MEDICINE

## 2022-09-01 ASSESSMENT — ENCOUNTER SYMPTOMS
COUGH: 0
ABDOMINAL PAIN: 0
VOMITING: 0
NAUSEA: 0
PHOTOPHOBIA: 0
BACK PAIN: 1
SORE THROAT: 0
ABDOMINAL DISTENTION: 0
CONSTIPATION: 0
BLOOD IN STOOL: 0
DIARRHEA: 0
SHORTNESS OF BREATH: 0

## 2022-09-01 NOTE — PROGRESS NOTES
Ortiz Kirk (:  1980) is a 35 y.o. male,Established patient, here for evaluation of the following chief complaint(s):  Dizziness (Has been feeling off balance and had had some confusion. Has been going on for awhile but has gotten worse within the last week)         ASSESSMENT/PLAN:  1. Dizziness  -     CT HEAD WO CONTRAST; Future  2. Postural dizziness with presyncope  -     CT HEAD WO CONTRAST; Future  3. Confusion  -     CT HEAD WO CONTRAST; Future  4. Type 2 diabetes mellitus with complication, without long-term current use of insulin (HCC)  -     TSH; Future  -     T4, Free; Future  -     Hemoglobin A1C; Future  5. Elevated TSH  -     TSH; Future  -     T4, Free; Future  -     Hemoglobin A1C; Future  Orthostatics borderline positive. Advised regarding conservative strategies in addition to following up with cardiology for medication adjustment. Compression stockings and slight increase in salt intake will help with symptoms. We will recheck TSH in 6 weeks to determine if he needs to be on supplementation or not. CT of the head also ordered given concern for recurrent dizziness and falls. If negative may refer to physical therapy for gait retraining if needed. Recent labs were within normal limits other than slight increase in A1c and slightly abnormal TSH. No follow-ups on file. Subjective   SUBJECTIVE/OBJECTIVE:  HPI  Patient presents today with mother for evaluation of several week history of worsening balance issues, confusion, and recent falls. Patient relates a graying out of his vision prior to falling to the ground. Also relates a buzzing sensation prior to falling. Denies any seizure activity. Does have migraines and states that this feels similar. Denies any head trauma. Denies any recent illness prior to symptoms beginning. As noted above patient recently had labs performed which only showed slight increase in A1c and slightly elevated TSH.   Has not had any intracranial imaging since last year. No other issues or concerns. Review of Systems   Constitutional:  Positive for fatigue. Negative for chills and fever. HENT:  Negative for congestion, hearing loss, nosebleeds and sore throat. Eyes:  Negative for photophobia. Respiratory:  Negative for cough and shortness of breath. Cardiovascular:  Negative for chest pain, palpitations and leg swelling. Gastrointestinal:  Negative for abdominal distention, abdominal pain, blood in stool, constipation, diarrhea, nausea and vomiting. Endocrine: Negative for polydipsia. Genitourinary:  Negative for dysuria, frequency, hematuria and urgency. Musculoskeletal:  Positive for arthralgias, back pain, gait problem, joint swelling and myalgias. Skin: Negative. Neurological:  Positive for dizziness, tremors, seizures, syncope, light-headedness and numbness. Negative for weakness and headaches. Hematological:  Does not bruise/bleed easily. Psychiatric/Behavioral:  Positive for behavioral problems, decreased concentration, dysphoric mood and sleep disturbance. Negative for agitation, hallucinations, self-injury and suicidal ideas. The patient is nervous/anxious. All other systems reviewed and are negative.        Current Outpatient Medications:     metFORMIN (GLUCOPHAGE) 1000 MG tablet, Take 1 tablet by mouth 2 times daily (with meals), Disp: 180 tablet, Rfl: 1    pioglitazone (ACTOS) 15 MG tablet, Take 1 tablet by mouth in the morning., Disp: 30 tablet, Rfl: 3    linagliptin (TRADJENTA) 5 MG tablet, Take 1 tablet by mouth in the morning., Disp: 30 tablet, Rfl: 5    metoprolol succinate (TOPROL XL) 100 MG extended release tablet, Take 1 tablet by mouth 2 times daily, Disp: 180 tablet, Rfl: 3    Rimegepant Sulfate 75 MG TBDP, Take 75 mg by mouth every other day No more than 1 tab in 24 hours, Disp: 16 tablet, Rfl: 11    zonisamide (ZONEGRAN) 50 MG capsule, Take 2 capsules by mouth nightly, Disp: 180 capsule, Rfl: 3    cyclobenzaprine (FLEXERIL) 5 MG tablet, Take 1 tablet by mouth nightly as needed for Muscle spasms, Disp: 30 tablet, Rfl: 11    azelastine (ASTELIN) 0.1 % nasal spray, 1 spray by Nasal route 2 times daily Use in each nostril as directed, Disp: 60 mL, Rfl: 1    chlorpheniramine (ALLER-CHLOR) 4 MG tablet, Take 1 tablet by mouth every 6 hours as needed for Allergies, Disp: 20 tablet, Rfl: 0    blood glucose monitor strips, Test 3 times a day & as needed for symptoms of irregular blood glucose. Dispense sufficient amount for indicated testing frequency plus additional to accommodate PRN testing needs. , Disp: 300 strip, Rfl: 1    atorvastatin (LIPITOR) 40 MG tablet, Take 1 tablet by mouth daily, Disp: 90 tablet, Rfl: 3    ammonium lactate (LAC-HYDRIN) 12 % lotion, Apply topically twice daily, Disp: 400 g, Rfl: 2    clonazePAM (KLONOPIN) 1 MG tablet, Take 1 mg by mouth 2 times daily as needed. , Disp: , Rfl:     ENULOSE 10 GM/15ML SOLN solution, as needed , Disp: , Rfl:     mirtazapine (REMERON) 30 MG tablet, TAKE ONE TABLET BY MOUTH once a day AT BEDTIME, Disp: , Rfl:     desonide (DESOWEN) 0.05 % cream, Apply topically 2 times daily. , Disp: 60 g, Rfl: 0    metroNIDAZOLE (METROGEL) 0.75 % gel, Apply topically 2 times daily. , Disp: 45 g, Rfl: 0    lactulose (CHRONULAC) 10 GM/15ML solution, TAKE 15 ML BY MOUTH 3 TIMES DAILY, Disp: 946 mL, Rfl: 0    medical marijuana, Take by mouth as needed. , Disp: , Rfl:     FLUoxetine (PROZAC) 20 MG capsule, Take 2 capsules by mouth daily, Disp: 60 capsule, Rfl: 0    rifaximin (XIFAXAN) 550 MG tablet, Take 550 mg by mouth 2 times daily, Disp: , Rfl:    Patient Active Problem List   Diagnosis    Anxiety    Hyperlipemia    Irritable bowel syndrome with diarrhea    Psychogenic nonepileptic seizure    Non-smoker    Depression    Type 2 diabetes mellitus with complication, without long-term current use of insulin (HCC)    Conversion disorder    Attention deficit hyperactivity disorder (ADHD), predominantly hyperactive type    Fatty liver    Migraine with aura and without status migrainosus, not intractable    Cervical spondylosis    Chronic left shoulder pain    Chronic pain of right knee    Postural dizziness with presyncope    Confusion    Elevated TSH     Past Medical History:   Diagnosis Date    Anxiety     Arthritis     Class 1 obesity due to excess calories with serious comorbidity and body mass index (BMI) of 34.0 to 34.9 in adult     First degree AV block     GERD (gastroesophageal reflux disease)     Hyperlipidemia     IBS (irritable bowel syndrome)     LVH (left ventricular hypertrophy)     Migraines     Obesity     ENOC (obstructive sleep apnea)     Psychiatric problem     Psychogenic nonepileptic seizure     Tremors of nervous system     Type 2 diabetes mellitus with complication, without long-term current use of insulin (HCC)      Past Surgical History:   Procedure Laterality Date    HERNIA REPAIR      INSERTABLE CARDIAC MONITOR  07/14/2017    KNEE SURGERY      PACEMAKER INSERTION  12/28/2017    D-PPM   ([facility])   [address] [facility]    TONSILLECTOMY       Social History     Socioeconomic History    Marital status: Single     Spouse name: Not on file    Number of children: Not on file    Years of education: Not on file    Highest education level: Not on file   Occupational History    Not on file   Tobacco Use    Smoking status: Never    Smokeless tobacco: Never   Vaping Use    Vaping Use: Never used   Substance and Sexual Activity    Alcohol use: Yes     Comment: Socially- couple times weekly    Drug use: Yes     Types: Marijuana Berneta Ben)     Comment: Medical- rarely    Sexual activity: Not on file   Other Topics Concern    Not on file   Social History Narrative    Not on file     Social Determinants of Health     Financial Resource Strain: Not on file   Food Insecurity: Not on file   Transportation Needs: Not on file   Physical Activity: Not on file   Stress: Not on file   Social Connections: Not on file   Intimate Partner Violence: Not on file   Housing Stability: Not on file     History reviewed. No pertinent family history. There are no preventive care reminders to display for this patient. There are no preventive care reminders to display for this patient. Diabetes Management   Topic Date Due    Diabetic foot exam  Never done    Diabetic retinal exam  04/03/2020      There are no preventive care reminders to display for this patient. Health Maintenance   Topic Date Due    Pneumococcal 0-64 years Vaccine (1 - PCV) Never done    Diabetic foot exam  Never done    Hepatitis B vaccine (2 of 3 - Risk 3-dose series) 08/29/2016    Diabetic retinal exam  04/03/2020    Flu vaccine (1) 09/01/2022    Depression Monitoring  01/13/2023    Diabetic microalbuminuria test  02/10/2023    A1C test (Diabetic or Prediabetic)  08/11/2023    Lipids  08/11/2023    DTaP/Tdap/Td vaccine (2 - Td or Tdap) 04/20/2026    COVID-19 Vaccine  Completed    Hepatitis C screen  Completed    Hepatitis A vaccine  Aged Out    Hib vaccine  Aged Out    Meningococcal (ACWY) vaccine  Aged Out    HIV screen  Discontinued      There are no preventive care reminders to display for this patient. There are no preventive care reminders to display for this patient. /70 (Site: Right Upper Arm, Position: Standing)   Pulse 73   Temp 97.3 °F (36.3 °C) (Temporal)   Wt 257 lb 12.8 oz (116.9 kg)   SpO2 98%   BMI 36.99 kg/m²     Objective   Physical Exam  Vitals reviewed. Constitutional:       Appearance: He is obese. HENT:      Head: Normocephalic and atraumatic. Right Ear: There is no impacted cerumen. Left Ear: There is no impacted cerumen. Eyes:      General: No scleral icterus. Conjunctiva/sclera: Conjunctivae normal.      Pupils: Pupils are equal, round, and reactive to light. Neck:      Thyroid: No thyromegaly. Cardiovascular:      Rate and Rhythm: Normal rate and regular rhythm.       Heart

## 2022-09-13 ENCOUNTER — HOSPITAL ENCOUNTER (OUTPATIENT)
Dept: CT IMAGING | Age: 42
Discharge: HOME | End: 2022-09-15
Payer: COMMERCIAL

## 2022-09-13 DIAGNOSIS — R42 DIZZINESS: ICD-10-CM

## 2022-09-13 DIAGNOSIS — R42 POSTURAL DIZZINESS WITH PRESYNCOPE: ICD-10-CM

## 2022-09-13 DIAGNOSIS — R41.0 CONFUSION: ICD-10-CM

## 2022-09-13 DIAGNOSIS — R55 POSTURAL DIZZINESS WITH PRESYNCOPE: ICD-10-CM

## 2022-09-13 PROCEDURE — 70450 CT HEAD/BRAIN W/O DYE: CPT

## 2022-10-14 DIAGNOSIS — E11.8 TYPE 2 DIABETES MELLITUS WITH COMPLICATION, WITHOUT LONG-TERM CURRENT USE OF INSULIN (HCC): ICD-10-CM

## 2022-10-26 ENCOUNTER — OFFICE VISIT (OUTPATIENT)
Dept: CHIROPRACTIC MEDICINE | Age: 42
End: 2022-10-26
Payer: COMMERCIAL

## 2022-10-26 VITALS — OXYGEN SATURATION: 96 % | TEMPERATURE: 97.4 F | HEART RATE: 81 BPM

## 2022-10-26 DIAGNOSIS — M54.04 PANNICULITIS AFFECTING REGIONS OF NECK AND BACK, THORACIC REGION: ICD-10-CM

## 2022-10-26 DIAGNOSIS — M47.812 CERVICAL SPONDYLOSIS: ICD-10-CM

## 2022-10-26 DIAGNOSIS — M99.02 SEGMENTAL AND SOMATIC DYSFUNCTION OF THORACIC REGION: ICD-10-CM

## 2022-10-26 DIAGNOSIS — M99.01 SEGMENTAL AND SOMATIC DYSFUNCTION OF CERVICAL REGION: Primary | ICD-10-CM

## 2022-10-26 PROCEDURE — G8427 DOCREV CUR MEDS BY ELIG CLIN: HCPCS | Performed by: CHIROPRACTOR

## 2022-10-26 PROCEDURE — 98940 CHIROPRACT MANJ 1-2 REGIONS: CPT | Performed by: CHIROPRACTOR

## 2022-10-26 PROCEDURE — 1036F TOBACCO NON-USER: CPT | Performed by: CHIROPRACTOR

## 2022-10-26 PROCEDURE — G8484 FLU IMMUNIZE NO ADMIN: HCPCS | Performed by: CHIROPRACTOR

## 2022-10-26 PROCEDURE — 99212 OFFICE O/P EST SF 10 MIN: CPT | Performed by: CHIROPRACTOR

## 2022-10-26 PROCEDURE — G8417 CALC BMI ABV UP PARAM F/U: HCPCS | Performed by: CHIROPRACTOR

## 2022-10-26 NOTE — PROGRESS NOTES
Patient states his neck and shoulders are sore today.  Juana Agustin DO  Electronically signed by Nahum Orr LPN on [id number] at 8:07 AM

## 2022-10-26 NOTE — PROGRESS NOTES
10/26/22  Jessica Calixto : 1980 Sex: male  Age: 38 y.o. Chief Complaint   Patient presents with    Neck Pain    Shoulder Pain       HPI:   Last seen 22  Neck pain, left shoulder worse of late. Maybe due to knitting? No specific injury. Limited left shoulder ROM, left rotation of cervical spine with pain. No UE numb, tingling. Tries some home stretches, & fluoxitine when really sore. No aggravation with Dejerne's. Pain now - 4-5/10  Range  3-7/10          Current Outpatient Medications:     Rimegepant Sulfate 75 MG TBDP, Take 75 mg by mouth every other day No more than 1 tab in 24 hours, Disp: 16 tablet, Rfl: 11    metFORMIN (GLUCOPHAGE) 1000 MG tablet, Take 1 tablet by mouth 2 times daily (with meals), Disp: 180 tablet, Rfl: 1    pioglitazone (ACTOS) 15 MG tablet, Take 1 tablet by mouth in the morning., Disp: 30 tablet, Rfl: 3    linagliptin (TRADJENTA) 5 MG tablet, Take 1 tablet by mouth in the morning., Disp: 30 tablet, Rfl: 5    metoprolol succinate (TOPROL XL) 100 MG extended release tablet, Take 1 tablet by mouth 2 times daily, Disp: 180 tablet, Rfl: 3    zonisamide (ZONEGRAN) 50 MG capsule, Take 2 capsules by mouth nightly, Disp: 180 capsule, Rfl: 3    cyclobenzaprine (FLEXERIL) 5 MG tablet, Take 1 tablet by mouth nightly as needed for Muscle spasms, Disp: 30 tablet, Rfl: 11    azelastine (ASTELIN) 0.1 % nasal spray, 1 spray by Nasal route 2 times daily Use in each nostril as directed, Disp: 60 mL, Rfl: 1    chlorpheniramine (ALLER-CHLOR) 4 MG tablet, Take 1 tablet by mouth every 6 hours as needed for Allergies, Disp: 20 tablet, Rfl: 0    blood glucose monitor strips, Test 3 times a day & as needed for symptoms of irregular blood glucose. Dispense sufficient amount for indicated testing frequency plus additional to accommodate PRN testing needs. , Disp: 300 strip, Rfl: 1    atorvastatin (LIPITOR) 40 MG tablet, Take 1 tablet by mouth daily, Disp: 90 tablet, Rfl: 3    ammonium lactate (LAC-HYDRIN) 12 % lotion, Apply topically twice daily, Disp: 400 g, Rfl: 2    clonazePAM (KLONOPIN) 1 MG tablet, Take 1 mg by mouth 2 times daily as needed. , Disp: , Rfl:     ENULOSE 10 GM/15ML SOLN solution, as needed , Disp: , Rfl:     mirtazapine (REMERON) 30 MG tablet, TAKE ONE TABLET BY MOUTH once a day AT BEDTIME, Disp: , Rfl:     desonide (DESOWEN) 0.05 % cream, Apply topically 2 times daily. , Disp: 60 g, Rfl: 0    metroNIDAZOLE (METROGEL) 0.75 % gel, Apply topically 2 times daily. , Disp: 45 g, Rfl: 0    lactulose (CHRONULAC) 10 GM/15ML solution, TAKE 15 ML BY MOUTH 3 TIMES DAILY, Disp: 946 mL, Rfl: 0    medical marijuana, Take by mouth as needed. , Disp: , Rfl:     FLUoxetine (PROZAC) 20 MG capsule, Take 2 capsules by mouth daily, Disp: 60 capsule, Rfl: 0    rifaximin (XIFAXAN) 550 MG tablet, Take 550 mg by mouth 2 times daily, Disp: , Rfl:     Exam:   Vitals:    10/26/22 0806   Pulse: 81   Temp: 97.4 °F (36.3 °C)   SpO2: 96%     No acute distress. Alert and oriented x3. There is moderate limitation of cervical extension, left rotation and left lateral flexion with mild endrange pain reported. At the left shoulder, flexion and abduction are approximately 135 degrees. Passively I am able to achieve 170 in each plane. Reflexes are +2 and symmetrical at the upper extremity indicators. Manual muscle testing reveals 5/5 strength of the upper extremity indicator muscles. Sensation to light touch is WNL throughout the upper extremity dermatomes. Radial pulses 2/4. Felipe's and Tromner's are absent. Cervical compression is negative. Left foraminal compression testing reproduces axial neck pain, unremarkable to the right. The left maximum cervical rotatory compression testing reproduces axial neck pain only, unremarkable right. The neck is supple nontender in the midline. He does have some tenderness over the facets on the left at C6-7, T2-4.   There are hypertonic and tender fibers noted with palpation in the paraspinal muscles of the cervical, thoracic region. Joint fixation is noted with motion screening at T 3-5, C6-7. Inez Villafuerte was seen today for neck pain and shoulder pain. Diagnoses and all orders for this visit:    Segmental and somatic dysfunction of cervical region    Segmental and somatic dysfunction of thoracic region    Cervical spondylosis    Panniculitis affecting regions of neck and back, thoracic region      Treatment Plan: Reexamination performed today due to worsening symptoms, clinical layoff. Despite this, his issues appear similar to that of February 2022. We will start him back in some conservative care consisting of manipulation and home-based exercises. Plan to see him 1-2 times per week for 4-6 visits then reevaluate. With consent, I did begin today. Specifically performing vibratory massage to the upper back for 2 minutes. Then diversified manipulation to listed cervical and thoracic segments. Tolerated well. I want him performing 1x10 of cervical repeated motions in the left rotation and extension with left deviation. I reviewed both of these for him today. He should stop should symptoms worsen.       Seen By:  Risa Lopez DC

## 2022-10-27 ENCOUNTER — TELEPHONE (OUTPATIENT)
Dept: NEUROLOGY | Age: 42
End: 2022-10-27

## 2022-10-27 NOTE — TELEPHONE ENCOUNTER
Betty Jones ([place]: Татьяна Schuster)  Nurtec 75MG dispersible tablets     Form  [employer] Electronic PA Form (2017 NCPDP)  Created  4 minutes ago  Sent to Plan  3 minutes ago  Plan Response  2 minutes ago  Submit Clinical Questions  less than a minute ago  Determination  Favorable  less than a minute ago  Message from Laura Bryson,#[phone]; Review Type:Prior Auth; Coverage Start Date:09/27/2022; Coverage End Date:10/27/2023;

## 2022-10-31 NOTE — PROGRESS NOTES
[facility] and [facility] Electrophysiology  Outpatient Progress Report  PATIENT: Ulises Howell RECORD NUMBER: [id number]  DATE OF SERVICE:  11/3/2022  ATTENDING ELECTROPHYSIOLOGIST: Dina Kingsley MD  REFERRING PHYSICIAN: No ref. provider torie and Adelso Alexander DO  CHIEF COMPLAINT: Pacemaker in situ and NSVT    HPI: This is a 38 y.o. male with a history of   Patient Active Problem List   Diagnosis    Anxiety    Hyperlipemia    Irritable bowel syndrome with diarrhea    Psychogenic nonepileptic seizure    Non-smoker    Depression    Type 2 diabetes mellitus with complication, without long-term current use of insulin ([facility] [address]    Conversion disorder    Attention deficit hyperactivity disorder (ADHD), predominantly hyperactive type    Fatty liver    Migraine with aura and without status migrainosus, not intractable    Cervical spondylosis    Chronic left shoulder pain    Chronic pain of right knee    Postural dizziness with presyncope    Confusion    Elevated TSH   who presents to cardiac electrophysiology clinic for follow up of pacemaker in situ and NSVT. Since his last office visit, Mr. Marrufo Sat had a TTE and stress test which were unremarkable. He reports feeling well and offers no complaints from a device POV. The device site looks well healed and free from infection or erosion. The patient denies any chest pain, dyspnea, palpitations, dizziness, syncope, orthopnea or paroxysmal nocturnal dyspnea. The patient continues to be followed remotely.     Patient Active Problem List    Diagnosis Date Noted    Postural dizziness with presyncope 09/01/2022     Priority: Medium    Confusion 09/01/2022     Priority: Medium    Elevated TSH 09/01/2022     Priority: Medium    Chronic left shoulder pain 01/13/2022    Chronic pain of right knee 01/13/2022    Cervical spondylosis 01/12/2022    Migraine with aura and without status migrainosus, not intractable 01/12/2021    Fatty liver 07/09/2019    Attention deficit hyperactivity disorder (ADHD), predominantly hyperactive type 01/08/2019    Non-smoker     Depression     Type 2 diabetes mellitus with complication, without long-term current use of insulin ([address]     Conversion disorder     Psychogenic nonepileptic seizure 09/06/2017    Irritable bowel syndrome with diarrhea 08/30/2016    Anxiety 11/04/2015    Hyperlipemia 11/04/2015       Past Medical History:   Diagnosis Date    Anxiety     Arthritis     Class 1 obesity due to excess calories with serious comorbidity and body mass index (BMI) of 34.0 to 34.9 in adult     First degree AV block     GERD (gastroesophageal reflux disease)     Hyperlipidemia     IBS (irritable bowel syndrome)     LVH (left ventricular hypertrophy)     Migraines     Obesity     ENOC (obstructive sleep apnea)     Psychiatric problem     Psychogenic nonepileptic seizure     Tremors of nervous system     Type 2 diabetes mellitus with complication, without long-term current use of insulin ([address]        No family history on file. Social History     Tobacco Use    Smoking status: Never    Smokeless tobacco: Never   Substance Use Topics    Alcohol use: Yes     Comment: Socially- couple times weekly       Current Outpatient Medications   Medication Sig Dispense Refill    Rimegepant Sulfate 75 MG TBDP Take 75 mg by mouth every other day No more than 1 tab in 24 hours 16 tablet 11    metFORMIN (GLUCOPHAGE) 1000 MG tablet Take 1 tablet by mouth 2 times daily (with meals) 180 tablet 1    pioglitazone (ACTOS) 15 MG tablet Take 1 tablet by mouth in the morning. 30 tablet 3    linagliptin (TRADJENTA) 5 MG tablet Take 1 tablet by mouth in the morning.  30 tablet 5    metoprolol succinate (TOPROL XL) 100 MG extended release tablet Take 1 tablet by mouth 2 times daily 180 tablet 3    zonisamide (ZONEGRAN) 50 MG capsule Take 2 capsules by mouth nightly 180 capsule 3    cyclobenzaprine (FLEXERIL) 5 MG tablet Take 1 tablet by mouth nightly as needed for Muscle spasms 30 tablet 11    azelastine (ASTELIN) 0.1 % nasal spray 1 spray by Nasal route 2 times daily Use in each nostril as directed 60 mL 1    chlorpheniramine (ALLER-CHLOR) 4 MG tablet Take 1 tablet by mouth every 6 hours as needed for Allergies 20 tablet 0    atorvastatin (LIPITOR) 40 MG tablet Take 1 tablet by mouth daily 90 tablet 3    ammonium lactate (LAC-HYDRIN) 12 % lotion Apply topically twice daily 400 g 2    clonazePAM (KLONOPIN) 1 MG tablet Take 1 mg by mouth 2 times daily as needed. ENULOSE 10 GM/15ML SOLN solution as needed       mirtazapine (REMERON) 30 MG tablet TAKE ONE TABLET BY MOUTH once a day AT BEDTIME      desonide (DESOWEN) 0.05 % cream Apply topically 2 times daily. (Patient taking differently: as needed) 60 g 0    metroNIDAZOLE (METROGEL) 0.75 % gel Apply topically 2 times daily. 45 g 0    lactulose (CHRONULAC) 10 GM/15ML solution TAKE 15 ML BY MOUTH 3 TIMES DAILY 946 mL 0    medical marijuana Take by mouth as needed. FLUoxetine (PROZAC) 20 MG capsule Take 2 capsules by mouth daily 60 capsule 0    rifaximin (XIFAXAN) 550 MG tablet Take 550 mg by mouth 2 times daily      blood glucose monitor strips Test 3 times a day & as needed for symptoms of irregular blood glucose. Dispense sufficient amount for indicated testing frequency plus additional to accommodate PRN testing needs. 300 strip 1     No current facility-administered medications for this visit. Allergies   Allergen Reactions    Fish-Derived Products      ROS:   Constitutional: Negative for fever, activity change and appetite change. HENT: Negative for epistaxis. Eyes: Negative for diploplia, blurred vision. Respiratory: Negative for cough, chest tightness, shortness of breath and wheezing. Cardiovascular: pertinent positives in HPI  Gastrointestinal: Negative for abdominal pain and blood in stool.    All other review of systems are negative     PHYSICAL EXAM:  Vitals:    11/03/22 1150   BP: 118/86   Pulse: 69   Resp: 18   Weight: 262 lb 9.6 oz (119.1 kg)   Height: 5' 10\" (1.778 m)        Constitutional: Well-developed, no acute distress  Eyes: conjunctivae normal, no xanthelasma   Ears, Nose, Throat: oral mucosa moist, no cyanosis   CV: no JVD. Regular rate and rhythm. Normal S1S2 and no S3. No murmurs, rubs, or gallops. PMI is nondisplaced  Lungs: clear to auscultation bilaterally, normal respiratory effort without used of accessory muscles  Abdomen: soft, non-tender, bowel sounds present, no masses or hepatomegaly   Musculoskeletal: no digital clubbing, trace edema of LEs  Skin: warm, no rashes   Pacemaker site: well healed. No erosion, infection or migration    I have personally reviewed the laboratory, cardiac diagnostic and radiographic testing as outlined below:    Data:    No results for input(s): WBC, HGB, HCT, PLT in the last 72 hours. No results for input(s): NA, K, CL, CO2, BUN, CREATININE, GLU, CALCIUM in the last 72 hours. Invalid input(s): MAGNESIUM   Lab Results   Component Value Date/Time    MG 2.0 06/03/2021 02:11 AM     No results for input(s): TSH in the last 72 hours. No results for input(s): INR in the last 72 hours. CXR 6/3/21:  FINDINGS:   The lungs are without acute focal process. There is no effusion or   pneumothorax. A left upper chest cardiac pacer device is noted in place. The cardiomediastinal silhouette is stable. The osseous structures are stable. Impression   No acute process. Echocardiogram 7/23/2021:    Findings      Left Ventricle   Normal left ventricle size and systolic function. Ejection fraction is visually estimated at 55-60%. No regional wall motion abnormalities seen. Normal left ventricle wall thickness. Indeterminate diastolic function. Right Ventricle   Normal right ventricular size and function. TAPSE 19 mm. Pacer wire visualized in right ventricle. Left Atrium   Left atrium is of normal size. Right Atrium   Normal right atrium size. Mitral Valve   Normal mitral valve structure and function. No evidence of mitral valve stenosis. No systolic mitral regurgitation noted. Tricuspid Valve   The tricuspid valve appears structurally normal.   No evidence of tricuspid regurgitation. Unable to estimate PA systolic pressure. Aortic Valve   Structurally normal aortic valve. The aortic valve is trileaflet. No hemodynamically significant aortic stenosis is present. No evidence of aortic valve regurgitation. Pulmonic Valve   Pulmonic valve is structurally normal.   Physiologic and/or trace pulmonic regurgitation present. No evidence of pulmonic valve stenosis. Pericardial Effusion   No evidence for hemodynamically significant pericardial effusion. Pleural Effusion   No evidence of pleural effusion. Aorta   Aortic root dimension within normal limits. Miscellaneous   Inferior Vena Cava not well visualized. Conclusions      Summary   Normal left ventricle size and systolic function. Ejection fraction is visually estimated at 55-60%. No regional wall motion abnormalities seen. Normal left ventricle wall thickness. Indeterminate diastolic function. Normal right ventricular size and function. TAPSE 19 mm. Pacer wire visualized in right ventricle. No hemodynamically significant aortic stenosis is present. Unable to estimate PA systolic pressure. No evidence for hemodynamically significant pericardial effusion.       Signature      ----------------------------------------------------------------   Electronically signed by He Rodriguez MD(Interpreting   physician) on 07/24/2021 05:01 PM   ----------------------------------------------------------------     M-Mode/2D Measurements & Calculations      LV Diastolic    LV Systolic Dimension: 3 cm  AV Cusp Separation: 1.8 cmLA   Dimension: 4.7  LV Volume Diastolic: 864.2   Dimension: 3 cmAO Root   cm ml                           Dimension: 2.8 cm   LV FS:36.2 %    LV Volume Systolic: 83.4 ml   LV PW           LV EDV/LV EDV Index: 565.3   Diastolic: 1.1  FD/95 VF/W^8WA ESV/LV ESV   cm              Index: 34.8 ml/15ml/ m^2     RV Diastolic Dimension: 2.4   Septum          EF Calculated: 66.1 %        cm   Diastolic: 1.1  LV Mass Index: 81 l/min*m^2   cm                                                LA volume/Index: 59 ml   LV Mass: 187.54 LVOT: 2.3 cm                 RA Area: 16.2 cm^2   g     Doppler Measurements & Calculations      MV Peak E-Wave: 0.46  AV Peak Velocity: 1.2 LVOT Peak Velocity: 0.93 m/s   m/s                   m/s                   LVOT Mean Velocity: 0.65 m/s   MV Peak A-Wave: 0.71  AV Peak Gradient:     LVOT Peak Gradient: 3.5   m/s                   5.78 mmHg             mmHgLVOT Mean Gradient: 1.9   MV E/A Ratio: 0.65    AV Mean Velocity:     mmHg   MV Peak Gradient: 2   0.79 m/s   mmHg                  AV Mean Gradient: 2.9   MV Mean Gradient: 0.9 mmHg   mmHg                  AV VTI: 22.6 cm   MV Mean Velocity:     AV Area   0.45 m/s              (Continuity):3.2 cm^2 PV Peak Velocity: 0.84 m/s   MV Deceleration Time:                       PV Peak Gradient: 2.83 mmHg   208.1 msec            LVOT VTI: 17.4 cm     PV Mean Velocity: 0.55 m/s   MV P1/2t: 68.6 msec                         PV Mean Gradient: 1.4 mmHg   MVA by PHT:3.21 cm^2   MV Area (continuity):   3.6 cm^2     Stress Test 2021        Exercise Stress Nuclear Gated SPECT Study     Name: [address] Account Number: [de-identified]     :  1980      Sex: male                                                          Date of Study:  2021     Height: 5' 10\" (177.8 cm)  Weight: 257 lb (116.6 kg)      Ordering Provider: Emily Yang MD          PCP: Davis Galdamez DO       Cardiologist: Emily Yang MD/RACHEL Hsu MD                         Interpreting Physician: Steph Luu MD  _________________________________________________________________________________     Indication:        Detecting the presence and location of coronary artery disease     Clinical History:   Patient has no known history of coronary artery disease. Resting ECG:    Sinus rhythm, 69 bpm.  Left axis deviation. Poor R wave progression. Nonspecific ST-T waves. Exercise: The patient exercised using a Domingo protocol, completing 7:15 minutes and reaching an estimated work load of 36.8 metabolic equivalents (METS). Resting HR was 69. Peak exercise heart rate was 156 ( 88% of maximum predicted heart rate for age). Baseline /70. Peak exercise /70. The blood pressure response to exercise was normal       Exercise was terminated due to heart rate attained and dyspnea. The patient experienced no chest pain with exercise. Exercise ECG:   The patient demonstrated no arrhythmias during exercise. With exercise, there were no ST segment changes of significance at the heart rate achieved. Knutson treadmill score was 7 implying low risk. IMAGING: Myocardial perfusion imaging was performed at rest 30-35 minutes following the intravenous injection of 10.0 mCi of (Tc-Sestamibi) followed by 10 ml of Normal Saline. At peak exercise, the patient was injected intravenously with 33.4 mCi of (Tc-Sestamibi) followed by 10 ml of Normal Saline. Gated post-stress tomographic imaging was performed 20-25 minutes after stress. FINDINGS: The overall quality of the study was good. Left ventricular cavity size was noted to be normal.     Rotational analog analysis demonstrated soft tissue breast attenuation and soft tissue diaphragmatic attenuation.        The gated SPECT stress imaging in the short, vertical long, and horizontal long axis demonstrated      A mild defect was present in the basal inferior, mid anterior and mid inferior wall(s) that was  moderate sized by quantification. The resting images show no change. Gated SPECT left ventricular ejection fraction was calculated to be 69%, with normal myocardial thickening and wall motion. Impression:    Exercise EKG was  negative. The patient experienced no chest pain with exercise. The myocardial perfusion imaging was normal with attenuation artifact. Overall left ventricular systolic function was normal without regional wall motion abnormalities. Knutson treadmill score was 7 implying low risk. Exercise capacity was average. Low risk general exercise treadmill test.    Device Interrogation: 11/3/22  Underlying rhythm: SR  Mode: DDD   Battery Voltage/Longevity:  6 years    Pacing: A: 1.6%  RV: 0.2%    P wave: 2.1 mV  Impedance: 418 ohms   Threshold: 0.5 V @ 0.4 ms  RV R wave: 9.6 mV  Impedance: 418 ohms   Threshold: 0.75 V @ 0.4 ms  Episodes: 13 SVT-ST all on 10/15/2022 - longest 4 minutes - NO EGM  Reprogramming included: see below  Overall device function is normal    All device programmable settings were evaluated per above and in the scanned document, along with iterative adjustments (capture thresholds) to assess and select the most appropriate final programming to provide for consistent delivery of the appropriate therapy and to verify function of the device. I have independently reviewed all of the ECGs and rhythm strips per above     Assessment/Plan:  This is a 38 y.o. male with a history of   Patient Active Problem List   Diagnosis    Anxiety    Hyperlipemia    Irritable bowel syndrome with diarrhea    Psychogenic nonepileptic seizure    Non-smoker    Depression    Type 2 diabetes mellitus with complication, without long-term current use of insulin ([facility] [address]    Conversion disorder    Attention deficit hyperactivity disorder (ADHD), predominantly hyperactive type    Fatty liver    Migraine with aura and without status migrainosus, not intractable    Cervical spondylosis    Chronic left shoulder pain    Chronic pain of right knee    Postural dizziness with presyncope    Confusion    Elevated TSH    who presents with pacemaker in situ. 1. Pacemaker in situ  - DOI 12/28/17.  - Dual chamber; [employer].  - Indication: syncope, SND and AV block   - Normal device function    2. Neurocardiogenic syncope  - History of positive TTT on 7/14/17.  - Advised life style modifications as below     - Make all postural changes from lying to sitting or sitting to standing slowly. - Drink to 2.0 -2.5 L of fluids per day. - Increase sodium in the diet to 3 - 5 g per day. - Avoid large meals which can cause low blood pressure during digestion.     - Avoid alcohol and excessive caffeine intake. - Perform lower extremity exercises to improve strength of the leg muscles. - Use physical counter maneuvers such as leg crossing, or leg raising and resting the leg on a chair.      - Raise the head of the bed by 6 to 10 inches. - Use custom fitted elastic support stockings. 3. Incomplete RBBB and LAFB  - Status post ILR implantation 7/14/17 and explantation 12/28/17.  - ILR showed long sinus pauses as well as AV block per Dr. Steve Chapa note (no rhythm strip available for review)  - Status post pacemaker implantation 12/28/17. 4. Nonsustained ventricular tachycardia  - Noted on device interrogation  - First and longest episode occurred on 3/2018 - lasting 6 seconds. - Again occurred Dec 2018, April 2019, October 2019  - Most recently occurred in April 2021 lasting 2 seconds  - Continue Toprol XL. 5. Hyperlipidemia  - On Lipitor    6. Diabetes mellitus  - On Glucophage and Tradjenta. .     7. Irritable bowel syndrome    8. Obstructive sleep apnea    9. Seizure disorder    10. Obesity   Body mass index is 37.68 kg/m². - Advised weight loss. 11. Anxiety/depression  - On Prozac    Recommendations:    1.  Normal pacemaker function  2. No changes in medications. 3. Remote interrogations every 91 days. 4. Office follow up in 12 months or sooner PRN. Encouraged the patient to call the office for any questions or concerns. I have spent a total of 40 minutes with the patient and the family reviewing the above stated recommendations. And a total of >50% of that time involved face-to-face time providing counseling and or coordination of care with the other providers, preparation for the clinic visit, reviewing records/tests, counseling/education of the patient, ordering medications/tests/procedures, coordinating care, and documenting clinical information in the EHR. Thank you for allowing me to participate in your patient's care. Please call me if there are any questions or concerns.       William Brizuela MD  Cardiac Electrophysiology  [facility]) Physicians  [facility] [place]  11/3/22   11:54 AM

## 2022-11-01 ENCOUNTER — TELEPHONE (OUTPATIENT)
Dept: NEUROLOGY | Age: 42
End: 2022-11-01

## 2022-11-02 ENCOUNTER — OFFICE VISIT (OUTPATIENT)
Dept: CHIROPRACTIC MEDICINE | Age: 42
End: 2022-11-02
Payer: COMMERCIAL

## 2022-11-02 VITALS — OXYGEN SATURATION: 99 % | TEMPERATURE: 98 F | HEART RATE: 70 BPM

## 2022-11-02 DIAGNOSIS — M99.02 SEGMENTAL AND SOMATIC DYSFUNCTION OF THORACIC REGION: ICD-10-CM

## 2022-11-02 DIAGNOSIS — M99.01 SEGMENTAL AND SOMATIC DYSFUNCTION OF CERVICAL REGION: Primary | ICD-10-CM

## 2022-11-02 DIAGNOSIS — M47.812 CERVICAL SPONDYLOSIS: ICD-10-CM

## 2022-11-02 DIAGNOSIS — M54.04 PANNICULITIS AFFECTING REGIONS OF NECK AND BACK, THORACIC REGION: ICD-10-CM

## 2022-11-02 NOTE — PROGRESS NOTES
22  Ira Ramirez : 1980 Sex: male  Age: 38 y.o. Chief Complaint   Patient presents with    Back Pain    Neck Pain       HPI:   Pain has improved. On average, pain is perceived as mild (3-4 pain scale). Patient denies new numbness, new weakness, new tingling  His home exercises are helping       Current Outpatient Medications:     Rimegepant Sulfate 75 MG TBDP, Take 75 mg by mouth every other day No more than 1 tab in 24 hours, Disp: 16 tablet, Rfl: 11    metFORMIN (GLUCOPHAGE) 1000 MG tablet, Take 1 tablet by mouth 2 times daily (with meals), Disp: 180 tablet, Rfl: 1    pioglitazone (ACTOS) 15 MG tablet, Take 1 tablet by mouth in the morning., Disp: 30 tablet, Rfl: 3    linagliptin (TRADJENTA) 5 MG tablet, Take 1 tablet by mouth in the morning., Disp: 30 tablet, Rfl: 5    metoprolol succinate (TOPROL XL) 100 MG extended release tablet, Take 1 tablet by mouth 2 times daily, Disp: 180 tablet, Rfl: 3    zonisamide (ZONEGRAN) 50 MG capsule, Take 2 capsules by mouth nightly, Disp: 180 capsule, Rfl: 3    cyclobenzaprine (FLEXERIL) 5 MG tablet, Take 1 tablet by mouth nightly as needed for Muscle spasms, Disp: 30 tablet, Rfl: 11    azelastine (ASTELIN) 0.1 % nasal spray, 1 spray by Nasal route 2 times daily Use in each nostril as directed, Disp: 60 mL, Rfl: 1    chlorpheniramine (ALLER-CHLOR) 4 MG tablet, Take 1 tablet by mouth every 6 hours as needed for Allergies, Disp: 20 tablet, Rfl: 0    blood glucose monitor strips, Test 3 times a day & as needed for symptoms of irregular blood glucose. Dispense sufficient amount for indicated testing frequency plus additional to accommodate PRN testing needs. , Disp: 300 strip, Rfl: 1    atorvastatin (LIPITOR) 40 MG tablet, Take 1 tablet by mouth daily, Disp: 90 tablet, Rfl: 3    ammonium lactate (LAC-HYDRIN) 12 % lotion, Apply topically twice daily, Disp: 400 g, Rfl: 2    clonazePAM (KLONOPIN) 1 MG tablet, Take 1 mg by mouth 2 times daily as needed. , Disp: , Rfl: ENULOSE 10 GM/15ML SOLN solution, as needed , Disp: , Rfl:     mirtazapine (REMERON) 30 MG tablet, TAKE ONE TABLET BY MOUTH once a day AT BEDTIME, Disp: , Rfl:     desonide (DESOWEN) 0.05 % cream, Apply topically 2 times daily. , Disp: 60 g, Rfl: 0    metroNIDAZOLE (METROGEL) 0.75 % gel, Apply topically 2 times daily. , Disp: 45 g, Rfl: 0    lactulose (CHRONULAC) 10 GM/15ML solution, TAKE 15 ML BY MOUTH 3 TIMES DAILY, Disp: 946 mL, Rfl: 0    medical marijuana, Take by mouth as needed. , Disp: , Rfl:     FLUoxetine (PROZAC) 20 MG capsule, Take 2 capsules by mouth daily, Disp: 60 capsule, Rfl: 0    rifaximin (XIFAXAN) 550 MG tablet, Take 550 mg by mouth 2 times daily, Disp: , Rfl:     Exam:   Vitals:    11/02/22 0835   Pulse: 70   Temp: 98 °F (36.7 °C)   SpO2: 99%       There are hypertonic and tender fibers noted with palpation in the paraspinal muscles of the cervical, thoracic region. Joint fixation is noted with motion screening at C4-6, T3-5. Pos neer impingement left. Dian Schuster was seen today for back pain and neck pain. Diagnoses and all orders for this visit:    Segmental and somatic dysfunction of cervical region    Segmental and somatic dysfunction of thoracic region    Cervical spondylosis    Panniculitis affecting regions of neck and back, thoracic region        Treatment Plan: Continued with diversified manipulation today to listed cervical and thoracic segments. Tolerated well. Reviewed some HEP for the left shoulder.   Follow-up next week for continued care      Seen By:  Joie Duane

## 2022-11-03 ENCOUNTER — OFFICE VISIT (OUTPATIENT)
Dept: NON INVASIVE DIAGNOSTICS | Age: 42
End: 2022-11-03
Payer: COMMERCIAL

## 2022-11-03 VITALS
WEIGHT: 262.6 LBS | HEIGHT: 70 IN | HEART RATE: 69 BPM | BODY MASS INDEX: 37.59 KG/M2 | RESPIRATION RATE: 18 BRPM | DIASTOLIC BLOOD PRESSURE: 86 MMHG | SYSTOLIC BLOOD PRESSURE: 118 MMHG

## 2022-11-03 DIAGNOSIS — I49.5 SINUS NODE DYSFUNCTION (HCC): ICD-10-CM

## 2022-11-03 DIAGNOSIS — Z95.0 S/P CARDIAC PACEMAKER PROCEDURE: Primary | ICD-10-CM

## 2022-11-03 DIAGNOSIS — I47.20 VENTRICULAR TACHYCARDIA: ICD-10-CM

## 2022-11-03 PROCEDURE — 99215 OFFICE O/P EST HI 40 MIN: CPT | Performed by: INTERNAL MEDICINE

## 2022-11-03 PROCEDURE — G8427 DOCREV CUR MEDS BY ELIG CLIN: HCPCS | Performed by: INTERNAL MEDICINE

## 2022-11-03 PROCEDURE — G8417 CALC BMI ABV UP PARAM F/U: HCPCS | Performed by: INTERNAL MEDICINE

## 2022-11-03 PROCEDURE — G8484 FLU IMMUNIZE NO ADMIN: HCPCS | Performed by: INTERNAL MEDICINE

## 2022-11-03 PROCEDURE — 1036F TOBACCO NON-USER: CPT | Performed by: INTERNAL MEDICINE

## 2022-11-07 RX ORDER — ATORVASTATIN CALCIUM 40 MG/1
40 TABLET, FILM COATED ORAL DAILY
Qty: 90 TABLET | Refills: 3 | Status: SHIPPED | OUTPATIENT
Start: 2022-11-07

## 2022-11-15 ENCOUNTER — OFFICE VISIT (OUTPATIENT)
Dept: CHIROPRACTIC MEDICINE | Age: 42
End: 2022-11-15
Payer: COMMERCIAL

## 2022-11-15 VITALS
OXYGEN SATURATION: 98 % | HEART RATE: 79 BPM | TEMPERATURE: 97.5 F | BODY MASS INDEX: 37.51 KG/M2 | WEIGHT: 262 LBS | HEIGHT: 70 IN

## 2022-11-15 DIAGNOSIS — M54.04 PANNICULITIS AFFECTING REGIONS OF NECK AND BACK, THORACIC REGION: ICD-10-CM

## 2022-11-15 DIAGNOSIS — M99.01 SEGMENTAL AND SOMATIC DYSFUNCTION OF CERVICAL REGION: Primary | ICD-10-CM

## 2022-11-15 DIAGNOSIS — M99.02 SEGMENTAL AND SOMATIC DYSFUNCTION OF THORACIC REGION: ICD-10-CM

## 2022-11-15 DIAGNOSIS — M47.812 CERVICAL SPONDYLOSIS: ICD-10-CM

## 2022-11-15 PROCEDURE — 98940 CHIROPRACT MANJ 1-2 REGIONS: CPT | Performed by: CHIROPRACTOR

## 2022-11-15 PROCEDURE — 99999 PR OFFICE/OUTPT VISIT,PROCEDURE ONLY: CPT | Performed by: CHIROPRACTOR

## 2022-11-15 NOTE — PROGRESS NOTES
11/15/22  Tiffanie Adame : 1980 Sex: male  Age: 38 y.o. Chief Complaint   Patient presents with    Neck Pain       HPI:   Pain has worsened slightly. On average, pain is perceived as moderate (4-6 pain scale). Patient denies new numbness, new weakness, new tingling  Sitting at computer working voting polls, has some left sided pain today in the suprascapular area per his identification  CBD rub helps; tiger balm too    Current Outpatient Medications:     atorvastatin (LIPITOR) 40 MG tablet, Take 1 tablet by mouth daily, Disp: 90 tablet, Rfl: 3    Rimegepant Sulfate 75 MG TBDP, Take 75 mg by mouth every other day No more than 1 tab in 24 hours, Disp: 16 tablet, Rfl: 11    metFORMIN (GLUCOPHAGE) 1000 MG tablet, Take 1 tablet by mouth 2 times daily (with meals), Disp: 180 tablet, Rfl: 1    pioglitazone (ACTOS) 15 MG tablet, Take 1 tablet by mouth in the morning., Disp: 30 tablet, Rfl: 3    linagliptin (TRADJENTA) 5 MG tablet, Take 1 tablet by mouth in the morning., Disp: 30 tablet, Rfl: 5    metoprolol succinate (TOPROL XL) 100 MG extended release tablet, Take 1 tablet by mouth 2 times daily, Disp: 180 tablet, Rfl: 3    zonisamide (ZONEGRAN) 50 MG capsule, Take 2 capsules by mouth nightly, Disp: 180 capsule, Rfl: 3    cyclobenzaprine (FLEXERIL) 5 MG tablet, Take 1 tablet by mouth nightly as needed for Muscle spasms, Disp: 30 tablet, Rfl: 11    azelastine (ASTELIN) 0.1 % nasal spray, 1 spray by Nasal route 2 times daily Use in each nostril as directed, Disp: 60 mL, Rfl: 1    chlorpheniramine (ALLER-CHLOR) 4 MG tablet, Take 1 tablet by mouth every 6 hours as needed for Allergies, Disp: 20 tablet, Rfl: 0    blood glucose monitor strips, Test 3 times a day & as needed for symptoms of irregular blood glucose. Dispense sufficient amount for indicated testing frequency plus additional to accommodate PRN testing needs. , Disp: 300 strip, Rfl: 1    ammonium lactate (LAC-HYDRIN) 12 % lotion, Apply topically twice daily, Disp: 400 g, Rfl: 2    clonazePAM (KLONOPIN) 1 MG tablet, Take 1 mg by mouth 2 times daily as needed. , Disp: , Rfl:     ENULOSE 10 GM/15ML SOLN solution, as needed , Disp: , Rfl:     mirtazapine (REMERON) 30 MG tablet, TAKE ONE TABLET BY MOUTH once a day AT BEDTIME, Disp: , Rfl:     desonide (DESOWEN) 0.05 % cream, Apply topically 2 times daily. (Patient taking differently: as needed), Disp: 60 g, Rfl: 0    metroNIDAZOLE (METROGEL) 0.75 % gel, Apply topically 2 times daily. , Disp: 45 g, Rfl: 0    lactulose (CHRONULAC) 10 GM/15ML solution, TAKE 15 ML BY MOUTH 3 TIMES DAILY, Disp: 946 mL, Rfl: 0    medical marijuana, Take by mouth as needed. , Disp: , Rfl:     FLUoxetine (PROZAC) 20 MG capsule, Take 2 capsules by mouth daily, Disp: 60 capsule, Rfl: 0    rifaximin (XIFAXAN) 550 MG tablet, Take 550 mg by mouth 2 times daily, Disp: , Rfl:     Exam:   Vitals:    11/15/22 0902   Pulse: 79   Temp: 97.5 °F (36.4 °C)   SpO2: 98%     Active trigger point left trap. No midline pain  There are hypertonic and tender fibers noted with palpation in the paraspinal muscles of the cervical, thoracic region. Joint fixation is noted with motion screening at C4-6, T3-5 . Minerva Blankenship was seen today for neck pain. Diagnoses and all orders for this visit:    Segmental and somatic dysfunction of cervical region    Segmental and somatic dysfunction of thoracic region    Cervical spondylosis    Panniculitis affecting regions of neck and back, thoracic region      Treatment Plan:  Diversified manipulation to the above-listed segments in the cervical spine and thoracic spine. Vibratory massage to the upper back for 2 mins. Tolerated well. Continue w home based self care. F/U 2 weeks.           Seen By:  Shari Thompson DC

## 2022-11-15 NOTE — PROGRESS NOTES
Patient is here for follow up neck pain.  Seamus Rain DO  Electronically signed by Asya Montes LPN on 98/58/3116 at 9:04 AM

## 2022-11-29 ENCOUNTER — OFFICE VISIT (OUTPATIENT)
Dept: CHIROPRACTIC MEDICINE | Age: 42
End: 2022-11-29

## 2022-11-29 VITALS
OXYGEN SATURATION: 98 % | TEMPERATURE: 97.4 F | WEIGHT: 262 LBS | HEART RATE: 91 BPM | BODY MASS INDEX: 37.51 KG/M2 | HEIGHT: 70 IN

## 2022-11-29 DIAGNOSIS — M47.812 CERVICAL SPONDYLOSIS: ICD-10-CM

## 2022-11-29 DIAGNOSIS — M99.01 SEGMENTAL AND SOMATIC DYSFUNCTION OF CERVICAL REGION: Primary | ICD-10-CM

## 2022-11-29 DIAGNOSIS — M99.02 SEGMENTAL AND SOMATIC DYSFUNCTION OF THORACIC REGION: ICD-10-CM

## 2022-11-29 DIAGNOSIS — M54.04 PANNICULITIS AFFECTING REGIONS OF NECK AND BACK, THORACIC REGION: ICD-10-CM

## 2022-11-29 NOTE — PROGRESS NOTES
Patient is here for follow up neck and back. Patient states no concerns.  [id number] [facility],   Electronically signed by Asya Montes LPN on 65/38/0512 at 9:54 AM

## 2022-11-29 NOTE — PROGRESS NOTES
22  Ned Edwards : 1980 Sex: male  Age: 38 y.o. Chief Complaint   Patient presents with    Neck Pain    Back Pain       HPI:   Pain has significantly improved. On average, pain is virtually absent. Patient denies new numbness, new weakness, new tingling  Does the exercises, feels that they are helping. Feels near his normal self at this point. Happy with his progress. Current Outpatient Medications:     atorvastatin (LIPITOR) 40 MG tablet, Take 1 tablet by mouth daily, Disp: 90 tablet, Rfl: 3    Rimegepant Sulfate 75 MG TBDP, Take 75 mg by mouth every other day No more than 1 tab in 24 hours, Disp: 16 tablet, Rfl: 11    metFORMIN (GLUCOPHAGE) 1000 MG tablet, Take 1 tablet by mouth 2 times daily (with meals), Disp: 180 tablet, Rfl: 1    pioglitazone (ACTOS) 15 MG tablet, Take 1 tablet by mouth in the morning., Disp: 30 tablet, Rfl: 3    linagliptin (TRADJENTA) 5 MG tablet, Take 1 tablet by mouth in the morning., Disp: 30 tablet, Rfl: 5    metoprolol succinate (TOPROL XL) 100 MG extended release tablet, Take 1 tablet by mouth 2 times daily, Disp: 180 tablet, Rfl: 3    zonisamide (ZONEGRAN) 50 MG capsule, Take 2 capsules by mouth nightly, Disp: 180 capsule, Rfl: 3    cyclobenzaprine (FLEXERIL) 5 MG tablet, Take 1 tablet by mouth nightly as needed for Muscle spasms, Disp: 30 tablet, Rfl: 11    azelastine (ASTELIN) 0.1 % nasal spray, 1 spray by Nasal route 2 times daily Use in each nostril as directed, Disp: 60 mL, Rfl: 1    chlorpheniramine (ALLER-CHLOR) 4 MG tablet, Take 1 tablet by mouth every 6 hours as needed for Allergies, Disp: 20 tablet, Rfl: 0    blood glucose monitor strips, Test 3 times a day & as needed for symptoms of irregular blood glucose. Dispense sufficient amount for indicated testing frequency plus additional to accommodate PRN testing needs. , Disp: 300 strip, Rfl: 1    ammonium lactate (LAC-HYDRIN) 12 % lotion, Apply topically twice daily, Disp: 400 g, Rfl: 2    clonazePAM (KLONOPIN) 1 MG tablet, Take 1 mg by mouth 2 times daily as needed. , Disp: , Rfl:     ENULOSE 10 GM/15ML SOLN solution, as needed , Disp: , Rfl:     mirtazapine (REMERON) 30 MG tablet, TAKE ONE TABLET BY MOUTH once a day AT BEDTIME, Disp: , Rfl:     desonide (DESOWEN) 0.05 % cream, Apply topically 2 times daily. (Patient taking differently: as needed), Disp: 60 g, Rfl: 0    metroNIDAZOLE (METROGEL) 0.75 % gel, Apply topically 2 times daily. , Disp: 45 g, Rfl: 0    lactulose (CHRONULAC) 10 GM/15ML solution, TAKE 15 ML BY MOUTH 3 TIMES DAILY, Disp: 946 mL, Rfl: 0    medical marijuana, Take by mouth as needed. , Disp: , Rfl:     FLUoxetine (PROZAC) 20 MG capsule, Take 2 capsules by mouth daily, Disp: 60 capsule, Rfl: 0    rifaximin (XIFAXAN) 550 MG tablet, Take 550 mg by mouth 2 times daily, Disp: , Rfl:     Exam:   Vitals:    11/29/22 0953   Pulse: 91   Temp: 97.4 °F (36.3 °C)   SpO2: 98%     Mild limitation of cervical active extension today without pain otherwise motions are near full, complete and pain-free. No midline pain. Trigger points bilateral trapezius. Cervical compression, distraction and foraminal compression testing all negative. There are hypertonic and tender fibers noted with palpation in the paraspinal muscles of the cervical, thoracic region. Joint fixation is noted with motion screening at T3-6. Dian Schuster was seen today for neck pain and back pain. Diagnoses and all orders for this visit:    Segmental and somatic dysfunction of cervical region    Segmental and somatic dysfunction of thoracic region    Cervical spondylosis    Panniculitis affecting regions of neck and back, thoracic region      Treatment Plan: Continued with diversified manipulation to listed thoracic segments today. Encouraged him to continue with his HEP. He can follow-up with me as needed going forward.       Seen By:  Kathrin Drake DC

## 2023-01-17 ENCOUNTER — OFFICE VISIT (OUTPATIENT)
Dept: CARDIOLOGY CLINIC | Age: 43
End: 2023-01-17
Payer: COMMERCIAL

## 2023-01-17 VITALS
RESPIRATION RATE: 18 BRPM | SYSTOLIC BLOOD PRESSURE: 110 MMHG | HEART RATE: 74 BPM | HEIGHT: 70 IN | DIASTOLIC BLOOD PRESSURE: 78 MMHG | BODY MASS INDEX: 37.78 KG/M2 | WEIGHT: 263.9 LBS

## 2023-01-17 DIAGNOSIS — I51.9 HEART PROBLEM: Primary | ICD-10-CM

## 2023-01-17 PROCEDURE — 99213 OFFICE O/P EST LOW 20 MIN: CPT | Performed by: INTERNAL MEDICINE

## 2023-01-17 PROCEDURE — 1036F TOBACCO NON-USER: CPT | Performed by: INTERNAL MEDICINE

## 2023-01-17 PROCEDURE — 93000 ELECTROCARDIOGRAM COMPLETE: CPT | Performed by: INTERNAL MEDICINE

## 2023-01-17 PROCEDURE — G8482 FLU IMMUNIZE ORDER/ADMIN: HCPCS | Performed by: INTERNAL MEDICINE

## 2023-01-17 PROCEDURE — G8427 DOCREV CUR MEDS BY ELIG CLIN: HCPCS | Performed by: INTERNAL MEDICINE

## 2023-01-17 PROCEDURE — G8417 CALC BMI ABV UP PARAM F/U: HCPCS | Performed by: INTERNAL MEDICINE

## 2023-01-17 NOTE — PATIENT INSTRUCTIONS
Echo results were reviewed and showed normal LV function. Lipid results discussed with the patient and they are well controlled. Continue current medications atorvastatin 40 mg daily, Toprol- mg twice daily and rest of the current medications. Follow up with EP service for SVT and pacer evaluation. Advised to stay well hydrated and drink at least 64 oz a day. He was advised to cut down on calories and lose weight. Follow up in 12 months.

## 2023-01-17 NOTE — PROGRESS NOTES
OUTPATIENT CARDIOLOGY FOLLOW-UP    Name: Osbaldo Watt    Age: 38 y.o. Primary Care Physician: Juliet Yen DO    Date of Service: 1/17/2023    Chief Complaint:   Chief Complaint   Patient presents with    Heart Problem     1 Yr-Patient complains of dizziness and having heart burn. Interim History:   Mr. Zahira Bermudez is a 68-year-old gentleman with history of vasodepressive syncope, ventricular tachycardia, moderate obesity and was following with Dr. Prashanth Meyers for his cardiology needs and he changed service with me. In addition, he also has history of hyperlipidemia, irritable bowel syndrome, obstructive sleep apnea seizure disorder and anxiety. He is here for follow-up visit. He told me that he had 2 sleep studies and was told that he does not have any sleep apnea. He denies any further episodes chest pain. He is complaining of fatigue and getting tired faster on walking with occasional exertional dyspnea. Denies any exertional chest pain, palpitations, syncope or presyncope. He had a stress test in November 2017 and that was normal. He denies orthopnea or paroxysmal nocturnal dyspnea. He denies any focal weakness or numbness. He is compliant with medications, as well as salt and fluid intake. He does not take any over-the-counter arthritis medications. He was seen in the office on 12/1/2021, since his last visit, he has not had any further hospitalizations or ER visits. He was evaluated in the emergency room on 6/2/2021 for palpitations and acute headache. He had a CT head which was negative for any acute findings. EKG showed normal sinus rhythm with incomplete right bundle branch block left anterior fascicular block without any abnormal rhythm. Patient was discharged home. At his last pacemaker evaluation in April he was told that he had a run of ventricular tachycardia which was not symptomatic. He was following with Dr. Mukund Blanca and now follows with [facility].   He was experiencing intermittent episodes of left-sided chest pain and felt like sharp stabbing pains, pain level 3-4/10 and did not notice any radiation of pain. He did not notice any aggravating or relieving factors. He underwent exercise nuclear stress test which showed no evidence of ischemia and his Duke treadmill score was 7 without chest pain or EKG changes. In 11/22, pacer detected multiple episodes of SVT, he did not notice any symptoms. He gets dizziness on getting up. Also gets heart burn episodes at least once a week in the evenings. No exertional chest pain, otc Prilosec is helping his heart burn symptoms. Not taking aspirin at this time. No new cardiac complaints since last cardiology evaluation. He gets winded if he walks more than a mile but he is able to walk a block and also walk 1 flight of stairs without any dyspnea or chest pain. He denies any exertional chest pain. He denies recent palpitations, lightheadedness, dizziness, syncope, PND, or orthopnea. SR on EKG.     Review of Systems:   Cardiac: As per HPI  General: No fever, chills  Pulmonary: As per HPI  HEENT: No visual disturbances, difficult swallowing  GI: No nausea, vomiting  Endocrine: No thyroid disease or DM  Musculoskeletal: MAGALLON x 4, no focal motor deficits  Skin: Intact, no rashes  Neuro/Psych: No headache or seizures    Past Medical History:  Past Medical History:   Diagnosis Date    Anxiety     Arthritis     Class 1 obesity due to excess calories with serious comorbidity and body mass index (BMI) of 34.0 to 34.9 in adult     First degree AV block     GERD (gastroesophageal reflux disease)     Hyperlipidemia     IBS (irritable bowel syndrome)     LVH (left ventricular hypertrophy)     Migraines     Obesity     ENOC (obstructive sleep apnea)     Psychiatric problem     Psychogenic nonepileptic seizure     Tremors of nervous system     Type 2 diabetes mellitus with complication, without long-term current use of insulin (HCC)        Past Surgical History:  Past Surgical History:   Procedure Laterality Date    HERNIA REPAIR      INSERTABLE CARDIAC MONITOR  07/14/2017    KNEE SURGERY      PACEMAKER INSERTION  12/28/2017    D-PPM   ([employer])   CHELSEY    TONSILLECTOMY         Family History:  No family history on file. Social History:  Social History     Socioeconomic History    Marital status: Single     Spouse name: Not on file    Number of children: Not on file    Years of education: Not on file    Highest education level: Not on file   Occupational History    Not on file   Tobacco Use    Smoking status: Never    Smokeless tobacco: Never   Vaping Use    Vaping Use: Never used   Substance and Sexual Activity    Alcohol use: Yes     Comment: Socially- couple times weekly    Drug use: Yes     Types: Marijuana Curlie Opal)     Comment: Medical- rarely    Sexual activity: Not on file   Other Topics Concern    Not on file   Social History Narrative    Not on file     Social Determinants of Health     Financial Resource Strain: Not on file   Food Insecurity: Not on file   Transportation Needs: Not on file   Physical Activity: Not on file   Stress: Not on file   Social Connections: Not on file   Intimate Partner Violence: Not on file   Housing Stability: Not on file       Allergies: Allergies   Allergen Reactions    Fish-Derived Products        Current Medications:  Current Outpatient Medications   Medication Sig Dispense Refill    atorvastatin (LIPITOR) 40 MG tablet Take 1 tablet by mouth daily 90 tablet 3    Rimegepant Sulfate 75 MG TBDP Take 75 mg by mouth every other day No more than 1 tab in 24 hours 16 tablet 11    metFORMIN (GLUCOPHAGE) 1000 MG tablet Take 1 tablet by mouth 2 times daily (with meals) 180 tablet 1    pioglitazone (ACTOS) 15 MG tablet Take 1 tablet by mouth in the morning. 30 tablet 3    linagliptin (TRADJENTA) 5 MG tablet Take 1 tablet by mouth in the morning.  30 tablet 5    metoprolol succinate (TOPROL XL) 100 MG extended release tablet Take 1 tablet by mouth 2 times daily 180 tablet 3    zonisamide (ZONEGRAN) 50 MG capsule Take 2 capsules by mouth nightly 180 capsule 3    cyclobenzaprine (FLEXERIL) 5 MG tablet Take 1 tablet by mouth nightly as needed for Muscle spasms 30 tablet 11    azelastine (ASTELIN) 0.1 % nasal spray 1 spray by Nasal route 2 times daily Use in each nostril as directed 60 mL 1    chlorpheniramine (ALLER-CHLOR) 4 MG tablet Take 1 tablet by mouth every 6 hours as needed for Allergies 20 tablet 0    blood glucose monitor strips Test 3 times a day & as needed for symptoms of irregular blood glucose. Dispense sufficient amount for indicated testing frequency plus additional to accommodate PRN testing needs. 300 strip 1    ammonium lactate (LAC-HYDRIN) 12 % lotion Apply topically twice daily 400 g 2    clonazePAM (KLONOPIN) 1 MG tablet Take 1 mg by mouth 2 times daily as needed. ENULOSE 10 GM/15ML SOLN solution as needed       mirtazapine (REMERON) 30 MG tablet TAKE ONE TABLET BY MOUTH once a day AT BEDTIME      desonide (DESOWEN) 0.05 % cream Apply topically 2 times daily. (Patient taking differently: as needed) 60 g 0    metroNIDAZOLE (METROGEL) 0.75 % gel Apply topically 2 times daily. 45 g 0    lactulose (CHRONULAC) 10 GM/15ML solution TAKE 15 ML BY MOUTH 3 TIMES DAILY 946 mL 0    medical marijuana Take by mouth as needed. FLUoxetine (PROZAC) 20 MG capsule Take 2 capsules by mouth daily 60 capsule 0    rifaximin (XIFAXAN) 550 MG tablet Take 550 mg by mouth 2 times daily       No current facility-administered medications for this visit. Physical Exam:  /78   Pulse 74   Resp 18   Ht 5' 10\" (1.778 m)   Wt 263 lb 14.4 oz (119.7 kg)   BMI 37.87 kg/m²   Wt Readings from Last 3 Encounters:   01/17/23 263 lb 14.4 oz (119.7 kg)   11/29/22 262 lb (118.8 kg)   11/15/22 262 lb (118.8 kg)     Appearance: Awake, alert and oriented x 3, no acute respiratory distress, he is obese. Skin: Intact, no rash.   Head: Normocephalic, atraumatic  Eyes: EOMI, no conjunctival erythema  ENMT: No pharyngeal erythema, MMM, no rhinorrhea  Neck: Supple, no elevated JVP, no carotid bruits  Lungs: Clear to auscultation bilaterally. No wheezes, rales, or rhonchi.   Cardiac: Regular rate and rhythm, +S1S2, no murmurs apparent  Abdomen: Soft, nontender, +bowel sounds  Extremities: Moves all extremities x 4, no lower extremity edema  Neurologic: No focal motor deficits apparent, normal mood and affect, alert and oriented x 3  Peripheral Pulses: Intact posterior tibial pulses bilaterally    Laboratory Tests:  Lab Results   Component Value Date    CREATININE 0.9 08/11/2022    BUN 7 08/11/2022     08/11/2022    K 3.8 08/11/2022     08/11/2022    CO2 24 08/11/2022     Lab Results   Component Value Date/Time    MG 2.0 06/03/2021 02:11 AM     Lab Results   Component Value Date    WBC 13.1 (H) 08/11/2022    HGB 15.6 08/11/2022    HCT 47.6 08/11/2022    MCV 90.7 08/11/2022     08/11/2022     Lab Results   Component Value Date    ALT 32 08/11/2022    AST 24 08/11/2022    ALKPHOS 111 08/11/2022    BILITOT 0.8 08/11/2022     Lab Results   Component Value Date    CKTOTAL 188 06/27/2017    CKMB 3.4 04/19/2016    TROPONINI <0.01 10/01/2020    TROPONINI <0.01 07/15/2020    TROPONINI <0.01 02/09/2020     Lab Results   Component Value Date    INR 1.2 07/15/2020    INR 1.1 09/11/2018    INR 1.2 12/28/2017    PROTIME 13.3 (H) 07/15/2020    PROTIME 12.5 (H) 09/11/2018    PROTIME 13.6 (H) 12/28/2017     Lab Results   Component Value Date    TSH 5.370 (H) 08/11/2022     Lab Results   Component Value Date    LABA1C 8.3 (H) 08/11/2022     No results found for: EAG  Lab Results   Component Value Date    CHOL 132 08/11/2022    CHOL 131 02/10/2022    CHOL 132 01/04/2021     Lab Results   Component Value Date    TRIG 129 08/11/2022    TRIG 144 02/10/2022    TRIG 104 01/04/2021     Lab Results   Component Value Date    HDL 48 08/11/2022    HDL 46 02/10/2022    HDL 47 01/04/2021     Lab Results   Component Value Date    LDLCALC 58 08/11/2022    LDLCALC 56 02/10/2022    LDLCALC 64 01/04/2021    LDLCHOLESTEROL 170 04/14/2020     Lab Results   Component Value Date    LABVLDL 26 08/11/2022    LABVLDL 29 02/10/2022    LABVLDL 21 01/04/2021    VLDL 182 04/14/2020     Lab Results   Component Value Date    CHOLHDLRATIO 3.5 04/14/2020       Cardiac Tests:  ECG: Normal sinus rhythm, left anterior fascicular block, incomplete right bundle branch block, biatrial atrial enlargement, nonspecific T wave changes, abnormal EKG. Echocardiogram-4/19/2016:    Left ventricular internal dimensions were normal in diastole and systole. Overall ejection fraction is low normal .       TTE-7/24/2019: EF 55 to 56%, normal diastolic function, RVSP 25 mmHg, normal RV size and function. Pacer wire was visualized in the RV, no pericardial effusion. TTE 7/23/2020:  Normal left ventricle size and systolic function. Ejection fraction is visually estimated at 55-60%. No regional wall motion abnormalities seen. Normal left ventricle wall thickness. Indeterminate diastolic function. Normal right ventricular size and function. TAPSE 19 mm. Pacer wire visualized in right ventricle. No hemodynamically significant aortic stenosis is present. Unable to estimate PA systolic pressure. No evidence for hemodynamically significant pericardial effusion. TTE-7/23/2021:   Normal left ventricle size and systolic function. Ejection fraction is visually estimated at 55-60%. No regional wall motion abnormalities seen. Normal left ventricle wall thickness. Indeterminate diastolic function. Normal right ventricular size and function. TAPSE 19 mm. Pacer wire visualized in right ventricle. No hemodynamically significant aortic stenosis is present. Unable to estimate PA systolic pressure. No evidence for hemodynamically significant pericardial effusion.     Stress test-12/2/2017:    Impression:    1. Exercise EKG was normal.    2. The patient experienced no chest pain with exercise. 3. Knutson treadmill score was 7 implying low risk of acute ischemic   events. 4. Exercise capacity was average. Exercise nuclear stress test 7/23/2021  Impression:    Exercise EKG was  negative. The patient experienced no chest pain with exercise. The myocardial perfusion imaging was normal with attenuation artifact. Overall left ventricular systolic function was normal without regional wall motion abnormalities. Knutson treadmill score was 7 implying low risk. Exercise capacity was average. Low risk general exercise treadmill test.      Cardiac catheterization:     The 10-year ASCVD risk score (Fer HINES, et al., 2019) is: 1.4%    Values used to calculate the score:      Age: 38 years      Sex: Male      Is Non- : No      Diabetic: Yes      Tobacco smoker: No      Systolic Blood Pressure: 885 mmHg      Is BP treated: No      HDL Cholesterol: 48 mg/dL      Total Cholesterol: 132 mg/dL     Lipid panel-5/28/2019: Cholesterol 203, triglycerides 89, HDL 43,     ASCVD 5.3%    Lipid panel-1/4/2021, cholesterol 132, triglycerides 104, HDL 47, LDL 64, A1c 7.0    ASSESSMENT:  Atypical chest pain resolved, no evidence of ischemia on the stress test.  Type II diabetes - well controlled. Nonanginal chest pains, stable  History of vasodepressor syncope , stable  History of for sinus node dysfunction and AV block on loop recorder, status post permanent DDD pacemaker implantation - 12/28/2017   History of multiple episodes of SVT, asymptomatic detected on the pacemaker  Obesity  Anxiety disorder  Hyperlipidemia, on atorvastatin 40 mg, LDL is at goal level and well controlled. Obstructive sleep apnea  Hyperammonemia level          Plan:   Echo results were reviewed and showed normal LV function.    Lipid results discussed with the patient and they are well controlled. Continue current medications atorvastatin 40 mg daily, Toprol- mg twice daily and rest of the current medications. Follow up with EP service for SVT and pacer evaluation. Advised to stay well hydrated and drink at least 64 oz a day. He was advised to cut down on calories and lose weight. Follow up in 12 months. The patient's current medication list, allergies, problem list and results of all previously ordered testing were reviewed at today's visit.   Roddy Ugalde MD  [facility]) Cardiology

## 2023-01-24 ENCOUNTER — OFFICE VISIT (OUTPATIENT)
Dept: NEUROLOGY | Age: 43
End: 2023-01-24
Payer: COMMERCIAL

## 2023-01-24 VITALS
SYSTOLIC BLOOD PRESSURE: 120 MMHG | WEIGHT: 263 LBS | DIASTOLIC BLOOD PRESSURE: 90 MMHG | HEIGHT: 70 IN | BODY MASS INDEX: 37.65 KG/M2

## 2023-01-24 DIAGNOSIS — G43.109 MIGRAINE WITH AURA AND WITHOUT STATUS MIGRAINOSUS, NOT INTRACTABLE: Primary | ICD-10-CM

## 2023-01-24 DIAGNOSIS — F44.5 PSYCHOGENIC NONEPILEPTIC SEIZURE: ICD-10-CM

## 2023-01-24 DIAGNOSIS — R25.1 TREMORS OF NERVOUS SYSTEM: ICD-10-CM

## 2023-01-24 PROBLEM — R41.0 CONFUSION: Status: RESOLVED | Noted: 2022-09-01 | Resolved: 2023-01-24

## 2023-01-24 PROBLEM — R79.89 ELEVATED TSH: Status: RESOLVED | Noted: 2022-09-01 | Resolved: 2023-01-24

## 2023-01-24 PROBLEM — R55 POSTURAL DIZZINESS WITH PRESYNCOPE: Status: RESOLVED | Noted: 2022-09-01 | Resolved: 2023-01-24

## 2023-01-24 PROBLEM — R42 POSTURAL DIZZINESS WITH PRESYNCOPE: Status: RESOLVED | Noted: 2022-09-01 | Resolved: 2023-01-24

## 2023-01-24 PROCEDURE — 99214 OFFICE O/P EST MOD 30 MIN: CPT | Performed by: NURSE PRACTITIONER

## 2023-01-24 PROCEDURE — 1036F TOBACCO NON-USER: CPT | Performed by: NURSE PRACTITIONER

## 2023-01-24 PROCEDURE — G8482 FLU IMMUNIZE ORDER/ADMIN: HCPCS | Performed by: NURSE PRACTITIONER

## 2023-01-24 PROCEDURE — G8427 DOCREV CUR MEDS BY ELIG CLIN: HCPCS | Performed by: NURSE PRACTITIONER

## 2023-01-24 PROCEDURE — G8417 CALC BMI ABV UP PARAM F/U: HCPCS | Performed by: NURSE PRACTITIONER

## 2023-01-24 NOTE — PROGRESS NOTES
[facility] MSN, APRN-CNP, [address], [address] [facility]      [phone]                                    Office Follow Up    Ortiz Kirk is a 38 y.o. right handed man    We are following him for essential tremor and migraines    He presents alone today and is a good historian    He continues to do very well on his regimen of Nurtec and Zonegran, and complains of no migraines since his last visit. He does have chronic photosensitivity which he states is controlled with the Nurtec and wearing hats and dark glasses. Flexeril is helpful for his neck pains. Migraine medications failed or contraindicated: Triptans are contraindicated with his SSRI use and cardiac history. He is already on Zonegran, Prozac, metoprolol, Remeron, and Nurtec    His tremors are well controlled and he has no issues performing his ADLs. Tremor medications failed or contraindicated: He failed primidone. He is already on benzodiazepine and beta-blocker. His psychogenic nonepileptic seizures are controlled and he follows closely with a mental health provider. Diabetes is not well controlled but he states it is slowly getting better. He continues to follow with cardiology for his history of SVT and pacemaker.     No chest pain or palpitations  No SOB  No vertigo, lightheadedness or loss of consciousness  No falls, tripping or stumbling  No incontinence of bowels or bladder  No itching or bruising appreciated  No focal limb weakness or paresthesias  No speech or swallowing troubles    ROS otherwise negative     Current Outpatient Medications   Medication Sig Dispense Refill    atorvastatin (LIPITOR) 40 MG tablet Take 1 tablet by mouth daily 90 tablet 3    Rimegepant Sulfate 75 MG TBDP Take 75 mg by mouth every other day No more than 1 tab in 24 hours 16 tablet 11    metFORMIN (GLUCOPHAGE) 1000 MG tablet Take 1 tablet by mouth 2 times daily (with meals) 180 tablet 1    pioglitazone (ACTOS) 15 MG tablet Take 1 tablet by mouth in the morning. 30 tablet 3    linagliptin (TRADJENTA) 5 MG tablet Take 1 tablet by mouth in the morning. 30 tablet 5    metoprolol succinate (TOPROL XL) 100 MG extended release tablet Take 1 tablet by mouth 2 times daily 180 tablet 3    zonisamide (ZONEGRAN) 50 MG capsule Take 2 capsules by mouth nightly 180 capsule 3    cyclobenzaprine (FLEXERIL) 5 MG tablet Take 1 tablet by mouth nightly as needed for Muscle spasms 30 tablet 11    azelastine (ASTELIN) 0.1 % nasal spray 1 spray by Nasal route 2 times daily Use in each nostril as directed 60 mL 1    chlorpheniramine (ALLER-CHLOR) 4 MG tablet Take 1 tablet by mouth every 6 hours as needed for Allergies 20 tablet 0    blood glucose monitor strips Test 3 times a day & as needed for symptoms of irregular blood glucose. Dispense sufficient amount for indicated testing frequency plus additional to accommodate PRN testing needs. 300 strip 1    ammonium lactate (LAC-HYDRIN) 12 % lotion Apply topically twice daily 400 g 2    clonazePAM (KLONOPIN) 1 MG tablet Take 1 mg by mouth 2 times daily as needed. ENULOSE 10 GM/15ML SOLN solution as needed       mirtazapine (REMERON) 30 MG tablet TAKE ONE TABLET BY MOUTH once a day AT BEDTIME      desonide (DESOWEN) 0.05 % cream Apply topically 2 times daily. (Patient taking differently: as needed) 60 g 0    metroNIDAZOLE (METROGEL) 0.75 % gel Apply topically 2 times daily. 45 g 0    lactulose (CHRONULAC) 10 GM/15ML solution TAKE 15 ML BY MOUTH 3 TIMES DAILY 946 mL 0    medical marijuana Take by mouth as needed. FLUoxetine (PROZAC) 20 MG capsule Take 2 capsules by mouth daily 60 capsule 0    rifaximin (XIFAXAN) 550 MG tablet Take 550 mg by mouth 2 times daily       No current facility-administered medications for this visit.      Objective:     BP (!) 120/90 (Site: Left Upper Arm, Position: Sitting, Cuff Size: Medium Adult)   Ht 5' 10\" (1.778 m)   Wt 263 lb (119.3 kg)   BMI 37.74 kg/m²     General exam: alert, appears stated age, in no acute distress  Head: normocephalic/atraumatic  Eyes: sclerae clear  Neck: full ROM-- tenderness to left cervical paraspinals and trapezius   Lungs: clear throughout  Heart: RRR, faint murmur appreciated  Ext: no edema or cyanosis  Pulses: 1+ throughout  Skin: intact    Mental Status: alert, oriented x4---pleasant     Appropriate attention/concentration  Intact memories and fundus of knowledge    Speech/language: no dysarthria or aphasias    Cranial Nerves:  II: visual fields full   II: pupils Slight physiologic anisocoria--R>L   III,VII: ptosis None   III,IV,VI: extraocular muscles  EOMI without nystagmus      V: mastication intact   V: facial light touch sensation  Intact    V,VII: corneal reflex     VII: facial muscle function   Intact   VIII: hearing intact   IX: soft palate elevation  intact   IX,X: gag reflex    XI: trapezius strength  5/5   XI: sternocleidomastoid strength 5/5   XI: neck extension strength  5/5   XII: tongue strength  midline     Motor:  5/5 throughout except 4+/5 L hand--with sudden giving way  Obese bulk and normal tone  No drift  No tremors today    Sensory:  LT intact throughout    Coordination:   FN and FFM intact b/l    Gait:  Slow, cautious and wide based    DTR:   2+ throughout     No Nichols's no other pathologic reflexes    Laboratory/Radiology:     Lab Results   Component Value Date     08/11/2022    K 3.8 08/11/2022     08/11/2022    CO2 24 08/11/2022    BUN 7 08/11/2022    CREATININE 0.9 08/11/2022    GLUCOSE 102 (H) 08/11/2022    CALCIUM 9.5 08/11/2022    PROT 7.5 08/11/2022    LABALBU 4.4 08/11/2022    BILITOT 0.8 08/11/2022    ALKPHOS 111 08/11/2022    AST 24 08/11/2022    ALT 32 08/11/2022    LABGLOM >60 08/11/2022    GFRAA >60 08/11/2022       Lab Results   Component Value Date    WBC 13.1 (H) 08/11/2022    HGB 15.6 08/11/2022    HCT 47.6 08/11/2022    MCV 90.7 08/11/2022     08/11/2022    LYMPHOPCT 25.8 08/11/2022    RBC 5.25 08/11/2022    MCH 29.7 08/11/2022    MCHC 32.8 08/11/2022    RDW 13.8 08/11/2022     Lab Results   Component Value Date    LABA1C 8.3 (H) 08/11/2022     Lab Results   Component Value Date    TSH 5.370 (H) 08/11/2022     All images personally reviewed today      Assessment:     Episodic migraines without aura: Excellent control on preventive use of Nurtec atop his other first-line medications. His exam is otherwise normal    Cervical spondylosis and mild stenosis    Tremors: suspect essential vs functional/med induced. Currently nonproblematic    Psychogenic nonepileptic seizures (PNES): Proven by EMU. Follows closely with mental health provider.      Plan:     -Continue Nurtec to every other day  -Continue Flexeril 5 mg nightly PRN  -Continue Zonegran 100 mg nightly  -Close follow up psych  -Call with any issues    RTO in 9 months or sooner PRN    NADIA Theodore - CNP, [facility]  8:15 AM  1/24/2023

## 2023-02-17 ENCOUNTER — HOSPITAL ENCOUNTER (EMERGENCY)
Age: 43
Discharge: HOME | End: 2023-02-17
Attending: EMERGENCY MEDICINE
Payer: COMMERCIAL

## 2023-02-17 ENCOUNTER — APPOINTMENT (OUTPATIENT)
Dept: CT IMAGING | Age: 43
End: 2023-02-17
Payer: COMMERCIAL

## 2023-02-17 VITALS
BODY MASS INDEX: 37.22 KG/M2 | TEMPERATURE: 97.3 F | WEIGHT: 260 LBS | HEART RATE: 70 BPM | RESPIRATION RATE: 16 BRPM | DIASTOLIC BLOOD PRESSURE: 85 MMHG | SYSTOLIC BLOOD PRESSURE: 132 MMHG | OXYGEN SATURATION: 99 % | HEIGHT: 70 IN

## 2023-02-17 DIAGNOSIS — K57.32 DIVERTICULITIS OF COLON: ICD-10-CM

## 2023-02-17 DIAGNOSIS — N20.0 KIDNEY STONE: Primary | ICD-10-CM

## 2023-02-17 LAB
ALBUMIN SERPL-MCNC: 4.5 G/DL (ref 3.5–5.2)
ALP BLD-CCNC: 121 U/L (ref 40–129)
ALT SERPL-CCNC: 26 U/L (ref 0–40)
AMORPHOUS: ABNORMAL
ANION GAP SERPL CALCULATED.3IONS-SCNC: 12 MMOL/L (ref 7–16)
AST SERPL-CCNC: 21 U/L (ref 0–39)
BACTERIA: ABNORMAL /HPF
BASOPHILS ABSOLUTE: 0.05 E9/L (ref 0–0.2)
BASOPHILS RELATIVE PERCENT: 0.3 % (ref 0–2)
BILIRUB SERPL-MCNC: 0.7 MG/DL (ref 0–1.2)
BILIRUBIN URINE: NEGATIVE
BLOOD, URINE: ABNORMAL
BUN BLDV-MCNC: 10 MG/DL (ref 6–20)
CALCIUM SERPL-MCNC: 9.4 MG/DL (ref 8.6–10.2)
CHLORIDE BLD-SCNC: 98 MMOL/L (ref 98–107)
CHP ED QC CHECK: YES
CLARITY: ABNORMAL
CO2: 24 MMOL/L (ref 22–29)
COLOR: ABNORMAL
CREAT SERPL-MCNC: 1.4 MG/DL (ref 0.7–1.2)
EOSINOPHILS ABSOLUTE: 0.08 E9/L (ref 0.05–0.5)
EOSINOPHILS RELATIVE PERCENT: 0.4 % (ref 0–6)
GFR SERPL CREATININE-BSD FRML MDRD: >60 ML/MIN/1.73
GLUCOSE BLD-MCNC: 126 MG/DL
GLUCOSE BLD-MCNC: 143 MG/DL (ref 74–99)
GLUCOSE URINE: NEGATIVE MG/DL
HCT VFR BLD CALC: 48.6 % (ref 37–54)
HEMOGLOBIN: 16.1 G/DL (ref 12.5–16.5)
IMMATURE GRANULOCYTES #: 0.1 E9/L
IMMATURE GRANULOCYTES %: 0.5 % (ref 0–5)
KETONES, URINE: ABNORMAL MG/DL
LEUKOCYTE ESTERASE, URINE: ABNORMAL
LIPASE: 25 U/L (ref 13–60)
LYMPHOCYTES ABSOLUTE: 1.73 E9/L (ref 1.5–4)
LYMPHOCYTES RELATIVE PERCENT: 9.1 % (ref 20–42)
MCH RBC QN AUTO: 29.8 PG (ref 26–35)
MCHC RBC AUTO-ENTMCNC: 33.1 % (ref 32–34.5)
MCV RBC AUTO: 89.8 FL (ref 80–99.9)
METER GLUCOSE: 126 MG/DL (ref 74–99)
MONOCYTES ABSOLUTE: 1.09 E9/L (ref 0.1–0.95)
MONOCYTES RELATIVE PERCENT: 5.7 % (ref 2–12)
NEUTROPHILS ABSOLUTE: 15.93 E9/L (ref 1.8–7.3)
NEUTROPHILS RELATIVE PERCENT: 84 % (ref 43–80)
NITRITE, URINE: NEGATIVE
PDW BLD-RTO: 13.8 FL (ref 11.5–15)
PH UA: 7.5 (ref 5–9)
PLATELET # BLD: 416 E9/L (ref 130–450)
PMV BLD AUTO: 9.7 FL (ref 7–12)
POTASSIUM REFLEX MAGNESIUM: 4.3 MMOL/L (ref 3.5–5)
PROTEIN UA: ABNORMAL MG/DL
RBC # BLD: 5.41 E12/L (ref 3.8–5.8)
RBC UA: ABNORMAL /HPF (ref 0–2)
SODIUM BLD-SCNC: 134 MMOL/L (ref 132–146)
SPECIFIC GRAVITY UA: 1.02 (ref 1–1.03)
TOTAL PROTEIN: 8.1 G/DL (ref 6.4–8.3)
UROBILINOGEN, URINE: 0.2 E.U./DL
WBC # BLD: 19 E9/L (ref 4.5–11.5)
WBC UA: ABNORMAL /HPF (ref 0–5)

## 2023-02-17 PROCEDURE — 83690 ASSAY OF LIPASE: CPT

## 2023-02-17 PROCEDURE — 82962 GLUCOSE BLOOD TEST: CPT

## 2023-02-17 PROCEDURE — 74176 CT ABD & PELVIS W/O CONTRAST: CPT

## 2023-02-17 PROCEDURE — 96375 TX/PRO/DX INJ NEW DRUG ADDON: CPT

## 2023-02-17 PROCEDURE — 85025 COMPLETE CBC W/AUTO DIFF WBC: CPT

## 2023-02-17 PROCEDURE — 96374 THER/PROPH/DIAG INJ IV PUSH: CPT

## 2023-02-17 PROCEDURE — 6360000002 HC RX W HCPCS: Performed by: STUDENT IN AN ORGANIZED HEALTH CARE EDUCATION/TRAINING PROGRAM

## 2023-02-17 PROCEDURE — 81001 URINALYSIS AUTO W/SCOPE: CPT

## 2023-02-17 PROCEDURE — 80053 COMPREHEN METABOLIC PANEL: CPT

## 2023-02-17 PROCEDURE — 6370000000 HC RX 637 (ALT 250 FOR IP): Performed by: EMERGENCY MEDICINE

## 2023-02-17 PROCEDURE — 99284 EMERGENCY DEPT VISIT MOD MDM: CPT

## 2023-02-17 RX ORDER — OXYCODONE HYDROCHLORIDE AND ACETAMINOPHEN 5; 325 MG/1; MG/1
1 TABLET ORAL EVERY 6 HOURS PRN
Qty: 12 TABLET | Refills: 0 | Status: SHIPPED | OUTPATIENT
Start: 2023-02-17 | End: 2023-02-20

## 2023-02-17 RX ORDER — KETOROLAC TROMETHAMINE 30 MG/ML
15 INJECTION, SOLUTION INTRAMUSCULAR; INTRAVENOUS ONCE
Status: COMPLETED | OUTPATIENT
Start: 2023-02-17 | End: 2023-02-17

## 2023-02-17 RX ORDER — CEFDINIR 300 MG/1
300 CAPSULE ORAL 2 TIMES DAILY
Qty: 14 CAPSULE | Refills: 0 | Status: SHIPPED | OUTPATIENT
Start: 2023-02-17 | End: 2023-02-24

## 2023-02-17 RX ORDER — KETOROLAC TROMETHAMINE 10 MG/1
10 TABLET, FILM COATED ORAL EVERY 6 HOURS PRN
Qty: 20 TABLET | Refills: 0 | Status: SHIPPED | OUTPATIENT
Start: 2023-02-17

## 2023-02-17 RX ORDER — CEFDINIR 300 MG/1
300 CAPSULE ORAL ONCE
Status: COMPLETED | OUTPATIENT
Start: 2023-02-17 | End: 2023-02-17

## 2023-02-17 RX ORDER — METRONIDAZOLE 500 MG/1
500 TABLET ORAL ONCE
Status: COMPLETED | OUTPATIENT
Start: 2023-02-17 | End: 2023-02-17

## 2023-02-17 RX ORDER — ONDANSETRON 2 MG/ML
4 INJECTION INTRAMUSCULAR; INTRAVENOUS ONCE
Status: COMPLETED | OUTPATIENT
Start: 2023-02-17 | End: 2023-02-17

## 2023-02-17 RX ORDER — METRONIDAZOLE 500 MG/1
500 TABLET ORAL 2 TIMES DAILY
Qty: 14 TABLET | Refills: 0 | Status: SHIPPED | OUTPATIENT
Start: 2023-02-17 | End: 2023-02-24

## 2023-02-17 RX ORDER — TAMSULOSIN HYDROCHLORIDE 0.4 MG/1
0.4 CAPSULE ORAL DAILY
Qty: 30 CAPSULE | Refills: 0 | Status: SHIPPED | OUTPATIENT
Start: 2023-02-17

## 2023-02-17 RX ADMIN — METRONIDAZOLE 500 MG: 500 TABLET ORAL at 23:47

## 2023-02-17 RX ADMIN — CEFDINIR 300 MG: 300 CAPSULE ORAL at 23:47

## 2023-02-17 RX ADMIN — KETOROLAC TROMETHAMINE 15 MG: 30 INJECTION, SOLUTION INTRAMUSCULAR; INTRAVENOUS at 21:23

## 2023-02-17 RX ADMIN — ONDANSETRON 4 MG: 2 INJECTION INTRAMUSCULAR; INTRAVENOUS at 21:23

## 2023-02-17 ASSESSMENT — ENCOUNTER SYMPTOMS
BACK PAIN: 0
SHORTNESS OF BREATH: 0
COUGH: 0
DIARRHEA: 0
SORE THROAT: 0
EYE DISCHARGE: 0
NAUSEA: 0
ABDOMINAL PAIN: 1
SINUS PRESSURE: 0
WHEEZING: 0
EYE PAIN: 0
VOMITING: 0
EYE REDNESS: 0

## 2023-02-17 ASSESSMENT — PAIN - FUNCTIONAL ASSESSMENT: PAIN_FUNCTIONAL_ASSESSMENT: 0-10

## 2023-02-17 ASSESSMENT — PAIN DESCRIPTION - LOCATION: LOCATION: FLANK

## 2023-02-17 ASSESSMENT — PAIN DESCRIPTION - ORIENTATION: ORIENTATION: LEFT

## 2023-02-17 ASSESSMENT — PAIN SCALES - GENERAL
PAINLEVEL_OUTOF10: 6
PAINLEVEL_OUTOF10: 6

## 2023-02-18 NOTE — ED NOTES
Department of Emergency Medicine  FIRST PROVIDER TRIAGE NOTE             Independent MLP           2/17/23  8:31 PM EST    Date of Encounter: 2/17/23   MRN: [id number]      HPI: Nicholette Epley is a 38 y.o. male who presents to the ED for Flank Pain (Left sided) and Nausea & Vomiting   Patient complains of left sided flank pain for the past 2 days, and stated he has been vomiting and unable to keep anything down. He stated he took a muscle relaxer at home, but stated he vomited shortly after. ROS: Negative for cp or sob. PE: Gen Appearance/Constitutional: alert  CV: regular rate  Pulm: CTA bilat     Initial Plan of Care: All treatment areas with department are currently occupied. Plan to order/Initiate the following while awaiting opening in ED: imaging studies.   Initiate Treatment-Testing, Proceed toTreatment Area When Bed Available for ED Attending/MLP to Continue Care    Electronically signed by NADIA Humphreys CNP   DD: 2/17/23       NADIA Humphreys CNP  02/17/23 2032

## 2023-02-18 NOTE — ED PROVIDER NOTES
The history is provided by the patient and medical records. 47-year-old male to the present emergency room complaining of left lower back pain radiating to his groin. Does have family history of kidney stone. States pain started yesterday worsening today. Denies any fever chills denies any changes bowel bladder habits. Denies any other acute complaints at this time. Review of Systems   Constitutional:  Negative for chills and fever. HENT:  Negative for ear pain, sinus pressure and sore throat. Eyes:  Negative for pain, discharge and redness. Respiratory:  Negative for cough, shortness of breath and wheezing. Cardiovascular:  Negative for chest pain. Gastrointestinal:  Positive for abdominal pain. Negative for diarrhea, nausea and vomiting. Genitourinary:  Negative for dysuria and frequency. Musculoskeletal:  Negative for arthralgias and back pain. Skin:  Negative for rash and wound. Neurological:  Negative for weakness and headaches. Hematological:  Negative for adenopathy. All other systems reviewed and are negative. Physical Exam  Vitals and nursing note reviewed. Constitutional:       General: He is not in acute distress. Appearance: He is well-developed. HENT:      Head: Normocephalic and atraumatic. Eyes:      Conjunctiva/sclera: Conjunctivae normal.   Cardiovascular:      Rate and Rhythm: Normal rate and regular rhythm. Heart sounds: Normal heart sounds. No murmur heard. Pulmonary:      Effort: Pulmonary effort is normal. No respiratory distress. Breath sounds: Normal breath sounds. No wheezing or rales. Abdominal:      General: Bowel sounds are normal. There is no distension. Palpations: Abdomen is soft. Tenderness: There is abdominal tenderness. There is no guarding or rebound. Comments: Left-sided flank pain and radiating to his left lower quadrant.   Has some slight tenderness to palpation of the left lower back no spinal tenderness   Musculoskeletal:         General: No tenderness or deformity. Cervical back: Normal range of motion and neck supple. Skin:     General: Skin is warm and dry. Neurological:      General: No focal deficit present. Mental Status: He is alert and oriented to person, place, and time. Psychiatric:         Mood and Affect: Mood normal.         Thought Content: Thought content normal.        Procedures     MDM       Diagnostic results    LABS:    Labs Reviewed   CBC WITH AUTO DIFFERENTIAL - Abnormal; Notable for the following components:       Result Value    WBC 19.0 (*)     Neutrophils % 84.0 (*)     Lymphocytes % 9.1 (*)     Neutrophils Absolute 15.93 (*)     Monocytes Absolute 1.09 (*)     All other components within normal limits   COMPREHENSIVE METABOLIC PANEL W/ REFLEX TO MG FOR LOW K - Abnormal; Notable for the following components:    Glucose 143 (*)     Creatinine 1.4 (*)     All other components within normal limits   URINALYSIS WITH MICROSCOPIC - Abnormal; Notable for the following components:    Color, UA DARK YELLOW (*)     Clarity, UA CLOUDY (*)     Ketones, Urine TRACE (*)     Blood, Urine LARGE (*)     Leukocyte Esterase, Urine TRACE (*)     All other components within normal limits   POCT GLUCOSE - Abnormal; Notable for the following components:    Meter Glucose 126 (*)     All other components within normal limits   POCT GLUCOSE - Normal   LIPASE     ED Course as of 02/17/23 2352   Fri Feb 17, 2023   2350 Laboratory work-up as inter myself shows elevated white count on CBC at 19.0 stable hemoglobin, urinalysis with hematuria no significant infection, CMP within normal limits, lipase is not elevated, blood glucose at 126. [CB]      ED Course User Index  [CB] Perla Clark MD         As interpreted by me, the above displayed labs are abnormal. All other labs obtained during this visit were within normal range or not returned as of this dictation.       EKG Interpretation  Interpreted by emergency department physician, Almaz Roman MD          RADIOLOGY:   Non-plain film images such as CT, Ultrasound and MRI are read by the radiologist. Plain radiographic images are visualized and preliminarily interpreted by the ED Provider with the below findings:    CT scan    Interpretation per the Radiologist below, if available at the time of this note:    CT ABDOMEN PELVIS WO CONTRAST Additional Contrast? None   Final Result   1. Obstructive findings of left kidney with mild-to-moderate hydronephrosis,   due to a distal ureteral 7 mm calculus   2. Suspicious for acute diverticulitis of the lower descending colon           No results found. No results found. MDM    History From: the patient    CONSULTS: (Who and What was discussed)  None      Social Determinants of Health : None    Chronic Conditions affecting care:    has a past medical history of Anxiety, Arthritis, Class 1 obesity due to excess calories with serious comorbidity and body mass index (BMI) of 34.0 to 34.9 in adult, First degree AV block, GERD (gastroesophageal reflux disease), Hyperlipidemia, IBS (irritable bowel syndrome), LVH (left ventricular hypertrophy), Migraines, Obesity, ENOC (obstructive sleep apnea), Psychiatric problem, Psychogenic nonepileptic seizure, Tremors of nervous system, and Type 2 diabetes mellitus with complication, without long-term current use of insulin ([facility] [address] Records Reviewed( Source)     CC/HPI Summary, DDx, ED Course, and Reassessment:   Differential diagnosis including but not limited to kidney stone, UTI, diverticulitis  41-year male present emergency department complaint of left lower back pain rating to left lower abdomen. Concern of kidney stone versus diverticulitis at this time.   Patient's laboratory work-up showing urinalysis with no bacteria seen, CMP showing stable electrolytes and kidney function, CBC showing elevated white count 19.0 lipase not elevated CT scan on pelvis showing diverticulitis as well as a 7 mm kidney stone with mild hydronephrosis. Patient feeling much better following Toradol and Zofran here in the emergency department. He is safe to be discharged home at this time for his kidney stone acute diverticulitis will be discharged home on Omnicef and Flagyl for his diverticulitis and given Toradol Percocet for pain control for his kidney stone as well as Flomax for his kidney stone. He is provided urology follow-up in his paperwork just call and schedule an appointment. He is otherwise hemodynamically stable and agreeable this plan. Disposition Considerations (Tests not ordered but considered, Shared Decision Making, Pt Expectation of Test or Tx.): Upon shared decision making patient safe to be discharged home follow with her family doctor . Did advise on return precautions to the emergency department. Did also advise follow-up with her primary care physician. Patient agreeable with the plan moving forward. Medications   ketorolac (TORADOL) injection 15 mg (15 mg IntraVENous Given 2/17/23 2123)   ondansetron (ZOFRAN) injection 4 mg (4 mg IntraVENous Given 2/17/23 2123)   cefdinir (OMNICEF) capsule 300 mg (300 mg Oral Given 2/17/23 2347)   metroNIDAZOLE (FLAGYL) tablet 500 mg (500 mg Oral Given 2/17/23 2347)         I am the primary provider of record    ED Course as of 02/17/23 2352 Fri Feb 17, 2023 2350 Laboratory work-up as inter myself shows elevated white count on CBC at 19.0 stable hemoglobin, urinalysis with hematuria no significant infection, CMP within normal limits, lipase is not elevated, blood glucose at 126.  [CB]      ED Course User Index  [CB] Jemima Garcia MD         ED Course as of 02/17/23 2352 Fri Feb 17, 2023 2350 Laboratory work-up as inter myself shows elevated white count on CBC at 19.0 stable hemoglobin, urinalysis with hematuria no significant infection, CMP within normal limits, lipase is not elevated, blood glucose at 126. [CB]      ED Course User Index  [CB] Asher Cunningham MD       --------------------------------------------- PAST HISTORY ---------------------------------------------  Past Medical History:  has a past medical history of Anxiety, Arthritis, Class 1 obesity due to excess calories with serious comorbidity and body mass index (BMI) of 34.0 to 34.9 in adult, First degree AV block, GERD (gastroesophageal reflux disease), Hyperlipidemia, IBS (irritable bowel syndrome), LVH (left ventricular hypertrophy), Migraines, Obesity, ENOC (obstructive sleep apnea), Psychiatric problem, Psychogenic nonepileptic seizure, Tremors of nervous system, and Type 2 diabetes mellitus with complication, without long-term current use of insulin ([facility] [address] Past Surgical History:  has a past surgical history that includes Tonsillectomy; hernia repair; knee surgery; Insertable Cardiac Monitor (07/14/2017); and Pacemaker insertion (12/28/2017). Social History:  reports that he has never smoked. He has never used smokeless tobacco. He reports current alcohol use. He reports current drug use. Drug: Marijuana Melody Jovel). Family History: family history is not on file. The patients home medications have been reviewed.     Allergies: Fish-derived products    -------------------------------------------------- RESULTS -------------------------------------------------  Labs:  Results for orders placed or performed during the hospital encounter of 02/17/23   CBC with Auto Differential   Result Value Ref Range    WBC 19.0 (H) 4.5 - 11.5 E9/L    RBC 5.41 3.80 - 5.80 E12/L    Hemoglobin 16.1 12.5 - 16.5 g/dL    Hematocrit 48.6 37.0 - 54.0 %    MCV 89.8 80.0 - 99.9 fL    MCH 29.8 26.0 - 35.0 pg    MCHC 33.1 32.0 - 34.5 %    RDW 13.8 11.5 - 15.0 fL    Platelets 362 234 - 090 E9/L    MPV 9.7 7.0 - 12.0 fL    Neutrophils % 84.0 (H) 43.0 - 80.0 %    Immature Granulocytes % 0.5 0.0 - 5.0 %    Lymphocytes % 9.1 (L) 20.0 - 42.0 %    Monocytes % 5.7 2.0 - 12.0 %    Eosinophils % 0.4 0.0 - 6.0 %    Basophils % 0.3 0.0 - 2.0 %    Neutrophils Absolute 15.93 (H) 1.80 - 7.30 E9/L    Immature Granulocytes # 0.10 E9/L    Lymphocytes Absolute 1.73 1.50 - 4.00 E9/L    Monocytes Absolute 1.09 (H) 0.10 - 0.95 E9/L    Eosinophils Absolute 0.08 0.05 - 0.50 E9/L    Basophils Absolute 0.05 0.00 - 0.20 E9/L   Comprehensive Metabolic Panel w/ Reflex to MG   Result Value Ref Range    Sodium 134 132 - 146 mmol/L    Potassium reflex Magnesium 4.3 3.5 - 5.0 mmol/L    Chloride 98 98 - 107 mmol/L    CO2 24 22 - 29 mmol/L    Anion Gap 12 7 - 16 mmol/L    Glucose 143 (H) 74 - 99 mg/dL    BUN 10 6 - 20 mg/dL    Creatinine 1.4 (H) 0.7 - 1.2 mg/dL    Est, Glom Filt Rate >60 >=60 mL/min/1.73    Calcium 9.4 8.6 - 10.2 mg/dL    Total Protein 8.1 6.4 - 8.3 g/dL    Albumin 4.5 3.5 - 5.2 g/dL    Total Bilirubin 0.7 0.0 - 1.2 mg/dL    Alkaline Phosphatase 121 40 - 129 U/L    ALT 26 0 - 40 U/L    AST 21 0 - 39 U/L   Lipase   Result Value Ref Range    Lipase 25 13 - 60 U/L   Urinalysis with Microscopic   Result Value Ref Range    Color, UA DARK YELLOW (A) Straw/Yellow    Clarity, UA CLOUDY (A) Clear    Glucose, Ur Negative Negative mg/dL    Bilirubin Urine Negative Negative    Ketones, Urine TRACE (A) Negative mg/dL    Specific Gravity, UA 1.020 1.005 - 1.030    Blood, Urine LARGE (A) Negative    pH, UA 7.5 5.0 - 9.0    Protein, UA TRACE Negative mg/dL    Urobilinogen, Urine 0.2 <2.0 E.U./dL    Nitrite, Urine Negative Negative    Leukocyte Esterase, Urine TRACE (A) Negative    WBC, UA 0-1 0 - 5 /HPF    RBC, UA PACKED 0 - 2 /HPF    Bacteria, UA NONE SEEN None Seen /HPF    Amorphous, UA FEW    POCT glucose   Result Value Ref Range    Glucose 126 mg/dL    QC OK? yes    POCT Glucose   Result Value Ref Range    Meter Glucose 126 (H) 74 - 99 mg/dL       Radiology:  CT ABDOMEN PELVIS WO CONTRAST Additional Contrast? None   Final Result   1.  Obstructive findings of left kidney with mild-to-moderate hydronephrosis,   due to a distal ureteral 7 mm calculus   2. Suspicious for acute diverticulitis of the lower descending colon             ------------------------- NURSING NOTES AND VITALS REVIEWED ---------------------------  Date / Time Roomed:  2/17/2023  8:52 PM  ED Bed Assignment:  94/    The nursing notes within the ED encounter and vital signs as below have been reviewed. /85   Pulse 70   Temp 97.3 °F (36.3 °C)   Resp 16   Ht 5' 10\" (1.778 m)   Wt 260 lb (117.9 kg)   SpO2 99%   BMI 37.31 kg/m²   Oxygen Saturation Interpretation: Normal      ------------------------------------------ PROGRESS NOTES ------------------------------------------  11:30 PM EST  I have spoken with the patient and discussed todays results, in addition to providing specific details for the plan of care and counseling regarding the diagnosis and prognosis. Their questions are answered at this time and they are agreeable with the plan. I discussed at length with them reasons for immediate return here for re evaluation. They will followup with their primary care physician by calling their office on Monday.      --------------------------------- ADDITIONAL PROVIDER NOTES ---------------------------------  At this time the patient is without objective evidence of an acute process requiring hospitalization or inpatient management. They have remained hemodynamically stable throughout their entire ED visit and are stable for discharge with outpatient follow-up. The plan has been discussed in detail and they are aware of the specific conditions for emergent return, as well as the importance of follow-up.       Discharge Medication List as of 2/17/2023 11:29 PM        START taking these medications    Details   cefdinir (OMNICEF) 300 MG capsule Take 1 capsule by mouth 2 times daily for 7 days, Disp-14 capsule, R-0Normal      metroNIDAZOLE (FLAGYL) 500 MG tablet Take 1 tablet by mouth 2 times daily for 7 days, Disp-14 tablet, R-0Normal      ketorolac (TORADOL) 10 MG tablet Take 1 tablet by mouth every 6 hours as needed for Pain, Disp-20 tablet, R-0Normal      oxyCODONE-acetaminophen (PERCOCET) 5-325 MG per tablet Take 1 tablet by mouth every 6 hours as needed for Pain for up to 3 days. Intended supply: 3 days. Take lowest dose possible to manage pain Max Daily Amount: 4 tablets, Disp-12 tablet, R-0Normal      tamsulosin (FLOMAX) 0.4 MG capsule Take 1 capsule by mouth daily, Disp-30 capsule, R-0Normal             Diagnosis:  1. Kidney stone    2. Diverticulitis of colon        Disposition:  Patient's disposition: Discharge to home  Patient's condition is stable.        Rashmi Tang MD  Resident  02/17/23 1561

## 2023-02-20 ENCOUNTER — OFFICE VISIT (OUTPATIENT)
Dept: PRIMARY CARE CLINIC | Age: 43
End: 2023-02-20
Payer: COMMERCIAL

## 2023-02-20 VITALS
WEIGHT: 263.25 LBS | OXYGEN SATURATION: 98 % | HEART RATE: 85 BPM | SYSTOLIC BLOOD PRESSURE: 130 MMHG | TEMPERATURE: 97.9 F | HEIGHT: 70 IN | DIASTOLIC BLOOD PRESSURE: 82 MMHG | BODY MASS INDEX: 37.69 KG/M2

## 2023-02-20 DIAGNOSIS — K57.92 DIVERTICULITIS: ICD-10-CM

## 2023-02-20 DIAGNOSIS — E11.8 TYPE 2 DIABETES MELLITUS WITH COMPLICATION, WITHOUT LONG-TERM CURRENT USE OF INSULIN (HCC): Primary | ICD-10-CM

## 2023-02-20 DIAGNOSIS — N20.0 LEFT NEPHROLITHIASIS: ICD-10-CM

## 2023-02-20 PROCEDURE — 2022F DILAT RTA XM EVC RTNOPTHY: CPT | Performed by: FAMILY MEDICINE

## 2023-02-20 PROCEDURE — G8427 DOCREV CUR MEDS BY ELIG CLIN: HCPCS | Performed by: FAMILY MEDICINE

## 2023-02-20 PROCEDURE — G8482 FLU IMMUNIZE ORDER/ADMIN: HCPCS | Performed by: FAMILY MEDICINE

## 2023-02-20 PROCEDURE — G8417 CALC BMI ABV UP PARAM F/U: HCPCS | Performed by: FAMILY MEDICINE

## 2023-02-20 PROCEDURE — 99213 OFFICE O/P EST LOW 20 MIN: CPT | Performed by: FAMILY MEDICINE

## 2023-02-20 PROCEDURE — 1036F TOBACCO NON-USER: CPT | Performed by: FAMILY MEDICINE

## 2023-02-20 PROCEDURE — 3046F HEMOGLOBIN A1C LEVEL >9.0%: CPT | Performed by: FAMILY MEDICINE

## 2023-02-20 RX ORDER — ONDANSETRON 4 MG/1
4 TABLET, FILM COATED ORAL 3 TIMES DAILY PRN
Qty: 90 TABLET | Refills: 0 | Status: SHIPPED | OUTPATIENT
Start: 2023-02-20

## 2023-02-20 ASSESSMENT — ENCOUNTER SYMPTOMS
COUGH: 0
CONSTIPATION: 0
ABDOMINAL DISTENTION: 0
BLOOD IN STOOL: 0
PHOTOPHOBIA: 0
BACK PAIN: 1
VOMITING: 0
SORE THROAT: 0
ABDOMINAL PAIN: 1
DIARRHEA: 0
SHORTNESS OF BREATH: 0
NAUSEA: 1

## 2023-02-20 ASSESSMENT — PATIENT HEALTH QUESTIONNAIRE - PHQ9
4. FEELING TIRED OR HAVING LITTLE ENERGY: 1
7. TROUBLE CONCENTRATING ON THINGS, SUCH AS READING THE NEWSPAPER OR WATCHING TELEVISION: 1
10. IF YOU CHECKED OFF ANY PROBLEMS, HOW DIFFICULT HAVE THESE PROBLEMS MADE IT FOR YOU TO DO YOUR WORK, TAKE CARE OF THINGS AT HOME, OR GET ALONG WITH OTHER PEOPLE: 1
1. LITTLE INTEREST OR PLEASURE IN DOING THINGS: 0
2. FEELING DOWN, DEPRESSED OR HOPELESS: 0
SUM OF ALL RESPONSES TO PHQ QUESTIONS 1-9: 4
SUM OF ALL RESPONSES TO PHQ9 QUESTIONS 1 & 2: 0
3. TROUBLE FALLING OR STAYING ASLEEP: 1
9. THOUGHTS THAT YOU WOULD BE BETTER OFF DEAD, OR OF HURTING YOURSELF: 0
SUM OF ALL RESPONSES TO PHQ QUESTIONS 1-9: 4
8. MOVING OR SPEAKING SO SLOWLY THAT OTHER PEOPLE COULD HAVE NOTICED. OR THE OPPOSITE, BEING SO FIGETY OR RESTLESS THAT YOU HAVE BEEN MOVING AROUND A LOT MORE THAN USUAL: 0
5. POOR APPETITE OR OVEREATING: 1
SUM OF ALL RESPONSES TO PHQ QUESTIONS 1-9: 4
SUM OF ALL RESPONSES TO PHQ QUESTIONS 1-9: 4
6. FEELING BAD ABOUT YOURSELF - OR THAT YOU ARE A FAILURE OR HAVE LET YOURSELF OR YOUR FAMILY DOWN: 0

## 2023-02-20 NOTE — PROGRESS NOTES
Татьяна Miranda (:  1980) is a 38 y.o. male,Established patient, here for evaluation of the following chief complaint(s):  Follow-up (Kidney stone and diverticulitis, patient states the medication seems to be helping.)         ASSESSMENT/PLAN:  1. Type 2 diabetes mellitus with complication, without long-term current use of insulin (HCC)  -     ondansetron (ZOFRAN) 4 MG tablet; Take 1 tablet by mouth 3 times daily as needed for Nausea or Vomiting, Disp-90 tablet, R-0Normal  2. Diverticulitis  -     ondansetron (ZOFRAN) 4 MG tablet; Take 1 tablet by mouth 3 times daily as needed for Nausea or Vomiting, Disp-90 tablet, R-0Normal  3. Left nephrolithiasis  -     ondansetron (ZOFRAN) 4 MG tablet; Take 1 tablet by mouth 3 times daily as needed for Nausea or Vomiting, Disp-90 tablet, R-0Normal  This time we will continue with current medication regimen. Follow-up with urology. We will see him back in 1 month or sooner if needed. Return in about 5 weeks (around 3/27/2023). Subjective   SUBJECTIVE/OBJECTIVE:  HPI  Presents today for emergency room follow-up. Patient was diagnosed with left-sided nephrolithiasis and diverticulitis. Patient has been taking all medication as prescribed without any side effects or adverse reactions. Patient states that the pain is doing well at this time. Still having occasional nausea after eating. No recent emesis. No fever or chills. Patient is unable to tell me if he passed the kidney stone or not. Review of Systems   Constitutional:  Positive for fatigue. Negative for chills and fever. HENT:  Negative for congestion, hearing loss, nosebleeds and sore throat. Eyes:  Negative for photophobia. Respiratory:  Negative for cough and shortness of breath. Cardiovascular:  Negative for chest pain, palpitations and leg swelling. Gastrointestinal:  Positive for abdominal pain and nausea.  Negative for abdominal distention, blood in stool, constipation, diarrhea and vomiting. Endocrine: Negative for polydipsia. Genitourinary:  Positive for flank pain. Negative for dysuria, frequency, hematuria and urgency. Musculoskeletal:  Positive for arthralgias, back pain, gait problem, joint swelling and myalgias. Skin: Negative. Neurological:  Positive for dizziness, tremors, seizures and numbness. Negative for weakness and headaches. Hematological:  Does not bruise/bleed easily. Psychiatric/Behavioral:  Positive for behavioral problems, decreased concentration, dysphoric mood and sleep disturbance. Negative for agitation, hallucinations, self-injury and suicidal ideas. The patient is not nervous/anxious. All other systems reviewed and are negative. Current Outpatient Medications:     ondansetron (ZOFRAN) 4 MG tablet, Take 1 tablet by mouth 3 times daily as needed for Nausea or Vomiting, Disp: 90 tablet, Rfl: 0    cefdinir (OMNICEF) 300 MG capsule, Take 1 capsule by mouth 2 times daily for 7 days, Disp: 14 capsule, Rfl: 0    metroNIDAZOLE (FLAGYL) 500 MG tablet, Take 1 tablet by mouth 2 times daily for 7 days, Disp: 14 tablet, Rfl: 0    ketorolac (TORADOL) 10 MG tablet, Take 1 tablet by mouth every 6 hours as needed for Pain, Disp: 20 tablet, Rfl: 0    oxyCODONE-acetaminophen (PERCOCET) 5-325 MG per tablet, Take 1 tablet by mouth every 6 hours as needed for Pain for up to 3 days. Intended supply: 3 days.  Take lowest dose possible to manage pain Max Daily Amount: 4 tablets, Disp: 12 tablet, Rfl: 0    tamsulosin (FLOMAX) 0.4 MG capsule, Take 1 capsule by mouth daily, Disp: 30 capsule, Rfl: 0    atorvastatin (LIPITOR) 40 MG tablet, Take 1 tablet by mouth daily, Disp: 90 tablet, Rfl: 3    Rimegepant Sulfate 75 MG TBDP, Take 75 mg by mouth every other day No more than 1 tab in 24 hours, Disp: 16 tablet, Rfl: 11    metFORMIN (GLUCOPHAGE) 1000 MG tablet, Take 1 tablet by mouth 2 times daily (with meals), Disp: 180 tablet, Rfl: 1    pioglitazone (ACTOS) 15 MG tablet, Take 1 tablet by mouth in the morning., Disp: 30 tablet, Rfl: 3    linagliptin (TRADJENTA) 5 MG tablet, Take 1 tablet by mouth in the morning., Disp: 30 tablet, Rfl: 5    metoprolol succinate (TOPROL XL) 100 MG extended release tablet, Take 1 tablet by mouth 2 times daily, Disp: 180 tablet, Rfl: 3    zonisamide (ZONEGRAN) 50 MG capsule, Take 2 capsules by mouth nightly, Disp: 180 capsule, Rfl: 3    cyclobenzaprine (FLEXERIL) 5 MG tablet, Take 1 tablet by mouth nightly as needed for Muscle spasms, Disp: 30 tablet, Rfl: 11    azelastine (ASTELIN) 0.1 % nasal spray, 1 spray by Nasal route 2 times daily Use in each nostril as directed, Disp: 60 mL, Rfl: 1    chlorpheniramine (ALLER-CHLOR) 4 MG tablet, Take 1 tablet by mouth every 6 hours as needed for Allergies, Disp: 20 tablet, Rfl: 0    blood glucose monitor strips, Test 3 times a day & as needed for symptoms of irregular blood glucose. Dispense sufficient amount for indicated testing frequency plus additional to accommodate PRN testing needs. , Disp: 300 strip, Rfl: 1    ammonium lactate (LAC-HYDRIN) 12 % lotion, Apply topically twice daily, Disp: 400 g, Rfl: 2    clonazePAM (KLONOPIN) 1 MG tablet, Take 1 mg by mouth 2 times daily as needed. , Disp: , Rfl:     ENULOSE 10 GM/15ML SOLN solution, as needed , Disp: , Rfl:     mirtazapine (REMERON) 30 MG tablet, TAKE ONE TABLET BY MOUTH once a day AT BEDTIME, Disp: , Rfl:     desonide (DESOWEN) 0.05 % cream, Apply topically 2 times daily. (Patient taking differently: as needed), Disp: 60 g, Rfl: 0    metroNIDAZOLE (METROGEL) 0.75 % gel, Apply topically 2 times daily. , Disp: 45 g, Rfl: 0    lactulose (CHRONULAC) 10 GM/15ML solution, TAKE 15 ML BY MOUTH 3 TIMES DAILY, Disp: 946 mL, Rfl: 0    medical marijuana, Take by mouth as needed. , Disp: , Rfl:     FLUoxetine (PROZAC) 20 MG capsule, Take 2 capsules by mouth daily, Disp: 60 capsule, Rfl: 0    rifaximin (XIFAXAN) 550 MG tablet, Take 550 mg by mouth 2 times daily, Disp: , Rfl:    Patient Active Problem List   Diagnosis    Anxiety    Hyperlipemia    Irritable bowel syndrome with diarrhea    Psychogenic nonepileptic seizure    Depression    Type 2 diabetes mellitus with complication, without long-term current use of insulin ([facility] [address]    Conversion disorder    Attention deficit hyperactivity disorder (ADHD), predominantly hyperactive type    Fatty liver    Migraine with aura and without status migrainosus, not intractable    Cervical spondylosis    Chronic left shoulder pain    Chronic pain of right knee    Tremors of nervous system     Past Medical History:   Diagnosis Date    Anxiety     Arthritis     Class 1 obesity due to excess calories with serious comorbidity and body mass index (BMI) of 34.0 to 34.9 in adult     First degree AV block     GERD (gastroesophageal reflux disease)     Hyperlipidemia     IBS (irritable bowel syndrome)     LVH (left ventricular hypertrophy)     Migraines     Obesity     ENOC (obstructive sleep apnea)     Psychiatric problem     Psychogenic nonepileptic seizure     Tremors of nervous system     Type 2 diabetes mellitus with complication, without long-term current use of insulin ([facility])      Past Surgical History:   Procedure Laterality Date    HERNIA REPAIR      INSERTABLE CARDIAC MONITOR  07/14/2017    KNEE SURGERY      PACEMAKER INSERTION  12/28/2017    D-PPM   ([employer])   [address] [facility]    TONSILLECTOMY       Social History     Socioeconomic History    Marital status: Single     Spouse name: Not on file    Number of children: Not on file    Years of education: Not on file    Highest education level: Not on file   Occupational History    Not on file   Tobacco Use    Smoking status: Never    Smokeless tobacco: Never   Vaping Use    Vaping Use: Never used   Substance and Sexual Activity    Alcohol use: Yes     Comment: Socially- couple times weekly    Drug use: Yes     Types: Marijuana Chanda Gregg     Comment: Medical- rarely    Sexual activity: Not on file Other Topics Concern    Not on file   Social History Narrative    Not on file     Social Determinants of Health     Financial Resource Strain: Not on file   Food Insecurity: Not on file   Transportation Needs: Not on file   Physical Activity: Not on file   Stress: Not on file   Social Connections: Not on file   Intimate Partner Violence: Not on file   Housing Stability: Not on file     History reviewed. No pertinent family history. There are no preventive care reminders to display for this patient. There are no preventive care reminders to display for this patient. Diabetes Management   Topic Date Due    Diabetic foot exam  Never done    Diabetic retinal exam  04/03/2020    Diabetic Alb to Cr ratio (uACR) test  02/10/2023      There are no preventive care reminders to display for this patient. Health Maintenance   Topic Date Due    Pneumococcal 0-64 years Vaccine (1 - PCV) Never done    Diabetic foot exam  Never done    Hepatitis B vaccine (2 of 3 - Risk 3-dose series) 08/29/2016    Diabetic retinal exam  04/03/2020    COVID-19 Vaccine (4 - Booster for Pfizer series) 05/04/2022    Diabetic Alb to Cr ratio (uACR) test  02/10/2023    A1C test (Diabetic or Prediabetic)  08/11/2023    Lipids  08/11/2023    GFR test (Diabetes, CKD 3-4, OR last GFR 15-59)  02/17/2024    Depression Monitoring  02/20/2024    DTaP/Tdap/Td vaccine (2 - Td or Tdap) 04/20/2026    Flu vaccine  Completed    Hepatitis C screen  Completed    Hepatitis A vaccine  Aged Out    Hib vaccine  Aged Out    Meningococcal (ACWY) vaccine  Aged Out    HIV screen  Discontinued      There are no preventive care reminders to display for this patient. There are no preventive care reminders to display for this patient.      /82 (Site: Right Upper Arm, Position: Sitting, Cuff Size: Medium Adult)   Pulse 85   Temp 97.9 °F (36.6 °C) (Temporal)   Ht 5' 10\" (1.778 m)   Wt 263 lb 4 oz (119.4 kg)   SpO2 98%   BMI 37.77 kg/m²     Objective   Physical Exam  Vitals reviewed. Constitutional:       Appearance: He is obese. HENT:      Head: Normocephalic and atraumatic. Right Ear: There is no impacted cerumen. Left Ear: There is no impacted cerumen. Eyes:      General: No scleral icterus. Conjunctiva/sclera: Conjunctivae normal.      Pupils: Pupils are equal, round, and reactive to light. Neck:      Thyroid: No thyromegaly. Cardiovascular:      Rate and Rhythm: Normal rate and regular rhythm. Heart sounds: Normal heart sounds. No murmur heard. Pulmonary:      Effort: Pulmonary effort is normal.      Breath sounds: Normal breath sounds. No rales. Abdominal:      General: Bowel sounds are normal. There is no distension. Palpations: Abdomen is soft. Tenderness: There is abdominal tenderness. There is left CVA tenderness. Musculoskeletal:         General: No swelling or tenderness. Cervical back: Neck supple. Lymphadenopathy:      Cervical: No cervical adenopathy. Skin:     General: Skin is warm and dry. Findings: No erythema or rash. Neurological:      Cranial Nerves: No cranial nerve deficit. Sensory: Sensory deficit present. Motor: Weakness and tremor present. Gait: Gait normal.   Psychiatric:         Attention and Perception: Attention normal.         Mood and Affect: Mood normal.         Speech: Speech normal.         Behavior: Behavior normal.         Judgment: Judgment normal.                An electronic signature was used to authenticate this note.     --Rachele Mauro DO

## 2023-03-08 ENCOUNTER — HOSPITAL ENCOUNTER (EMERGENCY)
Age: 43
Discharge: HOME | End: 2023-03-08
Attending: EMERGENCY MEDICINE
Payer: COMMERCIAL

## 2023-03-08 ENCOUNTER — APPOINTMENT (OUTPATIENT)
Dept: CT IMAGING | Age: 43
End: 2023-03-08
Payer: COMMERCIAL

## 2023-03-08 VITALS
BODY MASS INDEX: 37.22 KG/M2 | OXYGEN SATURATION: 97 % | DIASTOLIC BLOOD PRESSURE: 65 MMHG | HEART RATE: 78 BPM | HEIGHT: 70 IN | TEMPERATURE: 98.1 F | WEIGHT: 260 LBS | RESPIRATION RATE: 16 BRPM | SYSTOLIC BLOOD PRESSURE: 101 MMHG

## 2023-03-08 DIAGNOSIS — N23 RENAL COLIC: Primary | ICD-10-CM

## 2023-03-08 LAB
ALBUMIN SERPL-MCNC: 4 G/DL (ref 3.5–5.2)
ALP BLD-CCNC: 101 U/L (ref 40–129)
ALT SERPL-CCNC: 27 U/L (ref 0–40)
ANION GAP SERPL CALCULATED.3IONS-SCNC: 10 MMOL/L (ref 7–16)
AST SERPL-CCNC: 18 U/L (ref 0–39)
BACTERIA: ABNORMAL /HPF
BASOPHILS ABSOLUTE: 0.08 E9/L (ref 0–0.2)
BASOPHILS RELATIVE PERCENT: 0.7 % (ref 0–2)
BILIRUB SERPL-MCNC: 0.6 MG/DL (ref 0–1.2)
BILIRUBIN URINE: NEGATIVE
BLOOD, URINE: ABNORMAL
BUN BLDV-MCNC: 10 MG/DL (ref 6–20)
CALCIUM SERPL-MCNC: 9.5 MG/DL (ref 8.6–10.2)
CHLORIDE BLD-SCNC: 104 MMOL/L (ref 98–107)
CLARITY: ABNORMAL
CO2: 23 MMOL/L (ref 22–29)
COLOR: YELLOW
CREAT SERPL-MCNC: 0.9 MG/DL (ref 0.7–1.2)
EOSINOPHILS ABSOLUTE: 0.88 E9/L (ref 0.05–0.5)
EOSINOPHILS RELATIVE PERCENT: 7.7 % (ref 0–6)
GFR SERPL CREATININE-BSD FRML MDRD: >60 ML/MIN/1.73
GLUCOSE BLD-MCNC: 158 MG/DL (ref 74–99)
GLUCOSE URINE: NEGATIVE MG/DL
HCT VFR BLD CALC: 44.1 % (ref 37–54)
HEMOGLOBIN: 14.5 G/DL (ref 12.5–16.5)
IMMATURE GRANULOCYTES #: 0.04 E9/L
IMMATURE GRANULOCYTES %: 0.3 % (ref 0–5)
KETONES, URINE: NEGATIVE MG/DL
LEUKOCYTE ESTERASE, URINE: NEGATIVE
LYMPHOCYTES ABSOLUTE: 3.08 E9/L (ref 1.5–4)
LYMPHOCYTES RELATIVE PERCENT: 26.8 % (ref 20–42)
MCH RBC QN AUTO: 29.2 PG (ref 26–35)
MCHC RBC AUTO-ENTMCNC: 32.9 % (ref 32–34.5)
MCV RBC AUTO: 88.7 FL (ref 80–99.9)
MONOCYTES ABSOLUTE: 0.69 E9/L (ref 0.1–0.95)
MONOCYTES RELATIVE PERCENT: 6 % (ref 2–12)
MUCUS: PRESENT /LPF
NEUTROPHILS ABSOLUTE: 6.72 E9/L (ref 1.8–7.3)
NEUTROPHILS RELATIVE PERCENT: 58.5 % (ref 43–80)
NITRITE, URINE: NEGATIVE
PDW BLD-RTO: 13.5 FL (ref 11.5–15)
PH UA: 5.5 (ref 5–9)
PLATELET # BLD: 354 E9/L (ref 130–450)
PMV BLD AUTO: 10.1 FL (ref 7–12)
POTASSIUM SERPL-SCNC: 3.5 MMOL/L (ref 3.5–5)
PROTEIN UA: NEGATIVE MG/DL
RBC # BLD: 4.97 E12/L (ref 3.8–5.8)
RBC UA: ABNORMAL /HPF (ref 0–2)
SODIUM BLD-SCNC: 137 MMOL/L (ref 132–146)
SPECIFIC GRAVITY UA: >=1.03 (ref 1–1.03)
TOTAL PROTEIN: 7.1 G/DL (ref 6.4–8.3)
UROBILINOGEN, URINE: 0.2 E.U./DL
WBC # BLD: 11.5 E9/L (ref 4.5–11.5)
WBC UA: ABNORMAL /HPF (ref 0–5)

## 2023-03-08 PROCEDURE — 96374 THER/PROPH/DIAG INJ IV PUSH: CPT

## 2023-03-08 PROCEDURE — 99284 EMERGENCY DEPT VISIT MOD MDM: CPT

## 2023-03-08 PROCEDURE — 80053 COMPREHEN METABOLIC PANEL: CPT

## 2023-03-08 PROCEDURE — 85025 COMPLETE CBC W/AUTO DIFF WBC: CPT

## 2023-03-08 PROCEDURE — 81001 URINALYSIS AUTO W/SCOPE: CPT

## 2023-03-08 PROCEDURE — 74176 CT ABD & PELVIS W/O CONTRAST: CPT

## 2023-03-08 PROCEDURE — 96375 TX/PRO/DX INJ NEW DRUG ADDON: CPT

## 2023-03-08 PROCEDURE — 2580000003 HC RX 258: Performed by: EMERGENCY MEDICINE

## 2023-03-08 PROCEDURE — 6360000002 HC RX W HCPCS: Performed by: EMERGENCY MEDICINE

## 2023-03-08 RX ORDER — KETOROLAC TROMETHAMINE 10 MG/1
10 TABLET, FILM COATED ORAL EVERY 8 HOURS PRN
Qty: 15 TABLET | Refills: 0 | Status: SHIPPED | OUTPATIENT
Start: 2023-03-08

## 2023-03-08 RX ORDER — KETOROLAC TROMETHAMINE 30 MG/ML
15 INJECTION, SOLUTION INTRAMUSCULAR; INTRAVENOUS ONCE
Status: COMPLETED | OUTPATIENT
Start: 2023-03-08 | End: 2023-03-08

## 2023-03-08 RX ORDER — 0.9 % SODIUM CHLORIDE 0.9 %
1000 INTRAVENOUS SOLUTION INTRAVENOUS ONCE
Status: COMPLETED | OUTPATIENT
Start: 2023-03-08 | End: 2023-03-08

## 2023-03-08 RX ORDER — ONDANSETRON 4 MG/1
4 TABLET, FILM COATED ORAL EVERY 8 HOURS PRN
Qty: 10 TABLET | Refills: 0 | Status: SHIPPED | OUTPATIENT
Start: 2023-03-08

## 2023-03-08 RX ORDER — ONDANSETRON 2 MG/ML
4 INJECTION INTRAMUSCULAR; INTRAVENOUS ONCE
Status: COMPLETED | OUTPATIENT
Start: 2023-03-08 | End: 2023-03-08

## 2023-03-08 RX ADMIN — KETOROLAC TROMETHAMINE 15 MG: 30 INJECTION, SOLUTION INTRAMUSCULAR; INTRAVENOUS at 07:35

## 2023-03-08 RX ADMIN — ONDANSETRON 4 MG: 2 INJECTION INTRAMUSCULAR; INTRAVENOUS at 07:36

## 2023-03-08 RX ADMIN — SODIUM CHLORIDE 1000 ML: 9 INJECTION, SOLUTION INTRAVENOUS at 07:34

## 2023-03-08 ASSESSMENT — ENCOUNTER SYMPTOMS
NAUSEA: 1
SHORTNESS OF BREATH: 0
SORE THROAT: 0
BACK PAIN: 0
CONSTIPATION: 0
ABDOMINAL PAIN: 1
EYE PAIN: 0
EYE DISCHARGE: 0
VOMITING: 0
COUGH: 0
SINUS PRESSURE: 0
EYE REDNESS: 0
WHEEZING: 0
DIARRHEA: 0
BELCHING: 0

## 2023-03-08 ASSESSMENT — LIFESTYLE VARIABLES
HOW OFTEN DO YOU HAVE A DRINK CONTAINING ALCOHOL: NEVER
HOW MANY STANDARD DRINKS CONTAINING ALCOHOL DO YOU HAVE ON A TYPICAL DAY: PATIENT DOES NOT DRINK

## 2023-03-08 ASSESSMENT — PAIN - FUNCTIONAL ASSESSMENT
PAIN_FUNCTIONAL_ASSESSMENT: 0-10
PAIN_FUNCTIONAL_ASSESSMENT: NONE - DENIES PAIN

## 2023-03-08 ASSESSMENT — PAIN DESCRIPTION - LOCATION
LOCATION: ABDOMEN
LOCATION: FLANK

## 2023-03-08 ASSESSMENT — PAIN SCALES - GENERAL
PAINLEVEL_OUTOF10: 3
PAINLEVEL_OUTOF10: 4

## 2023-03-08 ASSESSMENT — PAIN DESCRIPTION - ORIENTATION: ORIENTATION: LEFT

## 2023-03-08 NOTE — ED PROVIDER NOTES
42-year-old male presents to the emergency department with flank pain.  Patient was recently diagnosed with a ureteral stone in February.  He is reportedly having a 7 mm stone at that time and followed up with urology where they are currently working on determining a plan for his care he reports today he had a right sided sharp pain in the flank.  He said it was so strong that it nearly knocked him off balance and he nearly fell but reports no injuries.  He reports he was told to come in if the pain became worse he states no fevers no vomiting diarrhea constipation states no dysuria at this time.  He reports some mild intermittent nausea    The history is provided by the patient.   Abdominal Pain  Pain location:  R flank  Pain quality: sharp    Pain radiates to:  Does not radiate  Pain severity:  Moderate  Onset quality:  Sudden  Duration:  1 hour  Timing:  Intermittent  Progression:  Partially resolved  Chronicity:  Recurrent  Relieved by:  Nothing  Worsened by:  Nothing  Ineffective treatments:  None tried  Associated symptoms: nausea    Associated symptoms: no anorexia, no belching, no chest pain, no chills, no constipation, no cough, no diarrhea, no dysuria, no fever, no hematuria, no shortness of breath, no sore throat and no vomiting       Review of Systems   Constitutional:  Negative for chills and fever.   HENT:  Negative for ear pain, sinus pressure and sore throat.    Eyes:  Negative for pain, discharge and redness.   Respiratory:  Negative for cough, shortness of breath and wheezing.    Cardiovascular:  Negative for chest pain.   Gastrointestinal:  Positive for abdominal pain and nausea. Negative for anorexia, constipation, diarrhea and vomiting.   Genitourinary:  Negative for dysuria, frequency and hematuria.   Musculoskeletal:  Negative for arthralgias and back pain.   Skin:  Negative for rash and wound.   Neurological:  Negative for weakness and headaches.   Hematological:  Negative for adenopathy.  All other systems reviewed and are negative. Physical Exam  Constitutional:       Appearance: Normal appearance. He is obese. HENT:      Head: Normocephalic and atraumatic. Nose: Nose normal.   Eyes:      Extraocular Movements: Extraocular movements intact. Pupils: Pupils are equal, round, and reactive to light. Cardiovascular:      Rate and Rhythm: Normal rate and regular rhythm. Pulses: Normal pulses. Heart sounds: Normal heart sounds. Pulmonary:      Effort: Pulmonary effort is normal.      Breath sounds: Normal breath sounds. Abdominal:      General: Abdomen is flat. Bowel sounds are normal. There is no distension. Palpations: Abdomen is soft. Tenderness: There is no abdominal tenderness. There is no right CVA tenderness or left CVA tenderness. Musculoskeletal:         General: Normal range of motion. Skin:     General: Skin is warm. Capillary Refill: Capillary refill takes less than 2 seconds. Neurological:      General: No focal deficit present. Mental Status: He is alert and oriented to person, place, and time. Procedures     MDM       ED Course as of 03/08/23 0839   Wed Mar 08, 2023   0703 Patient seen and examined. Patient recently diagnosed with a 7 mm stone according to patient as follow-up with urology. The patient reports he was having improvement after initial evaluation here in the emergency department but had a couple waves of pain shoot through him the last couple days and came in today after he nearly fell out of bed because of the pain. He reports mild nausea he reports no other complaints at this time. [CF]   0703 We will start patient with IV fluids, IV Toradol, IV Zofran we will check lab work including CT scanning of the abdomen pelvis to evaluate stone placement and urinalysis [CF]   0837 Patient reporting improvement of symptoms.  [CF]   7250 Urinalysis reveals no white blood cells and no nitrites or leukocyte Estrace white blood cell count 11.5 kidney function reassuring [CF]   4944 Discussed case with urology nurse practitioner Cj Buchanan they are comfortable patient being discharged they will attempt to move up patient's follow-up appointment. He is felt to be safe for discharge with recommendation follow-up with primary care physician he is also recommended to follow-up with urology and contact them today. Repeat vitals reassuring heart rate improved to 78.  97% on room air [CF]      ED Course User Index  [CF] Enoc Maki, DO     --------------------------------------------- PAST HISTORY ---------------------------------------------  Past Medical History:  has a past medical history of Anxiety, Arthritis, Class 1 obesity due to excess calories with serious comorbidity and body mass index (BMI) of 34.0 to 34.9 in adult, First degree AV block, GERD (gastroesophageal reflux disease), Hyperlipidemia, IBS (irritable bowel syndrome), LVH (left ventricular hypertrophy), Migraines, Obesity, ENOC (obstructive sleep apnea), Psychiatric problem, Psychogenic nonepileptic seizure, Tremors of nervous system, and Type 2 diabetes mellitus with complication, without long-term current use of insulin ([facility] [address] Past Surgical History:  has a past surgical history that includes Tonsillectomy; hernia repair; knee surgery; Insertable Cardiac Monitor (07/14/2017); and Pacemaker insertion (12/28/2017). Social History:  reports that he has never smoked. He has never used smokeless tobacco. He reports current alcohol use. He reports current drug use. Drug: Marijuana Kay E[place]in). Family History: family history is not on file. The patients home medications have been reviewed.     Allergies: Fish-derived products    -------------------------------------------------- RESULTS -------------------------------------------------  Labs:  Results for orders placed or performed during the hospital encounter of 03/08/23   CBC with Auto Differential   Result Value Ref Range    WBC 11.5 4.5 - 11.5 E9/L    RBC 4.97 3.80 - 5.80 E12/L    Hemoglobin 14.5 12.5 - 16.5 g/dL    Hematocrit 44.1 37.0 - 54.0 %    MCV 88.7 80.0 - 99.9 fL    MCH 29.2 26.0 - 35.0 pg    MCHC 32.9 32.0 - 34.5 %    RDW 13.5 11.5 - 15.0 fL    Platelets 757 838 - 837 E9/L    MPV 10.1 7.0 - 12.0 fL    Neutrophils % 58.5 43.0 - 80.0 %    Immature Granulocytes % 0.3 0.0 - 5.0 %    Lymphocytes % 26.8 20.0 - 42.0 %    Monocytes % 6.0 2.0 - 12.0 %    Eosinophils % 7.7 (H) 0.0 - 6.0 %    Basophils % 0.7 0.0 - 2.0 %    Neutrophils Absolute 6.72 1.80 - 7.30 E9/L    Immature Granulocytes # 0.04 E9/L    Lymphocytes Absolute 3.08 1.50 - 4.00 E9/L    Monocytes Absolute 0.69 0.10 - 0.95 E9/L    Eosinophils Absolute 0.88 (H) 0.05 - 0.50 E9/L    Basophils Absolute 0.08 0.00 - 0.20 E9/L   Comprehensive Metabolic Panel   Result Value Ref Range    Sodium 137 132 - 146 mmol/L    Potassium 3.5 3.5 - 5.0 mmol/L    Chloride 104 98 - 107 mmol/L    CO2 23 22 - 29 mmol/L    Anion Gap 10 7 - 16 mmol/L    Glucose 158 (H) 74 - 99 mg/dL    BUN 10 6 - 20 mg/dL    Creatinine 0.9 0.7 - 1.2 mg/dL    Est, Glom Filt Rate >60 >=60 mL/min/1.73    Calcium 9.5 8.6 - 10.2 mg/dL    Total Protein 7.1 6.4 - 8.3 g/dL    Albumin 4.0 3.5 - 5.2 g/dL    Total Bilirubin 0.6 0.0 - 1.2 mg/dL    Alkaline Phosphatase 101 40 - 129 U/L    ALT 27 0 - 40 U/L    AST 18 0 - 39 U/L   Urinalysis   Result Value Ref Range    Color, UA Yellow Straw/Yellow    Clarity, UA SL CLOUDY Clear    Glucose, Ur Negative Negative mg/dL    Bilirubin Urine Negative Negative    Ketones, Urine Negative Negative mg/dL    Specific Gravity, UA >=1.030 1.005 - 1.030    Blood, Urine LARGE (A) Negative    pH, UA 5.5 5.0 - 9.0    Protein, UA Negative Negative mg/dL    Urobilinogen, Urine 0.2 <2.0 E.U./dL    Nitrite, Urine Negative Negative    Leukocyte Esterase, Urine Negative Negative   Microscopic Urinalysis   Result Value Ref Range    Mucus, UA Present (A) None Seen /LPF    WBC, UA 0-1 0 - 5 /HPF    RBC, UA 10-20 (A) 0 - 2 /HPF    Bacteria, UA MANY (A) None Seen /HPF       Radiology:  CT ABDOMEN PELVIS WO CONTRAST Additional Contrast? None   Final Result   Kidneys without hydronephrosis demonstrate a right ureteral calculus in the   proximal right renal collecting system measuring 10 mm series 2, image 94 and   95 with only minimal adjacent prominence however no surrounding inflammatory   stranding and decompressed distal right ureter to this without obstructing   uropathy evident.             ------------------------- NURSING NOTES AND VITALS REVIEWED ---------------------------  Date / Time Roomed:  3/8/2023  6:42 AM  ED Bed Assignment:  17/17    The nursing notes within the ED encounter and vital signs as below have been reviewed.   /65   Pulse 78   Temp 98.1 °F (36.7 °C) (Oral)   Resp 16   Ht 5' 10\" (1.778 m)   Wt 260 lb (117.9 kg)   SpO2 97%   BMI 37.31 kg/m²   Oxygen Saturation Interpretation: Normal      ------------------------------------------ PROGRESS NOTES ------------------------------------------  I have spoken with the patient and discussed today’s results, in addition to providing specific details for the plan of care and counseling regarding the diagnosis and prognosis.  Their questions are answered at this time and they are agreeable with the plan. I discussed at length with them reasons for immediate return here for re evaluation. They will followup with their primary care physician by calling their office on Monday.      --------------------------------- ADDITIONAL PROVIDER NOTES ---------------------------------  At this time the patient is without objective evidence of an acute process requiring hospitalization or inpatient management.  They have remained hemodynamically stable throughout their entire ED visit and are stable for discharge with outpatient follow-up.     The plan has been discussed in detail and they are aware of the specific conditions for emergent  return, as well as the importance of follow-up. New Prescriptions    KETOROLAC (TORADOL) 10 MG TABLET    Take 1 tablet by mouth every 8 hours as needed for Pain    ONDANSETRON (ZOFRAN) 4 MG TABLET    Take 1 tablet by mouth every 8 hours as needed for Nausea       Diagnosis:  1. Renal colic        Disposition:  Patient's disposition: Discharge to home  Patient's condition is stable.        Rhonda Morrison DO  03/08/23 1104

## 2023-03-11 SDOH — ECONOMIC STABILITY: HOUSING INSECURITY
IN THE LAST 12 MONTHS, WAS THERE A TIME WHEN YOU DID NOT HAVE A STEADY PLACE TO SLEEP OR SLEPT IN A SHELTER (INCLUDING NOW)?: NO

## 2023-03-11 SDOH — ECONOMIC STABILITY: FOOD INSECURITY: WITHIN THE PAST 12 MONTHS, YOU WORRIED THAT YOUR FOOD WOULD RUN OUT BEFORE YOU GOT MONEY TO BUY MORE.: NEVER TRUE

## 2023-03-11 SDOH — ECONOMIC STABILITY: TRANSPORTATION INSECURITY
IN THE PAST 12 MONTHS, HAS LACK OF TRANSPORTATION KEPT YOU FROM MEETINGS, WORK, OR FROM GETTING THINGS NEEDED FOR DAILY LIVING?: YES

## 2023-03-11 SDOH — ECONOMIC STABILITY: INCOME INSECURITY: HOW HARD IS IT FOR YOU TO PAY FOR THE VERY BASICS LIKE FOOD, HOUSING, MEDICAL CARE, AND HEATING?: NOT HARD AT ALL

## 2023-03-11 SDOH — ECONOMIC STABILITY: FOOD INSECURITY: WITHIN THE PAST 12 MONTHS, THE FOOD YOU BOUGHT JUST DIDN'T LAST AND YOU DIDN'T HAVE MONEY TO GET MORE.: NEVER TRUE

## 2023-03-14 ENCOUNTER — OFFICE VISIT (OUTPATIENT)
Dept: FAMILY MEDICINE CLINIC | Age: 43
End: 2023-03-14
Payer: COMMERCIAL

## 2023-03-14 VITALS
TEMPERATURE: 96.8 F | WEIGHT: 260 LBS | SYSTOLIC BLOOD PRESSURE: 112 MMHG | OXYGEN SATURATION: 97 % | HEART RATE: 77 BPM | BODY MASS INDEX: 37.31 KG/M2 | DIASTOLIC BLOOD PRESSURE: 80 MMHG

## 2023-03-14 DIAGNOSIS — E11.8 TYPE 2 DIABETES MELLITUS WITH COMPLICATION, WITHOUT LONG-TERM CURRENT USE OF INSULIN (HCC): Primary | ICD-10-CM

## 2023-03-14 DIAGNOSIS — R10.9 FLANK PAIN: ICD-10-CM

## 2023-03-14 DIAGNOSIS — N20.0 KIDNEY STONES: ICD-10-CM

## 2023-03-14 PROCEDURE — G8417 CALC BMI ABV UP PARAM F/U: HCPCS | Performed by: FAMILY MEDICINE

## 2023-03-14 PROCEDURE — 1036F TOBACCO NON-USER: CPT | Performed by: FAMILY MEDICINE

## 2023-03-14 PROCEDURE — G8482 FLU IMMUNIZE ORDER/ADMIN: HCPCS | Performed by: FAMILY MEDICINE

## 2023-03-14 PROCEDURE — 99213 OFFICE O/P EST LOW 20 MIN: CPT | Performed by: FAMILY MEDICINE

## 2023-03-14 PROCEDURE — 3046F HEMOGLOBIN A1C LEVEL >9.0%: CPT | Performed by: FAMILY MEDICINE

## 2023-03-14 PROCEDURE — G8427 DOCREV CUR MEDS BY ELIG CLIN: HCPCS | Performed by: FAMILY MEDICINE

## 2023-03-14 PROCEDURE — 2022F DILAT RTA XM EVC RTNOPTHY: CPT | Performed by: FAMILY MEDICINE

## 2023-03-14 RX ORDER — OXYCODONE HYDROCHLORIDE AND ACETAMINOPHEN 5; 325 MG/1; MG/1
1 TABLET ORAL EVERY 8 HOURS PRN
Qty: 90 TABLET | Refills: 0 | Status: SHIPPED | OUTPATIENT
Start: 2023-03-14 | End: 2023-04-13

## 2023-03-14 ASSESSMENT — ENCOUNTER SYMPTOMS
BACK PAIN: 1
BLOOD IN STOOL: 0
SORE THROAT: 0
COUGH: 0
VOMITING: 0
NAUSEA: 1
CONSTIPATION: 0
PHOTOPHOBIA: 0
DIARRHEA: 0
ABDOMINAL DISTENTION: 0
ABDOMINAL PAIN: 1
SHORTNESS OF BREATH: 0

## 2023-03-14 NOTE — PROGRESS NOTES
[facility] (:  1980) is a 38 y.o. male,Established patient, here for evaluation of the following chief complaint(s):  Dizziness and Other (R finger webbing between 4th & 5th digit)         ASSESSMENT/PLAN:  1. Type 2 diabetes mellitus with complication, without long-term current use of insulin ([address]  2. Flank pain  -     oxyCODONE-acetaminophen (PERCOCET) 5-325 MG per tablet; Take 1 tablet by mouth every 8 hours as needed for Pain for up to 30 days. Intended supply: 7 days. Take lowest dose possible to manage pain Max Daily Amount: 3 tablets, Disp-90 tablet, R-0Normal  3. Kidney stones  -     oxyCODONE-acetaminophen (PERCOCET) 5-325 MG per tablet; Take 1 tablet by mouth every 8 hours as needed for Pain for up to 30 days. Intended supply: 7 days. Take lowest dose possible to manage pain Max Daily Amount: 3 tablets, Disp-90 tablet, R-0Normal  This time we will continue with current medication regimen. Follow-up with urology. We will see him back in 1 month or sooner if needed. No follow-ups on file. Subjective   SUBJECTIVE/OBJECTIVE:  HPI  Presents today for emergency room follow-up. Patient was diagnosed with left-sided nephrolithiasis and diverticulitis. Patient has been taking all medication as prescribed without any side effects or adverse reactions. Patient states that the pain is doing well at this time. Still having occasional nausea after eating. No recent emesis. No fever or chills. Patient is unable to tell me if he passed the kidney stone or not. Update 3/14/2023  Recently was in the emergency department for right-sided kidney stone. Seeing urology later this week. Most likely will need intervention as he does have bilateral kidney stones. Still having issues with flank pain and nausea. Also having issues with verrucous lesion between the fourth and fifth digits of the right hand. No other issues at this time.       Review of Systems   Constitutional:  Positive for fatigue. Negative for chills and fever.   HENT:  Negative for congestion, hearing loss, nosebleeds and sore throat.    Eyes:  Negative for photophobia.   Respiratory:  Negative for cough and shortness of breath.    Cardiovascular:  Negative for chest pain, palpitations and leg swelling.   Gastrointestinal:  Positive for abdominal pain and nausea. Negative for abdominal distention, blood in stool, constipation, diarrhea and vomiting.   Endocrine: Negative for polydipsia.   Genitourinary:  Positive for flank pain. Negative for dysuria, frequency, hematuria and urgency.   Musculoskeletal:  Positive for arthralgias, back pain, gait problem, joint swelling and myalgias.   Skin: Negative.    Neurological:  Positive for dizziness, tremors, seizures and numbness. Negative for weakness and headaches.   Hematological:  Does not bruise/bleed easily.   Psychiatric/Behavioral:  Positive for behavioral problems, decreased concentration, dysphoric mood and sleep disturbance. Negative for agitation, hallucinations, self-injury and suicidal ideas. The patient is not nervous/anxious.    All other systems reviewed and are negative.       Current Outpatient Medications:     oxyCODONE-acetaminophen (PERCOCET) 5-325 MG per tablet, Take 1 tablet by mouth every 8 hours as needed for Pain for up to 30 days. Intended supply: 7 days. Take lowest dose possible to manage pain Max Daily Amount: 3 tablets, Disp: 90 tablet, Rfl: 0    ketorolac (TORADOL) 10 MG tablet, Take 1 tablet by mouth every 8 hours as needed for Pain, Disp: 15 tablet, Rfl: 0    ondansetron (ZOFRAN) 4 MG tablet, Take 1 tablet by mouth every 8 hours as needed for Nausea, Disp: 10 tablet, Rfl: 0    tamsulosin (FLOMAX) 0.4 MG capsule, Take 1 capsule by mouth daily, Disp: 30 capsule, Rfl: 0    atorvastatin (LIPITOR) 40 MG tablet, Take 1 tablet by mouth daily, Disp: 90 tablet, Rfl: 3    Rimegepant Sulfate 75 MG TBDP, Take 75 mg by mouth every other day No more than 1 tab in 24  hours, Disp: 16 tablet, Rfl: 11    metFORMIN (GLUCOPHAGE) 1000 MG tablet, Take 1 tablet by mouth 2 times daily (with meals), Disp: 180 tablet, Rfl: 1    pioglitazone (ACTOS) 15 MG tablet, Take 1 tablet by mouth in the morning., Disp: 30 tablet, Rfl: 3    linagliptin (TRADJENTA) 5 MG tablet, Take 1 tablet by mouth in the morning., Disp: 30 tablet, Rfl: 5    metoprolol succinate (TOPROL XL) 100 MG extended release tablet, Take 1 tablet by mouth 2 times daily, Disp: 180 tablet, Rfl: 3    zonisamide (ZONEGRAN) 50 MG capsule, Take 2 capsules by mouth nightly, Disp: 180 capsule, Rfl: 3    cyclobenzaprine (FLEXERIL) 5 MG tablet, Take 1 tablet by mouth nightly as needed for Muscle spasms, Disp: 30 tablet, Rfl: 11    azelastine (ASTELIN) 0.1 % nasal spray, 1 spray by Nasal route 2 times daily Use in each nostril as directed, Disp: 60 mL, Rfl: 1    chlorpheniramine (ALLER-CHLOR) 4 MG tablet, Take 1 tablet by mouth every 6 hours as needed for Allergies, Disp: 20 tablet, Rfl: 0    blood glucose monitor strips, Test 3 times a day & as needed for symptoms of irregular blood glucose. Dispense sufficient amount for indicated testing frequency plus additional to accommodate PRN testing needs. , Disp: 300 strip, Rfl: 1    ammonium lactate (LAC-HYDRIN) 12 % lotion, Apply topically twice daily, Disp: 400 g, Rfl: 2    clonazePAM (KLONOPIN) 1 MG tablet, Take 1 mg by mouth 2 times daily as needed. , Disp: , Rfl:     ENULOSE 10 GM/15ML SOLN solution, as needed , Disp: , Rfl:     mirtazapine (REMERON) 30 MG tablet, TAKE ONE TABLET BY MOUTH once a day AT BEDTIME, Disp: , Rfl:     desonide (DESOWEN) 0.05 % cream, Apply topically 2 times daily. (Patient taking differently: as needed), Disp: 60 g, Rfl: 0    metroNIDAZOLE (METROGEL) 0.75 % gel, Apply topically 2 times daily. , Disp: 45 g, Rfl: 0    lactulose (CHRONULAC) 10 GM/15ML solution, TAKE 15 ML BY MOUTH 3 TIMES DAILY, Disp: 946 mL, Rfl: 0    medical marijuana, Take by mouth as needed. , Disp: , Rfl:      FLUoxetine (PROZAC) 20 MG capsule, Take 2 capsules by mouth daily, Disp: 60 capsule, Rfl: 0    rifaximin (XIFAXAN) 550 MG tablet, Take 550 mg by mouth 2 times daily, Disp: , Rfl:    Patient Active Problem List   Diagnosis    Anxiety    Hyperlipemia    Irritable bowel syndrome with diarrhea    Psychogenic nonepileptic seizure    Depression    Type 2 diabetes mellitus with complication, without long-term current use of insulin (HCC)    Conversion disorder    Attention deficit hyperactivity disorder (ADHD), predominantly hyperactive type    Fatty liver    Migraine with aura and without status migrainosus, not intractable    Cervical spondylosis    Chronic left shoulder pain    Chronic pain of right knee    Tremors of nervous system    Flank pain     Past Medical History:   Diagnosis Date    Anxiety     Arthritis     Class 1 obesity due to excess calories with serious comorbidity and body mass index (BMI) of 34.0 to 34.9 in adult     First degree AV block     GERD (gastroesophageal reflux disease)     Hyperlipidemia     IBS (irritable bowel syndrome)     LVH (left ventricular hypertrophy)     Migraines     Obesity     ENOC (obstructive sleep apnea)     Psychiatric problem     Psychogenic nonepileptic seizure     Tremors of nervous system     Type 2 diabetes mellitus with complication, without long-term current use of insulin (HCC)      Past Surgical History:   Procedure Laterality Date    HERNIA REPAIR      INSERTABLE CARDIAC MONITOR  07/14/2017    KNEE SURGERY      PACEMAKER INSERTION  12/28/2017    D-PPM   ([employer])   [facility]    TONSILLECTOMY       Social History     Socioeconomic History    Marital status: Single     Spouse name: Not on file    Number of children: Not on file    Years of education: Not on file    Highest education level: Not on file   Occupational History    Not on file   Tobacco Use    Smoking status: Never    Smokeless tobacco: Never   Vaping Use    Vaping Use: Never used   Substance and Sexual Activity    Alcohol use: Yes     Comment: Socially- couple times weekly    Drug use: Yes     Types: Marijuana Joséell Goldberg)     Comment: Medical- rarely    Sexual activity: Not on file   Other Topics Concern    Not on file   Social History Narrative    Not on file     Social Determinants of Health     Financial Resource Strain: Not on file   Food Insecurity: Not on file   Transportation Needs: Not on file   Physical Activity: Not on file   Stress: Not on file   Social Connections: Not on file   Intimate Partner Violence: Not on file   Housing Stability: Not on file     No family history on file. There are no preventive care reminders to display for this patient. There are no preventive care reminders to display for this patient. Diabetes Management   Topic Date Due    Diabetic foot exam  Never done    Diabetic retinal exam  04/03/2020    Diabetic Alb to Cr ratio (uACR) test  02/10/2023      There are no preventive care reminders to display for this patient. Health Maintenance   Topic Date Due    Pneumococcal 0-64 years Vaccine (1 - PCV) Never done    Diabetic foot exam  Never done    Hepatitis B vaccine (2 of 3 - Risk 3-dose series) 08/29/2016    Diabetic retinal exam  04/03/2020    COVID-19 Vaccine (4 - Booster for Pfizer series) 05/04/2022    Diabetic Alb to Cr ratio (uACR) test  02/10/2023    A1C test (Diabetic or Prediabetic)  08/11/2023    Lipids  08/11/2023    Depression Monitoring  02/20/2024    GFR test (Diabetes, CKD 3-4, OR last GFR 15-59)  03/08/2024    DTaP/Tdap/Td vaccine (2 - Td or Tdap) 04/20/2026    Flu vaccine  Completed    Hepatitis C screen  Completed    Hepatitis A vaccine  Aged Out    Hib vaccine  Aged Out    Meningococcal (ACWY) vaccine  Aged Out    HIV screen  Discontinued      There are no preventive care reminders to display for this patient. There are no preventive care reminders to display for this patient.      /80   Pulse 77   Temp 96.8 °F (36 °C)   Wt 260 lb (117.9 kg) SpO2 97%   BMI 37.31 kg/m²     Objective   Physical Exam  Vitals reviewed. Constitutional:       Appearance: He is obese. HENT:      Head: Normocephalic and atraumatic. Right Ear: There is no impacted cerumen. Left Ear: There is no impacted cerumen. Eyes:      General: No scleral icterus. Conjunctiva/sclera: Conjunctivae normal.      Pupils: Pupils are equal, round, and reactive to light. Neck:      Thyroid: No thyromegaly. Cardiovascular:      Rate and Rhythm: Normal rate and regular rhythm. Heart sounds: Normal heart sounds. No murmur heard. Pulmonary:      Effort: Pulmonary effort is normal.      Breath sounds: Normal breath sounds. No rales. Abdominal:      General: Bowel sounds are normal. There is no distension. Palpations: Abdomen is soft. Tenderness: There is abdominal tenderness. There is left CVA tenderness. Musculoskeletal:         General: No swelling or tenderness. Cervical back: Neck supple. Lymphadenopathy:      Cervical: No cervical adenopathy. Skin:     General: Skin is warm and dry. Findings: No erythema or rash. Neurological:      Cranial Nerves: No cranial nerve deficit. Sensory: Sensory deficit present. Motor: Weakness and tremor present. Gait: Gait normal.   Psychiatric:         Attention and Perception: Attention normal.         Mood and Affect: Mood normal.         Speech: Speech normal.         Behavior: Behavior normal.         Judgment: Judgment normal.                An electronic signature was used to authenticate this note.     --Arturo Mauro DO

## 2023-03-15 NOTE — H&P
MARCIE NOTE     Admit Date: 2023  MARCIE Physician: Dirk Wellington  Primary OBGYN: /[facility]    Admit Diagnosis/Chief Complaint:       Chief Complaint   Patient presents with    suspected rupture of membranes       Hospital Course:  Concepcion Gonsales is a 25 y.o.  at 30w3d presents complaining of having some vaginal discharge earlier tonight which she was concerned maybe ruptured membranes.  This has not reoccurred.  She also has occasional contractions but no regular pattern.    This IUP is complicated by reported history of substance abuse with heroin..    Patient denies vaginal bleeding, contractions, headache, vision changes, RUQ pain, dysuria, fever, and nausea/vomiting.  Fetal Movement: normal.      /64   Pulse 90   Temp 98.6 °F (37 °C)   LMP  (LMP Unknown)   Breastfeeding No   Temp:  [98.6 °F (37 °C)] 98.6 °F (37 °C)  Pulse:  [90] 90  BP: (116)/(64) 116/64    General: in no apparent distress in no respiratory distress and acyanotic oriented times 3  Cardiovascular: regular rate and rhythm no murmurs  Respiratory: clear to auscultation, no wheezes, rales or rhonchi, symmetric air entry  Abdominal: fundus soft, nontender 30 weeks size FHT present  Back: lumbar tenderness absent CVA tenderness none  Extremeties no redness or tenderness in the calves or thighs no edema    SSE:   SVE:  Cervix is long thick and closed.  No evidence of ruptured membranes      FHT: Category 1  TOCO:  Occasional isolated contraction is noted.  Some uterine irritability    Medical Decision Making:  Signs and symptoms of  labor have been discussed with the patient and her significant other.  Questions were answered to her satisfaction.  A ROM plus was done and is negative.  LABS:   No results found for this or any previous visit (from the past 24 hour(s)).  [unfilled]     Imaging Results    None          ASSESMENT: Concepcion Gonsales is a 25 y.o.   at 30w3d with  HERE FOR F/U AFTER IMAGING   LOV 2/22/23   WENT BACK TO THE ER ON 3/8/23   3/8/23 CTAP WO- SEE REPORT   KAYLEE AND KUB SCHEDULED FOR NEXT WEEK   + PAIN LOWER RIGHT BACK   + NAUSEA, VOMITING   DR. Pratima Lay GAVE THE PT PERCOCET AND NAUSEA MEDICATION   + HEMATURIA   - DYSURIA   - FEVER, CHILLS   DENIES ALL OTHER URINARY ISSUES AT THIS TIME          ALLERGIES: Fish Derived  Fish Oil       MEDICATIONS: Flomax 0.4 mg capsule 1 capsule PO Q HS   Percocet   Ammonium Lactate   Atorvastatin Calcium   Azelastine Hcl   Cefdinir   Chlorpheniramine Maleate   Clonazepam   Cyclobenzaprine Hcl   Desonide   Enulose   Fluoxetine Hcl   Ketorolac Tromethamine   Lactulose   MEDICAL MARIJUANA   Metformin Hcl   Metoprolol Succinate   Metronidazole   Mirtazapine   Oxycodone-Acetaminophen   Pioglitazone Hcl   RIMEGEPANT SULFATE   Tamsulosin Hcl   Tradjenta   Xifaxan   Zonisamide       Notes: NAUSEA MEDICATION-- 3/15/23// [facility]      PSH: None       PSH Notes: PACEMAKER FOR HEART        NON- PSH: Knee Surgery (Unspecified), Right          PMH: Hydronephrosis with renal and ureteral calculous obstruction - 2/22/2023  Other microscopic hematuria - 2/22/2023  Other proteinuria - 2/22/2023  Unspecified renal colic - 6/65/7060  Calculus of kidney  Urinary Tract Inf, Unspec site         PMH Notes: DIVETRTICULITIS   DIVERTICULOSIS   TACHYCARDIA   SVT        NON- PMH: Anxiety disorder, unspecified  Depression, unspecified  Type 2 diabetes mellitus without complications  Unspecified osteoarthritis, unspecified site       FAMILY HISTORY: Heart Disease - Runs in Family  ovarian cancer - Grandmother  uterine cancer - Sister     SOCIAL HISTORY: Marital Status: Single  Preferred Language: English; Ethnicity: Not  Or ; Race: White  Current Smoking Status: Patient has never smoked. Tobacco Use Assessment Completed: Used Tobacco in last 30 days? Does not use smokeless tobacco.  Drinks 1 drink per day. Patient uses recreational drugs.  Uses marijuana. Drinks 4+ caffeinated drinks per day. Has not had a blood transfusion. REVIEW OF SYSTEMS:     Constitutional:   Patient denies fever, chills, and weight loss. Eyes:   Patient reports wearing glasses. Patient denies dry eyes and cataract(s). Ears, Nose, Mouth, Throat:   Patient denies hearing loss and dry mouth. Cardiovascular:   Patient denies chest pains, swollen ankles, and irregular heartbeat. Respiratory:   Patient denies shortness of breath, wheezing, and chronic cough. Gastrointestinal:   Patient denies abdominal pain, nausea/vomiting, and change in bowels. Genitourinary:   Patient denies painful urination, blood in urine, negro cath in place, s/p cath in place, and incontinence. Musculoskeletal:   Patient denies using cane, using walker, and using wheelchair. Integumentary/Skin:   Patient denies rash, persistent itching, and skin cancer history. Neurological:   Patient denies numbness, tingling, and dizziness. Hematologic/Lymphatic:   Patient denies swollen glands, abnormal bleeding, and history blood transfusion. VITAL SIGNS:       Weight 260 lb / 117.93 kg   Height 70 in / 177.8 cm   BMI 37.3 kg/m²     MULTI-SYSTEM PHYSICAL EXAMINATION:     Constitutional: Well-nourished. No physical deformities. Normally developed. Good grooming. Skin: No paleness, no jaundice, no cyanosis. No lesion, no ulcer, no rash. Gastrointestinal: No mass, no tenderness, no rigidity, non obese abdomen. Musculoskeletal: + right CV tenderness        Complexity of Data:   Source Of History:  Patient   Records Review:   Previous Doctor Records, Previous Patient Records   Urine Test Review:   Urinalysis   X-Ray Review: C.T. Abdomen/Pelvis: Reviewed Films. Reviewed Report. Discussed With Patient.         PROCEDURES:            Urinalysis - Dipstick - 84723  Dipstick Dipstick Cont'd   Specimen: Voided Protein: Trace   Appearance: Hazy Nitrites: Neg   Color: Orange pH: 5.5   Glucose: Neg vaginal discharge of pregnancy.  No evidence of ruptured membranes.    Discharge Diagnosis/Clinical Impression:  Pregnancy 30 weeks.  Vaginal discharge of pregnancy.    Status:Improved/stable    Disposition:  discharged to home    Medications:       Patient Instructions:   Discharge Medication List as of 2/12/2023  2:08 AM        CONTINUE these medications which have NOT CHANGED    Details   buprenorphine HCL (SUBUTEX) 2 mg Subl Place 8 mg under the tongue 3 (three) times daily., Historical Med      acetaminophen (TYLENOL) 500 MG tablet Take 1 tablet (500 mg total) by mouth every 6 (six) hours as needed., Starting Tue 4/3/2018, Print      docusate sodium (COLACE) 100 MG capsule Take 2 capsules (200 mg total) by mouth 2 (two) times daily., Starting Wed 1/18/2023, Until Fri 2/17/2023, Normal      fluticasone (FLONASE) 50 mcg/actuation nasal spray 1 spray by Each Nare route 2 (two) times daily as needed for Rhinitis., Starting Thu 11/23/2017, Print      glycerin pediatric suppository Place 1 suppository rectally as needed for Constipation., Starting Wed 1/18/2023, Normal      hydrocortisone 2.5 % cream Apply topically 2 (two) times daily., Starting Sun 11/26/2017, Until Wed 12/6/2017, Print      prenatal 21-iron fu-folic acid (PRENATAL COMPLETE) 14 mg iron- 400 mcg Tab Take 1 tablet by mouth once daily., Starting Tue 4/3/2018, Until Wed 4/3/2019, Print      prenatal 25-iron fum-folic-dha (PRENATAL-1) -200 mg-mcg-mg Cap Take 1 capsule by mouth once daily., Starting Wed 1/18/2023, Until Thu 1/18/2024, Normal             - Pt was given routine pregnancy instructions including to return to triage if she had any vaginal bleeding (other than spotting for the next 48hrs), any loss of fluid like her water broke, decreased fetal movement, or contractions Q 5min lasting for 2 or more hours. Pt was also instructed to drink copious water. Patient voiced understanding of all these instructions and was subsequently discharged  Blood: Small     Leukocyte Esterase: 1+       ASSESSMENT:       ICD-10 Details   1 : Unspecified renal colic - K28    2   Calculus of kidney - N20.0    3   Other microscopic hematuria - R31.29    4   Other proteinuria - R80.8      PLAN:       Procedure: 03/16/2023 - Cysto Uretero Lithotripsy - 41049, right  Notes: Dr. Claudeen Chu  Stent only ok if needed         Her today for ER f/u after severe right flank pain, N/V and a right renal pelvic stone was noted. CTAP reviewed and notes a relatively unchanged position of this 10 mm right renal pelvis stone without any obvious hydronephrosis or surrounding edema. His left ureter and kidney are clear of stones   UA today noted small MH, trace PTN, and 1+ WBCs   He is in significant right pain to flank   His mother is present   Denies dysuria, GH, urgency, frequency, feelings of incomplete bladder emptying, fevers   Just finished cefdinir     Options for stone treatment were discussed with the patient including ureteroscopy and laser lithotripsy, percutaneous nephrolithotomy, extracorporeal shockwave lithotripsy, and even medical expulsive therapy and a decision was made for Ureteroscopy and Laser Lithotripsy with stent     All risks, benefits and alternatives were discussed with the patient including but not limited to Death, Stroke, MI, bleeding, infection, injury to the urethra, bladder, ureter, kidney or any other adjacent organs. It was discussed at length that there are always chances that we would be unable to reach the stone on the first try and would require stenting with subsequent procedure for stone management. In all likelihood a stent will be left and will be removed in the office in 1-2 weeks, if the stone is able to be removed at this procedure. There is remote possibility that he may leave a string stent, otherwise it will be removed/changed at the next procedure     After explaining all of the above to the patient they will proceed with surgery. home.    She will follow up with her primary OB.  Patient reports she has a family Medicine [facility] appointment March 7th.    No discharge procedures on file.    Dirk Wellington MD  OB-GYN Hospitalist       Dirk Wellington MD  02/12/23 0211     We discussed stone prevention including increasing fluids enough to produce 2.5L urine daily, low salt diet, decreasing red meat intake, lower oxalate intake, decreasing calcium supplementation if applicable to <9327SG daily, and how watermelon and cantaloupe may be helpful in preventing stones as well. Add lemon to water. No tea and limit pop. We discussed how metabolic work-up for stones in the future may be helpful including [facility]olink and associated bloodwork. Plan:   Cystoscopy, retrograde pyelogram, right ureteroscopy, laser lithotripsy, basket extraction, right ureteral stent at [facility] 3/16/23 with Dr. Lizz Pat. He understands that he may only get a stent tomorrow if insurance will not allow and we will come back in 1-2 weeks and laser the stone.    He is agreeable with this and only wants to be out of pain   Continue Flomax   Continue Zofran and percocet per PCP   Push fluids   strain urine   To ER with fevers

## 2023-03-15 NOTE — PROGRESS NOTES
Informed patient of the following: Surgery scheduled on 3/16 time has been changed to 9:30 am will need to arrive at 7:30 am.  Patient verbalized understanding.  Patient repeated arrival time of 7:30 am.

## 2023-03-15 NOTE — PROGRESS NOTES
[address]   PRE-ADMISSION TESTING GENERAL INSTRUCTIONS  [facility] Phone Number: [phone]      GENERAL INSTRUCTIONS:    [x] Antibacterial Soap Shower Night before and/or AM of surgery. [x] Do not wear makeup, lotions, powders, deodorant. [x] Nothing by mouth after midnight; including gum, candy, mints, or water. [x] You may brush your teeth, gargle, but do NOT swallow water. [x] No tobacco products, illegal drugs, marijuana or alcohol within 24 hours of your surgery. [x] Jewelry or valuables should not be brought to the hospital. All body and/or tongue piercing's must be removed prior to arriving to hospital. No contact lens or hair pins. [x] Arrange transportation with a responsible adult  to and from the hospital. Arrange for someone to be with you for the remainder of the day and for 24 hours after your procedure due to having had anesthesia. -Who will be your  for transportation? Juliana Lewis mom        -Who will be staying with you for 24 hrs after your procedure? Juliana Lewis, mom  [x] [email or web address] card and photo ID. [x] Bring copy of living will or healthcare power of  papers to be placed in your electronic record. PARKING INSTRUCTIONS:     [x] ARRIVAL DATE  3/16 & TIME   12:30 pm:   [x] Enter into the [employer]. Two people may accompany you. [x] Parking lot '\"I\"  is located on [facility] (the corner of [facility]). To enter the lot,you will need to get a parking ticket at the gate . To exit you will be given a free parking voucher. You will need both the ticket and parking voucher to exit the parking lot. One car per patient is allowed to park in this lot. Walk up the front walk to the [facility], the door will be locked an employee will greet you and let you in. EDUCATION INSTRUCTIONS:        [x] Pain: Post-op pain is normal and to be expected.  You will be asked to rate your pain from 0-10. MEDICATION INSTRUCTIONS:    [x] Bring a complete list of your medications, please write the last time you took the medicine, give this list to the nurse in Pre-Op. [x] Take only the following medications the morning of surgery with 1-2 ounces of water:   metoprolol succinate, prozac,     [x] May take the morning of surgery if needed with a sip of water:  Percocet    [x] Stop all herbal supplements and vitamins 5 days before surgery. Stop ibuprofen (NSAIDS) 7 days before surgery. [x] DO NOT take any diabetic medicine the morning of surgery. Follow instructions for insulin the day before surgery. Currently not prescribed insulin  [x] If you are diabetic and your blood sugar is low or you feel symptomatic, you may drink 1-2 ounces of apple juice only or take a glucose tablet.            -The morning of your procedure, you may call the pre-op area if you have concerns about your blood sugar [phone]. [x] Follow physician instructions regarding any blood thinners you may be taking. WHAT TO EXPECT:    [x] The day of surgery you will be greeted and checked in by the [employer].  In addition, you will be registered in the [facility] by a Patient Access Representative. Please bring your photo ID and insurance card. A nurse will greet you in accordance to the time you are needed in the pre-op area to prepare you for surgery. Please do not be discouraged if you are not greeted in the order you arrive as there are many variables that are involved in patient preparation. Your patience is greatly appreciated as you wait for your nurse. Please bring in items such as: books, magazines, newspapers, electronics, or any other items  to occupy your time in the waiting area. [x]  Delays may occur with surgery and staff will make a sincere effort to keep you informed of delays.   If any delays occur with your procedure, we apologize ahead of time for your inconvenience as we recognize the value of your time.

## 2023-03-16 ENCOUNTER — ANESTHESIA (OUTPATIENT)
Dept: OPERATING ROOM | Age: 43
End: 2023-03-16
Payer: COMMERCIAL

## 2023-03-16 ENCOUNTER — APPOINTMENT (OUTPATIENT)
Dept: GENERAL RADIOLOGY | Age: 43
End: 2023-03-16
Attending: UROLOGY
Payer: COMMERCIAL

## 2023-03-16 ENCOUNTER — ANESTHESIA EVENT (OUTPATIENT)
Dept: OPERATING ROOM | Age: 43
End: 2023-03-16
Payer: COMMERCIAL

## 2023-03-16 ENCOUNTER — HOSPITAL ENCOUNTER (OUTPATIENT)
Age: 43
Setting detail: OUTPATIENT SURGERY
Discharge: HOME | End: 2023-03-16
Attending: UROLOGY | Admitting: UROLOGY
Payer: COMMERCIAL

## 2023-03-16 VITALS
HEART RATE: 60 BPM | TEMPERATURE: 97.6 F | WEIGHT: 260 LBS | OXYGEN SATURATION: 95 % | SYSTOLIC BLOOD PRESSURE: 133 MMHG | BODY MASS INDEX: 37.22 KG/M2 | RESPIRATION RATE: 18 BRPM | HEIGHT: 70 IN | DIASTOLIC BLOOD PRESSURE: 76 MMHG

## 2023-03-16 DIAGNOSIS — Z01.812 PRE-OPERATIVE LABORATORY EXAMINATION: Primary | ICD-10-CM

## 2023-03-16 LAB — METER GLUCOSE: 124 MG/DL (ref 74–99)

## 2023-03-16 PROCEDURE — 82962 GLUCOSE BLOOD TEST: CPT

## 2023-03-16 PROCEDURE — 2709999900 HC NON-CHARGEABLE SUPPLY: Performed by: UROLOGY

## 2023-03-16 PROCEDURE — 2580000003 HC RX 258: Performed by: ANESTHESIOLOGIST ASSISTANT

## 2023-03-16 PROCEDURE — 2720000010 HC SURG SUPPLY STERILE: Performed by: UROLOGY

## 2023-03-16 PROCEDURE — 6360000002 HC RX W HCPCS: Performed by: ANESTHESIOLOGIST ASSISTANT

## 2023-03-16 PROCEDURE — 3209999900 FLUORO FOR SURGICAL PROCEDURES

## 2023-03-16 PROCEDURE — 3600000013 HC SURGERY LEVEL 3 ADDTL 15MIN: Performed by: UROLOGY

## 2023-03-16 PROCEDURE — 7100000010 HC PHASE II RECOVERY - FIRST 15 MIN: Performed by: UROLOGY

## 2023-03-16 PROCEDURE — C2617 STENT, NON-COR, TEM W/O DEL: HCPCS | Performed by: UROLOGY

## 2023-03-16 PROCEDURE — 7100000011 HC PHASE II RECOVERY - ADDTL 15 MIN: Performed by: UROLOGY

## 2023-03-16 PROCEDURE — 2580000003 HC RX 258: Performed by: UROLOGY

## 2023-03-16 PROCEDURE — 3700000000 HC ANESTHESIA ATTENDED CARE: Performed by: UROLOGY

## 2023-03-16 PROCEDURE — C1769 GUIDE WIRE: HCPCS | Performed by: UROLOGY

## 2023-03-16 PROCEDURE — 3600000003 HC SURGERY LEVEL 3 BASE: Performed by: UROLOGY

## 2023-03-16 PROCEDURE — 6360000002 HC RX W HCPCS: Performed by: UROLOGY

## 2023-03-16 PROCEDURE — 3700000001 HC ADD 15 MINUTES (ANESTHESIA): Performed by: UROLOGY

## 2023-03-16 PROCEDURE — C1758 CATHETER, URETERAL: HCPCS | Performed by: UROLOGY

## 2023-03-16 DEVICE — URETERAL STENT
Type: IMPLANTABLE DEVICE | Site: URETER | Status: FUNCTIONAL
Brand: PERCUFLEX™

## 2023-03-16 RX ORDER — SODIUM CHLORIDE 9 MG/ML
INJECTION, SOLUTION INTRAVENOUS CONTINUOUS
Status: DISCONTINUED | OUTPATIENT
Start: 2023-03-16 | End: 2023-03-16 | Stop reason: HOSPADM

## 2023-03-16 RX ORDER — PROPOFOL 10 MG/ML
INJECTION, EMULSION INTRAVENOUS CONTINUOUS PRN
Status: DISCONTINUED | OUTPATIENT
Start: 2023-03-16 | End: 2023-03-16 | Stop reason: SDUPTHER

## 2023-03-16 RX ORDER — SODIUM CHLORIDE 9 MG/ML
INJECTION, SOLUTION INTRAVENOUS CONTINUOUS PRN
Status: DISCONTINUED | OUTPATIENT
Start: 2023-03-16 | End: 2023-03-16 | Stop reason: SDUPTHER

## 2023-03-16 RX ORDER — SODIUM CHLORIDE 9 MG/ML
INJECTION, SOLUTION INTRAVENOUS PRN
Status: DISCONTINUED | OUTPATIENT
Start: 2023-03-16 | End: 2023-03-16 | Stop reason: HOSPADM

## 2023-03-16 RX ORDER — SODIUM CHLORIDE 0.9 % (FLUSH) 0.9 %
5-40 SYRINGE (ML) INJECTION EVERY 12 HOURS SCHEDULED
Status: DISCONTINUED | OUTPATIENT
Start: 2023-03-16 | End: 2023-03-16 | Stop reason: HOSPADM

## 2023-03-16 RX ORDER — SODIUM CHLORIDE 0.9 % (FLUSH) 0.9 %
5-40 SYRINGE (ML) INJECTION PRN
Status: DISCONTINUED | OUTPATIENT
Start: 2023-03-16 | End: 2023-03-16 | Stop reason: HOSPADM

## 2023-03-16 RX ORDER — IBUPROFEN 400 MG/1
400 TABLET ORAL EVERY 8 HOURS PRN
Qty: 20 TABLET | Refills: 0 | Status: SHIPPED | OUTPATIENT
Start: 2023-03-16

## 2023-03-16 RX ORDER — FENTANYL CITRATE 50 UG/ML
INJECTION, SOLUTION INTRAMUSCULAR; INTRAVENOUS PRN
Status: DISCONTINUED | OUTPATIENT
Start: 2023-03-16 | End: 2023-03-16 | Stop reason: SDUPTHER

## 2023-03-16 RX ORDER — MIDAZOLAM HYDROCHLORIDE 1 MG/ML
INJECTION INTRAMUSCULAR; INTRAVENOUS PRN
Status: DISCONTINUED | OUTPATIENT
Start: 2023-03-16 | End: 2023-03-16 | Stop reason: SDUPTHER

## 2023-03-16 RX ORDER — IBUPROFEN 400 MG/1
400 TABLET ORAL EVERY 8 HOURS PRN
Status: DISCONTINUED | OUTPATIENT
Start: 2023-03-16 | End: 2023-03-16 | Stop reason: HOSPADM

## 2023-03-16 RX ORDER — PHENAZOPYRIDINE HYDROCHLORIDE 100 MG/1
100 TABLET, FILM COATED ORAL 3 TIMES DAILY PRN
Qty: 20 TABLET | Refills: 0 | Status: SHIPPED | OUTPATIENT
Start: 2023-03-16 | End: 2023-03-19

## 2023-03-16 RX ORDER — PHENAZOPYRIDINE HYDROCHLORIDE 100 MG/1
100 TABLET, FILM COATED ORAL 3 TIMES DAILY PRN
Status: DISCONTINUED | OUTPATIENT
Start: 2023-03-16 | End: 2023-03-16 | Stop reason: HOSPADM

## 2023-03-16 RX ORDER — ONDANSETRON 2 MG/ML
4 INJECTION INTRAMUSCULAR; INTRAVENOUS EVERY 6 HOURS PRN
Status: DISCONTINUED | OUTPATIENT
Start: 2023-03-16 | End: 2023-03-16 | Stop reason: HOSPADM

## 2023-03-16 RX ADMIN — CEFAZOLIN 2000 MG: 2 INJECTION, POWDER, FOR SOLUTION INTRAMUSCULAR; INTRAVENOUS at 09:31

## 2023-03-16 RX ADMIN — PROPOFOL 125 MCG/KG/MIN: 10 INJECTION, EMULSION INTRAVENOUS at 09:24

## 2023-03-16 RX ADMIN — MIDAZOLAM 2 MG: 1 INJECTION INTRAMUSCULAR; INTRAVENOUS at 09:20

## 2023-03-16 RX ADMIN — SODIUM CHLORIDE: 9 INJECTION, SOLUTION INTRAVENOUS at 08:15

## 2023-03-16 RX ADMIN — FENTANYL CITRATE 100 MCG: 50 INJECTION, SOLUTION INTRAMUSCULAR; INTRAVENOUS at 09:24

## 2023-03-16 RX ADMIN — SODIUM CHLORIDE: 9 INJECTION, SOLUTION INTRAVENOUS at 09:20

## 2023-03-16 ASSESSMENT — PAIN SCALES - GENERAL: PAINLEVEL_OUTOF10: 0

## 2023-03-16 ASSESSMENT — PAIN - FUNCTIONAL ASSESSMENT: PAIN_FUNCTIONAL_ASSESSMENT: 0-10

## 2023-03-16 NOTE — BRIEF OP NOTE
Brief Postoperative Note      Patient: Stoney Merida  YOB: 1980  MRN: [id number]    Date of Procedure: 3/16/2023    Pre-Op Diagnosis: Kidney pain [N23]  Kidney stone [N20.0]    Post-Op Diagnosis: Same       Procedure(s):  cystoscopy, retrograde pyelography, right ureteroscopy with laser lithotripsy, right JJ ureteral stent insertion    Surgeon(s):  Alfredo Saenz MD    Assistant:  * No surgical staff found *    Anesthesia: Monitor Anesthesia Care    Estimated Blood Loss (mL): Minimal    Complications: None    Specimens:   * No specimens in log *    Implants:  Implant Name Type Inv.  Item Serial No.  Lot No. LRB No. Used Action   [facility] PERCFLX FIRM DUROMETER DBL Elizabeth Greenberg [id number]  Clarissa Wood [address] [place] [address] PERCFLX FIRM DUROMETER DBL [facility] UROLOGY-WD [id number] Right 1 Implanted         Drains:   Ureteral Drain/Stent 03/16/23 Right Ureter (Active)       Findings: Estelle Huerta    Electronically signed by Alfredo Saenz MD on 3/16/2023 at 10:39 AM

## 2023-03-16 NOTE — ANESTHESIA PRE PROCEDURE
Department of Anesthesiology  Preprocedure Note       Name:  Vijaya Palmer   Age:  38 y.o.  :  1980                                          MRN:  [id number]         Date:  3/16/2023      Surgeon: Buddy Goss):  Jacky Foy MD    Procedure: Procedure(s):  CYSTOSCOPY RETROGRADE PYELOGRAM STENT INSERTION    Medications prior to admission:   Prior to Admission medications    Medication Sig Start Date End Date Taking? Authorizing Provider   NONFORMULARY CBD gummies ([employer]) 2 gummies daily as needed   Yes Historical Provider, MD   oxyCODONE-acetaminophen (PERCOCET) 5-325 MG per tablet Take 1 tablet by mouth every 8 hours as needed for Pain for up to 30 days. Intended supply: 7 days. Take lowest dose possible to manage pain Max Daily Amount: 3 tablets 3/14/23 4/13/23  Drake Mauro DO   ketorolac (TORADOL) 10 MG tablet Take 1 tablet by mouth every 8 hours as needed for Pain 3/8/23   Juice Randolph, DO   ondansetron [facility]) 4 MG tablet Take 1 tablet by mouth every 8 hours as needed for Nausea 3/8/23   Juice Randolph, DO   tamsulosin [employer]) 0.4 MG capsule Take 1 capsule by mouth daily 23   Katarina Dobbs MD   atorvastatin (LIPITOR) 40 MG tablet Take 1 tablet by mouth daily 22   Drake Mauro, DO   Rimegepant Sulfate 75 MG TBDP Take 75 mg by mouth every other day No more than 1 tab in 24 hours 10/17/22   Fallon Whyte, APRN - CNP   metFORMIN (GLUCOPHAGE) 1000 MG tablet Take 1 tablet by mouth 2 times daily (with meals) 10/14/22   Drake Mauro DO   pioglitazone (ACTOS) 15 MG tablet Take 1 tablet by mouth in the morning. 22   Drake Mauro,    linagliptin (TRADJENTA) 5 MG tablet Take 1 tablet by mouth in the morning.  22   Drake Mauro,    metoprolol succinate (TOPROL XL) 100 MG extended release tablet Take 1 tablet by mouth 2 times daily 22   Drake Mauro,    zonisamide (ZONEGRAN) 50 MG capsule Take 2 capsules by mouth nightly 22   NADIA Dalal - CNP   cyclobenzaprine (FLEXERIL) 5 MG tablet Take 1 tablet by mouth nightly as needed for Muscle spasms 6/29/22 6/24/23  NADIA Ross - CNP   azelastine (ASTELIN) 0.1 % nasal spray 1 spray by Nasal route 2 times daily Use in each nostril as directed 4/14/22   Drake Mauro, DO   blood glucose monitor strips Test 3 times a day & as needed for symptoms of irregular blood glucose. Dispense sufficient amount for indicated testing frequency plus additional to accommodate PRN testing needs. 3/1/22   Drake Mauro, DO   ammonium lactate (LAC-HYDRIN) 12 % lotion Apply topically twice daily 5/24/21   Ada Alonzo DPM   clonazePAM (KLONOPIN) 1 MG tablet Take 1 mg by mouth 2 times daily as needed for Anxiety. Historical Provider, MD   mirtazapine (REMERON) 30 MG tablet TAKE ONE TABLET BY MOUTH once a day AT BEDTIME 9/8/20   Historical Provider, MD   desonide (DESOWEN) 0.05 % cream Apply topically 2 times daily. Patient taking differently: as needed 6/15/20   Drake Mauro, DO   metroNIDAZOLE (METROGEL) 0.75 % gel Apply topically 2 times daily. 6/15/20   Drake Mauro, DO   lactulose (CHRONULAC) 10 GM/15ML solution TAKE 15 ML BY MOUTH 3 TIMES DAILY  Patient taking differently: 3 times daily as needed TAKE 15 ML BY MOUTH 3 TIMES DAILY as need 5/20/20   Naina Merrill MD   medical marijuana Take by mouth as needed.     Historical Provider, MD   FLUoxetine (PROZAC) 20 MG capsule Take 2 capsules by mouth daily 3/23/20   Naina Merrill MD   rifaximin (XIFAXAN) 550 MG tablet Take 550 mg by mouth 2 times daily 2/2/20   Historical Provider, MD       Current medications:    Current Facility-Administered Medications   Medication Dose Route Frequency Provider Last Rate Last Admin    0.9 % sodium chloride infusion   IntraVENous Continuous Derrel Vivian Angel MD        sodium chloride flush 0.9 % injection 5-40 mL  5-40 mL IntraVENous 2 times per day Sreedhar Bynum MD        sodium chloride flush 0.9 % injection 5-40 mL  5-40 mL IntraVENous PRN Kaela Angel MD        0.9 % sodium chloride infusion   IntraVENous PRN Petr Flores MD 25 mL/hr at 03/16/23 0815 New Bag at 03/16/23 0815    ceFAZolin (ANCEF) 2,000 mg in sterile water 20 mL IV syringe  2,000 mg IntraVENous On Call to [address], MD           Allergies: Allergies   Allergen Reactions    Fish-Derived Products        Problem List:    Patient Active Problem List   Diagnosis Code    Anxiety F41.9    Hyperlipemia E78.5    Irritable bowel syndrome with diarrhea K58.0    Psychogenic nonepileptic seizure F44.5    Depression F32. A    Type 2 diabetes mellitus with complication, without long-term current use of insulin (HCC) E11.8    Conversion disorder F44.9    Attention deficit hyperactivity disorder (ADHD), predominantly hyperactive type F90.1    Fatty liver K76.0    Migraine with aura and without status migrainosus, not intractable G43. 109    Cervical spondylosis M47.812    Chronic left shoulder pain M25.512, G89.29    Chronic pain of right knee M25.561, G89.29    Tremors of nervous system R25.1    Flank pain R10.9       Past Medical History:        Diagnosis Date    Anxiety     Arthritis     Class 1 obesity due to excess calories with serious comorbidity and body mass index (BMI) of 34.0 to 34.9 in adult     Diverticulosis     Diverticulitis Feb. 2023    First degree AV block     GERD (gastroesophageal reflux disease)     Hyperlipidemia     IBS (irritable bowel syndrome)     LVH (left ventricular hypertrophy)     Migraines     Obesity     ENOC (obstructive sleep apnea)     Psychiatric problem     Psychogenic nonepileptic seizure     Tremors of nervous system     Type 2 diabetes mellitus with complication, without long-term current use of insulin (HCC)        Past Surgical History:        Procedure Laterality Date    HERNIA REPAIR      As a toddler age 1    INSERTABLE CARDIAC MONITOR  07/14/2017 Loop Monitor    KNEE SURGERY Right     Arthroscopy    PACEMAKER INSERTION  12/28/2017    D-PPM   ([facility])   [facility]    TONSILLECTOMY         Social History:    Social History     Tobacco Use    Smoking status: Never    Smokeless tobacco: Never   Substance Use Topics    Alcohol use: Yes     Alcohol/week: 7.0 standard drinks     Types: 7 Shots of liquor per week                                Counseling given: Not Answered      Vital Signs (Current):   Vitals:    03/15/23 1201 03/16/23 0754   BP:  126/64   Pulse:  71   Resp:  20   Temp:  97 °F (36.1 °C)   TempSrc:  Temporal   SpO2:  96%   Weight: 260 lb (117.9 kg) 260 lb (117.9 kg)   Height: 5' 10\" (1.778 m) 5' 10\" (1.778 m)                                              BP Readings from Last 3 Encounters:   03/16/23 126/64   03/14/23 112/80   03/08/23 101/65       NPO Status: Time of last liquid consumption: 2355                        Time of last solid consumption: 1900                        Date of last liquid consumption: 03/15/23                        Date of last solid food consumption: 03/15/23    BMI:   Wt Readings from Last 3 Encounters:   03/16/23 260 lb (117.9 kg)   03/14/23 260 lb (117.9 kg)   03/08/23 260 lb (117.9 kg)     Body mass index is 37.31 kg/m².     CBC:   Lab Results   Component Value Date/Time    WBC 11.5 03/08/2023 07:13 AM    RBC 4.97 03/08/2023 07:13 AM    HGB 14.5 03/08/2023 07:13 AM    HCT 44.1 03/08/2023 07:13 AM    MCV 88.7 03/08/2023 07:13 AM    RDW 13.5 03/08/2023 07:13 AM     03/08/2023 07:13 AM       CMP:   Lab Results   Component Value Date/Time     03/08/2023 07:13 AM    K 3.5 03/08/2023 07:13 AM    K 4.3 02/17/2023 09:11 PM     03/08/2023 07:13 AM    CO2 23 03/08/2023 07:13 AM    BUN 10 03/08/2023 07:13 AM    CREATININE 0.9 03/08/2023 07:13 AM    GFRAA >60 08/11/2022 08:25 AM    LABGLOM >60 03/08/2023 07:13 AM    GLUCOSE 158 03/08/2023 07:13 AM    PROT 7.1 03/08/2023 07:13 AM    CALCIUM 9.5 03/08/2023 07:13 AM    BILITOT 0.6 03/08/2023 07:13 AM    ALKPHOS 101 03/08/2023 07:13 AM    AST 18 03/08/2023 07:13 AM    ALT 27 03/08/2023 07:13 AM       POC Tests: No results for input(s): POCGLU, POCNA, POCK, POCCL, POCBUN, POCHEMO, POCHCT in the last 72 hours. Coags:   Lab Results   Component Value Date/Time    PROTIME 13.3 07/15/2020 12:30 PM    INR 1.2 07/15/2020 12:30 PM    APTT 35.2 07/15/2020 12:30 PM       HCG (If Applicable): No results found for: PREGTESTUR, PREGSERUM, HCG, HCGQUANT     ABGs: No results found for: PHART, PO2ART, XPW6PRT, USB7PSO, BEART, L3ULTSUV     Type & Screen (If Applicable):  No results found for: LABABO, LABRH    Drug/Infectious Status (If Applicable):  No results found for: HIV, HEPCAB    COVID-19 Screening (If Applicable):   Lab Results   Component Value Date/Time    COVID19 Non-Reactive 08/11/2022 08:25 AM    COVID19 Not Detected 06/03/2021 02:11 AM           Anesthesia Evaluation  Patient summary reviewed and Nursing notes reviewed  Airway: Mallampati: II  TM distance: >3 FB   Neck ROM: full  Mouth opening: > = 3 FB   Dental:    (+) edentulous      Pulmonary:   (+) sleep apnea: on noncompliant,  decreased breath sounds                            Cardiovascular:  Exercise tolerance: good (>4 METS),   (+) pacemaker ( for AV Block 2017): pacemaker,         Rhythm: regular  Rate: normal                    Neuro/Psych:   (+) headaches:, psychiatric history:            GI/Hepatic/Renal:   (+) GERD:, liver disease:, renal disease: kidney stones,           Endo/Other:    (+) DiabetesType II DM, , .                 Abdominal:             Vascular: Other Findings:           Anesthesia Plan      MAC     ASA 3       Induction: intravenous. MIPS: Prophylactic antiemetics administered. Anesthetic plan and risks discussed with patient and mother. Plan discussed with attending. Pt seen and evaluated pre-op, Chart reviewed.    NADIA Montoya CRNA  Date and Time: 03/16/23 8:45 AM

## 2023-03-16 NOTE — H&P
HERE FOR F/U AFTER IMAGING   LOV 2/22/23   WENT BACK TO THE ER ON 3/8/23   3/8/23 CTAP WO- SEE REPORT   KAYLEE AND KUB SCHEDULED FOR NEXT WEEK   + PAIN LOWER RIGHT BACK   + NAUSEA, VOMITING   DR. Tyler Jacobson GAVE THE PT PERCOCET AND NAUSEA MEDICATION   + HEMATURIA   - DYSURIA   - FEVER, CHILLS   DENIES ALL OTHER URINARY ISSUES AT THIS TIME            ALLERGIES: Fish Derived  Fish Oil        MEDICATIONS: Flomax 0.4 mg capsule 1 capsule PO Q HS   Percocet   Ammonium Lactate   Atorvastatin Calcium   Azelastine Hcl   Cefdinir   Chlorpheniramine Maleate   Clonazepam   Cyclobenzaprine Hcl   Desonide   Enulose   Fluoxetine Hcl   Ketorolac Tromethamine   Lactulose   MEDICAL MARIJUANA   Metformin Hcl   Metoprolol Succinate   Metronidazole   Mirtazapine   Oxycodone-Acetaminophen   Pioglitazone Hcl   RIMEGEPANT SULFATE   Tamsulosin Hcl   Tradjenta   Xifaxan   Zonisamide       Notes: NAUSEA MEDICATION-- 3/15/23// NH       PSH: None       PSH Notes: PACEMAKER FOR HEART         NON- PSH: Knee Surgery (Unspecified), Right            PMH: Hydronephrosis with renal and ureteral calculous obstruction - 2/22/2023  Other microscopic hematuria - 2/22/2023  Other proteinuria - 2/22/2023  Unspecified renal colic - 1/68/6742  Calculus of kidney  Urinary Tract Inf, Unspec site         PMH Notes: DIVETRTICULITIS   DIVERTICULOSIS   TACHYCARDIA   SVT         NON- PMH: Anxiety disorder, unspecified  Depression, unspecified  Type 2 diabetes mellitus without complications  Unspecified osteoarthritis, unspecified site        FAMILY HISTORY: Heart Disease - Runs in Family  ovarian cancer - Grandmother  uterine cancer - Sister      SOCIAL HISTORY: Marital Status: Single  Preferred Language: English; Ethnicity: Not  Or ; Race: White  Current Smoking Status: Patient has never smoked. Tobacco Use Assessment Completed: Used Tobacco in last 30 days? Does not use smokeless tobacco.  Drinks 1 drink per day.    Patient uses recreational drugs. Uses marijuana. Drinks 4+ caffeinated drinks per day. Has not had a blood transfusion. REVIEW OF SYSTEMS:     Constitutional:   Patient denies fever, chills, and weight loss. Eyes:   Patient reports wearing glasses. Patient denies dry eyes and cataract(s). Ears, Nose, Mouth, Throat:   Patient denies hearing loss and dry mouth. Cardiovascular:   Patient denies chest pains, swollen ankles, and irregular heartbeat. Respiratory:   Patient denies shortness of breath, wheezing, and chronic cough. Gastrointestinal:   Patient denies abdominal pain, nausea/vomiting, and change in bowels. Genitourinary:   Patient denies painful urination, blood in urine, negro cath in place, s/p cath in place, and incontinence. Musculoskeletal:   Patient denies using cane, using walker, and using wheelchair. Integumentary/Skin:   Patient denies rash, persistent itching, and skin cancer history. Neurological:   Patient denies numbness, tingling, and dizziness. Hematologic/Lymphatic:   Patient denies swollen glands, abnormal bleeding, and history blood transfusion. VITAL SIGNS:        Weight 260 lb / 117.93 kg   Height 70 in / 177.8 cm   BMI 37.3 kg/m²      MULTI-SYSTEM PHYSICAL EXAMINATION:     Constitutional: Well-nourished. No physical deformities. Normally developed. Good grooming. Skin: No paleness, no jaundice, no cyanosis. No lesion, no ulcer, no rash. Gastrointestinal: No mass, no tenderness, no rigidity, non obese abdomen. Musculoskeletal: + right CV tenderness         Complexity of Data:   Source Of History:  Patient   Records Review:   Previous Doctor Records, Previous Patient Records   Urine Test Review:   Urinalysis   X-Ray Review: C.T. Abdomen/Pelvis: Reviewed Films. Reviewed Report. Discussed With Patient.          PROCEDURES:             Urinalysis - Dipstick - 64692  Dipstick Dipstick Cont'd   Specimen: Voided Protein: Trace   Appearance: Hazy Nitrites: Neg   Color: Orange pH: 5.5 Glucose: Neg Blood: Small     Leukocyte Esterase: 1+         ASSESSMENT:       ICD-10 Details   1 : Unspecified renal colic - S65    2   Calculus of kidney - N20.0    3   Other microscopic hematuria - R31.29    4   Other proteinuria - R80.8       PLAN:        Procedure: 03/16/2023 - Cysto Uretero Lithotripsy - 77835, right  Notes: Dr. Carl Ang  Stent only ok if needed            Her today for ER f/u after severe right flank pain, N/V and a right renal pelvic stone was noted. CTAP reviewed and notes a relatively unchanged position of this 10 mm right renal pelvis stone without any obvious hydronephrosis or surrounding edema. His left ureter and kidney are clear of stones   UA today noted small MH, trace PTN, and 1+ WBCs   He is in significant right pain to flank   His mother is present   Denies dysuria, GH, urgency, frequency, feelings of incomplete bladder emptying, fevers   Just finished cefdinir      Options for stone treatment were discussed with the patient including ureteroscopy and laser lithotripsy, percutaneous nephrolithotomy, extracorporeal shockwave lithotripsy, and even medical expulsive therapy and a decision was made for Ureteroscopy and Laser Lithotripsy with stent      All risks, benefits and alternatives were discussed with the patient including but not limited to Death, Stroke, MI, bleeding, infection, injury to the urethra, bladder, ureter, kidney or any other adjacent organs. It was discussed at length that there are always chances that we would be unable to reach the stone on the first try and would require stenting with subsequent procedure for stone management. In all likelihood a stent will be left and will be removed in the office in 1-2 weeks, if the stone is able to be removed at this procedure.  There is remote possibility that he may leave a string stent, otherwise it will be removed/changed at the next procedure      After explaining all of the above to the patient they will proceed with surgery. We discussed stone prevention including increasing fluids enough to produce 2.5L urine daily, low salt diet, decreasing red meat intake, lower oxalate intake, decreasing calcium supplementation if applicable to <2658CR daily, and how watermelon and cantaloupe may be helpful in preventing stones as well. Add lemon to water. No tea and limit pop. We discussed how metabolic work-up for stones in the future may be helpful including [facility]olink and associated bloodwork. Plan:   Cystoscopy, retrograde pyelogram, right ureteroscopy, laser lithotripsy, basket extraction, right ureteral stent at [facility] 3/16/23 with Dr. Haris Milian. He understands that he may only get a stent tomorrow if insurance will not allow and we will come back in 1-2 weeks and laser the stone.    He is agreeable with this and only wants to be out of pain   Continue Flomax   Continue Zofran and percocet per PCP   Push fluids   strain urine   To ER with fevers

## 2023-03-16 NOTE — ANESTHESIA POSTPROCEDURE EVALUATION
Department of Anesthesiology  Postprocedure Note    Patient: Yaneli Feng  MRN: [id number]  YOB: 1980  Date of evaluation: 3/16/2023      Procedure Summary     Date: 03/16/23 Room / Location: [facility] [place] OR 11 / [facility]    Anesthesia Start: 5094 Anesthesia Stop: 9701    Procedure: cystoscopy, retrograde pyelography, right ureteroscopy with laser lithotripsy, right JJ ureteral stent insertion (Right) Diagnosis:       Kidney pain      Kidney stone      (Kidney pain [N23])      (Kidney stone [N20.0])    Surgeons: Rl Swenson MD Responsible Provider: Jeni Hampton DO    Anesthesia Type: MAC ASA Status: 3          Anesthesia Type: No value filed.     Rosey Phase I: Rsoey Score: 10    Rosey Phase II: Rosey Score: 9      Anesthesia Post Evaluation    Patient location during evaluation: bedside  Patient participation: complete - patient cannot participate  Level of consciousness: awake and alert  Airway patency: patent  Nausea & Vomiting: no nausea and no vomiting  Complications: no  Cardiovascular status: blood pressure returned to baseline  Respiratory status: acceptable  Hydration status: euvolemic  Multimodal analgesia pain management approach

## 2023-03-16 NOTE — PROGRESS NOTES
Discharge instructions reviewed with pt and family. All questions answered at this time. IV removed.

## 2023-03-16 NOTE — DISCHARGE INSTRUCTIONS
Blood in the urine is to be expected  Increase oral fluid intake for 2-3 days  Strain your urine for passage of stone fragments  Call the [facility] office ([phone]) for a follow-up appointment in 2-4 weeks   Remove the stent by pulling on the strings in the morning on 3/20/2023  Resume a normal diet  No heavy lifting or physical activity for 7 days  OK to shower in 2 days

## 2023-03-16 NOTE — OP NOTE
[facility] Urology [address]  Urology Post-operative Note    Tanner Avendano  YOB: 1980  [id number]    Pre-operative Diagnosis: Right proximal ureteral calculus, right hydronephrosis    Post-operative Diagnosis: Right renal calculus    Procedure: Cystourethroscopy, right semirigid and flexible ureteroscopy with intracorporeal holmium laser lithotripsy and stone dusting, right JJ ureteral stent insertion    Surgeon: Preeti Vasquez MD    Anesthesia:   [facility]    Estimated Blood Loss:   Minimal    Complications: None    Drains: Right 7 Slovak by 28 cm JJ ureteral stent with strings attached    Specimen(s): None none    Clinical Note:   40-year-old male with intermittent right flank pain. He was found on imaging to have a large stone at the right UPJ. He presents for the above listed procedure. The risks and benefits of the procedure including but not limited to infection, bleeding, possible inability to access the stone requiring further procedures, possible need for stent with stent irritation etc. were discussed in detail and he elected to proceed    Operative Description:   He was taken to the operating room and placed on the OR table in supine position. He received IV Ancef preoperatively. Following induction of anesthesia he was repositioned into the dorsolithotomy position. A  from [facility]UB showed a faint stone visualized in the area of the right UPJ. No other stones were identified. His external genitalia and abdomen were prepped and draped sterilely. A 21 Slovak cystoscope with 30 degree lens was inserted per urethra and into the bladder. There were no urethral strictures false passages or tumors. The prostatic urethra was widely patent. Panendoscopic evaluation of the bladder showed no clots, stones, tumors, foreign bodies. Both ureteral openings were in the usual position on the trigone and there was clear urine from each.   An Amplatz Super Stiff wire was inserted through the scope and into the right ureteral orifice and up into the right renal pelvis.  The cystoscope was removed.  A semirigid 6 Kyrgyz ureteroscope was advanced per urethra into the bladder over the wire using high-pressure saline irrigation.  I was able to pass ureteroscope up to the level of the proximal ureter where a large impacted yellowish spiculated stone was encountered.  I remove the ureteroscope and passed this alongside the wire back up into the ureter but could not reach the stone at this point and it appeared to migrate back into the kidney.  I passed a sensor wire through the ureteroscope and of the wrist 2 wire access into the right kidney and the ureteroscope was removed.  A flexible Olympus ureteroscope was then obtained and advanced over the Amplatz wire up into the right renal pelvis.  The sensor wire was kept in place as a safety wire.  I was able to visualize a stone in the midpole calyx now.  A 200 µm holmium laser fiber was deployed.  The stone was easily fragmented and dusted into small pieces of passable particulate matter.  There is minimal renal trauma and bleeding.  This was done until all pieces were felt to be small enough to pass.  Ureteroscope was removed under direct vision and the ureter was intact without perforations or residual stones.  The cystoscope was reassembled and advanced per urethra and the bladder over the sensor wire.  The Amplatz wire was removed.  A 7 Kyrgyz by 28 cm JJ ureteral stent was passed over the wire and into the right renal pelvis.  The wire was removed.  Fluoroscopy confirmed coiling the stent proximally the right renal pelvis and was visualized to coil distally in the bladder in good position.  The bladder was emptied and the cystoscope was removed.  The strings the stent were brought up urethra and secured to the dorsum of the penis with benzoin and tape.  He tolerated the procedure well was taken to the PACU in stable condition.  He will follow-up in 2 to 4  weeks. He will remove his stent this Monday morning by pulling on the strings. He was discharged home with prescriptions for ibuprofen and Pyridium.       Neftali Villalta MD  3/16/2023  10:44 AM

## 2023-06-19 DIAGNOSIS — E11.8 TYPE 2 DIABETES MELLITUS WITH COMPLICATION, WITHOUT LONG-TERM CURRENT USE OF INSULIN (HCC): ICD-10-CM

## 2023-07-03 RX ORDER — METOPROLOL SUCCINATE 100 MG/1
100 TABLET, EXTENDED RELEASE ORAL 2 TIMES DAILY
Qty: 180 TABLET | Refills: 3 | Status: SHIPPED | OUTPATIENT
Start: 2023-07-03

## 2023-07-09 DIAGNOSIS — G43.109 MIGRAINE WITH AURA AND WITHOUT STATUS MIGRAINOSUS, NOT INTRACTABLE: ICD-10-CM

## 2023-07-10 RX ORDER — ZONISAMIDE 50 MG/1
100 CAPSULE ORAL NIGHTLY
Qty: 180 CAPSULE | Refills: 3 | Status: SHIPPED | OUTPATIENT
Start: 2023-07-10

## 2023-09-21 ENCOUNTER — OFFICE VISIT (OUTPATIENT)
Dept: PRIMARY CARE CLINIC | Age: 43
End: 2023-09-21
Payer: COMMERCIAL

## 2023-09-21 VITALS
SYSTOLIC BLOOD PRESSURE: 118 MMHG | BODY MASS INDEX: 37.51 KG/M2 | TEMPERATURE: 97.6 F | OXYGEN SATURATION: 98 % | HEART RATE: 87 BPM | HEIGHT: 70 IN | WEIGHT: 262 LBS | DIASTOLIC BLOOD PRESSURE: 70 MMHG

## 2023-09-21 DIAGNOSIS — R79.89 ELEVATED TSH: ICD-10-CM

## 2023-09-21 DIAGNOSIS — E11.8 TYPE 2 DIABETES MELLITUS WITH COMPLICATION, WITHOUT LONG-TERM CURRENT USE OF INSULIN (HCC): Primary | ICD-10-CM

## 2023-09-21 DIAGNOSIS — K12.1 MOUTH ULCER: ICD-10-CM

## 2023-09-21 DIAGNOSIS — E11.8 TYPE 2 DIABETES MELLITUS WITH COMPLICATION, WITHOUT LONG-TERM CURRENT USE OF INSULIN (HCC): ICD-10-CM

## 2023-09-21 PROBLEM — N20.0 MIXED CALCIUM OXALATE KIDNEY STONES: Status: ACTIVE | Noted: 2023-02-17

## 2023-09-21 PROBLEM — K57.92 DIVERTICULITIS: Status: ACTIVE | Noted: 2023-02-17

## 2023-09-21 LAB
ABSOLUTE IMMATURE GRANULOCYTE: 0.04 K/UL (ref 0–0.58)
ALBUMIN SERPL-MCNC: 4.3 G/DL (ref 3.5–5.2)
ALP BLD-CCNC: 94 U/L (ref 40–129)
ALT SERPL-CCNC: 48 U/L (ref 0–40)
ANION GAP SERPL CALCULATED.3IONS-SCNC: 15 MMOL/L (ref 7–16)
AST SERPL-CCNC: 47 U/L (ref 0–39)
BASOPHILS ABSOLUTE: 0.09 K/UL (ref 0–0.2)
BASOPHILS RELATIVE PERCENT: 1 % (ref 0–2)
BILIRUB SERPL-MCNC: 0.4 MG/DL (ref 0–1.2)
BILIRUBIN DIRECT: <0.2 MG/DL (ref 0–0.3)
BILIRUBIN, INDIRECT: ABNORMAL MG/DL (ref 0–1)
BUN BLDV-MCNC: 8 MG/DL (ref 6–20)
CALCIUM SERPL-MCNC: 9.6 MG/DL (ref 8.6–10.2)
CHLORIDE BLD-SCNC: 103 MMOL/L (ref 98–107)
CHOLESTEROL: 135 MG/DL
CO2: 18 MMOL/L (ref 22–29)
CREAT SERPL-MCNC: 0.7 MG/DL (ref 0.7–1.2)
EOSINOPHILS ABSOLUTE: 0.81 K/UL (ref 0.05–0.5)
EOSINOPHILS RELATIVE PERCENT: 7 % (ref 0–6)
FOLATE: 19.1 NG/ML (ref 4.8–24.2)
GFR SERPL CREATININE-BSD FRML MDRD: >60 ML/MIN/1.73M2
GLUCOSE BLD-MCNC: 219 MG/DL (ref 74–99)
HBA1C MFR BLD: 8.2 % (ref 4–5.6)
HCT VFR BLD CALC: 47.2 % (ref 37–54)
HDLC SERPL-MCNC: 46 MG/DL
HEMOGLOBIN: 15.8 G/DL (ref 12.5–16.5)
IMMATURE GRANULOCYTES: 0 % (ref 0–5)
LDL CHOLESTEROL: 45 MG/DL
LYMPHOCYTES ABSOLUTE: 2.75 K/UL (ref 1.5–4)
LYMPHOCYTES RELATIVE PERCENT: 25 % (ref 20–42)
MCH RBC QN AUTO: 30.4 PG (ref 26–35)
MCHC RBC AUTO-ENTMCNC: 33.5 G/DL (ref 32–34.5)
MCV RBC AUTO: 90.9 FL (ref 80–99.9)
MONOCYTES ABSOLUTE: 0.78 K/UL (ref 0.1–0.95)
MONOCYTES RELATIVE PERCENT: 7 % (ref 2–12)
NEUTROPHILS ABSOLUTE: 6.74 K/UL (ref 1.8–7.3)
NEUTROPHILS RELATIVE PERCENT: 60 % (ref 43–80)
PDW BLD-RTO: 13.8 % (ref 11.5–15)
PLATELET # BLD: 334 K/UL (ref 130–450)
PMV BLD AUTO: 10.8 FL (ref 7–12)
POTASSIUM SERPL-SCNC: 4.7 MMOL/L (ref 3.5–5)
RBC # BLD: 5.19 M/UL (ref 3.8–5.8)
SODIUM BLD-SCNC: 136 MMOL/L (ref 132–146)
T4 FREE: 0.9 NG/DL (ref 0.9–1.7)
TOTAL PROTEIN: 7.5 G/DL (ref 6.4–8.3)
TRIGL SERPL-MCNC: 218 MG/DL
TSH SERPL DL<=0.05 MIU/L-ACNC: 1.67 UIU/ML (ref 0.27–4.2)
URIC ACID: 2.8 MG/DL (ref 3.4–7)
VITAMIN B-12: 768 PG/ML (ref 211–946)
VLDLC SERPL CALC-MCNC: 44 MG/DL
WBC # BLD: 11.2 K/UL (ref 4.5–11.5)

## 2023-09-21 PROCEDURE — 1036F TOBACCO NON-USER: CPT | Performed by: FAMILY MEDICINE

## 2023-09-21 PROCEDURE — 3046F HEMOGLOBIN A1C LEVEL >9.0%: CPT | Performed by: FAMILY MEDICINE

## 2023-09-21 PROCEDURE — G8427 DOCREV CUR MEDS BY ELIG CLIN: HCPCS | Performed by: FAMILY MEDICINE

## 2023-09-21 PROCEDURE — G8417 CALC BMI ABV UP PARAM F/U: HCPCS | Performed by: FAMILY MEDICINE

## 2023-09-21 PROCEDURE — 2022F DILAT RTA XM EVC RTNOPTHY: CPT | Performed by: FAMILY MEDICINE

## 2023-09-21 PROCEDURE — 99214 OFFICE O/P EST MOD 30 MIN: CPT | Performed by: FAMILY MEDICINE

## 2023-09-21 ASSESSMENT — ENCOUNTER SYMPTOMS
DIARRHEA: 0
ABDOMINAL PAIN: 1
VOMITING: 0
COLOR CHANGE: 1
BLOOD IN STOOL: 0
BACK PAIN: 1
COUGH: 0
NAUSEA: 1
SORE THROAT: 0
SHORTNESS OF BREATH: 0
PHOTOPHOBIA: 0
EYE PAIN: 1
ABDOMINAL DISTENTION: 0
CONSTIPATION: 0

## 2023-09-21 NOTE — PROGRESS NOTES
Financial Resource Strain: Not on file   Food Insecurity: Not on file   Transportation Needs: Not on file   Physical Activity: Not on file   Stress: Not on file   Social Connections: Not on file   Intimate Partner Violence: Not on file   Housing Stability: Not on file     Family History   Problem Relation Age of Onset    Allergy (Severe) Mother     Arthritis Mother     Diabetes Mother     Hearing Loss Mother     High Cholesterol Mother     Immune Disorder Mother     Osteoporosis Mother     Arthritis Father     Hearing Loss Father     Coronary Art Dis Maternal Grandfather     Stroke Maternal Grandfather     Cancer Maternal Grandmother         Stomach cancer, ovarian cancer    Vision Loss Paternal Grandmother     Cancer Sister         Ovarian    Diabetes Sister     High Cholesterol Sister     Diabetes Brother       There are no preventive care reminders to display for this patient. There are no preventive care reminders to display for this patient. Diabetes Management   Topic Date Due    Diabetic foot exam  Never done    Diabetic retinal exam  04/03/2020    Diabetic Alb to Cr ratio (uACR) test  02/10/2023    A1C test (Diabetic or Prediabetic)  08/11/2023    Lipids  08/11/2023      There are no preventive care reminders to display for this patient.    Health Maintenance   Topic Date Due    Pneumococcal 0-64 years Vaccine (1 - PCV) Never done    Diabetic foot exam  Never done    Hepatitis B vaccine (2 of 3 - 19+ 3-dose series) 08/29/2016    Diabetic retinal exam  04/03/2020    COVID-19 Vaccine (4 - Pfizer series) 05/04/2022    Diabetic Alb to Cr ratio (uACR) test  02/10/2023    Flu vaccine (1) 08/01/2023    A1C test (Diabetic or Prediabetic)  08/11/2023    Lipids  08/11/2023    Depression Monitoring  02/20/2024    GFR test (Diabetes, CKD 3-4, OR last GFR 15-59)  03/08/2024    DTaP/Tdap/Td vaccine (2 - Td or Tdap) 04/20/2026    Hepatitis C screen  Completed    Hepatitis A vaccine  Aged Out    Hib vaccine  Aged Out

## 2023-09-24 LAB — ZINC: 94 UG/DL (ref 60–120)

## 2023-09-25 LAB
HSV I/II AB, IGM: 0.38 IV
HSV I/II IGG: 0.67 IV

## 2023-10-09 PROCEDURE — 93294 REM INTERROG EVL PM/LDLS PM: CPT | Performed by: INTERNAL MEDICINE

## 2023-10-09 PROCEDURE — 93296 REM INTERROG EVL PM/IDS: CPT | Performed by: INTERNAL MEDICINE

## 2023-10-10 DIAGNOSIS — E11.8 TYPE 2 DIABETES MELLITUS WITH COMPLICATION, WITHOUT LONG-TERM CURRENT USE OF INSULIN (HCC): Primary | ICD-10-CM

## 2023-10-10 RX ORDER — PIOGLITAZONEHYDROCHLORIDE 30 MG/1
30 TABLET ORAL DAILY
Qty: 30 TABLET | Refills: 5 | Status: SHIPPED | OUTPATIENT
Start: 2023-10-10

## 2023-10-11 RX ORDER — ATORVASTATIN CALCIUM 40 MG/1
40 TABLET, FILM COATED ORAL DAILY
Qty: 90 TABLET | Refills: 0 | Status: SHIPPED | OUTPATIENT
Start: 2023-10-11

## 2023-10-25 ENCOUNTER — OFFICE VISIT (OUTPATIENT)
Dept: NEUROLOGY | Age: 43
End: 2023-10-25
Payer: COMMERCIAL

## 2023-10-25 VITALS
WEIGHT: 253 LBS | BODY MASS INDEX: 36.3 KG/M2 | DIASTOLIC BLOOD PRESSURE: 82 MMHG | SYSTOLIC BLOOD PRESSURE: 117 MMHG | TEMPERATURE: 98.1 F | OXYGEN SATURATION: 99 % | HEART RATE: 96 BPM

## 2023-10-25 DIAGNOSIS — G43.109 MIGRAINE WITH AURA AND WITHOUT STATUS MIGRAINOSUS, NOT INTRACTABLE: Primary | ICD-10-CM

## 2023-10-25 DIAGNOSIS — R25.1 TREMORS OF NERVOUS SYSTEM: ICD-10-CM

## 2023-10-25 DIAGNOSIS — F44.5 PSYCHOGENIC NONEPILEPTIC SEIZURE: ICD-10-CM

## 2023-10-25 PROBLEM — R10.9 FLANK PAIN: Status: RESOLVED | Noted: 2023-03-14 | Resolved: 2023-10-25

## 2023-10-25 PROCEDURE — 1036F TOBACCO NON-USER: CPT | Performed by: NURSE PRACTITIONER

## 2023-10-25 PROCEDURE — G8484 FLU IMMUNIZE NO ADMIN: HCPCS | Performed by: NURSE PRACTITIONER

## 2023-10-25 PROCEDURE — G8417 CALC BMI ABV UP PARAM F/U: HCPCS | Performed by: NURSE PRACTITIONER

## 2023-10-25 PROCEDURE — G8427 DOCREV CUR MEDS BY ELIG CLIN: HCPCS | Performed by: NURSE PRACTITIONER

## 2023-10-25 PROCEDURE — 99214 OFFICE O/P EST MOD 30 MIN: CPT | Performed by: NURSE PRACTITIONER

## 2023-10-25 RX ORDER — MIRTAZAPINE 45 MG/1
TABLET, FILM COATED ORAL
COMMUNITY
Start: 2023-08-08

## 2023-10-25 RX ORDER — TEMAZEPAM 15 MG/1
CAPSULE ORAL
COMMUNITY
Start: 2023-09-12

## 2023-12-11 ENCOUNTER — HOSPITAL ENCOUNTER (OUTPATIENT)
Age: 43
Discharge: HOME | End: 2023-12-11
Payer: MEDICARE

## 2023-12-11 LAB
ALBUMIN SERPL-MCNC: 4.1 G/DL (ref 3.5–5.2)
ALP SERPL-CCNC: 84 U/L (ref 40–129)
ALT SERPL-CCNC: 21 U/L (ref 0–40)
AMMONIA PLAS-SCNC: 31 UMOL/L (ref 16–60)
ANION GAP SERPL CALCULATED.3IONS-SCNC: 28 MMOL/L (ref 7–16)
AST SERPL-CCNC: 18 U/L (ref 0–39)
BASOPHILS # BLD: 0.05 K/UL (ref 0–0.2)
BASOPHILS NFR BLD: 1 % (ref 0–2)
BILIRUB SERPL-MCNC: 0.5 MG/DL (ref 0–1.2)
BUN SERPL-MCNC: 9 MG/DL (ref 6–20)
CALCIUM SERPL-MCNC: 9.7 MG/DL (ref 8.6–10.2)
CHLORIDE SERPL-SCNC: 94 MMOL/L (ref 98–107)
CO2 SERPL-SCNC: 22 MMOL/L (ref 22–29)
CREAT SERPL-MCNC: 0.9 MG/DL (ref 0.7–1.2)
EOSINOPHIL # BLD: 0.34 K/UL (ref 0.05–0.5)
EOSINOPHILS RELATIVE PERCENT: 4 % (ref 0–6)
ERYTHROCYTE [DISTWIDTH] IN BLOOD BY AUTOMATED COUNT: 14.3 % (ref 11.5–15)
GFR SERPL CREATININE-BSD FRML MDRD: >60 ML/MIN/1.73M2
GLUCOSE SERPL-MCNC: 89 MG/DL (ref 74–99)
HCT VFR BLD AUTO: 46 % (ref 37–54)
HGB BLD-MCNC: 15.2 G/DL (ref 12.5–16.5)
IMM GRANULOCYTES # BLD AUTO: 0.05 K/UL (ref 0–0.58)
IMM GRANULOCYTES NFR BLD: 1 % (ref 0–5)
INR PPP: 1.2
LYMPHOCYTES NFR BLD: 2.53 K/UL (ref 1.5–4)
LYMPHOCYTES RELATIVE PERCENT: 27 % (ref 20–42)
MCH RBC QN AUTO: 30.2 PG (ref 26–35)
MCHC RBC AUTO-ENTMCNC: 33 G/DL (ref 32–34.5)
MCV RBC AUTO: 91.5 FL (ref 80–99.9)
MONOCYTES NFR BLD: 0.77 K/UL (ref 0.1–0.95)
MONOCYTES NFR BLD: 8 % (ref 2–12)
NEUTROPHILS NFR BLD: 60 % (ref 43–80)
NEUTS SEG NFR BLD: 5.54 K/UL (ref 1.8–7.3)
PARTIAL THROMBOPLASTIN TIME: 30.1 SEC (ref 24.5–35.1)
PLATELET # BLD AUTO: 392 K/UL (ref 130–450)
PMV BLD AUTO: 10 FL (ref 7–12)
POTASSIUM SERPL-SCNC: 5 MMOL/L (ref 3.5–5)
PROT SERPL-MCNC: 7.5 G/DL (ref 6.4–8.3)
PROTHROMBIN TIME: 12.8 SEC (ref 9.3–12.4)
RBC # BLD AUTO: 5.03 M/UL (ref 3.8–5.8)
SODIUM SERPL-SCNC: 144 MMOL/L (ref 132–146)
WBC OTHER # BLD: 9.3 K/UL (ref 4.5–11.5)

## 2023-12-11 PROCEDURE — 80053 COMPREHEN METABOLIC PANEL: CPT

## 2023-12-11 PROCEDURE — 85025 COMPLETE CBC W/AUTO DIFF WBC: CPT

## 2023-12-11 PROCEDURE — 85730 THROMBOPLASTIN TIME PARTIAL: CPT

## 2023-12-11 PROCEDURE — 82105 ALPHA-FETOPROTEIN SERUM: CPT

## 2023-12-11 PROCEDURE — 36415 COLL VENOUS BLD VENIPUNCTURE: CPT

## 2023-12-11 PROCEDURE — 82140 ASSAY OF AMMONIA: CPT

## 2023-12-11 PROCEDURE — 85610 PROTHROMBIN TIME: CPT

## 2023-12-14 LAB — AFP SERPL-MCNC: 3.2 UG/L

## 2024-01-08 PROCEDURE — 93294 REM INTERROG EVL PM/LDLS PM: CPT | Performed by: INTERNAL MEDICINE

## 2024-01-08 PROCEDURE — 93296 REM INTERROG EVL PM/IDS: CPT | Performed by: INTERNAL MEDICINE

## 2024-03-11 RX ORDER — ATORVASTATIN CALCIUM 40 MG/1
40 TABLET, FILM COATED ORAL DAILY
Qty: 90 TABLET | Refills: 0 | Status: SHIPPED | OUTPATIENT
Start: 2024-03-11

## 2024-03-18 DIAGNOSIS — E11.8 TYPE 2 DIABETES MELLITUS WITH COMPLICATION, WITHOUT LONG-TERM CURRENT USE OF INSULIN (HCC): ICD-10-CM

## 2024-04-08 DIAGNOSIS — E11.8 TYPE 2 DIABETES MELLITUS WITH COMPLICATION, WITHOUT LONG-TERM CURRENT USE OF INSULIN (HCC): ICD-10-CM

## 2024-04-08 PROCEDURE — 93296 REM INTERROG EVL PM/IDS: CPT | Performed by: INTERNAL MEDICINE

## 2024-04-08 PROCEDURE — 93294 REM INTERROG EVL PM/LDLS PM: CPT | Performed by: INTERNAL MEDICINE

## 2024-04-10 RX ORDER — PIOGLITAZONEHYDROCHLORIDE 30 MG/1
30 TABLET ORAL DAILY
Qty: 30 TABLET | Refills: 5 | Status: SHIPPED | OUTPATIENT
Start: 2024-04-10

## 2024-04-29 SDOH — ECONOMIC STABILITY: INCOME INSECURITY: HOW HARD IS IT FOR YOU TO PAY FOR THE VERY BASICS LIKE FOOD, HOUSING, MEDICAL CARE, AND HEATING?: NOT VERY HARD

## 2024-04-29 SDOH — ECONOMIC STABILITY: FOOD INSECURITY: WITHIN THE PAST 12 MONTHS, THE FOOD YOU BOUGHT JUST DIDN'T LAST AND YOU DIDN'T HAVE MONEY TO GET MORE.: NEVER TRUE

## 2024-04-29 SDOH — HEALTH STABILITY: PHYSICAL HEALTH: ON AVERAGE, HOW MANY DAYS PER WEEK DO YOU ENGAGE IN MODERATE TO STRENUOUS EXERCISE (LIKE A BRISK WALK)?: 5 DAYS

## 2024-04-29 SDOH — ECONOMIC STABILITY: FOOD INSECURITY: WITHIN THE PAST 12 MONTHS, YOU WORRIED THAT YOUR FOOD WOULD RUN OUT BEFORE YOU GOT MONEY TO BUY MORE.: NEVER TRUE

## 2024-04-29 SDOH — HEALTH STABILITY: PHYSICAL HEALTH: ON AVERAGE, HOW MANY MINUTES DO YOU ENGAGE IN EXERCISE AT THIS LEVEL?: 30 MIN

## 2024-04-29 ASSESSMENT — LIFESTYLE VARIABLES
HOW OFTEN DURING THE LAST YEAR HAVE YOU HAD A FEELING OF GUILT OR REMORSE AFTER DRINKING: NEVER
HOW OFTEN DO YOU HAVE A DRINK CONTAINING ALCOHOL: 4 OR MORE TIMES A WEEK
HOW OFTEN DURING THE LAST YEAR HAVE YOU HAD A FEELING OF GUILT OR REMORSE AFTER DRINKING: NEVER
HOW OFTEN DURING THE LAST YEAR HAVE YOU FAILED TO DO WHAT WAS NORMALLY EXPECTED FROM YOU BECAUSE OF DRINKING: NEVER
HOW MANY STANDARD DRINKS CONTAINING ALCOHOL DO YOU HAVE ON A TYPICAL DAY: 1
HAVE YOU OR SOMEONE ELSE BEEN INJURED AS A RESULT OF YOUR DRINKING: NO
HOW OFTEN DURING THE LAST YEAR HAVE YOU NEEDED AN ALCOHOLIC DRINK FIRST THING IN THE MORNING TO GET YOURSELF GOING AFTER A NIGHT OF HEAVY DRINKING: NEVER
HAVE YOU OR SOMEONE ELSE BEEN INJURED AS A RESULT OF YOUR DRINKING: NO
HOW OFTEN DO YOU HAVE A DRINK CONTAINING ALCOHOL: 5
HOW OFTEN DURING THE LAST YEAR HAVE YOU BEEN UNABLE TO REMEMBER WHAT HAPPENED THE NIGHT BEFORE BECAUSE YOU HAD BEEN DRINKING: NEVER
HOW OFTEN DO YOU HAVE SIX OR MORE DRINKS ON ONE OCCASION: 1
HOW OFTEN DURING THE LAST YEAR HAVE YOU FAILED TO DO WHAT WAS NORMALLY EXPECTED FROM YOU BECAUSE OF DRINKING: NEVER
HOW MANY STANDARD DRINKS CONTAINING ALCOHOL DO YOU HAVE ON A TYPICAL DAY: 1 OR 2
HOW OFTEN DURING THE LAST YEAR HAVE YOU NEEDED AN ALCOHOLIC DRINK FIRST THING IN THE MORNING TO GET YOURSELF GOING AFTER A NIGHT OF HEAVY DRINKING: NEVER
HOW OFTEN DURING THE LAST YEAR HAVE YOU FOUND THAT YOU WERE NOT ABLE TO STOP DRINKING ONCE YOU HAD STARTED: NEVER
HAS A RELATIVE, FRIEND, DOCTOR, OR ANOTHER HEALTH PROFESSIONAL EXPRESSED CONCERN ABOUT YOUR DRINKING OR SUGGESTED YOU CUT DOWN: NO
HAS A RELATIVE, FRIEND, DOCTOR, OR ANOTHER HEALTH PROFESSIONAL EXPRESSED CONCERN ABOUT YOUR DRINKING OR SUGGESTED YOU CUT DOWN: NO
HOW OFTEN DURING THE LAST YEAR HAVE YOU BEEN UNABLE TO REMEMBER WHAT HAPPENED THE NIGHT BEFORE BECAUSE YOU HAD BEEN DRINKING: NEVER
HOW OFTEN DURING THE LAST YEAR HAVE YOU FOUND THAT YOU WERE NOT ABLE TO STOP DRINKING ONCE YOU HAD STARTED: NEVER

## 2024-04-29 ASSESSMENT — PATIENT HEALTH QUESTIONNAIRE - PHQ9
4. FEELING TIRED OR HAVING LITTLE ENERGY: NEARLY EVERY DAY
10. IF YOU CHECKED OFF ANY PROBLEMS, HOW DIFFICULT HAVE THESE PROBLEMS MADE IT FOR YOU TO DO YOUR WORK, TAKE CARE OF THINGS AT HOME, OR GET ALONG WITH OTHER PEOPLE: SOMEWHAT DIFFICULT
SUM OF ALL RESPONSES TO PHQ QUESTIONS 1-9: 13
9. THOUGHTS THAT YOU WOULD BE BETTER OFF DEAD, OR OF HURTING YOURSELF: NOT AT ALL
SUM OF ALL RESPONSES TO PHQ QUESTIONS 1-9: 13
8. MOVING OR SPEAKING SO SLOWLY THAT OTHER PEOPLE COULD HAVE NOTICED. OR THE OPPOSITE, BEING SO FIGETY OR RESTLESS THAT YOU HAVE BEEN MOVING AROUND A LOT MORE THAN USUAL: SEVERAL DAYS
7. TROUBLE CONCENTRATING ON THINGS, SUCH AS READING THE NEWSPAPER OR WATCHING TELEVISION: NOT AT ALL
6. FEELING BAD ABOUT YOURSELF - OR THAT YOU ARE A FAILURE OR HAVE LET YOURSELF OR YOUR FAMILY DOWN: NOT AT ALL
2. FEELING DOWN, DEPRESSED OR HOPELESS: NOT AT ALL
SUM OF ALL RESPONSES TO PHQ9 QUESTIONS 1 & 2: 3
1. LITTLE INTEREST OR PLEASURE IN DOING THINGS: NEARLY EVERY DAY
SUM OF ALL RESPONSES TO PHQ QUESTIONS 1-9: 13
3. TROUBLE FALLING OR STAYING ASLEEP: NEARLY EVERY DAY
SUM OF ALL RESPONSES TO PHQ QUESTIONS 1-9: 13
5. POOR APPETITE OR OVEREATING: NEARLY EVERY DAY

## 2024-04-30 ENCOUNTER — OFFICE VISIT (OUTPATIENT)
Dept: PRIMARY CARE CLINIC | Age: 44
End: 2024-04-30
Payer: MEDICARE

## 2024-04-30 VITALS
HEIGHT: 70 IN | OXYGEN SATURATION: 97 % | TEMPERATURE: 98.1 F | SYSTOLIC BLOOD PRESSURE: 102 MMHG | BODY MASS INDEX: 38.08 KG/M2 | DIASTOLIC BLOOD PRESSURE: 64 MMHG | WEIGHT: 266 LBS | HEART RATE: 86 BPM

## 2024-04-30 DIAGNOSIS — Z12.12 SCREENING FOR MALIGNANT NEOPLASM OF THE RECTUM: ICD-10-CM

## 2024-04-30 DIAGNOSIS — R79.89 ELEVATED TSH: ICD-10-CM

## 2024-04-30 DIAGNOSIS — E11.8 TYPE 2 DIABETES MELLITUS WITH COMPLICATION, WITHOUT LONG-TERM CURRENT USE OF INSULIN (HCC): ICD-10-CM

## 2024-04-30 DIAGNOSIS — M99.02 SEGMENTAL AND SOMATIC DYSFUNCTION OF THORACIC REGION: ICD-10-CM

## 2024-04-30 DIAGNOSIS — Z00.00 MEDICARE ANNUAL WELLNESS VISIT, SUBSEQUENT: Primary | ICD-10-CM

## 2024-04-30 PROCEDURE — G0439 PPPS, SUBSEQ VISIT: HCPCS | Performed by: FAMILY MEDICINE

## 2024-04-30 RX ORDER — CYCLOBENZAPRINE HCL 5 MG
5 TABLET ORAL NIGHTLY PRN
Qty: 30 TABLET | Refills: 11 | Status: SHIPPED | OUTPATIENT
Start: 2024-04-30

## 2024-04-30 RX ORDER — MIRTAZAPINE 30 MG/1
30 TABLET, FILM COATED ORAL NIGHTLY
COMMUNITY
Start: 2024-04-16

## 2024-04-30 NOTE — PATIENT INSTRUCTIONS
pop.   Heart-healthy lifestyle    If your doctor recommends it, get more exercise. For many people, walking is a good choice. Or you may want to swim, bike, or do other activities. Bit by bit, increase the time you're active every day. Try for at least 30 minutes on most days of the week.     Try to quit or cut back on using tobacco and other nicotine products. This includes smoking and vaping. If you need help quitting, talk to your doctor about stop-smoking programs and medicines. These can increase your chances of quitting for good. Quitting is one of the most important things you can do to protect your heart. It is never too late to quit. Try to avoid secondhand smoke too.     Stay at a weight that's healthy for you. Talk to your doctor if you need help losing weight.     Try to get 7 to 9 hours of sleep each night.     Limit alcohol to 2 drinks a day for men and 1 drink a day for women. Too much alcohol can cause health problems.     Manage other health problems such as diabetes, high blood pressure, and high cholesterol. If you think you may have a problem with alcohol or drug use, talk to your doctor.   Medicines    Take your medicines exactly as prescribed. Call your doctor if you think you are having a problem with your medicine.     If your doctor recommends aspirin, take the amount directed each day. Make sure you take aspirin and not another kind of pain reliever, such as acetaminophen (Tylenol).   When should you call for help?   Call 911 if you have symptoms of a heart attack. These may include:    Chest pain or pressure, or a strange feeling in the chest.     Sweating.     Shortness of breath.     Pain, pressure, or a strange feeling in the back, neck, jaw, or upper belly or in one or both shoulders or arms.     Lightheadedness or sudden weakness.     A fast or irregular heartbeat.   After you call 911, the  may tell you to chew 1 adult-strength or 2 to 4 low-dose aspirin. Wait for an

## 2024-04-30 NOTE — PROGRESS NOTES
present.      Gait: Gait normal.   Psychiatric:         Attention and Perception: Attention normal.         Mood and Affect: Mood normal.         Speech: Speech normal.         Behavior: Behavior normal.         Judgment: Judgment normal.              Allergies   Allergen Reactions    Fish-Derived Products     Indomethacin Other (See Comments)     Prior to Visit Medications    Medication Sig Taking? Authorizing Provider   mirtazapine (REMERON) 30 MG tablet Take 1 tablet by mouth nightly Yes Bernie Horan MD   cyclobenzaprine (FLEXERIL) 5 MG tablet Take 1 tablet by mouth nightly as needed for Muscle spasms Yes Drake Mauro DO   pioglitazone (ACTOS) 30 MG tablet Take 1 tablet by mouth daily  Drake Mauro DO   metFORMIN (GLUCOPHAGE) 1000 MG tablet TAKE ONE TABLET BY MOUTH TWICE A DAY WITH MEALS  Drake Mauro DO   atorvastatin (LIPITOR) 40 MG tablet Take 1 tablet by mouth daily  Drake Mauro DO   temazepam (RESTORIL) 15 MG capsule TAKE 1 CAPSULE BY MOUTH ONCE A DAY AT BEDTIME FOR 30 DAYS  ProviderBernie MD   Rimegepant Sulfate 75 MG TBDP Take 75 mg by mouth every other day No more than 1 tab in 24 hours  Enrico Johnson APRN - CNP   zonisamide (ZONEGRAN) 50 MG capsule Take 2 capsules by mouth nightly  Enrico Johnson APRN - CNP   metoprolol succinate (TOPROL XL) 100 MG extended release tablet Take 1 tablet by mouth 2 times daily  Drake Mauro DO   NONFORMULARY CBD gummies (Tali's Web) 2 gummies daily as needed  ProviderBernie MD   ondansetron (ZOFRAN) 4 MG tablet Take 1 tablet by mouth every 8 hours as needed for Nausea  Vargas Adams DO   linagliptin (TRADJENTA) 5 MG tablet Take 1 tablet by mouth in the morning.  Drake Mauro DO   azelastine (ASTELIN) 0.1 % nasal spray 1 spray by Nasal route 2 times daily Use in each nostril as directed  Drake Mauro DO   blood glucose monitor strips Test 3 times a day & as needed for symptoms of irregular blood glucose.

## 2024-05-08 ENCOUNTER — HOSPITAL ENCOUNTER (OUTPATIENT)
Age: 44
Discharge: HOME | End: 2024-05-08
Payer: MEDICARE

## 2024-05-08 DIAGNOSIS — E11.8 TYPE 2 DIABETES MELLITUS WITH COMPLICATION, WITHOUT LONG-TERM CURRENT USE OF INSULIN (HCC): ICD-10-CM

## 2024-05-08 LAB
ALBUMIN SERPL-MCNC: 4.2 G/DL (ref 3.5–5.2)
ALP SERPL-CCNC: 116 U/L (ref 40–129)
ALT SERPL-CCNC: 19 U/L (ref 0–40)
AMMONIA PLAS-SCNC: 43 UMOL/L (ref 16–60)
ANION GAP SERPL CALCULATED.3IONS-SCNC: 11 MMOL/L (ref 7–16)
AST SERPL-CCNC: 20 U/L (ref 0–39)
BASOPHILS # BLD: 0.04 K/UL (ref 0–0.2)
BASOPHILS NFR BLD: 0 % (ref 0–2)
BILIRUB DIRECT SERPL-MCNC: <0.2 MG/DL (ref 0–0.3)
BILIRUB INDIRECT SERPL-MCNC: NORMAL MG/DL (ref 0–1)
BILIRUB SERPL-MCNC: 0.4 MG/DL (ref 0–1.2)
BILIRUB UR QL STRIP: NEGATIVE
BUN SERPL-MCNC: 7 MG/DL (ref 6–20)
CALCIUM SERPL-MCNC: 9.4 MG/DL (ref 8.6–10.2)
CHLORIDE SERPL-SCNC: 99 MMOL/L (ref 98–107)
CHOLEST SERPL-MCNC: 128 MG/DL
CLARITY UR: CLEAR
CO2 SERPL-SCNC: 24 MMOL/L (ref 22–29)
COLOR UR: YELLOW
CREAT SERPL-MCNC: 0.8 MG/DL (ref 0.7–1.2)
CREAT UR-MCNC: 46.4 MG/DL (ref 40–278)
EOSINOPHIL # BLD: 0.56 K/UL (ref 0.05–0.5)
EOSINOPHILS RELATIVE PERCENT: 5 % (ref 0–6)
ERYTHROCYTE [DISTWIDTH] IN BLOOD BY AUTOMATED COUNT: 14.2 % (ref 11.5–15)
FOLATE SERPL-MCNC: >20 NG/ML (ref 4.8–24.2)
GFR, ESTIMATED: >90 ML/MIN/1.73M2
GLUCOSE SERPL-MCNC: 106 MG/DL (ref 74–99)
GLUCOSE UR STRIP-MCNC: NEGATIVE MG/DL
HBA1C MFR BLD: 6.6 % (ref 4–5.6)
HCT VFR BLD AUTO: 44.4 % (ref 37–54)
HDLC SERPL-MCNC: 43 MG/DL
HGB BLD-MCNC: 14.2 G/DL (ref 12.5–16.5)
HGB UR QL STRIP.AUTO: NEGATIVE
IMM GRANULOCYTES # BLD AUTO: 0.11 K/UL (ref 0–0.58)
IMM GRANULOCYTES NFR BLD: 1 % (ref 0–5)
KETONES UR STRIP-MCNC: NEGATIVE MG/DL
LDLC SERPL CALC-MCNC: 54 MG/DL
LEUKOCYTE ESTERASE UR QL STRIP: NEGATIVE
LYMPHOCYTES NFR BLD: 2.95 K/UL (ref 1.5–4)
LYMPHOCYTES RELATIVE PERCENT: 27 % (ref 20–42)
MCH RBC QN AUTO: 29.7 PG (ref 26–35)
MCHC RBC AUTO-ENTMCNC: 32 G/DL (ref 32–34.5)
MCV RBC AUTO: 92.9 FL (ref 80–99.9)
MICROALBUMIN UR-MCNC: <12 MG/L (ref 0–19)
MICROALBUMIN/CREAT UR-RTO: NORMAL MCG/MG CREAT (ref 0–30)
MONOCYTES NFR BLD: 0.75 K/UL (ref 0.1–0.95)
MONOCYTES NFR BLD: 7 % (ref 2–12)
NEUTROPHILS NFR BLD: 60 % (ref 43–80)
NEUTS SEG NFR BLD: 6.55 K/UL (ref 1.8–7.3)
NITRITE UR QL STRIP: NEGATIVE
PH UR STRIP: 6 [PH] (ref 5–9)
PLATELET # BLD AUTO: 420 K/UL (ref 130–450)
PMV BLD AUTO: 9.9 FL (ref 7–12)
POTASSIUM SERPL-SCNC: 4.2 MMOL/L (ref 3.5–5)
PROT SERPL-MCNC: 7.4 G/DL (ref 6.4–8.3)
PROT UR STRIP-MCNC: NEGATIVE MG/DL
RBC # BLD AUTO: 4.78 M/UL (ref 3.8–5.8)
RBC #/AREA URNS HPF: ABNORMAL /HPF
SODIUM SERPL-SCNC: 134 MMOL/L (ref 132–146)
SP GR UR STRIP: <1.005 (ref 1–1.03)
T4 FREE SERPL-MCNC: 1.1 NG/DL (ref 0.9–1.7)
TRIGL SERPL-MCNC: 157 MG/DL
TSH SERPL DL<=0.05 MIU/L-ACNC: 5.83 UIU/ML (ref 0.27–4.2)
URATE SERPL-MCNC: 4 MG/DL (ref 3.4–7)
UROBILINOGEN UR STRIP-ACNC: 0.2 EU/DL (ref 0–1)
VIT B12 SERPL-MCNC: 790 PG/ML (ref 211–946)
VLDLC SERPL CALC-MCNC: 31 MG/DL
WBC #/AREA URNS HPF: ABNORMAL /HPF
WBC OTHER # BLD: 11 K/UL (ref 4.5–11.5)

## 2024-05-08 PROCEDURE — 82607 VITAMIN B-12: CPT

## 2024-05-08 PROCEDURE — 82570 ASSAY OF URINE CREATININE: CPT

## 2024-05-08 PROCEDURE — 84439 ASSAY OF FREE THYROXINE: CPT

## 2024-05-08 PROCEDURE — 82140 ASSAY OF AMMONIA: CPT

## 2024-05-08 PROCEDURE — 81001 URINALYSIS AUTO W/SCOPE: CPT

## 2024-05-08 PROCEDURE — 84550 ASSAY OF BLOOD/URIC ACID: CPT

## 2024-05-08 PROCEDURE — 82746 ASSAY OF FOLIC ACID SERUM: CPT

## 2024-05-08 PROCEDURE — 85025 COMPLETE CBC W/AUTO DIFF WBC: CPT

## 2024-05-08 PROCEDURE — 82043 UR ALBUMIN QUANTITATIVE: CPT

## 2024-05-08 PROCEDURE — 84443 ASSAY THYROID STIM HORMONE: CPT

## 2024-05-08 PROCEDURE — 80053 COMPREHEN METABOLIC PANEL: CPT

## 2024-05-08 PROCEDURE — 36415 COLL VENOUS BLD VENIPUNCTURE: CPT

## 2024-05-08 PROCEDURE — 82248 BILIRUBIN DIRECT: CPT

## 2024-05-08 PROCEDURE — 80061 LIPID PANEL: CPT

## 2024-05-08 PROCEDURE — 83036 HEMOGLOBIN GLYCOSYLATED A1C: CPT

## 2024-05-09 DIAGNOSIS — E03.9 HYPOTHYROIDISM, UNSPECIFIED TYPE: Primary | ICD-10-CM

## 2024-05-09 RX ORDER — LEVOTHYROXINE SODIUM 0.05 MG/1
50 TABLET ORAL DAILY
Qty: 30 TABLET | Refills: 3 | Status: SHIPPED | OUTPATIENT
Start: 2024-05-09

## 2024-05-22 ENCOUNTER — OFFICE VISIT (OUTPATIENT)
Dept: CHIROPRACTIC MEDICINE | Age: 44
End: 2024-05-22
Payer: MEDICARE

## 2024-05-22 VITALS
OXYGEN SATURATION: 96 % | TEMPERATURE: 98.1 F | HEART RATE: 80 BPM | HEIGHT: 70 IN | WEIGHT: 275.57 LBS | BODY MASS INDEX: 39.45 KG/M2 | SYSTOLIC BLOOD PRESSURE: 138 MMHG | DIASTOLIC BLOOD PRESSURE: 78 MMHG

## 2024-05-22 DIAGNOSIS — M99.02 SEGMENTAL AND SOMATIC DYSFUNCTION OF THORACIC REGION: ICD-10-CM

## 2024-05-22 DIAGNOSIS — M48.02 CERVICAL STENOSIS OF SPINAL CANAL: ICD-10-CM

## 2024-05-22 DIAGNOSIS — M99.01 SEGMENTAL AND SOMATIC DYSFUNCTION OF CERVICAL REGION: Primary | ICD-10-CM

## 2024-05-22 DIAGNOSIS — M54.04 PANNICULITIS AFFECTING REGIONS OF NECK AND BACK, THORACIC REGION: ICD-10-CM

## 2024-05-22 PROCEDURE — 99213 OFFICE O/P EST LOW 20 MIN: CPT | Performed by: CHIROPRACTOR

## 2024-05-22 PROCEDURE — 98940 CHIROPRACT MANJ 1-2 REGIONS: CPT | Performed by: CHIROPRACTOR

## 2024-05-22 NOTE — PROGRESS NOTES
Patient is here for neck and back pain. Patient states no injury. Patient states chronic pain. Drake Mauro DO  Electronically signed by Radha Conley LPN on 5/22/2024 at 1:49 PM

## 2024-05-22 NOTE — PROGRESS NOTES
24  Kranthi Wellington : 1980 Sex: male  Age: 43 y.o.    Chief Complaint   Patient presents with    Neck Pain    Back Pain       HPI:   Back Pain  Patient presents for evaluation of neck and back pains. I last saw Kranthi Wellington >1.5 years ago.      Difficulty turning head again, shoulders tight.  PCP noted some muscle spasms again  Flared up for no reason, no injury  Worse over past 6+months  Was using topical gels hoping it would go away but hasn't  Occasional tingling in L UE, rare into left hand.  Some weakness in  he thinks.    Muscle relaxers helping some      Current Outpatient Medications:     levothyroxine (SYNTHROID) 50 MCG tablet, Take 1 tablet by mouth Daily, Disp: 30 tablet, Rfl: 3    mirtazapine (REMERON) 30 MG tablet, Take 1 tablet by mouth nightly, Disp: , Rfl:     cyclobenzaprine (FLEXERIL) 5 MG tablet, Take 1 tablet by mouth nightly as needed for Muscle spasms, Disp: 30 tablet, Rfl: 11    pioglitazone (ACTOS) 30 MG tablet, Take 1 tablet by mouth daily, Disp: 30 tablet, Rfl: 5    metFORMIN (GLUCOPHAGE) 1000 MG tablet, TAKE ONE TABLET BY MOUTH TWICE A DAY WITH MEALS, Disp: 180 tablet, Rfl: 0    atorvastatin (LIPITOR) 40 MG tablet, Take 1 tablet by mouth daily, Disp: 90 tablet, Rfl: 0    temazepam (RESTORIL) 15 MG capsule, TAKE 1 CAPSULE BY MOUTH ONCE A DAY AT BEDTIME FOR 30 DAYS, Disp: , Rfl:     Rimegepant Sulfate 75 MG TBDP, Take 75 mg by mouth every other day No more than 1 tab in 24 hours, Disp: 16 tablet, Rfl: 11    zonisamide (ZONEGRAN) 50 MG capsule, Take 2 capsules by mouth nightly, Disp: 180 capsule, Rfl: 3    metoprolol succinate (TOPROL XL) 100 MG extended release tablet, Take 1 tablet by mouth 2 times daily, Disp: 180 tablet, Rfl: 3    NONFORMULARY, CBD gummies (Tali's Web) 2 gummies daily as needed, Disp: , Rfl:     ondansetron (ZOFRAN) 4 MG tablet, Take 1 tablet by mouth every 8 hours as needed for Nausea, Disp: 10 tablet, Rfl: 0    linagliptin (TRADJENTA) 5 MG

## 2024-05-30 ENCOUNTER — OFFICE VISIT (OUTPATIENT)
Dept: CHIROPRACTIC MEDICINE | Age: 44
End: 2024-05-30
Payer: MEDICARE

## 2024-05-30 VITALS — TEMPERATURE: 97.9 F | HEIGHT: 70 IN | BODY MASS INDEX: 39.37 KG/M2 | WEIGHT: 275 LBS

## 2024-05-30 DIAGNOSIS — M54.04 PANNICULITIS AFFECTING REGIONS OF NECK AND BACK, THORACIC REGION: ICD-10-CM

## 2024-05-30 DIAGNOSIS — M48.02 CERVICAL STENOSIS OF SPINAL CANAL: ICD-10-CM

## 2024-05-30 DIAGNOSIS — M47.812 CERVICAL SPONDYLOSIS: ICD-10-CM

## 2024-05-30 DIAGNOSIS — M99.02 SEGMENTAL AND SOMATIC DYSFUNCTION OF THORACIC REGION: ICD-10-CM

## 2024-05-30 DIAGNOSIS — M99.01 SEGMENTAL AND SOMATIC DYSFUNCTION OF CERVICAL REGION: Primary | ICD-10-CM

## 2024-05-30 PROCEDURE — 99999 PR OFFICE/OUTPT VISIT,PROCEDURE ONLY: CPT | Performed by: CHIROPRACTOR

## 2024-05-30 PROCEDURE — 98940 CHIROPRACT MANJ 1-2 REGIONS: CPT | Performed by: CHIROPRACTOR

## 2024-05-30 NOTE — PROGRESS NOTES
Patient is here for follow up neck.  Drake Mauro DO  Electronically signed by Radha Conley LPN on 5/30/2024 at 8:50 AM    
each nostril as directed, Disp: 60 mL, Rfl: 1    blood glucose monitor strips, Test 3 times a day & as needed for symptoms of irregular blood glucose. Dispense sufficient amount for indicated testing frequency plus additional to accommodate PRN testing needs., Disp: 300 strip, Rfl: 1    ammonium lactate (LAC-HYDRIN) 12 % lotion, Apply topically twice daily, Disp: 400 g, Rfl: 2    clonazePAM (KLONOPIN) 1 MG tablet, Take 1 tablet by mouth 2 times daily as needed for Anxiety., Disp: , Rfl:     lactulose (CHRONULAC) 10 GM/15ML solution, TAKE 15 ML BY MOUTH 3 TIMES DAILY (Patient taking differently: 3 times daily as needed TAKE 15 ML BY MOUTH 3 TIMES DAILY as need), Disp: 946 mL, Rfl: 0    medical marijuana, Take by mouth as needed., Disp: , Rfl:     FLUoxetine (PROZAC) 20 MG capsule, Take 2 capsules by mouth daily, Disp: 60 capsule, Rfl: 0    rifaximin (XIFAXAN) 550 MG tablet, Take 1 tablet by mouth 2 times daily, Disp: , Rfl:     Exam:   Vitals:    05/30/24 0849   Temp: 97.9 °F (36.6 °C)     No midline pain in the cervical or thoracic regions.  He does have an active trigger point in the right trapezius today which reproduces his \"shoulder\" pain.  There are hypertonic and tender fibers noted with palpation in the paraspinal muscles of the cervical, thoracic region. Joint fixation is noted with motion screening at C7-T1, T 3-7.    Kranthi was seen today for neck pain.    Diagnoses and all orders for this visit:    Segmental and somatic dysfunction of cervical region    Segmental and somatic dysfunction of thoracic region    Panniculitis affecting regions of neck and back, thoracic region    Cervical spondylosis    Cervical stenosis of spinal canal        Treatment Plan: Continued with vibratory massage to the upper back for 2 minutes today.  Diversified manipulation to the affected segments in the cervical and thoracic spine.  Long axis distraction cervical spine 3 x 20 seconds.  Tolerated well.  I will see him back next

## 2024-06-03 DIAGNOSIS — M54.12 CERVICAL RADICULOPATHY: Primary | ICD-10-CM

## 2024-06-12 RX ORDER — ATORVASTATIN CALCIUM 40 MG/1
40 TABLET, FILM COATED ORAL DAILY
Qty: 90 TABLET | Refills: 0 | Status: SHIPPED | OUTPATIENT
Start: 2024-06-12

## 2024-06-13 ENCOUNTER — OFFICE VISIT (OUTPATIENT)
Dept: CHIROPRACTIC MEDICINE | Age: 44
End: 2024-06-13
Payer: MEDICARE

## 2024-06-13 VITALS
BODY MASS INDEX: 39.37 KG/M2 | OXYGEN SATURATION: 97 % | HEIGHT: 70 IN | WEIGHT: 275 LBS | TEMPERATURE: 97.2 F | HEART RATE: 83 BPM

## 2024-06-13 DIAGNOSIS — M99.02 SEGMENTAL AND SOMATIC DYSFUNCTION OF THORACIC REGION: ICD-10-CM

## 2024-06-13 DIAGNOSIS — M48.02 CERVICAL STENOSIS OF SPINAL CANAL: ICD-10-CM

## 2024-06-13 DIAGNOSIS — M99.01 SEGMENTAL AND SOMATIC DYSFUNCTION OF CERVICAL REGION: Primary | ICD-10-CM

## 2024-06-13 DIAGNOSIS — M54.04 PANNICULITIS AFFECTING REGIONS OF NECK AND BACK, THORACIC REGION: ICD-10-CM

## 2024-06-13 DIAGNOSIS — M47.812 CERVICAL SPONDYLOSIS: ICD-10-CM

## 2024-06-13 PROCEDURE — 98940 CHIROPRACT MANJ 1-2 REGIONS: CPT | Performed by: CHIROPRACTOR

## 2024-06-13 NOTE — PROGRESS NOTES
Patient is here for follow up back. Patient states no new concerns. Drake Mauro DO  Electronically signed by Radha Conley LPN on 6/13/2024 at 9:05 AM

## 2024-06-13 NOTE — PROGRESS NOTES
24  Kranthi Wellington : 1980 Sex: male  Age: 43 y.o.    Chief Complaint   Patient presents with    Back Pain       HPI:   Pain has worsened. 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  Position all day in uncomfortable chair made things worse.  On average, pain is perceived as moderate (4-6 pain scale). Patient denies new numbness, new weakness, new tingling.        Current Outpatient Medications:     atorvastatin (LIPITOR) 40 MG tablet, TAKE ONE TABLET BY MOUTH DAILY, Disp: 90 tablet, Rfl: 0    levothyroxine (SYNTHROID) 50 MCG tablet, Take 1 tablet by mouth Daily, Disp: 30 tablet, Rfl: 3    mirtazapine (REMERON) 30 MG tablet, Take 1 tablet by mouth nightly, Disp: , Rfl:     cyclobenzaprine (FLEXERIL) 5 MG tablet, Take 1 tablet by mouth nightly as needed for Muscle spasms, Disp: 30 tablet, Rfl: 11    pioglitazone (ACTOS) 30 MG tablet, Take 1 tablet by mouth daily, Disp: 30 tablet, Rfl: 5    metFORMIN (GLUCOPHAGE) 1000 MG tablet, TAKE ONE TABLET BY MOUTH TWICE A DAY WITH MEALS, Disp: 180 tablet, Rfl: 0    temazepam (RESTORIL) 15 MG capsule, TAKE 1 CAPSULE BY MOUTH ONCE A DAY AT BEDTIME FOR 30 DAYS, Disp: , Rfl:     Rimegepant Sulfate 75 MG TBDP, Take 75 mg by mouth every other day No more than 1 tab in 24 hours, Disp: 16 tablet, Rfl: 11    zonisamide (ZONEGRAN) 50 MG capsule, Take 2 capsules by mouth nightly, Disp: 180 capsule, Rfl: 3    metoprolol succinate (TOPROL XL) 100 MG extended release tablet, Take 1 tablet by mouth 2 times daily, Disp: 180 tablet, Rfl: 3    NONFORMULARY, CBD gummies (Tali's Web) 2 gummies daily as needed, Disp: , Rfl:     ondansetron (ZOFRAN) 4 MG tablet, Take 1 tablet by mouth every 8 hours as needed for Nausea, Disp: 10 tablet, Rfl: 0    linagliptin (TRADJENTA) 5 MG tablet, Take 1 tablet by mouth in the morning., Disp: 30 tablet, Rfl: 5    azelastine (ASTELIN) 0.1 % nasal spray, 1 spray by Nasal route 2 times daily Use in each nostril as

## 2024-06-17 DIAGNOSIS — E11.8 TYPE 2 DIABETES MELLITUS WITH COMPLICATION, WITHOUT LONG-TERM CURRENT USE OF INSULIN (HCC): ICD-10-CM

## 2024-06-17 RX ORDER — METOPROLOL SUCCINATE 100 MG/1
100 TABLET, EXTENDED RELEASE ORAL 2 TIMES DAILY
Qty: 180 TABLET | Refills: 1 | Status: SHIPPED | OUTPATIENT
Start: 2024-06-17

## 2024-06-26 ENCOUNTER — OFFICE VISIT (OUTPATIENT)
Dept: CHIROPRACTIC MEDICINE | Age: 44
End: 2024-06-26
Payer: MEDICARE

## 2024-06-26 VITALS
TEMPERATURE: 97 F | HEIGHT: 70 IN | OXYGEN SATURATION: 96 % | HEART RATE: 91 BPM | BODY MASS INDEX: 39.37 KG/M2 | WEIGHT: 275 LBS

## 2024-06-26 DIAGNOSIS — M54.04 PANNICULITIS AFFECTING REGIONS OF NECK AND BACK, THORACIC REGION: ICD-10-CM

## 2024-06-26 DIAGNOSIS — M99.02 SEGMENTAL AND SOMATIC DYSFUNCTION OF THORACIC REGION: ICD-10-CM

## 2024-06-26 DIAGNOSIS — M48.02 CERVICAL STENOSIS OF SPINAL CANAL: ICD-10-CM

## 2024-06-26 DIAGNOSIS — M47.812 CERVICAL SPONDYLOSIS: ICD-10-CM

## 2024-06-26 DIAGNOSIS — M99.01 SEGMENTAL AND SOMATIC DYSFUNCTION OF CERVICAL REGION: Primary | ICD-10-CM

## 2024-06-26 PROCEDURE — 98940 CHIROPRACT MANJ 1-2 REGIONS: CPT | Performed by: CHIROPRACTOR

## 2024-06-26 NOTE — PROGRESS NOTES
24  Kranthi CAMACHO Wellington : 1980 Sex: male  Age: 43 y.o.    Chief Complaint   Patient presents with    Back Pain       HPI:   Pain has improved. On average, pain is perceived as mild (1-3  pain scale). Patient denies new numbness, new weakness, new tingling      Current Outpatient Medications:     metoprolol succinate (TOPROL XL) 100 MG extended release tablet, Take 1 tablet by mouth 2 times daily, Disp: 180 tablet, Rfl: 1    metFORMIN (GLUCOPHAGE) 1000 MG tablet, Take 1 tablet by mouth 2 times daily (with meals) with meals, Disp: 180 tablet, Rfl: 1    atorvastatin (LIPITOR) 40 MG tablet, TAKE ONE TABLET BY MOUTH DAILY, Disp: 90 tablet, Rfl: 0    levothyroxine (SYNTHROID) 50 MCG tablet, Take 1 tablet by mouth Daily, Disp: 30 tablet, Rfl: 3    mirtazapine (REMERON) 30 MG tablet, Take 1 tablet by mouth nightly, Disp: , Rfl:     cyclobenzaprine (FLEXERIL) 5 MG tablet, Take 1 tablet by mouth nightly as needed for Muscle spasms, Disp: 30 tablet, Rfl: 11    pioglitazone (ACTOS) 30 MG tablet, Take 1 tablet by mouth daily, Disp: 30 tablet, Rfl: 5    temazepam (RESTORIL) 15 MG capsule, TAKE 1 CAPSULE BY MOUTH ONCE A DAY AT BEDTIME FOR 30 DAYS, Disp: , Rfl:     Rimegepant Sulfate 75 MG TBDP, Take 75 mg by mouth every other day No more than 1 tab in 24 hours, Disp: 16 tablet, Rfl: 11    zonisamide (ZONEGRAN) 50 MG capsule, Take 2 capsules by mouth nightly, Disp: 180 capsule, Rfl: 3    NONFORMULARY, CBD gummies (Tali's Web) 2 gummies daily as needed, Disp: , Rfl:     ondansetron (ZOFRAN) 4 MG tablet, Take 1 tablet by mouth every 8 hours as needed for Nausea, Disp: 10 tablet, Rfl: 0    linagliptin (TRADJENTA) 5 MG tablet, Take 1 tablet by mouth in the morning., Disp: 30 tablet, Rfl: 5    azelastine (ASTELIN) 0.1 % nasal spray, 1 spray by Nasal route 2 times daily Use in each nostril as directed, Disp: 60 mL, Rfl: 1    blood glucose monitor strips, Test 3 times a day & as needed for symptoms of irregular blood

## 2024-06-26 NOTE — PROGRESS NOTES
Patient is here for follow up back. Patient states no new concerns. Drake Mauro DO  Electronically signed by Radha Conley LPN on 6/26/2024 at 9:13 AM

## 2024-07-09 ENCOUNTER — TELEPHONE (OUTPATIENT)
Dept: NON INVASIVE DIAGNOSTICS | Age: 44
End: 2024-07-09

## 2024-07-09 NOTE — TELEPHONE ENCOUNTER
Call from patient's sister questioning if Kranthi's remote came over.   Explained that his remote did come over.   Patient is scheduled to send another remote on 10/7/2024.      Deborah VALDIVIA,RN   [facility] [place]   [facility]

## 2024-07-10 PROCEDURE — 93294 REM INTERROG EVL PM/LDLS PM: CPT | Performed by: INTERNAL MEDICINE

## 2024-07-10 PROCEDURE — 93296 REM INTERROG EVL PM/IDS: CPT | Performed by: INTERNAL MEDICINE

## 2024-07-15 DIAGNOSIS — G43.109 MIGRAINE WITH AURA AND WITHOUT STATUS MIGRAINOSUS, NOT INTRACTABLE: ICD-10-CM

## 2024-07-15 RX ORDER — ZONISAMIDE 50 MG/1
100 CAPSULE ORAL NIGHTLY
Qty: 180 CAPSULE | Refills: 3 | Status: SHIPPED | OUTPATIENT
Start: 2024-07-15

## 2024-09-03 RX ORDER — ATORVASTATIN CALCIUM 40 MG/1
40 TABLET, FILM COATED ORAL DAILY
Qty: 90 TABLET | Refills: 0 | Status: SHIPPED | OUTPATIENT
Start: 2024-09-03

## 2024-09-13 DIAGNOSIS — E03.9 HYPOTHYROIDISM, UNSPECIFIED TYPE: ICD-10-CM

## 2024-09-13 RX ORDER — LEVOTHYROXINE SODIUM 50 UG/1
50 TABLET ORAL DAILY
Qty: 30 TABLET | Refills: 3 | Status: SHIPPED | OUTPATIENT
Start: 2024-09-13

## 2024-10-02 RX ORDER — RIMEGEPANT SULFATE 75 MG/75MG
TABLET, ORALLY DISINTEGRATING ORAL
Qty: 16 TABLET | Refills: 11 | Status: SHIPPED | OUTPATIENT
Start: 2024-10-02

## 2024-10-09 PROCEDURE — 93294 REM INTERROG EVL PM/LDLS PM: CPT | Performed by: INTERNAL MEDICINE

## 2024-10-09 PROCEDURE — 93296 REM INTERROG EVL PM/IDS: CPT | Performed by: INTERNAL MEDICINE

## 2024-10-11 DIAGNOSIS — E11.8 TYPE 2 DIABETES MELLITUS WITH COMPLICATION, WITHOUT LONG-TERM CURRENT USE OF INSULIN (HCC): ICD-10-CM

## 2024-10-14 RX ORDER — PIOGLITAZONEHYDROCHLORIDE 30 MG/1
30 TABLET ORAL DAILY
Qty: 30 TABLET | Refills: 0 | Status: SHIPPED | OUTPATIENT
Start: 2024-10-14

## 2024-10-29 NOTE — PROGRESS NOTES
[address] MSN, APRN-CNP, [address] [facility] , [address] [facility], [address] [place] [address]      [phone]                                    Office Follow Up    Doug Chavez is a 37 y.o. right handed man    We are following him for essential tremor and migraines    He presents alone today and is a good historian    He continues to do very well on his regimen of preventive Nurtec and Zonegran, and migraines are infrequent. Chronic photosensitivity remains is controlled with the Nurtec and wearing hats and dark glasses. Flexeril is helpful for his neck pains. His tremors are well controlled and he has no issues performing his ADLs. His psychogenic nonepileptic seizures are stable and he follows closely with a mental health provider. He gets stressed with some family members at home that triggers his events. A1C remains elevated. He has treadmill and has been doing some yoga. He continues to follow with cardiology for his history of SVT and pacemaker. Migraine medications failed or contraindicated: Triptans are contraindicated with his SSRI use and cardiac history. He is already on Zonegran, Prozac, metoprolol, Remeron, and Nurtec    Tremor medications failed or contraindicated: He failed primidone. He is already on benzodiazepine and beta-blocker.     No chest pain or palpitations  No SOB  No vertigo, lightheadedness or loss of consciousness  No falls, tripping or stumbling  No incontinence of bowels or bladder  No itching or bruising appreciated  No focal limb weakness or paresthesias  No speech or swallowing troubles    ROS otherwise negative     Current Outpatient Medications   Medication Sig Dispense Refill    temazepam (RESTORIL) 15 MG capsule TAKE 1 CAPSULE BY MOUTH ONCE A DAY AT BEDTIME FOR 30 DAYS      mirtazapine (REMERON) 45 MG tablet TAKE ONE TABLET BY MOUTH ONCE A DAY AT NIGHT FOR 30 DAYS      atorvastatin (LIPITOR) 40 MG tablet TAKE ONE TABLET BY Adequate: hears normal conversation without difficulty

## 2024-11-05 NOTE — TELEPHONE ENCOUNTER
SCHEDULED ECHO AND STRESS TEST FOR 07-23-21. REVIEWED COVID CHECKLIST WITH PATIENT.     Electronically signed by Mikayla Zapata on 7/12/2021 at 10:03 AM
patient feeling better  repeat labs improving, amylase dec and H/H stable
h/h stable. vitals stable. will get CT abd/pelv for dropping hb and elevated amylase

## 2024-11-18 DIAGNOSIS — E11.8 TYPE 2 DIABETES MELLITUS WITH COMPLICATION, WITHOUT LONG-TERM CURRENT USE OF INSULIN (HCC): ICD-10-CM

## 2024-11-18 RX ORDER — PIOGLITAZONE 30 MG/1
30 TABLET ORAL DAILY
Qty: 30 TABLET | Refills: 3 | Status: SHIPPED | OUTPATIENT
Start: 2024-11-18

## 2024-12-13 DIAGNOSIS — E11.8 TYPE 2 DIABETES MELLITUS WITH COMPLICATION, WITHOUT LONG-TERM CURRENT USE OF INSULIN (HCC): ICD-10-CM

## 2024-12-13 RX ORDER — ATORVASTATIN CALCIUM 40 MG/1
40 TABLET, FILM COATED ORAL DAILY
Qty: 90 TABLET | Refills: 0 | Status: SHIPPED | OUTPATIENT
Start: 2024-12-13

## 2024-12-13 RX ORDER — METOPROLOL SUCCINATE 100 MG/1
100 TABLET, EXTENDED RELEASE ORAL 2 TIMES DAILY
Qty: 180 TABLET | Refills: 0 | Status: SHIPPED | OUTPATIENT
Start: 2024-12-13

## 2024-12-15 DIAGNOSIS — M99.02 SEGMENTAL AND SOMATIC DYSFUNCTION OF THORACIC REGION: ICD-10-CM

## 2024-12-16 RX ORDER — CYCLOBENZAPRINE HCL 5 MG
5 TABLET ORAL NIGHTLY PRN
Qty: 30 TABLET | Refills: 11 | Status: SHIPPED | OUTPATIENT
Start: 2024-12-16

## 2025-01-06 NOTE — PROGRESS NOTES
clonazePAM (KLONOPIN) 1 MG tablet Take 1 tablet by mouth 2 times daily as needed for Anxiety.      lactulose (CHRONULAC) 10 GM/15ML solution TAKE 15 ML BY MOUTH 3 TIMES DAILY (Patient taking differently: 3 times daily as needed TAKE 15 ML BY MOUTH 3 TIMES DAILY as need) 946 mL 0    medical marijuana Take by mouth as needed.      FLUoxetine (PROZAC) 20 MG capsule Take 2 capsules by mouth daily 60 capsule 0    rifaximin (XIFAXAN) 550 MG tablet Take 1 tablet by mouth 2 times daily      ondansetron (ZOFRAN) 4 MG tablet Take 1 tablet by mouth every 8 hours as needed for Nausea (Patient not taking: Reported on 1/7/2025) 10 tablet 0     No current facility-administered medications for this visit.        Allergies   Allergen Reactions    Fish-Derived Products     Indomethacin Other (See Comments)     ROS:   Constitutional: Negative for fever, activity change and appetite change.   HENT: Negative for epistaxis.   Eyes: Negative for diploplia, blurred vision.   Respiratory: Negative for cough, chest tightness, shortness of breath and wheezing.   Cardiovascular: pertinent positives in HPI  Gastrointestinal: Negative for abdominal pain and blood in stool.   All other review of systems are negative     PHYSICAL EXAM:  Vitals:    01/07/25 0854 01/07/25 0908 01/07/25 0909   BP: 128/78 118/80 (!) 110/90   Position: Supine Sitting Standing   Pulse: 74 74 82   Resp: 16     Temp: 97.3 °F (36.3 °C)     SpO2: 94%     Weight: 126.8 kg (279 lb 9.6 oz)     Height: 1.778 m (5' 10\")       Constitutional: Well-developed, no acute distress  Eyes: conjunctivae normal, no xanthelasma   Ears, Nose, Throat: oral mucosa moist, no cyanosis   CV: no JVD. Regular rate and rhythm. Normal S1S2 and no S3. No murmurs, rubs, or gallops. PMI is nondisplaced  Lungs: clear to auscultation bilaterally, normal respiratory effort without used of accessory muscles  Abdomen: soft, non-tender, bowel sounds present, no masses or hepatomegaly   Musculoskeletal: no 
Yes

## 2025-01-07 ENCOUNTER — OFFICE VISIT (OUTPATIENT)
Dept: NON INVASIVE DIAGNOSTICS | Age: 45
End: 2025-01-07

## 2025-01-07 VITALS
OXYGEN SATURATION: 94 % | BODY MASS INDEX: 40.03 KG/M2 | WEIGHT: 279.6 LBS | SYSTOLIC BLOOD PRESSURE: 110 MMHG | HEART RATE: 82 BPM | HEIGHT: 70 IN | DIASTOLIC BLOOD PRESSURE: 90 MMHG | TEMPERATURE: 97.3 F | RESPIRATION RATE: 16 BRPM

## 2025-01-07 DIAGNOSIS — Z95.0 CARDIAC PACEMAKER IN SITU: ICD-10-CM

## 2025-01-07 DIAGNOSIS — G43.109 MIGRAINE WITH AURA AND WITHOUT STATUS MIGRAINOSUS, NOT INTRACTABLE: ICD-10-CM

## 2025-01-07 DIAGNOSIS — I51.9 HEART PROBLEM: Primary | ICD-10-CM

## 2025-01-07 RX ORDER — ZONISAMIDE 50 MG/1
100 CAPSULE ORAL NIGHTLY
Qty: 180 CAPSULE | Refills: 3 | Status: SHIPPED | OUTPATIENT
Start: 2025-01-07

## 2025-01-17 DIAGNOSIS — E03.9 HYPOTHYROIDISM, UNSPECIFIED TYPE: ICD-10-CM

## 2025-01-17 RX ORDER — LEVOTHYROXINE SODIUM 50 UG/1
50 TABLET ORAL DAILY
Qty: 30 TABLET | Refills: 0 | Status: SHIPPED | OUTPATIENT
Start: 2025-01-17

## 2025-01-27 SDOH — ECONOMIC STABILITY: INCOME INSECURITY: IN THE LAST 12 MONTHS, WAS THERE A TIME WHEN YOU WERE NOT ABLE TO PAY THE MORTGAGE OR RENT ON TIME?: NO

## 2025-01-27 SDOH — ECONOMIC STABILITY: FOOD INSECURITY: WITHIN THE PAST 12 MONTHS, THE FOOD YOU BOUGHT JUST DIDN'T LAST AND YOU DIDN'T HAVE MONEY TO GET MORE.: NEVER TRUE

## 2025-01-27 SDOH — ECONOMIC STABILITY: FOOD INSECURITY: WITHIN THE PAST 12 MONTHS, YOU WORRIED THAT YOUR FOOD WOULD RUN OUT BEFORE YOU GOT MONEY TO BUY MORE.: NEVER TRUE

## 2025-01-27 SDOH — ECONOMIC STABILITY: TRANSPORTATION INSECURITY
IN THE PAST 12 MONTHS, HAS LACK OF TRANSPORTATION KEPT YOU FROM MEETINGS, WORK, OR FROM GETTING THINGS NEEDED FOR DAILY LIVING?: NO

## 2025-01-27 SDOH — HEALTH STABILITY: PHYSICAL HEALTH: ON AVERAGE, HOW MANY MINUTES DO YOU ENGAGE IN EXERCISE AT THIS LEVEL?: 60 MIN

## 2025-01-27 SDOH — ECONOMIC STABILITY: TRANSPORTATION INSECURITY
IN THE PAST 12 MONTHS, HAS THE LACK OF TRANSPORTATION KEPT YOU FROM MEDICAL APPOINTMENTS OR FROM GETTING MEDICATIONS?: YES

## 2025-01-27 SDOH — HEALTH STABILITY: PHYSICAL HEALTH: ON AVERAGE, HOW MANY DAYS PER WEEK DO YOU ENGAGE IN MODERATE TO STRENUOUS EXERCISE (LIKE A BRISK WALK)?: 5 DAYS

## 2025-01-27 ASSESSMENT — PATIENT HEALTH QUESTIONNAIRE - PHQ9
9. THOUGHTS THAT YOU WOULD BE BETTER OFF DEAD, OR OF HURTING YOURSELF: NOT AT ALL
10. IF YOU CHECKED OFF ANY PROBLEMS, HOW DIFFICULT HAVE THESE PROBLEMS MADE IT FOR YOU TO DO YOUR WORK, TAKE CARE OF THINGS AT HOME, OR GET ALONG WITH OTHER PEOPLE: NOT DIFFICULT AT ALL
2. FEELING DOWN, DEPRESSED OR HOPELESS: SEVERAL DAYS
7. TROUBLE CONCENTRATING ON THINGS, SUCH AS READING THE NEWSPAPER OR WATCHING TELEVISION: NOT AT ALL
SUM OF ALL RESPONSES TO PHQ QUESTIONS 1-9: 2
4. FEELING TIRED OR HAVING LITTLE ENERGY: NOT AT ALL
8. MOVING OR SPEAKING SO SLOWLY THAT OTHER PEOPLE COULD HAVE NOTICED. OR THE OPPOSITE, BEING SO FIGETY OR RESTLESS THAT YOU HAVE BEEN MOVING AROUND A LOT MORE THAN USUAL: NOT AT ALL
SUM OF ALL RESPONSES TO PHQ QUESTIONS 1-9: 2
3. TROUBLE FALLING OR STAYING ASLEEP: NOT AT ALL
1. LITTLE INTEREST OR PLEASURE IN DOING THINGS: SEVERAL DAYS
5. POOR APPETITE OR OVEREATING: NOT AT ALL
SUM OF ALL RESPONSES TO PHQ9 QUESTIONS 1 & 2: 2
SUM OF ALL RESPONSES TO PHQ QUESTIONS 1-9: 2
6. FEELING BAD ABOUT YOURSELF - OR THAT YOU ARE A FAILURE OR HAVE LET YOURSELF OR YOUR FAMILY DOWN: NOT AT ALL
SUM OF ALL RESPONSES TO PHQ QUESTIONS 1-9: 2

## 2025-01-27 ASSESSMENT — LIFESTYLE VARIABLES
HOW MANY STANDARD DRINKS CONTAINING ALCOHOL DO YOU HAVE ON A TYPICAL DAY: 1
HOW MANY STANDARD DRINKS CONTAINING ALCOHOL DO YOU HAVE ON A TYPICAL DAY: 1 OR 2
HOW OFTEN DO YOU HAVE A DRINK CONTAINING ALCOHOL: 4
HOW OFTEN DO YOU HAVE SIX OR MORE DRINKS ON ONE OCCASION: 1
HOW OFTEN DO YOU HAVE A DRINK CONTAINING ALCOHOL: 2-3 TIMES A WEEK

## 2025-01-28 ENCOUNTER — OFFICE VISIT (OUTPATIENT)
Dept: PRIMARY CARE CLINIC | Age: 45
End: 2025-01-28

## 2025-01-28 VITALS
DIASTOLIC BLOOD PRESSURE: 82 MMHG | BODY MASS INDEX: 39.22 KG/M2 | OXYGEN SATURATION: 97 % | HEART RATE: 74 BPM | HEIGHT: 70 IN | WEIGHT: 274 LBS | TEMPERATURE: 98.1 F | SYSTOLIC BLOOD PRESSURE: 128 MMHG

## 2025-01-28 DIAGNOSIS — E11.8 TYPE 2 DIABETES MELLITUS WITH COMPLICATION, WITHOUT LONG-TERM CURRENT USE OF INSULIN (HCC): ICD-10-CM

## 2025-01-28 DIAGNOSIS — Z00.00 MEDICARE ANNUAL WELLNESS VISIT, SUBSEQUENT: Primary | ICD-10-CM

## 2025-01-28 DIAGNOSIS — Z23 NEED FOR INFLUENZA VACCINATION: ICD-10-CM

## 2025-01-28 DIAGNOSIS — R26.2 AMBULATORY DYSFUNCTION: ICD-10-CM

## 2025-01-28 DIAGNOSIS — E66.01 MORBID (SEVERE) OBESITY DUE TO EXCESS CALORIES: ICD-10-CM

## 2025-01-28 DIAGNOSIS — E03.9 HYPOTHYROIDISM, UNSPECIFIED TYPE: ICD-10-CM

## 2025-01-28 RX ORDER — TEMAZEPAM 30 MG/1
CAPSULE ORAL
COMMUNITY
Start: 2025-01-16

## 2025-01-28 NOTE — PROGRESS NOTES
Medicare Annual Wellness Visit    Kranthi Wellington is here for Medicare AWV    Assessment & Plan   Medicare annual wellness visit, subsequent  Type 2 diabetes mellitus with complication, without long-term current use of insulin (HCC)  -     CBC with Auto Differential; Future  -     T4, Free; Future  -     Uric Acid; Future  -     Vitamin B12 & Folate; Future  -     TSH; Future  -     Hepatic Function Panel; Future  -     Basic Metabolic Panel; Future  -     Lipid Panel; Future  -     Hemoglobin A1C; Future  -     Urinalysis with Microscopic; Future  -     Albumin/Creatinine Ratio, Urine; Future  -     [facility]  Need for influenza vaccination  -     Influenza, FLUCELVAX Trivalent, (age 6 mo+) IM, Preservative Free, 0.5mL  Ambulatory dysfunction  -     CBC with Auto Differential; Future  -     T4, Free; Future  -     Uric Acid; Future  -     Vitamin B12 & Folate; Future  -     TSH; Future  -     Hepatic Function Panel; Future  -     Basic Metabolic Panel; Future  -     Lipid Panel; Future  -     Hemoglobin A1C; Future  -     Urinalysis with Microscopic; Future  -     Albumin/Creatinine Ratio, Urine; Future  -     [facility]  Hypothyroidism, unspecified type  -     CBC with Auto Differential; Future  -     T4, Free; Future  -     Uric Acid; Future  -     Vitamin B12 & Folate; Future  -     TSH; Future  -     Hepatic Function Panel; Future  -     Basic Metabolic Panel; Future  -     Lipid Panel; Future  -     Hemoglobin A1C; Future  -     Urinalysis with Microscopic; Future  -     Albumin/Creatinine Ratio, Urine; Future  -     [facility]  Morbid (severe) obesity due to excess calories (E66.01)     Continue current medication regimen.  Referral to physical therapy for ambulatory dysfunction.  Baseline labs ordered.  See back in 6-month  Return in about 6 months (around 7/28/2025).     Subjective   Patient

## 2025-01-30 ENCOUNTER — HOSPITAL ENCOUNTER (EMERGENCY)
Age: 45
Discharge: HOME | End: 2025-01-30
Payer: MEDICARE

## 2025-01-30 ENCOUNTER — APPOINTMENT (OUTPATIENT)
Dept: CT IMAGING | Age: 45
End: 2025-01-30
Payer: MEDICARE

## 2025-01-30 VITALS
HEART RATE: 73 BPM | TEMPERATURE: 98.4 F | BODY MASS INDEX: 38.65 KG/M2 | RESPIRATION RATE: 16 BRPM | OXYGEN SATURATION: 97 % | WEIGHT: 270 LBS | SYSTOLIC BLOOD PRESSURE: 127 MMHG | HEIGHT: 70 IN | DIASTOLIC BLOOD PRESSURE: 84 MMHG

## 2025-01-30 DIAGNOSIS — N20.0 KIDNEY STONE: Primary | ICD-10-CM

## 2025-01-30 LAB
ALBUMIN SERPL-MCNC: 4.2 G/DL (ref 3.5–5.2)
ALP SERPL-CCNC: 111 U/L (ref 40–129)
ALT SERPL-CCNC: 24 U/L (ref 0–40)
ANION GAP SERPL CALCULATED.3IONS-SCNC: 15 MMOL/L (ref 7–16)
AST SERPL-CCNC: 29 U/L (ref 0–39)
BACTERIA URNS QL MICRO: ABNORMAL
BASOPHILS # BLD: 0.03 K/UL (ref 0–0.2)
BASOPHILS NFR BLD: 0 % (ref 0–2)
BILIRUB SERPL-MCNC: 0.7 MG/DL (ref 0–1.2)
BILIRUB UR QL STRIP: NEGATIVE
BUN SERPL-MCNC: 12 MG/DL (ref 6–20)
CALCIUM SERPL-MCNC: 9.6 MG/DL (ref 8.6–10.2)
CASTS #/AREA URNS LPF: ABNORMAL /LPF
CHLORIDE SERPL-SCNC: 95 MMOL/L (ref 98–107)
CLARITY UR: CLEAR
CO2 SERPL-SCNC: 21 MMOL/L (ref 22–29)
COLOR UR: YELLOW
CREAT SERPL-MCNC: 1.6 MG/DL (ref 0.7–1.2)
EOSINOPHIL # BLD: 0.08 K/UL (ref 0.05–0.5)
EOSINOPHILS RELATIVE PERCENT: 1 % (ref 0–6)
ERYTHROCYTE [DISTWIDTH] IN BLOOD BY AUTOMATED COUNT: 13.7 % (ref 11.5–15)
GFR, ESTIMATED: 53 ML/MIN/1.73M2
GLUCOSE SERPL-MCNC: 140 MG/DL (ref 74–99)
GLUCOSE UR STRIP-MCNC: NEGATIVE MG/DL
HCT VFR BLD AUTO: 47 % (ref 37–54)
HGB BLD-MCNC: 15.6 G/DL (ref 12.5–16.5)
HGB UR QL STRIP.AUTO: ABNORMAL
IMM GRANULOCYTES # BLD AUTO: 0.09 K/UL (ref 0–0.58)
IMM GRANULOCYTES NFR BLD: 1 % (ref 0–5)
KETONES UR STRIP-MCNC: NEGATIVE MG/DL
LACTATE BLDV-SCNC: 2.2 MMOL/L (ref 0.5–2.2)
LACTATE BLDV-SCNC: 2.5 MMOL/L (ref 0.5–2.2)
LEUKOCYTE ESTERASE UR QL STRIP: NEGATIVE
LIPASE SERPL-CCNC: 53 U/L (ref 13–60)
LYMPHOCYTES NFR BLD: 1.87 K/UL (ref 1.5–4)
LYMPHOCYTES RELATIVE PERCENT: 13 % (ref 20–42)
MCH RBC QN AUTO: 30.3 PG (ref 26–35)
MCHC RBC AUTO-ENTMCNC: 33.2 G/DL (ref 32–34.5)
MCV RBC AUTO: 91.3 FL (ref 80–99.9)
MONOCYTES NFR BLD: 1.11 K/UL (ref 0.1–0.95)
MONOCYTES NFR BLD: 8 % (ref 2–12)
MUCOUS THREADS URNS QL MICRO: PRESENT
NEUTROPHILS NFR BLD: 78 % (ref 43–80)
NEUTS SEG NFR BLD: 11.26 K/UL (ref 1.8–7.3)
NITRITE UR QL STRIP: NEGATIVE
PH UR STRIP: 6 [PH] (ref 5–8)
PLATELET # BLD AUTO: 411 K/UL (ref 130–450)
PMV BLD AUTO: 10.4 FL (ref 7–12)
POTASSIUM SERPL-SCNC: 3.9 MMOL/L (ref 3.5–5)
PROT SERPL-MCNC: 8.2 G/DL (ref 6.4–8.3)
PROT UR STRIP-MCNC: NEGATIVE MG/DL
RBC # BLD AUTO: 5.15 M/UL (ref 3.8–5.8)
RBC #/AREA URNS HPF: ABNORMAL /HPF
SODIUM SERPL-SCNC: 131 MMOL/L (ref 132–146)
SP GR UR STRIP: 1.02 (ref 1–1.03)
UROBILINOGEN UR STRIP-ACNC: 0.2 EU/DL (ref 0–1)
WBC #/AREA URNS HPF: ABNORMAL /HPF
WBC OTHER # BLD: 14.4 K/UL (ref 4.5–11.5)

## 2025-01-30 PROCEDURE — 81001 URINALYSIS AUTO W/SCOPE: CPT

## 2025-01-30 PROCEDURE — 83690 ASSAY OF LIPASE: CPT

## 2025-01-30 PROCEDURE — 96374 THER/PROPH/DIAG INJ IV PUSH: CPT

## 2025-01-30 PROCEDURE — 6360000002 HC RX W HCPCS: Performed by: NURSE PRACTITIONER

## 2025-01-30 PROCEDURE — 85025 COMPLETE CBC W/AUTO DIFF WBC: CPT

## 2025-01-30 PROCEDURE — 74176 CT ABD & PELVIS W/O CONTRAST: CPT

## 2025-01-30 PROCEDURE — 80053 COMPREHEN METABOLIC PANEL: CPT

## 2025-01-30 PROCEDURE — 83605 ASSAY OF LACTIC ACID: CPT

## 2025-01-30 PROCEDURE — 2580000003 HC RX 258: Performed by: NURSE PRACTITIONER

## 2025-01-30 PROCEDURE — 99284 EMERGENCY DEPT VISIT MOD MDM: CPT

## 2025-01-30 PROCEDURE — 96361 HYDRATE IV INFUSION ADD-ON: CPT

## 2025-01-30 PROCEDURE — 6370000000 HC RX 637 (ALT 250 FOR IP): Performed by: NURSE PRACTITIONER

## 2025-01-30 RX ORDER — ONDANSETRON 4 MG/1
4 TABLET, ORALLY DISINTEGRATING ORAL EVERY 8 HOURS PRN
Qty: 9 TABLET | Refills: 0 | Status: SHIPPED | OUTPATIENT
Start: 2025-01-30 | End: 2025-02-02

## 2025-01-30 RX ORDER — OXYCODONE AND ACETAMINOPHEN 5; 325 MG/1; MG/1
1 TABLET ORAL EVERY 8 HOURS PRN
Qty: 9 TABLET | Refills: 0 | Status: SHIPPED | OUTPATIENT
Start: 2025-01-30 | End: 2025-02-02

## 2025-01-30 RX ORDER — TAMSULOSIN HYDROCHLORIDE 0.4 MG/1
0.4 CAPSULE ORAL DAILY
Qty: 7 CAPSULE | Refills: 0 | Status: SHIPPED | OUTPATIENT
Start: 2025-01-30 | End: 2025-02-06

## 2025-01-30 RX ORDER — ONDANSETRON 2 MG/ML
4 INJECTION INTRAMUSCULAR; INTRAVENOUS ONCE
Status: COMPLETED | OUTPATIENT
Start: 2025-01-30 | End: 2025-01-30

## 2025-01-30 RX ORDER — 0.9 % SODIUM CHLORIDE 0.9 %
1000 INTRAVENOUS SOLUTION INTRAVENOUS ONCE
Status: COMPLETED | OUTPATIENT
Start: 2025-01-30 | End: 2025-01-30

## 2025-01-30 RX ORDER — OXYCODONE AND ACETAMINOPHEN 5; 325 MG/1; MG/1
1 TABLET ORAL ONCE
Status: COMPLETED | OUTPATIENT
Start: 2025-01-30 | End: 2025-01-30

## 2025-01-30 RX ADMIN — ONDANSETRON 4 MG: 2 INJECTION, SOLUTION INTRAMUSCULAR; INTRAVENOUS at 08:45

## 2025-01-30 RX ADMIN — SODIUM CHLORIDE 1000 ML: 9 INJECTION, SOLUTION INTRAVENOUS at 08:45

## 2025-01-30 RX ADMIN — OXYCODONE HYDROCHLORIDE AND ACETAMINOPHEN 1 TABLET: 5; 325 TABLET ORAL at 10:30

## 2025-01-30 ASSESSMENT — PAIN SCALES - GENERAL: PAINLEVEL_OUTOF10: 5

## 2025-01-30 ASSESSMENT — PAIN - FUNCTIONAL ASSESSMENT: PAIN_FUNCTIONAL_ASSESSMENT: 0-10

## 2025-01-30 NOTE — CONSULTS
1/30/2025 12:19 PM  Kranthi Wellington  [id number]     Chief Complaint:    Right ureteral calculus    History of Present Illness:      The patient is a 44 y.o. male patient who presented to the hospital with complaints of nausea and vomiting.  He is well-known to our office.  He sees Dr. Haroldo Angel and has a history of kidney stones.  He presented to the ED after recurrent vomiting.  He states that this is similar to the symptoms he has had with past stones.  He did have a CT in the ED which showed a right ureteral calculus approximately 9 mm x 5 mm.  He also had a creatinine elevated to 1.6.  He denies fevers or chills at this time.  He denies right flank pain.  He is nontoxic-appearing and sitting in dispo 2 at [facility].      Past Medical History:   Diagnosis Date    ADHD (attention deficit hyperactivity disorder) 2018    Allergic rhinitis Na    Anxiety     Arthritis     Carotid artery stenosis 2017    V fib    Chronic kidney disease 2018    Class 1 obesity due to excess calories with serious comorbidity and body mass index (BMI) of 34.0 to 34.9 in adult     Depression 2016    Diverticulosis     Diverticulitis Feb. 2023    First degree AV block     GERD (gastroesophageal reflux disease)     Hyperlipidemia     Hypothyroidism     IBS (irritable bowel syndrome)     Liver disease 2018    LVH (left ventricular hypertrophy)     Migraines     Mixed calcium oxalate kidney stones 02/17/2023    Obesity     ENOC (obstructive sleep apnea)     Osteoarthritis 2019    Psychiatric problem     Psychogenic nonepileptic seizure     Tremors of nervous system     Type 2 diabetes mellitus with complication, without long-term current use of insulin (HCC)          Past Surgical History:   Procedure Laterality Date    BLADDER SURGERY Right 03/16/2023    cystoscopy, retrograde pyelography, right ureteroscopy with laser lithotripsy, right JJ ureteral stent insertion [place] Tone Angel [place] [facility] [place]    HERNIA

## 2025-01-30 NOTE — ED PROVIDER NOTES
Independent RADHIKA Visit.        [facility]  Department of Emergency Medicine   ED  Encounter Note  Admit Date/RoomTime: 2025  8:20 AM  ED Room: DISPO/D02    NAME: Kranthi Wellington  : 1980  MRN: [id number]     Chief Complaint:  Nausea and Flank Pain (Right sided hx of kidney stones)    History of Present Illness        Kranthi Wellington is a 44 y.o. old male who presents to the emergency department by private vehicle, for sudden onset dull pain in the right flank with radiation to the right lower quadrant which began 1 day(s) prior to arrival.  There has been similar episodes in the past  with history of kidney stones and follows with Dr. Angel.  Patient states that he took some leftover Toradol this morning that his brother gave him which helped with the pain.  Patient also has been taking Zofran at home for vomiting. The pain is aggravated by nothing in particular and relieved by NSAIDs. Took Toradol this morning with good pain relief. Last took Zofran last night   Since onset the symptoms have been remaining constant.  The pain is associated with abdominal pain, back pain, nausea, and vomiting.  The pain is aggravated by nothing in particular. There has been NO chest pain, shortness of breath, fever, chills, sweating, anorexia, weight loss, diarrhea, constipation, dark/black stools, blood in stool, blood in emesis, cloudy urine, urinary frequency, dysuria, hematuria, urinary urgency, urinary incontinence, penile discharge, or scrotal pain.    .  ROS   Pertinent positives and negatives are stated within HPI, all other systems reviewed and are negative.    Past Medical History:  has a past medical history of ADHD (attention deficit hyperactivity disorder), Allergic rhinitis, Anxiety, Arthritis, Carotid artery stenosis, Chronic kidney disease, Class 1 obesity due to excess calories with serious comorbidity and body mass index (BMI) of 34.0 to 34.9 in adult, Depression, Diverticulosis,

## 2025-01-30 NOTE — DISCHARGE INSTRUCTIONS
CT ABDOMEN PELVIS WO CONTRAST Additional Contrast? None   Final Result   Moderate obstruction of the right kidney and right ureter to the distal   ureter where there is an obstructing 9 x 5 mm stone.

## 2025-02-04 ENCOUNTER — HOSPITAL ENCOUNTER (OUTPATIENT)
Age: 45
Discharge: HOME | End: 2025-02-06

## 2025-02-04 PROCEDURE — 82365 CALCULUS SPECTROSCOPY: CPT

## 2025-02-04 PROCEDURE — 88300 SURGICAL PATH GROSS: CPT

## 2025-02-06 LAB — SURGICAL PATHOLOGY REPORT: NORMAL

## 2025-02-07 ENCOUNTER — TELEPHONE (OUTPATIENT)
Dept: PHYSICAL THERAPY | Age: 45
End: 2025-02-07

## 2025-02-07 LAB
STONE COMPOSITION: NORMAL
STONE DESCRIPTION: NORMAL
STONE MASS: 44 MG

## 2025-02-17 DIAGNOSIS — E03.9 HYPOTHYROIDISM, UNSPECIFIED TYPE: ICD-10-CM

## 2025-02-17 RX ORDER — LEVOTHYROXINE SODIUM 50 UG/1
50 TABLET ORAL DAILY
Qty: 30 TABLET | Refills: 0 | Status: SHIPPED | OUTPATIENT
Start: 2025-02-17

## 2025-03-22 DIAGNOSIS — E11.8 TYPE 2 DIABETES MELLITUS WITH COMPLICATION, WITHOUT LONG-TERM CURRENT USE OF INSULIN (HCC): ICD-10-CM

## 2025-03-22 DIAGNOSIS — E03.9 HYPOTHYROIDISM, UNSPECIFIED TYPE: ICD-10-CM

## 2025-03-24 RX ORDER — ATORVASTATIN CALCIUM 40 MG/1
40 TABLET, FILM COATED ORAL DAILY
Qty: 90 TABLET | Refills: 0 | Status: SHIPPED | OUTPATIENT
Start: 2025-03-24

## 2025-03-24 RX ORDER — PIOGLITAZONE 30 MG/1
30 TABLET ORAL DAILY
Qty: 30 TABLET | Refills: 3 | Status: SHIPPED | OUTPATIENT
Start: 2025-03-24

## 2025-03-24 RX ORDER — LEVOTHYROXINE SODIUM 50 UG/1
50 TABLET ORAL DAILY
Qty: 30 TABLET | Refills: 0 | Status: SHIPPED | OUTPATIENT
Start: 2025-03-24

## 2025-03-24 RX ORDER — METOPROLOL SUCCINATE 100 MG/1
100 TABLET, EXTENDED RELEASE ORAL 2 TIMES DAILY
Qty: 180 TABLET | Refills: 0 | Status: SHIPPED | OUTPATIENT
Start: 2025-03-24

## 2025-04-04 DIAGNOSIS — G43.109 MIGRAINE WITH AURA AND WITHOUT STATUS MIGRAINOSUS, NOT INTRACTABLE: ICD-10-CM

## 2025-04-04 RX ORDER — ZONISAMIDE 50 MG/1
100 CAPSULE ORAL NIGHTLY
Qty: 180 CAPSULE | Refills: 3 | Status: SHIPPED | OUTPATIENT
Start: 2025-04-04

## 2025-04-16 PROCEDURE — 93294 REM INTERROG EVL PM/LDLS PM: CPT | Performed by: INTERNAL MEDICINE

## 2025-04-16 PROCEDURE — 93296 REM INTERROG EVL PM/IDS: CPT | Performed by: INTERNAL MEDICINE

## 2025-04-19 DIAGNOSIS — E03.9 HYPOTHYROIDISM, UNSPECIFIED TYPE: ICD-10-CM

## 2025-04-19 RX ORDER — LEVOTHYROXINE SODIUM 50 UG/1
50 TABLET ORAL DAILY
Qty: 30 TABLET | Refills: 0 | Status: SHIPPED | OUTPATIENT
Start: 2025-04-19

## 2025-04-30 ENCOUNTER — OFFICE VISIT (OUTPATIENT)
Dept: NEUROLOGY | Age: 45
End: 2025-04-30
Payer: COMMERCIAL

## 2025-04-30 VITALS
HEART RATE: 100 BPM | WEIGHT: 268 LBS | SYSTOLIC BLOOD PRESSURE: 117 MMHG | DIASTOLIC BLOOD PRESSURE: 80 MMHG | OXYGEN SATURATION: 98 % | BODY MASS INDEX: 38.45 KG/M2 | TEMPERATURE: 98.6 F

## 2025-04-30 DIAGNOSIS — G43.109 MIGRAINE WITH AURA AND WITHOUT STATUS MIGRAINOSUS, NOT INTRACTABLE: Primary | ICD-10-CM

## 2025-04-30 DIAGNOSIS — R25.1 TREMORS OF NERVOUS SYSTEM: ICD-10-CM

## 2025-04-30 DIAGNOSIS — F44.5 PSYCHOGENIC NONEPILEPTIC SEIZURE: ICD-10-CM

## 2025-04-30 DIAGNOSIS — M47.812 CERVICAL SPONDYLOSIS: ICD-10-CM

## 2025-04-30 PROCEDURE — G8417 CALC BMI ABV UP PARAM F/U: HCPCS | Performed by: NURSE PRACTITIONER

## 2025-04-30 PROCEDURE — 1036F TOBACCO NON-USER: CPT | Performed by: NURSE PRACTITIONER

## 2025-04-30 PROCEDURE — 99213 OFFICE O/P EST LOW 20 MIN: CPT | Performed by: NURSE PRACTITIONER

## 2025-04-30 PROCEDURE — G8427 DOCREV CUR MEDS BY ELIG CLIN: HCPCS | Performed by: NURSE PRACTITIONER

## 2025-04-30 NOTE — PROGRESS NOTES
(H) 01/30/2025    GLUCOSE 140 (H) 01/30/2025    CALCIUM 9.6 01/30/2025    BILITOT 0.7 01/30/2025    ALKPHOS 111 01/30/2025    AST 29 01/30/2025    ALT 24 01/30/2025    LABGLOM 53 (L) 01/30/2025    GFRAA >60 08/11/2022       Lab Results   Component Value Date    WBC 14.4 (H) 01/30/2025    HGB 15.6 01/30/2025    HCT 47.0 01/30/2025    MCV 91.3 01/30/2025     01/30/2025    LYMPHOPCT 13 (L) 01/30/2025    RBC 5.15 01/30/2025    MCH 30.3 01/30/2025    MCHC 33.2 01/30/2025    RDW 13.7 01/30/2025     All images personally reviewed today      Assessment:     Episodic migraines without aura: Having anywhere from 4-8 headache days per month but he remains satisfied on his current regimen and does not want changes.  Headaches are aggravated by insomnia and mental health issues.  Exam is stable    Tremors: suspect essential vs functional/med induced.    Psychogenic nonepileptic seizures (PNES): Proven by EMU.  Follows closely with mental health provider.     Cervical spondylosis and mild stenosis: Controlled    Plan:     -Continue Nurtec every other day  -Continue Flexeril 5 mg nightly PRN  -Discussed risk for kidney stones while on Zonegran but he does not want to change; continue 100 mg nightly, increase fluids, avoid dietary items that cause kidney stones  -Close follow up psych  -Sleep hygiene strategies discussed  -Discussed increasing daytime physical activity and stress management  -Call with any issues    RTO in 1 year or sooner PRN    NADIA Romero - CNP, [facility]  8:01 AM  4/30/2025

## 2025-05-19 DIAGNOSIS — E03.9 HYPOTHYROIDISM, UNSPECIFIED TYPE: ICD-10-CM

## 2025-05-19 RX ORDER — LEVOTHYROXINE SODIUM 50 UG/1
50 TABLET ORAL DAILY
Qty: 30 TABLET | Refills: 0 | Status: SHIPPED | OUTPATIENT
Start: 2025-05-19

## 2025-06-06 ENCOUNTER — TELEPHONE (OUTPATIENT)
Dept: PRIMARY CARE CLINIC | Age: 45
End: 2025-06-06

## 2025-06-06 DIAGNOSIS — R26.2 TROUBLE WALKING: Primary | ICD-10-CM

## 2025-06-06 DIAGNOSIS — E03.9 ACQUIRED HYPOTHYROIDISM: ICD-10-CM

## 2025-06-06 DIAGNOSIS — E11.8 TYPE II DIABETES MELLITUS WITH COMPLICATION (HCC): ICD-10-CM

## 2025-06-20 DIAGNOSIS — E11.8 TYPE 2 DIABETES MELLITUS WITH COMPLICATION, WITHOUT LONG-TERM CURRENT USE OF INSULIN (HCC): ICD-10-CM

## 2025-06-20 DIAGNOSIS — E03.9 HYPOTHYROIDISM, UNSPECIFIED TYPE: ICD-10-CM

## 2025-06-20 RX ORDER — ATORVASTATIN CALCIUM 40 MG/1
40 TABLET, FILM COATED ORAL DAILY
Qty: 90 TABLET | Refills: 0 | Status: SHIPPED | OUTPATIENT
Start: 2025-06-20

## 2025-06-20 RX ORDER — METOPROLOL SUCCINATE 100 MG/1
100 TABLET, EXTENDED RELEASE ORAL 2 TIMES DAILY
Qty: 180 TABLET | Refills: 0 | Status: SHIPPED | OUTPATIENT
Start: 2025-06-20

## 2025-06-20 RX ORDER — LEVOTHYROXINE SODIUM 50 UG/1
50 TABLET ORAL DAILY
Qty: 30 TABLET | Refills: 0 | Status: SHIPPED | OUTPATIENT
Start: 2025-06-20

## 2025-07-01 ENCOUNTER — APPOINTMENT (OUTPATIENT)
Dept: CT IMAGING | Age: 45
DRG: 871 | End: 2025-07-01
Payer: COMMERCIAL

## 2025-07-01 ENCOUNTER — APPOINTMENT (OUTPATIENT)
Dept: GENERAL RADIOLOGY | Age: 45
DRG: 871 | End: 2025-07-01
Payer: COMMERCIAL

## 2025-07-01 ENCOUNTER — HOSPITAL ENCOUNTER (INPATIENT)
Age: 45
LOS: 5 days | Discharge: HOME | DRG: 871 | End: 2025-07-06
Attending: STUDENT IN AN ORGANIZED HEALTH CARE EDUCATION/TRAINING PROGRAM | Admitting: STUDENT IN AN ORGANIZED HEALTH CARE EDUCATION/TRAINING PROGRAM
Payer: COMMERCIAL

## 2025-07-01 DIAGNOSIS — R74.01 TRANSAMINITIS: ICD-10-CM

## 2025-07-01 DIAGNOSIS — F19.10 POLYSUBSTANCE ABUSE (HCC): ICD-10-CM

## 2025-07-01 DIAGNOSIS — R55 SYNCOPE, UNSPECIFIED SYNCOPE TYPE: ICD-10-CM

## 2025-07-01 DIAGNOSIS — R40.2431 GLASGOW COMA SCALE TOTAL SCORE 3-8, IN THE FIELD (EMT OR AMBULANCE) (HCC): Primary | ICD-10-CM

## 2025-07-01 PROBLEM — R41.82 AMS (ALTERED MENTAL STATUS): Status: ACTIVE | Noted: 2025-07-01

## 2025-07-01 LAB
AADO2: 260.7 MMHG
AADO2: 265 MMHG
ALBUMIN SERPL-MCNC: 3.4 G/DL (ref 3.5–5.2)
ALP SERPL-CCNC: 146 U/L (ref 40–129)
ALT SERPL-CCNC: 2346 U/L (ref 0–50)
AMMONIA PLAS-SCNC: 36 UMOL/L (ref 16–60)
AMORPH SED URNS QL MICRO: PRESENT
AMPHET UR QL SCN: NEGATIVE
ANION GAP SERPL CALCULATED.3IONS-SCNC: 17 MMOL/L (ref 7–16)
APAP SERPL-MCNC: <5 UG/ML (ref 10–30)
APAP SERPL-MCNC: <5 UG/ML (ref 10–30)
AST SERPL-CCNC: 1555 U/L (ref 0–50)
B.E.: -6.8 MMOL/L (ref -3–3)
B.E.: -8.9 MMOL/L (ref -3–3)
BACTERIA URNS QL MICRO: ABNORMAL
BARBITURATES UR QL SCN: NEGATIVE
BASOPHILS # BLD: 0.06 K/UL (ref 0–0.2)
BASOPHILS NFR BLD: 1 % (ref 0–2)
BENZODIAZ UR QL: POSITIVE
BILIRUB SERPL-MCNC: 0.6 MG/DL (ref 0–1.2)
BILIRUB UR QL STRIP: NEGATIVE
BNP SERPL-MCNC: 78 PG/ML (ref 0–125)
BUN SERPL-MCNC: 23 MG/DL (ref 6–20)
BUPRENORPHINE UR QL: NEGATIVE
CALCIUM SERPL-MCNC: 8.9 MG/DL (ref 8.6–10)
CANNABINOIDS UR QL SCN: POSITIVE
CHLORIDE SERPL-SCNC: 104 MMOL/L (ref 98–107)
CK SERPL-CCNC: 41 U/L (ref 0–190)
CLARITY UR: ABNORMAL
CO2 SERPL-SCNC: 17 MMOL/L (ref 22–29)
COCAINE UR QL SCN: NEGATIVE
COHB: 0.3 % (ref 0–1.5)
COHB: 0.3 % (ref 0–1.5)
COLOR UR: YELLOW
CORTIS SERPL-MCNC: 11.2 UG/DL (ref 2.7–18.4)
CORTISOL COLLECTION INFO: NORMAL
CREAT SERPL-MCNC: 1.5 MG/DL (ref 0.7–1.2)
CRITICAL: ABNORMAL
CRITICAL: ABNORMAL
DATE ANALYZED: ABNORMAL
DATE ANALYZED: ABNORMAL
DATE LAST DOSE: ABNORMAL
DATE OF COLLECTION: ABNORMAL
DATE OF COLLECTION: ABNORMAL
EOSINOPHIL # BLD: 1.08 K/UL (ref 0.05–0.5)
EOSINOPHILS RELATIVE PERCENT: 9 % (ref 0–6)
ERYTHROCYTE [DISTWIDTH] IN BLOOD BY AUTOMATED COUNT: 13.6 % (ref 11.5–15)
ETHANOLAMINE SERPL-MCNC: <10 MG/DL (ref 0–0.08)
FENTANYL UR QL: NEGATIVE
FIO2: 100 %
FIO2: 75 %
GFR, ESTIMATED: 58 ML/MIN/1.73M2
GLUCOSE BLD-MCNC: 135 MG/DL (ref 74–99)
GLUCOSE BLD-MCNC: 184 MG/DL (ref 74–99)
GLUCOSE SERPL-MCNC: 173 MG/DL (ref 74–99)
GLUCOSE UR STRIP-MCNC: NEGATIVE MG/DL
HAV IGM SERPL QL IA: NONREACTIVE
HBV CORE IGM SERPL QL IA: NONREACTIVE
HBV SURFACE AG SERPL QL IA: NONREACTIVE
HCO3: 17.7 MMOL/L (ref 22–26)
HCO3: 20.5 MMOL/L (ref 22–26)
HCT VFR BLD AUTO: 37.5 % (ref 37–54)
HCV AB SERPL QL IA: NONREACTIVE
HGB BLD-MCNC: 12.5 G/DL (ref 12.5–16.5)
HGB UR QL STRIP.AUTO: NEGATIVE
HHB: 0.4 % (ref 0–5)
HHB: 0.6 % (ref 0–5)
IMM GRANULOCYTES # BLD AUTO: 0.15 K/UL (ref 0–0.58)
IMM GRANULOCYTES NFR BLD: 1 % (ref 0–5)
INR PPP: 1.5
KETONES UR STRIP-MCNC: NEGATIVE MG/DL
LAB: ABNORMAL
LAB: ABNORMAL
LACTATE BLDV-SCNC: 3.7 MMOL/L (ref 0.5–2.2)
LDH SERPL-CCNC: 495 U/L (ref 135–225)
LEUKOCYTE ESTERASE UR QL STRIP: NEGATIVE
LYMPHOCYTES NFR BLD: 1.94 K/UL (ref 1.5–4)
LYMPHOCYTES RELATIVE PERCENT: 16 % (ref 20–42)
Lab: 1439
Lab: 2121
MAGNESIUM SERPL-MCNC: 2.3 MG/DL (ref 1.6–2.6)
MCH RBC QN AUTO: 31.7 PG (ref 26–35)
MCHC RBC AUTO-ENTMCNC: 33.3 G/DL (ref 32–34.5)
MCV RBC AUTO: 95.2 FL (ref 80–99.9)
METHADONE UR QL: NEGATIVE
METHB: 0.4 % (ref 0–1.5)
METHB: 0.6 % (ref 0–1.5)
MODE: AC
MODE: AC
MONOCYTES NFR BLD: 0.86 K/UL (ref 0.1–0.95)
MONOCYTES NFR BLD: 7 % (ref 2–12)
NEUTROPHILS NFR BLD: 67 % (ref 43–80)
NEUTS SEG NFR BLD: 8.16 K/UL (ref 1.8–7.3)
NITRITE UR QL STRIP: NEGATIVE
O2 SATURATION: 99.4 % (ref 92–98.5)
O2 SATURATION: 99.6 % (ref 92–98.5)
O2HB: 98.5 % (ref 94–97)
O2HB: 98.9 % (ref 94–97)
OPERATOR ID: 1741
OPERATOR ID: ABNORMAL
OPIATES UR QL SCN: NEGATIVE
OXYCODONE UR QL SCN: NEGATIVE
PATIENT TEMP: 37 C
PATIENT TEMP: 37 C
PCO2: 40.9 MMHG (ref 35–45)
PCO2: 48.2 MMHG (ref 35–45)
PCP UR QL SCN: NEGATIVE
PEEP/CPAP: 8 CMH2O
PEEP/CPAP: 8 CMH2O
PFO2: 2.91 MMHG/%
PFO2: 3.86 MMHG/%
PH BLOOD GAS: 7.25 (ref 7.35–7.45)
PH BLOOD GAS: 7.25 (ref 7.35–7.45)
PH UR STRIP: 5.5 [PH] (ref 5–8)
PLATELET # BLD AUTO: 272 K/UL (ref 130–450)
PMV BLD AUTO: 10.7 FL (ref 7–12)
PO2: 218.5 MMHG (ref 75–100)
PO2: 386.4 MMHG (ref 75–100)
POTASSIUM SERPL-SCNC: 4 MMOL/L (ref 3.5–5.1)
PROCALCITONIN SERPL-MCNC: 7.71 NG/ML (ref 0–0.08)
PROT SERPL-MCNC: 6.5 G/DL (ref 6.4–8.3)
PROT UR STRIP-MCNC: 30 MG/DL
PROTHROMBIN TIME: 16.2 SEC (ref 9.3–12.4)
RBC # BLD AUTO: 3.94 M/UL (ref 3.8–5.8)
RBC #/AREA URNS HPF: ABNORMAL /HPF
RI(T): 0.67
RI(T): 1.21
RR MECHANICAL: 18 B/MIN
RR MECHANICAL: 18 B/MIN
SALICYLATES SERPL-MCNC: <0.5 MG/DL (ref 0–30)
SODIUM SERPL-SCNC: 138 MMOL/L (ref 136–145)
SOURCE, BLOOD GAS: ABNORMAL
SOURCE, BLOOD GAS: ABNORMAL
SP GR UR STRIP: 1.02 (ref 1–1.03)
T4 FREE SERPL-MCNC: 1.1 NG/DL (ref 0.9–1.7)
TEST INFORMATION: ABNORMAL
THB: 12.9 G/DL (ref 11.5–16.5)
THB: 13.1 G/DL (ref 11.5–16.5)
TIME ANALYZED: 1453
TIME ANALYZED: 2128
TME LAST DOSE: ABNORMAL H
TOXIC TRICYCLIC SC,BLOOD: NEGATIVE
TROPONIN I SERPL HS-MCNC: 10 NG/L (ref 0–22)
TROPONIN I SERPL HS-MCNC: 11 NG/L (ref 0–22)
TSH SERPL DL<=0.05 MIU/L-ACNC: 1.18 UIU/ML (ref 0.27–4.2)
UROBILINOGEN UR STRIP-ACNC: 1 EU/DL (ref 0–1)
VALPROATE SERPL-MCNC: <3 UG/ML (ref 50–100)
VANCOMYCIN DOSE: ABNORMAL MG
VT MECHANICAL: 500 ML
VT MECHANICAL: 500 ML
WBC #/AREA URNS HPF: ABNORMAL /HPF
WBC OTHER # BLD: 12.3 K/UL (ref 4.5–11.5)

## 2025-07-01 PROCEDURE — 87449 NOS EACH ORGANISM AG IA: CPT

## 2025-07-01 PROCEDURE — 87899 AGENT NOS ASSAY W/OPTIC: CPT

## 2025-07-01 PROCEDURE — 70450 CT HEAD/BRAIN W/O DYE: CPT

## 2025-07-01 PROCEDURE — 96375 TX/PRO/DX INJ NEW DRUG ADDON: CPT

## 2025-07-01 PROCEDURE — 82533 TOTAL CORTISOL: CPT

## 2025-07-01 PROCEDURE — 6360000002 HC RX W HCPCS: Performed by: STUDENT IN AN ORGANIZED HEALTH CARE EDUCATION/TRAINING PROGRAM

## 2025-07-01 PROCEDURE — 80164 ASSAY DIPROPYLACETIC ACD TOT: CPT

## 2025-07-01 PROCEDURE — G0480 DRUG TEST DEF 1-7 CLASSES: HCPCS

## 2025-07-01 PROCEDURE — 71045 X-RAY EXAM CHEST 1 VIEW: CPT

## 2025-07-01 PROCEDURE — 84484 ASSAY OF TROPONIN QUANT: CPT

## 2025-07-01 PROCEDURE — 87086 URINE CULTURE/COLONY COUNT: CPT

## 2025-07-01 PROCEDURE — 6360000002 HC RX W HCPCS

## 2025-07-01 PROCEDURE — 93005 ELECTROCARDIOGRAM TRACING: CPT

## 2025-07-01 PROCEDURE — 99222 1ST HOSP IP/OBS MODERATE 55: CPT | Performed by: STUDENT IN AN ORGANIZED HEALTH CARE EDUCATION/TRAINING PROGRAM

## 2025-07-01 PROCEDURE — 6360000004 HC RX CONTRAST MEDICATION: Performed by: RADIOLOGY

## 2025-07-01 PROCEDURE — 3E033XZ INTRODUCTION OF VASOPRESSOR INTO PERIPHERAL VEIN, PERCUTANEOUS APPROACH: ICD-10-PCS | Performed by: INTERNAL MEDICINE

## 2025-07-01 PROCEDURE — 80074 ACUTE HEPATITIS PANEL: CPT

## 2025-07-01 PROCEDURE — 5A1945Z RESPIRATORY VENTILATION, 24-96 CONSECUTIVE HOURS: ICD-10-PCS | Performed by: INTERNAL MEDICINE

## 2025-07-01 PROCEDURE — 80179 DRUG ASSAY SALICYLATE: CPT

## 2025-07-01 PROCEDURE — 74018 RADEX ABDOMEN 1 VIEW: CPT

## 2025-07-01 PROCEDURE — 85025 COMPLETE CBC W/AUTO DIFF WBC: CPT

## 2025-07-01 PROCEDURE — 70496 CT ANGIOGRAPHY HEAD: CPT

## 2025-07-01 PROCEDURE — 6370000000 HC RX 637 (ALT 250 FOR IP)

## 2025-07-01 PROCEDURE — 83735 ASSAY OF MAGNESIUM: CPT

## 2025-07-01 PROCEDURE — 2580000003 HC RX 258

## 2025-07-01 PROCEDURE — 99285 EMERGENCY DEPT VISIT HI MDM: CPT

## 2025-07-01 PROCEDURE — 82140 ASSAY OF AMMONIA: CPT

## 2025-07-01 PROCEDURE — 94002 VENT MGMT INPAT INIT DAY: CPT

## 2025-07-01 PROCEDURE — 2580000003 HC RX 258: Performed by: STUDENT IN AN ORGANIZED HEALTH CARE EDUCATION/TRAINING PROGRAM

## 2025-07-01 PROCEDURE — 31500 INSERT EMERGENCY AIRWAY: CPT

## 2025-07-01 PROCEDURE — 84439 ASSAY OF FREE THYROXINE: CPT

## 2025-07-01 PROCEDURE — 96374 THER/PROPH/DIAG INJ IV PUSH: CPT

## 2025-07-01 PROCEDURE — 2500000003 HC RX 250 WO HCPCS

## 2025-07-01 PROCEDURE — 87081 CULTURE SCREEN ONLY: CPT

## 2025-07-01 PROCEDURE — 85610 PROTHROMBIN TIME: CPT

## 2025-07-01 PROCEDURE — 83880 ASSAY OF NATRIURETIC PEPTIDE: CPT

## 2025-07-01 PROCEDURE — 82962 GLUCOSE BLOOD TEST: CPT

## 2025-07-01 PROCEDURE — 84300 ASSAY OF URINE SODIUM: CPT

## 2025-07-01 PROCEDURE — 87040 BLOOD CULTURE FOR BACTERIA: CPT

## 2025-07-01 PROCEDURE — 51701 INSERT BLADDER CATHETER: CPT

## 2025-07-01 PROCEDURE — 83605 ASSAY OF LACTIC ACID: CPT

## 2025-07-01 PROCEDURE — 81001 URINALYSIS AUTO W/SCOPE: CPT

## 2025-07-01 PROCEDURE — 70498 CT ANGIOGRAPHY NECK: CPT

## 2025-07-01 PROCEDURE — 2000000000 HC ICU R&B

## 2025-07-01 PROCEDURE — 36415 COLL VENOUS BLD VENIPUNCTURE: CPT

## 2025-07-01 PROCEDURE — 0BH17EZ INSERTION OF ENDOTRACHEAL AIRWAY INTO TRACHEA, VIA NATURAL OR ARTIFICIAL OPENING: ICD-10-PCS | Performed by: INTERNAL MEDICINE

## 2025-07-01 PROCEDURE — 83615 LACTATE (LD) (LDH) ENZYME: CPT

## 2025-07-01 PROCEDURE — 80053 COMPREHEN METABOLIC PANEL: CPT

## 2025-07-01 PROCEDURE — 84145 PROCALCITONIN (PCT): CPT

## 2025-07-01 PROCEDURE — 84443 ASSAY THYROID STIM HORMONE: CPT

## 2025-07-01 PROCEDURE — 82570 ASSAY OF URINE CREATININE: CPT

## 2025-07-01 PROCEDURE — 80307 DRUG TEST PRSMV CHEM ANLYZR: CPT

## 2025-07-01 PROCEDURE — 99291 CRITICAL CARE FIRST HOUR: CPT | Performed by: INTERNAL MEDICINE

## 2025-07-01 PROCEDURE — 82550 ASSAY OF CK (CPK): CPT

## 2025-07-01 PROCEDURE — 82805 BLOOD GASES W/O2 SATURATION: CPT

## 2025-07-01 PROCEDURE — 80143 DRUG ASSAY ACETAMINOPHEN: CPT

## 2025-07-01 RX ORDER — IOPAMIDOL 755 MG/ML
60 INJECTION, SOLUTION INTRAVASCULAR
Status: COMPLETED | OUTPATIENT
Start: 2025-07-01 | End: 2025-07-01

## 2025-07-01 RX ORDER — SODIUM CHLORIDE 0.9 % (FLUSH) 0.9 %
5-40 SYRINGE (ML) INJECTION PRN
Status: DISCONTINUED | OUTPATIENT
Start: 2025-07-01 | End: 2025-07-06 | Stop reason: HOSPADM

## 2025-07-01 RX ORDER — LEVETIRACETAM 500 MG/1
500 TABLET ORAL 2 TIMES DAILY
Status: CANCELLED | OUTPATIENT
Start: 2025-07-01

## 2025-07-01 RX ORDER — LEVOTHYROXINE SODIUM 50 UG/1
50 TABLET ORAL DAILY
Status: DISCONTINUED | OUTPATIENT
Start: 2025-07-02 | End: 2025-07-06 | Stop reason: HOSPADM

## 2025-07-01 RX ORDER — FENTANYL CITRATE-0.9 % NACL/PF 20 MCG/2ML
50 SYRINGE (ML) INTRAVENOUS EVERY 30 MIN PRN
Refills: 0 | Status: DISCONTINUED | OUTPATIENT
Start: 2025-07-01 | End: 2025-07-01

## 2025-07-01 RX ORDER — LEVETIRACETAM 500 MG/5ML
500 INJECTION, SOLUTION, CONCENTRATE INTRAVENOUS EVERY 12 HOURS
Status: DISCONTINUED | OUTPATIENT
Start: 2025-07-01 | End: 2025-07-03

## 2025-07-01 RX ORDER — FENTANYL CITRATE-0.9 % NACL/PF 10 MCG/ML
25-200 PLASTIC BAG, INJECTION (ML) INTRAVENOUS CONTINUOUS
Status: DISCONTINUED | OUTPATIENT
Start: 2025-07-01 | End: 2025-07-03

## 2025-07-01 RX ORDER — DEXTROSE MONOHYDRATE 100 MG/ML
INJECTION, SOLUTION INTRAVENOUS CONTINUOUS PRN
Status: DISCONTINUED | OUTPATIENT
Start: 2025-07-01 | End: 2025-07-06 | Stop reason: HOSPADM

## 2025-07-01 RX ORDER — TAMSULOSIN HYDROCHLORIDE 0.4 MG/1
0.4 CAPSULE ORAL DAILY
Status: DISCONTINUED | OUTPATIENT
Start: 2025-07-02 | End: 2025-07-06 | Stop reason: HOSPADM

## 2025-07-01 RX ORDER — GLUCAGON 1 MG/ML
1 KIT INJECTION PRN
Status: DISCONTINUED | OUTPATIENT
Start: 2025-07-01 | End: 2025-07-06 | Stop reason: HOSPADM

## 2025-07-01 RX ORDER — POLYETHYLENE GLYCOL 3350 17 G/17G
17 POWDER, FOR SOLUTION ORAL DAILY PRN
Status: DISCONTINUED | OUTPATIENT
Start: 2025-07-01 | End: 2025-07-06 | Stop reason: HOSPADM

## 2025-07-01 RX ORDER — LACTULOSE 10 G/15ML
10 SOLUTION ORAL 3 TIMES DAILY
Status: DISCONTINUED | OUTPATIENT
Start: 2025-07-01 | End: 2025-07-06 | Stop reason: HOSPADM

## 2025-07-01 RX ORDER — ATORVASTATIN CALCIUM 40 MG/1
40 TABLET, FILM COATED ORAL DAILY
Status: DISCONTINUED | OUTPATIENT
Start: 2025-07-02 | End: 2025-07-06 | Stop reason: HOSPADM

## 2025-07-01 RX ORDER — SODIUM CHLORIDE 9 MG/ML
INJECTION, SOLUTION INTRAVENOUS CONTINUOUS
Status: DISCONTINUED | OUTPATIENT
Start: 2025-07-01 | End: 2025-07-06 | Stop reason: HOSPADM

## 2025-07-01 RX ORDER — FENTANYL CITRATE-0.9 % NACL/PF 20 MCG/2ML
50 SYRINGE (ML) INTRAVENOUS EVERY 30 MIN PRN
Refills: 0 | Status: DISCONTINUED | OUTPATIENT
Start: 2025-07-01 | End: 2025-07-03

## 2025-07-01 RX ORDER — MIRTAZAPINE 15 MG/1
30 TABLET, FILM COATED ORAL NIGHTLY
Status: DISCONTINUED | OUTPATIENT
Start: 2025-07-01 | End: 2025-07-06 | Stop reason: HOSPADM

## 2025-07-01 RX ORDER — ETOMIDATE 2 MG/ML
20 INJECTION INTRAVENOUS ONCE
Status: COMPLETED | OUTPATIENT
Start: 2025-07-01 | End: 2025-07-01

## 2025-07-01 RX ORDER — METOPROLOL SUCCINATE 100 MG/1
100 TABLET, EXTENDED RELEASE ORAL 2 TIMES DAILY
Status: DISCONTINUED | OUTPATIENT
Start: 2025-07-01 | End: 2025-07-06 | Stop reason: HOSPADM

## 2025-07-01 RX ORDER — SODIUM CHLORIDE 0.9 % (FLUSH) 0.9 %
5-40 SYRINGE (ML) INJECTION EVERY 12 HOURS SCHEDULED
Status: DISCONTINUED | OUTPATIENT
Start: 2025-07-01 | End: 2025-07-06 | Stop reason: HOSPADM

## 2025-07-01 RX ORDER — ROCURONIUM BROMIDE 10 MG/ML
100 INJECTION, SOLUTION INTRAVENOUS ONCE
Status: COMPLETED | OUTPATIENT
Start: 2025-07-01 | End: 2025-07-01

## 2025-07-01 RX ORDER — ONDANSETRON 4 MG/1
4 TABLET, ORALLY DISINTEGRATING ORAL EVERY 8 HOURS PRN
Status: DISCONTINUED | OUTPATIENT
Start: 2025-07-01 | End: 2025-07-06 | Stop reason: HOSPADM

## 2025-07-01 RX ORDER — LEVETIRACETAM 500 MG/5ML
1000 INJECTION, SOLUTION, CONCENTRATE INTRAVENOUS ONCE
Status: COMPLETED | OUTPATIENT
Start: 2025-07-01 | End: 2025-07-01

## 2025-07-01 RX ORDER — CHLORHEXIDINE GLUCONATE ORAL RINSE 1.2 MG/ML
15 SOLUTION DENTAL 2 TIMES DAILY
Status: DISCONTINUED | OUTPATIENT
Start: 2025-07-01 | End: 2025-07-05

## 2025-07-01 RX ORDER — ACETAMINOPHEN 650 MG/1
650 SUPPOSITORY RECTAL EVERY 6 HOURS PRN
Status: DISCONTINUED | OUTPATIENT
Start: 2025-07-01 | End: 2025-07-06 | Stop reason: HOSPADM

## 2025-07-01 RX ORDER — ENOXAPARIN SODIUM 100 MG/ML
30 INJECTION SUBCUTANEOUS 2 TIMES DAILY
Status: DISCONTINUED | OUTPATIENT
Start: 2025-07-01 | End: 2025-07-01

## 2025-07-01 RX ORDER — LEVETIRACETAM 500 MG/1
500 TABLET ORAL 2 TIMES DAILY
Status: DISCONTINUED | OUTPATIENT
Start: 2025-07-01 | End: 2025-07-01

## 2025-07-01 RX ORDER — INSULIN LISPRO 100 [IU]/ML
0-4 INJECTION, SOLUTION INTRAVENOUS; SUBCUTANEOUS
Status: DISCONTINUED | OUTPATIENT
Start: 2025-07-01 | End: 2025-07-06 | Stop reason: HOSPADM

## 2025-07-01 RX ORDER — ACETAMINOPHEN 325 MG/1
650 TABLET ORAL EVERY 6 HOURS PRN
Status: DISCONTINUED | OUTPATIENT
Start: 2025-07-01 | End: 2025-07-06 | Stop reason: HOSPADM

## 2025-07-01 RX ORDER — FENTANYL CITRATE-0.9 % NACL/PF 10 MCG/ML
25-200 PLASTIC BAG, INJECTION (ML) INTRAVENOUS CONTINUOUS
Refills: 0 | Status: DISCONTINUED | OUTPATIENT
Start: 2025-07-01 | End: 2025-07-01

## 2025-07-01 RX ORDER — ONDANSETRON 2 MG/ML
4 INJECTION INTRAMUSCULAR; INTRAVENOUS EVERY 6 HOURS PRN
Status: DISCONTINUED | OUTPATIENT
Start: 2025-07-01 | End: 2025-07-06 | Stop reason: HOSPADM

## 2025-07-01 RX ORDER — MIDAZOLAM HYDROCHLORIDE 1 MG/ML
1-10 INJECTION, SOLUTION INTRAVENOUS CONTINUOUS
Status: DISCONTINUED | OUTPATIENT
Start: 2025-07-01 | End: 2025-07-02

## 2025-07-01 RX ORDER — SODIUM CHLORIDE 9 MG/ML
INJECTION, SOLUTION INTRAVENOUS PRN
Status: DISCONTINUED | OUTPATIENT
Start: 2025-07-01 | End: 2025-07-06 | Stop reason: HOSPADM

## 2025-07-01 RX ORDER — 0.9 % SODIUM CHLORIDE 0.9 %
1000 INTRAVENOUS SOLUTION INTRAVENOUS ONCE
Status: COMPLETED | OUTPATIENT
Start: 2025-07-01 | End: 2025-07-01

## 2025-07-01 RX ADMIN — IOPAMIDOL 60 ML: 755 INJECTION, SOLUTION INTRAVENOUS at 14:50

## 2025-07-01 RX ADMIN — SODIUM CHLORIDE 1000 ML: 0.9 INJECTION, SOLUTION INTRAVENOUS at 15:27

## 2025-07-01 RX ADMIN — LEVETIRACETAM 1000 MG: 100 INJECTION INTRAVENOUS at 16:16

## 2025-07-01 RX ADMIN — CHLORHEXIDINE GLUCONATE, 0.12% ORAL RINSE 15 ML: 1.2 SOLUTION DENTAL at 22:08

## 2025-07-01 RX ADMIN — MIRTAZAPINE 30 MG: 15 TABLET, FILM COATED ORAL at 23:10

## 2025-07-01 RX ADMIN — ETOMIDATE 20 MG: 2 INJECTION, SOLUTION INTRAVENOUS at 14:35

## 2025-07-01 RX ADMIN — RIFAXIMIN 400 MG: 200 TABLET ORAL at 23:07

## 2025-07-01 RX ADMIN — ROCURONIUM BROMIDE 100 MG: 10 INJECTION, SOLUTION INTRAVENOUS at 14:35

## 2025-07-01 RX ADMIN — Medication 5 MCG/MIN: at 16:10

## 2025-07-01 RX ADMIN — SODIUM CHLORIDE, PRESERVATIVE FREE 10 ML: 5 INJECTION INTRAVENOUS at 23:13

## 2025-07-01 RX ADMIN — VANCOMYCIN HYDROCHLORIDE 2500 MG: 10 INJECTION, POWDER, LYOPHILIZED, FOR SOLUTION INTRAVENOUS at 22:18

## 2025-07-01 RX ADMIN — Medication 125 MCG/HR: at 23:48

## 2025-07-01 RX ADMIN — SODIUM CHLORIDE: 0.9 INJECTION, SOLUTION INTRAVENOUS at 20:36

## 2025-07-01 RX ADMIN — PIPERACILLIN AND TAZOBACTAM 4500 MG: 4; .5 INJECTION, POWDER, FOR SOLUTION INTRAVENOUS at 22:00

## 2025-07-01 RX ADMIN — LACTULOSE 10 G: 10 SOLUTION ORAL at 23:07

## 2025-07-01 RX ADMIN — INSULIN LISPRO 1 UNITS: 100 INJECTION, SOLUTION INTRAVENOUS; SUBCUTANEOUS at 23:18

## 2025-07-01 RX ADMIN — Medication 2 MG/HR: at 18:02

## 2025-07-01 RX ADMIN — Medication 100 MCG/HR: at 15:22

## 2025-07-01 RX ADMIN — LEVETIRACETAM 500 MG: 100 INJECTION INTRAVENOUS at 23:18

## 2025-07-01 RX ADMIN — SODIUM CHLORIDE, PRESERVATIVE FREE 20 MG: 5 INJECTION INTRAVENOUS at 22:09

## 2025-07-01 RX ADMIN — SODIUM CHLORIDE 5 MCG/MIN: 0.9 INJECTION, SOLUTION INTRAVENOUS at 16:10

## 2025-07-01 RX ADMIN — ACETYLCYSTEINE 20000 MG: 200 INJECTION, SOLUTION INTRAVENOUS at 22:25

## 2025-07-01 ASSESSMENT — PULMONARY FUNCTION TESTS
PIF_VALUE: 22
PIF_VALUE: 20
PIF_VALUE: 26
PIF_VALUE: 23
PIF_VALUE: 21
PIF_VALUE: 23
PIF_VALUE: 21
PIF_VALUE: 21

## 2025-07-01 NOTE — ED PROVIDER NOTES
[facility] EMERGENCY DEPARTMENT  EMERGENCY DEPARTMENT ENCOUNTER        Pt Name: Kranthi Wellington  MRN: [id number]  Birthdate 1980  Date of evaluation: 7/1/2025  Provider: Oziel Swain MD  PCP: Drake Mauro DO  Note Started: 2:35 PM EDT 7/1/25    CHIEF COMPLAINT       Chief Complaint   Patient presents with    Altered Mental Status     Pt found unresponsive at home by family. LKW was 1300. PT being bagged upon arrival       HISTORY OF PRESENT ILLNESS: 1 or more Elements   History From: EMS    Limitations to history : Altered Mental Status  Social Determinants : None    Kranthi Wellington is a 44 y.o. male with history of anxiety, hyperlipidemia, irritable bowel syndrome, nonepileptic seizure, diabetes, ADHD who presents for an unresponsive episode.  As per EMS, last known well was 1 PM and when family returned back at home they noted that he was unresponsive on the floor.  EMS mention the patient was hypotensive.  Patient was given IV fluid and patient was bagged while being brought to the emergency room.    Nursing Notes were all reviewed and agreed with or any disagreements were addressed in the HPI.    ROS:   Pertinent positives and negatives are stated within HPI    --------------------------------------------- PAST HISTORY ---------------------------------------------  Past Medical History:       Diagnosis Date    ADHD (attention deficit hyperactivity disorder) 2018    Allergic rhinitis Na    Anxiety     Arthritis     Carotid artery stenosis 2017    V fib    Chronic kidney disease 2018    Class 1 obesity due to excess calories with serious comorbidity and body mass index (BMI) of 34.0 to 34.9 in adult     Depression 2016    Diverticulosis     Diverticulitis Feb. 2023    First degree AV block     GERD (gastroesophageal reflux disease)     Hyperlipidemia     Hypothyroidism     IBS (irritable bowel syndrome)     Liver disease 2018    LVH (left ventricular hypertrophy)     Migraines

## 2025-07-01 NOTE — ED NOTES
Radiology Procedure Waiver   Name: Kranthi Wellington  : 1980  MRN: [id number]    Date:  25    Time: 2:39 PM EDT    Benefits of immediately proceeding with Radiology exam(s) without pre-testing outweigh the risks or are not indicated as specified below and therefore the following is/are being waived:    [] Pregnancy test   [] Patients LMP on-time and regular.   [] Patient had Tubal Ligation or has other Contraception Device.   [] Patient  is Menopausal or Premenarcheal.    [] Patient had Full or Partial Hysterectomy.    [] Protocol for Iodine allergy    [] MRI Questionnaire     [x] BUN/Creatinine   [] Patient age w/no hx of renal dysfunction.   [] Patient on Dialysis.   [] Recent Normal Labs.  Electronically signed by Oziel Swain MD on 25 at 2:39 PM EDT

## 2025-07-01 NOTE — ED PROVIDER NOTES
[facility] EMERGENCY DEPARTMENT  EMERGENCY DEPARTMENT ENCOUNTER        Pt Name: Kranthi Wellington  MRN: [id number]  Birthdate 1980  Date of evaluation: 7/1/2025  Provider: Rajiv Ceja MD  PCP: Drake Mauro DO  Note Started: 2:34 PM EDT 7/1/25    CHIEF COMPLAINT       Chief Complaint   Patient presents with    Altered Mental Status     Pt found unresponsive at home by family. LKW was 1300. PT being bagged upon arrival       HISTORY OF PRESENT ILLNESS: 1 or more Elements        Limitations to history : Unresponsive    Kranthi Wellington is a 44 y.o. male who presents to the emergency room for unresponsive episode.  Last known well was 1 PM.  Has a history of seizures.  Per EMS he was hypotensive, and had to be bagged.  Had a normal blood sugar.  Also had large pupils per EMS.    Nursing Notes were all reviewed and agreed with or any disagreements were addressed in the HPI.      REVIEW OF EXTERNAL NOTE :       Neurology encounter 4/30/2025 patient has a history of psychogenic nonepileptic seizure    REVIEW OF SYSTEMS :      Positives and Pertinent negatives as per HPI.     SURGICAL HISTORY     Past Surgical History:   Procedure Laterality Date    BLADDER SURGERY Right 03/16/2023    cystoscopy, retrograde pyelography, right ureteroscopy with laser lithotripsy, right JJ ureteral stent insertion performed by Tone Angel MD at [facility] [place]    HERNIA REPAIR      As a toddler age 1    INSERTABLE CARDIAC MONITOR  07/14/2017    Loop Monitor    KNEE ARTHROPLASTY  2017    KNEE SURGERY Right     Arthroscopy    PACEMAKER INSERTION  12/28/2017    D-PPM   (MEDTRONIC)   RAHEJA    TONSILLECTOMY         CURRENTMEDICATIONS       Previous Medications    AMMONIUM LACTATE (LAC-HYDRIN) 12 % LOTION    Apply topically twice daily    ATORVASTATIN (LIPITOR) 40 MG TABLET    TAKE ONE TABLET BY MOUTH DAILY    AZELASTINE (ASTELIN) 0.1 % NASAL SPRAY    1 spray by Nasal route 2 times daily Use in each

## 2025-07-01 NOTE — ED NOTES
Radiology Procedure Waiver   Name: Kranthi Wellington  : 1980  MRN: [id number]    Date:  25    Time: 2:39 PM EDT    Benefits of immediately proceeding with Radiology exam(s) without pre-testing outweigh the risks or are not indicated as specified below and therefore the following is/are being waived:    [] Pregnancy test   [] Patients LMP on-time and regular.   [] Patient had Tubal Ligation or has other Contraception Device.   [] Patient  is Menopausal or Premenarcheal.    [] Patient had Full or Partial Hysterectomy.    [] Protocol for Iodine allergy    [] MRI Questionnaire     [x] BUN/Creatinine   [] Patient age w/no hx of renal dysfunction.   [] Patient on Dialysis.   [] Recent Normal Labs.  Electronically signed by Rajiv Ceja MD on 25 at 2:39 PM EDT               Rajiv Ceja MD  [phone]

## 2025-07-01 NOTE — H&P
History & Physical      PCP: Drake Mauro DO    Date of Admission: 7/1/2025    Date of Service: Pt seen/examined on 7/1/2025 and is     admitted to Inpatient with expected LOS greater than two midnights due to medical therapy.      Chief Complaint:  had concerns including Altered Mental Status (Pt found unresponsive at home by family. LKW was 1300. PT being bagged upon arrival).    History Of Present Illness:    Mr. Kranthi Wellington, a 44 y.o. year old male  who  has a past medical history of ADHD (attention deficit hyperactivity disorder), Allergic rhinitis, Anxiety, Arthritis, Carotid artery stenosis, Chronic kidney disease, Class 1 obesity due to excess calories with serious comorbidity and body mass index (BMI) of 34.0 to 34.9 in adult, Depression, Diverticulosis, First degree AV block, GERD (gastroesophageal reflux disease), Hyperlipidemia, Hypothyroidism, IBS (irritable bowel syndrome), Liver disease, LVH (left ventricular hypertrophy), Migraines, Mixed calcium oxalate kidney stones, Obesity, ENOC (obstructive sleep apnea), Osteoarthritis, Psychiatric problem, Psychogenic nonepileptic seizure, Tremors of nervous system, and Type 2 diabetes mellitus with complication, without long-term current use of insulin (HCC).  Presented to the hospital after  being found unresponsive at [facility] l[facility] family members.    Patient was hypotensive per EMS. He was intubated at ER for airway protection GCS 7/15.   Bicarb 17, cr 1.6, lactic acid of 3.7, ast 1555, alt 2346, uds positive for cannabinoid and BZD.  CTA head and neck negative    Case discussed with ED physician  Past Medical History:        Diagnosis Date    ADHD (attention deficit hyperactivity disorder) 2018    Allergic rhinitis Na    Anxiety     Arthritis     Carotid artery stenosis 2017    V fib    Chronic kidney disease 2018    Class 1 obesity due to excess calories with serious comorbidity and body mass index (BMI) of 34.0 to 34.9 in adult     Depression 2016

## 2025-07-02 ENCOUNTER — APPOINTMENT (OUTPATIENT)
Dept: GENERAL RADIOLOGY | Age: 45
DRG: 871 | End: 2025-07-02
Payer: COMMERCIAL

## 2025-07-02 ENCOUNTER — APPOINTMENT (OUTPATIENT)
Dept: CT IMAGING | Age: 45
DRG: 871 | End: 2025-07-02
Payer: COMMERCIAL

## 2025-07-02 ENCOUNTER — APPOINTMENT (OUTPATIENT)
Age: 45
DRG: 871 | End: 2025-07-02
Payer: COMMERCIAL

## 2025-07-02 PROBLEM — R74.01 TRANSAMINITIS: Status: ACTIVE | Noted: 2025-07-02

## 2025-07-02 PROBLEM — A41.9 SEPTIC SHOCK (HCC): Status: ACTIVE | Noted: 2025-07-02

## 2025-07-02 PROBLEM — F19.10 POLYSUBSTANCE ABUSE (HCC): Status: ACTIVE | Noted: 2025-07-02

## 2025-07-02 PROBLEM — R65.21 SEPTIC SHOCK (HCC): Status: ACTIVE | Noted: 2025-07-02

## 2025-07-02 PROBLEM — J18.9 PNEUMONIA OF LEFT LOWER LOBE DUE TO INFECTIOUS ORGANISM: Status: ACTIVE | Noted: 2025-07-02

## 2025-07-02 LAB
AADO2: 288.7 MMHG
ALBUMIN SERPL-MCNC: 3.2 G/DL (ref 3.5–5.2)
ALP SERPL-CCNC: 112 U/L (ref 40–129)
ALT SERPL-CCNC: 1830 U/L (ref 0–50)
ANION GAP SERPL CALCULATED.3IONS-SCNC: 14 MMOL/L (ref 7–16)
AST SERPL-CCNC: 911 U/L (ref 0–50)
B.E.: -5 MMOL/L (ref -3–3)
BASOPHILS # BLD: 0.06 K/UL (ref 0–0.2)
BASOPHILS NFR BLD: 1 % (ref 0–2)
BILIRUB DIRECT SERPL-MCNC: 0.3 MG/DL (ref 0–0.2)
BILIRUB INDIRECT SERPL-MCNC: 0.2 MG/DL (ref 0–1)
BILIRUB SERPL-MCNC: 0.6 MG/DL (ref 0–1.2)
BUN SERPL-MCNC: 18 MG/DL (ref 6–20)
CALCIUM SERPL-MCNC: 7.5 MG/DL (ref 8.6–10)
CHLORIDE SERPL-SCNC: 107 MMOL/L (ref 98–107)
CO2 SERPL-SCNC: 18 MMOL/L (ref 22–29)
COHB: 0.3 % (ref 0–1.5)
CREAT SERPL-MCNC: 1.1 MG/DL (ref 0.7–1.2)
CREAT UR-MCNC: 169 MG/DL (ref 40–278)
CRITICAL: ABNORMAL
DATE ANALYZED: ABNORMAL
DATE OF COLLECTION: ABNORMAL
EKG ATRIAL RATE: 90 BPM
EKG P AXIS: 71 DEGREES
EKG P-R INTERVAL: 230 MS
EKG Q-T INTERVAL: 388 MS
EKG QRS DURATION: 114 MS
EKG QTC CALCULATION (BAZETT): 474 MS
EKG R AXIS: -55 DEGREES
EKG T AXIS: 34 DEGREES
EKG VENTRICULAR RATE: 90 BPM
EOSINOPHIL # BLD: 1.14 K/UL (ref 0.05–0.5)
EOSINOPHILS RELATIVE PERCENT: 10 % (ref 0–6)
ERYTHROCYTE [DISTWIDTH] IN BLOOD BY AUTOMATED COUNT: 13.8 % (ref 11.5–15)
FIO2: 75 %
GFR, ESTIMATED: 84 ML/MIN/1.73M2
GLUCOSE BLD-MCNC: 139 MG/DL (ref 74–99)
GLUCOSE BLD-MCNC: 155 MG/DL (ref 74–99)
GLUCOSE BLD-MCNC: 161 MG/DL (ref 74–99)
GLUCOSE BLD-MCNC: 199 MG/DL (ref 74–99)
GLUCOSE SERPL-MCNC: 171 MG/DL (ref 74–99)
HCO3: 19.3 MMOL/L (ref 22–26)
HCT VFR BLD AUTO: 33.2 % (ref 37–54)
HGB BLD-MCNC: 11.2 G/DL (ref 12.5–16.5)
HHB: 0.6 % (ref 0–5)
IMM GRANULOCYTES # BLD AUTO: 0.1 K/UL (ref 0–0.58)
IMM GRANULOCYTES NFR BLD: 1 % (ref 0–5)
INR PPP: 1.4
L PNEUMO1 AG UR QL IA.RAPID: NEGATIVE
LAB: ABNORMAL
LACTATE BLDV-SCNC: 1.2 MMOL/L (ref 0.5–2.2)
LACTATE BLDV-SCNC: 1.2 MMOL/L (ref 0.5–2.2)
LACTATE BLDV-SCNC: 1.3 MMOL/L (ref 0.5–2.2)
LYMPHOCYTES NFR BLD: 1.56 K/UL (ref 1.5–4)
LYMPHOCYTES RELATIVE PERCENT: 13 % (ref 20–42)
Lab: 430
MAGNESIUM SERPL-MCNC: 1.9 MG/DL (ref 1.6–2.6)
MCH RBC QN AUTO: 31.8 PG (ref 26–35)
MCHC RBC AUTO-ENTMCNC: 33.7 G/DL (ref 32–34.5)
MCV RBC AUTO: 94.3 FL (ref 80–99.9)
METHB: 0.5 % (ref 0–1.5)
MODE: AC
MONOCYTES NFR BLD: 0.93 K/UL (ref 0.1–0.95)
MONOCYTES NFR BLD: 8 % (ref 2–12)
NEUTROPHILS NFR BLD: 67 % (ref 43–80)
NEUTS SEG NFR BLD: 7.89 K/UL (ref 1.8–7.3)
O2 SATURATION: 99.4 % (ref 92–98.5)
O2HB: 98.6 % (ref 94–97)
OPERATOR ID: ABNORMAL
PATIENT TEMP: 37 C
PCO2: 33.4 MMHG (ref 35–45)
PEEP/CPAP: 8 CMH2O
PFO2: 2.81 MMHG/%
PH BLOOD GAS: 7.38 (ref 7.35–7.45)
PHOSPHATE SERPL-MCNC: 1.2 MG/DL (ref 2.5–4.5)
PLATELET # BLD AUTO: 309 K/UL (ref 130–450)
PMV BLD AUTO: 10.2 FL (ref 7–12)
PO2: 210.6 MMHG (ref 75–100)
POTASSIUM SERPL-SCNC: 4.2 MMOL/L (ref 3.5–5.1)
PROT SERPL-MCNC: 6.2 G/DL (ref 6.4–8.3)
PROTHROMBIN TIME: 15.5 SEC (ref 9.3–12.4)
RBC # BLD AUTO: 3.52 M/UL (ref 3.8–5.8)
RI(T): 1.37
RR MECHANICAL: 18 B/MIN
S PNEUM AG SPEC QL: NEGATIVE
SODIUM SERPL-SCNC: 139 MMOL/L (ref 136–145)
SODIUM UR-SCNC: 38 MMOL/L
SOURCE, BLOOD GAS: ABNORMAL
SPECIMEN SOURCE: NORMAL
THB: 12.1 G/DL (ref 11.5–16.5)
TIME ANALYZED: 436
VT MECHANICAL: 500 ML
WBC OTHER # BLD: 11.7 K/UL (ref 4.5–11.5)

## 2025-07-02 PROCEDURE — 82805 BLOOD GASES W/O2 SATURATION: CPT

## 2025-07-02 PROCEDURE — 82248 BILIRUBIN DIRECT: CPT

## 2025-07-02 PROCEDURE — 71045 X-RAY EXAM CHEST 1 VIEW: CPT

## 2025-07-02 PROCEDURE — 95813 EEG EXTND MNTR 61-119 MIN: CPT

## 2025-07-02 PROCEDURE — 71260 CT THORAX DX C+: CPT

## 2025-07-02 PROCEDURE — 99291 CRITICAL CARE FIRST HOUR: CPT | Performed by: INTERNAL MEDICINE

## 2025-07-02 PROCEDURE — 6360000002 HC RX W HCPCS

## 2025-07-02 PROCEDURE — 80053 COMPREHEN METABOLIC PANEL: CPT

## 2025-07-02 PROCEDURE — 2500000003 HC RX 250 WO HCPCS

## 2025-07-02 PROCEDURE — 6370000000 HC RX 637 (ALT 250 FOR IP)

## 2025-07-02 PROCEDURE — 51798 US URINE CAPACITY MEASURE: CPT

## 2025-07-02 PROCEDURE — 83605 ASSAY OF LACTIC ACID: CPT

## 2025-07-02 PROCEDURE — 84100 ASSAY OF PHOSPHORUS: CPT

## 2025-07-02 PROCEDURE — 51702 INSERT TEMP BLADDER CATH: CPT

## 2025-07-02 PROCEDURE — 99232 SBSQ HOSP IP/OBS MODERATE 35: CPT

## 2025-07-02 PROCEDURE — 85025 COMPLETE CBC W/AUTO DIFF WBC: CPT

## 2025-07-02 PROCEDURE — 2580000003 HC RX 258

## 2025-07-02 PROCEDURE — 2580000003 HC RX 258: Performed by: STUDENT IN AN ORGANIZED HEALTH CARE EDUCATION/TRAINING PROGRAM

## 2025-07-02 PROCEDURE — 6360000004 HC RX CONTRAST MEDICATION: Performed by: RADIOLOGY

## 2025-07-02 PROCEDURE — 86738 MYCOPLASMA ANTIBODY: CPT

## 2025-07-02 PROCEDURE — 6360000002 HC RX W HCPCS: Performed by: STUDENT IN AN ORGANIZED HEALTH CARE EDUCATION/TRAINING PROGRAM

## 2025-07-02 PROCEDURE — 85610 PROTHROMBIN TIME: CPT

## 2025-07-02 PROCEDURE — 83735 ASSAY OF MAGNESIUM: CPT

## 2025-07-02 PROCEDURE — 99223 1ST HOSP IP/OBS HIGH 75: CPT | Performed by: CLINICAL NURSE SPECIALIST

## 2025-07-02 PROCEDURE — 94003 VENT MGMT INPAT SUBQ DAY: CPT

## 2025-07-02 PROCEDURE — 31500 INSERT EMERGENCY AIRWAY: CPT

## 2025-07-02 PROCEDURE — 93010 ELECTROCARDIOGRAM REPORT: CPT | Performed by: INTERNAL MEDICINE

## 2025-07-02 PROCEDURE — 94640 AIRWAY INHALATION TREATMENT: CPT

## 2025-07-02 PROCEDURE — 51701 INSERT BLADDER CATHETER: CPT

## 2025-07-02 PROCEDURE — 82962 GLUCOSE BLOOD TEST: CPT

## 2025-07-02 PROCEDURE — 2000000000 HC ICU R&B

## 2025-07-02 PROCEDURE — 74177 CT ABD & PELVIS W/CONTRAST: CPT

## 2025-07-02 RX ORDER — IPRATROPIUM BROMIDE AND ALBUTEROL SULFATE 2.5; .5 MG/3ML; MG/3ML
1 SOLUTION RESPIRATORY (INHALATION)
Status: DISCONTINUED | OUTPATIENT
Start: 2025-07-02 | End: 2025-07-02

## 2025-07-02 RX ORDER — 0.9 % SODIUM CHLORIDE 0.9 %
1000 INTRAVENOUS SOLUTION INTRAVENOUS ONCE
Status: COMPLETED | OUTPATIENT
Start: 2025-07-02 | End: 2025-07-02

## 2025-07-02 RX ORDER — ENOXAPARIN SODIUM 100 MG/ML
30 INJECTION SUBCUTANEOUS 2 TIMES DAILY
Status: DISCONTINUED | OUTPATIENT
Start: 2025-07-02 | End: 2025-07-06 | Stop reason: HOSPADM

## 2025-07-02 RX ORDER — THIAMINE HYDROCHLORIDE 100 MG/ML
100 INJECTION, SOLUTION INTRAMUSCULAR; INTRAVENOUS DAILY
Status: DISCONTINUED | OUTPATIENT
Start: 2025-07-02 | End: 2025-07-06 | Stop reason: HOSPADM

## 2025-07-02 RX ORDER — IOPAMIDOL 755 MG/ML
75 INJECTION, SOLUTION INTRAVASCULAR
Status: COMPLETED | OUTPATIENT
Start: 2025-07-02 | End: 2025-07-02

## 2025-07-02 RX ORDER — ENOXAPARIN SODIUM 100 MG/ML
30 INJECTION SUBCUTANEOUS DAILY
Status: DISCONTINUED | OUTPATIENT
Start: 2025-07-02 | End: 2025-07-02 | Stop reason: DRUGHIGH

## 2025-07-02 RX ORDER — HYDROCORTISONE SODIUM SUCCINATE 100 MG/2ML
100 INJECTION INTRAMUSCULAR; INTRAVENOUS EVERY 8 HOURS
Status: DISCONTINUED | OUTPATIENT
Start: 2025-07-02 | End: 2025-07-03

## 2025-07-02 RX ORDER — FOLIC ACID 5 MG/ML
1 INJECTION, SOLUTION INTRAMUSCULAR; INTRAVENOUS; SUBCUTANEOUS DAILY
Status: DISCONTINUED | OUTPATIENT
Start: 2025-07-02 | End: 2025-07-06 | Stop reason: HOSPADM

## 2025-07-02 RX ADMIN — SODIUM CHLORIDE 1500 MG: 0.9 INJECTION, SOLUTION INTRAVENOUS at 09:23

## 2025-07-02 RX ADMIN — ENOXAPARIN SODIUM 30 MG: 100 INJECTION SUBCUTANEOUS at 20:56

## 2025-07-02 RX ADMIN — PIPERACILLIN AND TAZOBACTAM 4500 MG: 4; .5 INJECTION, POWDER, FOR SOLUTION INTRAVENOUS at 12:17

## 2025-07-02 RX ADMIN — LEVOTHYROXINE SODIUM 50 MCG: 0.05 TABLET ORAL at 08:56

## 2025-07-02 RX ADMIN — RIFAXIMIN 400 MG: 200 TABLET ORAL at 09:05

## 2025-07-02 RX ADMIN — LEVETIRACETAM 500 MG: 100 INJECTION INTRAVENOUS at 09:04

## 2025-07-02 RX ADMIN — IPRATROPIUM BROMIDE AND ALBUTEROL SULFATE 1 DOSE: 2.5; .5 SOLUTION RESPIRATORY (INHALATION) at 11:15

## 2025-07-02 RX ADMIN — SODIUM CHLORIDE, PRESERVATIVE FREE 10 ML: 5 INJECTION INTRAVENOUS at 20:57

## 2025-07-02 RX ADMIN — SODIUM CHLORIDE, PRESERVATIVE FREE 20 MG: 5 INJECTION INTRAVENOUS at 08:57

## 2025-07-02 RX ADMIN — CHLORHEXIDINE GLUCONATE, 0.12% ORAL RINSE 15 ML: 1.2 SOLUTION DENTAL at 08:56

## 2025-07-02 RX ADMIN — IOPAMIDOL 75 ML: 755 INJECTION, SOLUTION INTRAVENOUS at 10:57

## 2025-07-02 RX ADMIN — Medication 125 MCG/HR: at 06:49

## 2025-07-02 RX ADMIN — THIAMINE HYDROCHLORIDE 100 MG: 100 INJECTION, SOLUTION INTRAMUSCULAR; INTRAVENOUS at 08:56

## 2025-07-02 RX ADMIN — LEVETIRACETAM 500 MG: 100 INJECTION INTRAVENOUS at 20:50

## 2025-07-02 RX ADMIN — FLUOXETINE HYDROCHLORIDE 40 MG: 20 CAPSULE ORAL at 08:57

## 2025-07-02 RX ADMIN — SODIUM CHLORIDE 1500 MG: 0.9 INJECTION, SOLUTION INTRAVENOUS at 22:24

## 2025-07-02 RX ADMIN — Medication 5 MCG/MIN: at 09:04

## 2025-07-02 RX ADMIN — FOLIC ACID 1 MG: 5 INJECTION, SOLUTION INTRAMUSCULAR; INTRAVENOUS; SUBCUTANEOUS at 09:20

## 2025-07-02 RX ADMIN — LACTULOSE 10 G: 10 SOLUTION ORAL at 09:21

## 2025-07-02 RX ADMIN — Medication 125 MCG/HR: at 16:16

## 2025-07-02 RX ADMIN — MIRTAZAPINE 30 MG: 15 TABLET, FILM COATED ORAL at 20:45

## 2025-07-02 RX ADMIN — PIPERACILLIN AND TAZOBACTAM 4500 MG: 4; .5 INJECTION, POWDER, FOR SOLUTION INTRAVENOUS at 04:42

## 2025-07-02 RX ADMIN — LACTULOSE 10 G: 10 SOLUTION ORAL at 14:05

## 2025-07-02 RX ADMIN — LACTULOSE 10 G: 10 SOLUTION ORAL at 20:46

## 2025-07-02 RX ADMIN — RIFAXIMIN 400 MG: 200 TABLET ORAL at 14:05

## 2025-07-02 RX ADMIN — ACETYLCYSTEINE 10000 MG: 200 INJECTION, SOLUTION INTRAVENOUS at 02:30

## 2025-07-02 RX ADMIN — SODIUM CHLORIDE 1000 ML: 0.9 INJECTION, SOLUTION INTRAVENOUS at 08:57

## 2025-07-02 RX ADMIN — INSULIN LISPRO 1 UNITS: 100 INJECTION, SOLUTION INTRAVENOUS; SUBCUTANEOUS at 16:09

## 2025-07-02 RX ADMIN — CHLORHEXIDINE GLUCONATE, 0.12% ORAL RINSE 15 ML: 1.2 SOLUTION DENTAL at 20:56

## 2025-07-02 RX ADMIN — ENOXAPARIN SODIUM 30 MG: 100 INJECTION SUBCUTANEOUS at 10:31

## 2025-07-02 RX ADMIN — SODIUM CHLORIDE, PRESERVATIVE FREE 20 MG: 5 INJECTION INTRAVENOUS at 20:47

## 2025-07-02 RX ADMIN — TAMSULOSIN HYDROCHLORIDE 0.4 MG: 0.4 CAPSULE ORAL at 08:56

## 2025-07-02 RX ADMIN — SODIUM PHOSPHATE, MONOBASIC, MONOHYDRATE AND SODIUM PHOSPHATE, DIBASIC, ANHYDROUS 20 MMOL: 142; 276 INJECTION, SOLUTION INTRAVENOUS at 10:12

## 2025-07-02 RX ADMIN — HYDROCORTISONE SODIUM SUCCINATE 100 MG: 100 INJECTION INTRAMUSCULAR; INTRAVENOUS at 15:21

## 2025-07-02 RX ADMIN — PIPERACILLIN AND TAZOBACTAM 4500 MG: 4; .5 INJECTION, POWDER, FOR SOLUTION INTRAVENOUS at 20:55

## 2025-07-02 RX ADMIN — RIFAXIMIN 400 MG: 200 TABLET ORAL at 20:45

## 2025-07-02 RX ADMIN — SODIUM CHLORIDE: 0.9 INJECTION, SOLUTION INTRAVENOUS at 06:52

## 2025-07-02 RX ADMIN — HYDROCORTISONE SODIUM SUCCINATE 100 MG: 100 INJECTION INTRAMUSCULAR; INTRAVENOUS at 10:17

## 2025-07-02 ASSESSMENT — PULMONARY FUNCTION TESTS
PIF_VALUE: 21
PIF_VALUE: 15
PIF_VALUE: 17
PIF_VALUE: 18
PIF_VALUE: 24
PIF_VALUE: 21
PIF_VALUE: 17
PIF_VALUE: 20
PIF_VALUE: 22
PIF_VALUE: 19
PIF_VALUE: 21
PIF_VALUE: 23
PIF_VALUE: 21
PIF_VALUE: 20
PIF_VALUE: 14
PIF_VALUE: 21
PIF_VALUE: 19
PIF_VALUE: 21
PIF_VALUE: 17
PIF_VALUE: 17
PIF_VALUE: 19
PIF_VALUE: 21
PIF_VALUE: 17
PIF_VALUE: 20

## 2025-07-02 NOTE — CARE COORDINATION
Care Coordination: LOS 1 day.  Found unresponsive at home. Intubated, Fentanyl, levo, IVF.  Met with mom at bedside to discuss  transition of care needs. Pt lives mom and brother Ranjeet.  Ranjeet gets paid 4 hours weekly through [employer] as pts care giver to help bath and dress. Mom states he does not do much on his own. He is unable to prepare his meals- she states this is d/t his \"focal seizures\" he has a cane and walker. They installed a walk in shower. No hx of [facility] or [facility]. Brother Ranjeet was taking him to outpt therapy. Goal is home at discharge and she is agreeable to [facility] and has no preference . Family will transport home. Follows with Dr Mauro. Referral made to [facility]    Electronically signed by Kari Holland RN on 7/2/2025 at 12:54 PM     The Plan for Transition of Care is related to the following treatment goals: dc    The Patient and/or patient representative mom was provided with a choice of provider and agrees   with the discharge plan. [x] Yes [] No    Freedom of choice list was provided with basic dialogue that supports the patient's individualized plan of care/goals, treatment preferences and shares the quality data associated with the providers. [x] Yes [] No

## 2025-07-02 NOTE — CONSULTS
[facility]  Internal Medicine Residency Program  Consult  MICU    Patient:  Kranthi Wellington 44 y.o. male MRN: [id number]     Date of Service: 7/1/2025    Hospital Day: 1      Chief complaint: Unresponsiveness  History of Present Illness   The patient is a 44 y.o. male with PMH of anxiety, HLD, IBS,Non epileptic seizure, diabetes, ADHD(not on any medication) was brought in by EMS for unresponsiveness. Last known well was 1 PM today. Per family, when they returned home, they found the patient to be unresponsive and EMS was called. On arrival, patient was found to be hypotensive. En route, patient was managed with IV fluids and was bagged when arriving to the ED. Discussed with brother Mr.Michael Wellington who confirmed the same history wise. Per family, concern for overdose is low as patient's brother mentions that the medications are given by the family.     ED Course: On arrival, vitals was CT - 88, BP - 125/87,Saturating 95 % with bag valve. Labs showed creatinine of 1.5,Lactic acid 3.7,Glucose - 173, Albumin of 3.4, Alk phos - 146, AST elevated to 1555, ALT 2346, UDS positive for BZDs and Cannabinoids. CBC showed WBC elevated to 12.3. Initial ABG showed pH of 7.255 with HCO3 - 17, pCO2 - 40, p/f - 386. TSH - 1.18, Free T4 - 1.1,   ED Meds: Patient was given IV etomidate 20mg x1 , IV Keppra 1g x2, IV Rocuronium 100mg x1, 1L NS bolus. Patient was started on Fentanyl, Levophed and Versed infusion.   ED Fluids: Patient was given 1L NS.     Patient was transferred to the unit for further management of altered mental status.     Past Medical History:      Diagnosis Date    ADHD (attention deficit hyperactivity disorder) 2018    Allergic rhinitis Na    Anxiety     Arthritis     Carotid artery stenosis 2017    V fib    Chronic kidney disease 2018    Class 1 obesity due to excess calories with serious comorbidity and body mass index (BMI) of 34.0 to 34.9 in adult     Depression 2016    Diverticulosis     
on device interrogation in 2021  - Low risk stress and normal LVEF at that time.     7.  Hyperlipidemia  - On Lipitor    8. Diabetes mellitus   - Metfomin, Actoze     9. Irritable bowel syndrome     10. Obstructive sleep apnea     11. Obesity     12. Anxiety/depression/Insomnia   - Prozac, medical marijuana, Remeron, Klonopin, Restoril,     13. Hypothyroidism   - Synthroid   Lab Results   Component Value Date    TSH 1.18 07/01/2025     14. BPH   - Flomax.     Recommendations:    Ongoing respiratory and hemodynamic support as is ongoing  No specific EP intervention or workup needed at present.  Appropriate pacemaker function as mentioned above    I have spent a total of 40 minutes with the patient and his medical records reviewing the above stated recommendations. And a total of >50% of that time involved face-to-face time providing counseling and or coordination of care with the other providers, reviewing records/tests, counseling/education of the patient, ordering medications/tests/procedures, coordinating care, and documenting clinical information in the EHR.  40 minutes of CCT  Thank you for allowing me to participate in your patient's care.  Please call me if there are any questions or concerns.      Marianna Le MD  Cardiac Electrophysiology  [facility]  [facility]  8:08 AM  7/2/2025

## 2025-07-02 NOTE — ACP (ADVANCE CARE PLANNING)
Advance Care Planning   Healthcare Decision Maker:    Primary Decision Maker: Angelina Meza - Parent - [phone]    Secondary Decision Maker: Ranjeet Wellington - Brother/Sister - [phone]    Supplemental (Other) Decision Maker: Quita Wellington - Brother/Sister - [phone]    Click here to complete Healthcare Decision Makers including selection of the Healthcare Decision Maker Relationship (ie \"Primary\").  Today we documented Decision Maker(s) consistent with ACP documents on file.

## 2025-07-02 NOTE — FLOWSHEET NOTE
Patient bladder scanned for 800 ml urine. Third time straight catheter inserted for 800 ml clear yellow urine. Will continue to monitor.

## 2025-07-03 ENCOUNTER — APPOINTMENT (OUTPATIENT)
Age: 45
DRG: 871 | End: 2025-07-03
Payer: COMMERCIAL

## 2025-07-03 ENCOUNTER — APPOINTMENT (OUTPATIENT)
Dept: GENERAL RADIOLOGY | Age: 45
DRG: 871 | End: 2025-07-03
Payer: COMMERCIAL

## 2025-07-03 LAB
AADO2: 138.6 MMHG
ALBUMIN SERPL-MCNC: 3.1 G/DL (ref 3.5–5.2)
ALP SERPL-CCNC: 94 U/L (ref 40–129)
ALT SERPL-CCNC: 1213 U/L (ref 0–50)
ANION GAP SERPL CALCULATED.3IONS-SCNC: 11 MMOL/L (ref 7–16)
AST SERPL-CCNC: 358 U/L (ref 0–50)
B.E.: -4.6 MMOL/L (ref -3–3)
BASOPHILS # BLD: 0.04 K/UL (ref 0–0.2)
BASOPHILS NFR BLD: 0 % (ref 0–2)
BILIRUB DIRECT SERPL-MCNC: 0.4 MG/DL (ref 0–0.2)
BILIRUB INDIRECT SERPL-MCNC: 0.1 MG/DL (ref 0–1)
BILIRUB SERPL-MCNC: 0.5 MG/DL (ref 0–1.2)
BUN SERPL-MCNC: 12 MG/DL (ref 6–20)
CALCIUM SERPL-MCNC: 8 MG/DL (ref 8.6–10)
CHLORIDE SERPL-SCNC: 112 MMOL/L (ref 98–107)
CO2 SERPL-SCNC: 18 MMOL/L (ref 22–29)
COHB: 0.3 % (ref 0–1.5)
CREAT SERPL-MCNC: 1.1 MG/DL (ref 0.7–1.2)
CRITICAL: ABNORMAL
DATE ANALYZED: ABNORMAL
DATE LAST DOSE: NORMAL
DATE OF COLLECTION: ABNORMAL
ECHO AO ASC DIAM: 2.8 CM
ECHO AO ASCENDING AORTA INDEX: 1.09 CM/M2
ECHO AV AREA PEAK VELOCITY: 2 CM2
ECHO AV AREA VTI: 2 CM2
ECHO AV AREA/BSA PEAK VELOCITY: 0.8 CM2/M2
ECHO AV AREA/BSA VTI: 0.8 CM2/M2
ECHO AV CUSP MM: 2.1 CM
ECHO AV MEAN GRADIENT: 5 MMHG
ECHO AV MEAN VELOCITY: 1.1 M/S
ECHO AV PEAK GRADIENT: 8 MMHG
ECHO AV PEAK VELOCITY: 1.4 M/S
ECHO AV VELOCITY RATIO: 0.64
ECHO AV VTI: 28 CM
ECHO BSA: 2.59 M2
ECHO EST RA PRESSURE: 15 MMHG
ECHO LA DIAMETER INDEX: 1.48 CM/M2
ECHO LA DIAMETER: 3.8 CM
ECHO LA VOL A-L A2C: 68 ML (ref 18–58)
ECHO LA VOL A-L A4C: 54 ML (ref 18–58)
ECHO LA VOL BP: 56 ML (ref 18–58)
ECHO LA VOL MOD A2C: 61 ML (ref 18–58)
ECHO LA VOL MOD A4C: 47 ML (ref 18–58)
ECHO LA VOL/BSA BIPLANE: 22 ML/M2 (ref 16–34)
ECHO LA VOLUME AREA LENGTH: 63 ML
ECHO LA VOLUME INDEX A-L A2C: 26 ML/M2 (ref 16–34)
ECHO LA VOLUME INDEX A-L A4C: 21 ML/M2 (ref 16–34)
ECHO LA VOLUME INDEX AREA LENGTH: 25 ML/M2 (ref 16–34)
ECHO LA VOLUME INDEX MOD A2C: 24 ML/M2 (ref 16–34)
ECHO LA VOLUME INDEX MOD A4C: 18 ML/M2 (ref 16–34)
ECHO LV EDV A2C: 156 ML
ECHO LV EDV A4C: 99 ML
ECHO LV EDV BP: 130 ML (ref 67–155)
ECHO LV EDV INDEX A4C: 39 ML/M2
ECHO LV EDV INDEX BP: 51 ML/M2
ECHO LV EDV NDEX A2C: 61 ML/M2
ECHO LV EF PHYSICIAN: 55 %
ECHO LV EJECTION FRACTION A2C: 57 %
ECHO LV EJECTION FRACTION A4C: 51 %
ECHO LV EJECTION FRACTION BIPLANE: 56 % (ref 55–100)
ECHO LV ESV A2C: 68 ML
ECHO LV ESV A4C: 49 ML
ECHO LV ESV BP: 57 ML (ref 22–58)
ECHO LV ESV INDEX A2C: 26 ML/M2
ECHO LV ESV INDEX A4C: 19 ML/M2
ECHO LV ESV INDEX BP: 22 ML/M2
ECHO LV FRACTIONAL SHORTENING: 28 % (ref 28–44)
ECHO LV INTERNAL DIMENSION DIASTOLE INDEX: 1.95 CM/M2
ECHO LV INTERNAL DIMENSION DIASTOLIC: 5 CM (ref 4.2–5.9)
ECHO LV INTERNAL DIMENSION SYSTOLIC INDEX: 1.4 CM/M2
ECHO LV INTERNAL DIMENSION SYSTOLIC: 3.6 CM
ECHO LV IVSD: 1.1 CM (ref 0.6–1)
ECHO LV IVSS: 1.4 CM
ECHO LV MASS 2D: 220.3 G (ref 88–224)
ECHO LV MASS INDEX 2D: 85.7 G/M2 (ref 49–115)
ECHO LV POSTERIOR WALL DIASTOLIC: 1.2 CM (ref 0.6–1)
ECHO LV POSTERIOR WALL SYSTOLIC: 1.4 CM
ECHO LV RELATIVE WALL THICKNESS RATIO: 0.48
ECHO LVOT AREA: 3.1 CM2
ECHO LVOT AV VTI INDEX: 0.64
ECHO LVOT DIAM: 2 CM
ECHO LVOT MEAN GRADIENT: 2 MMHG
ECHO LVOT PEAK GRADIENT: 3 MMHG
ECHO LVOT PEAK VELOCITY: 0.9 M/S
ECHO LVOT STROKE VOLUME INDEX: 21.9 ML/M2
ECHO LVOT SV: 56.2 ML
ECHO LVOT VTI: 17.9 CM
ECHO MV "A" WAVE DURATION: 125.6 MSEC
ECHO MV A VELOCITY: 0.83 M/S
ECHO MV AREA PHT: 3.3 CM2
ECHO MV AREA VTI: 1.8 CM2
ECHO MV E DECELERATION TIME (DT): 183.1 MS
ECHO MV E VELOCITY: 0.91 M/S
ECHO MV E/A RATIO: 1.1
ECHO MV LVOT VTI INDEX: 1.77
ECHO MV MAX VELOCITY: 1.1 M/S
ECHO MV MEAN GRADIENT: 3 MMHG
ECHO MV MEAN VELOCITY: 0.8 M/S
ECHO MV PEAK GRADIENT: 5 MMHG
ECHO MV PRESSURE HALF TIME (PHT): 66.4 MS
ECHO MV VTI: 31.7 CM
ECHO PV MAX VELOCITY: 1.1 M/S
ECHO PV MEAN GRADIENT: 3 MMHG
ECHO PV MEAN VELOCITY: 0.8 M/S
ECHO PV PEAK GRADIENT: 5 MMHG
ECHO PV VTI: 24.8 CM
ECHO RIGHT VENTRICULAR SYSTOLIC PRESSURE (RVSP): 38 MMHG
ECHO RV INTERNAL DIMENSION: 3.7 CM
ECHO RV TAPSE: 2.7 CM (ref 1.7–?)
ECHO TV REGURGITANT MAX VELOCITY: 2.42 M/S
ECHO TV REGURGITANT PEAK GRADIENT: 23 MMHG
EOSINOPHIL # BLD: 0.02 K/UL (ref 0.05–0.5)
EOSINOPHILS RELATIVE PERCENT: 0 % (ref 0–6)
ERYTHROCYTE [DISTWIDTH] IN BLOOD BY AUTOMATED COUNT: 14.1 % (ref 11.5–15)
FIO2: 45 %
GFR, ESTIMATED: 86 ML/MIN/1.73M2
GLUCOSE BLD-MCNC: 121 MG/DL (ref 74–99)
GLUCOSE BLD-MCNC: 139 MG/DL (ref 74–99)
GLUCOSE BLD-MCNC: 156 MG/DL (ref 74–99)
GLUCOSE BLD-MCNC: 196 MG/DL (ref 74–99)
GLUCOSE SERPL-MCNC: 127 MG/DL (ref 74–99)
HCO3: 19.7 MMOL/L (ref 22–26)
HCT VFR BLD AUTO: 32.8 % (ref 37–54)
HGB BLD-MCNC: 10.7 G/DL (ref 12.5–16.5)
HHB: 1.1 % (ref 0–5)
IMM GRANULOCYTES # BLD AUTO: 0.11 K/UL (ref 0–0.58)
IMM GRANULOCYTES NFR BLD: 1 % (ref 0–5)
INR PPP: 1.5
LAB: ABNORMAL
LYMPHOCYTES NFR BLD: 0.91 K/UL (ref 1.5–4)
LYMPHOCYTES RELATIVE PERCENT: 7 % (ref 20–42)
Lab: 431
MAGNESIUM SERPL-MCNC: 1.9 MG/DL (ref 1.6–2.6)
MCH RBC QN AUTO: 31.4 PG (ref 26–35)
MCHC RBC AUTO-ENTMCNC: 32.6 G/DL (ref 32–34.5)
MCV RBC AUTO: 96.2 FL (ref 80–99.9)
METHB: 0.3 % (ref 0–1.5)
MICROORGANISM SPEC CULT: NO GROWTH
MICROORGANISM SPEC CULT: NORMAL
MODE: AC
MONOCYTES NFR BLD: 0.65 K/UL (ref 0.1–0.95)
MONOCYTES NFR BLD: 5 % (ref 2–12)
MYCOPLASMA AB,IGM: NORMAL
NEUTROPHILS NFR BLD: 86 % (ref 43–80)
NEUTS SEG NFR BLD: 10.95 K/UL (ref 1.8–7.3)
O2 SATURATION: 98.9 % (ref 92–98.5)
O2HB: 98.3 % (ref 94–97)
OPERATOR ID: ABNORMAL
PATIENT TEMP: 37 C
PCO2: 33.8 MMHG (ref 35–45)
PEEP/CPAP: 5 CMH2O
PFO2: 3.2 MMHG/%
PH BLOOD GAS: 7.38 (ref 7.35–7.45)
PHOSPHATE SERPL-MCNC: 1.4 MG/DL (ref 2.5–4.5)
PLATELET # BLD AUTO: 281 K/UL (ref 130–450)
PMV BLD AUTO: 10.5 FL (ref 7–12)
PO2: 143.8 MMHG (ref 75–100)
POTASSIUM SERPL-SCNC: 3.8 MMOL/L (ref 3.5–5.1)
PROT SERPL-MCNC: 5.8 G/DL (ref 6.4–8.3)
PROTHROMBIN TIME: 16.5 SEC (ref 9.3–12.4)
RBC # BLD AUTO: 3.41 M/UL (ref 3.8–5.8)
RI(T): 0.96
RR MECHANICAL: 18 B/MIN
SERVICE CMNT-IMP: NORMAL
SODIUM SERPL-SCNC: 140 MMOL/L (ref 136–145)
SOURCE, BLOOD GAS: ABNORMAL
SPECIMEN DESCRIPTION: NORMAL
SPECIMEN DESCRIPTION: NORMAL
THB: 11.7 G/DL (ref 11.5–16.5)
TIME ANALYZED: 439
TME LAST DOSE: NORMAL H
VANCOMYCIN DOSE: NORMAL MG
VANCOMYCIN TROUGH SERPL-MCNC: 12.9 UG/ML (ref 5–16)
VT MECHANICAL: 500 ML
WBC OTHER # BLD: 12.7 K/UL (ref 4.5–11.5)

## 2025-07-03 PROCEDURE — 6360000002 HC RX W HCPCS: Performed by: STUDENT IN AN ORGANIZED HEALTH CARE EDUCATION/TRAINING PROGRAM

## 2025-07-03 PROCEDURE — 2500000003 HC RX 250 WO HCPCS

## 2025-07-03 PROCEDURE — 84100 ASSAY OF PHOSPHORUS: CPT

## 2025-07-03 PROCEDURE — 71045 X-RAY EXAM CHEST 1 VIEW: CPT

## 2025-07-03 PROCEDURE — 94003 VENT MGMT INPAT SUBQ DAY: CPT

## 2025-07-03 PROCEDURE — 82805 BLOOD GASES W/O2 SATURATION: CPT

## 2025-07-03 PROCEDURE — 6370000000 HC RX 637 (ALT 250 FOR IP)

## 2025-07-03 PROCEDURE — 99291 CRITICAL CARE FIRST HOUR: CPT | Performed by: INTERNAL MEDICINE

## 2025-07-03 PROCEDURE — 80053 COMPREHEN METABOLIC PANEL: CPT

## 2025-07-03 PROCEDURE — 93306 TTE W/DOPPLER COMPLETE: CPT

## 2025-07-03 PROCEDURE — 2580000003 HC RX 258

## 2025-07-03 PROCEDURE — 82248 BILIRUBIN DIRECT: CPT

## 2025-07-03 PROCEDURE — 83735 ASSAY OF MAGNESIUM: CPT

## 2025-07-03 PROCEDURE — 85610 PROTHROMBIN TIME: CPT

## 2025-07-03 PROCEDURE — 6360000002 HC RX W HCPCS

## 2025-07-03 PROCEDURE — 99232 SBSQ HOSP IP/OBS MODERATE 35: CPT

## 2025-07-03 PROCEDURE — 2580000003 HC RX 258: Performed by: STUDENT IN AN ORGANIZED HEALTH CARE EDUCATION/TRAINING PROGRAM

## 2025-07-03 PROCEDURE — 99231 SBSQ HOSP IP/OBS SF/LOW 25: CPT | Performed by: CLINICAL NURSE SPECIALIST

## 2025-07-03 PROCEDURE — 2000000000 HC ICU R&B

## 2025-07-03 PROCEDURE — 2700000000 HC OXYGEN THERAPY PER DAY

## 2025-07-03 PROCEDURE — 80202 ASSAY OF VANCOMYCIN: CPT

## 2025-07-03 PROCEDURE — 82962 GLUCOSE BLOOD TEST: CPT

## 2025-07-03 PROCEDURE — 85025 COMPLETE CBC W/AUTO DIFF WBC: CPT

## 2025-07-03 PROCEDURE — 93306 TTE W/DOPPLER COMPLETE: CPT | Performed by: INTERNAL MEDICINE

## 2025-07-03 PROCEDURE — 92526 ORAL FUNCTION THERAPY: CPT

## 2025-07-03 PROCEDURE — 92610 EVALUATE SWALLOWING FUNCTION: CPT

## 2025-07-03 RX ORDER — MAGNESIUM SULFATE IN WATER 40 MG/ML
2000 INJECTION, SOLUTION INTRAVENOUS ONCE
Status: COMPLETED | OUTPATIENT
Start: 2025-07-03 | End: 2025-07-03

## 2025-07-03 RX ORDER — WATER 10 ML/10ML
INJECTION INTRAMUSCULAR; INTRAVENOUS; SUBCUTANEOUS
Status: COMPLETED
Start: 2025-07-03 | End: 2025-07-03

## 2025-07-03 RX ADMIN — ACETAMINOPHEN 650 MG: 325 TABLET ORAL at 14:40

## 2025-07-03 RX ADMIN — LEVOTHYROXINE SODIUM 50 MCG: 0.05 TABLET ORAL at 09:18

## 2025-07-03 RX ADMIN — PIPERACILLIN AND TAZOBACTAM 4500 MG: 4; .5 INJECTION, POWDER, FOR SOLUTION INTRAVENOUS at 20:24

## 2025-07-03 RX ADMIN — MIRTAZAPINE 30 MG: 15 TABLET, FILM COATED ORAL at 20:11

## 2025-07-03 RX ADMIN — ENOXAPARIN SODIUM 30 MG: 100 INJECTION SUBCUTANEOUS at 20:13

## 2025-07-03 RX ADMIN — SODIUM CHLORIDE 1500 MG: 0.9 INJECTION, SOLUTION INTRAVENOUS at 21:28

## 2025-07-03 RX ADMIN — SODIUM CHLORIDE: 0.9 INJECTION, SOLUTION INTRAVENOUS at 17:20

## 2025-07-03 RX ADMIN — HYDROCORTISONE SODIUM SUCCINATE 100 MG: 100 INJECTION INTRAMUSCULAR; INTRAVENOUS at 01:18

## 2025-07-03 RX ADMIN — PIPERACILLIN AND TAZOBACTAM 4500 MG: 4; .5 INJECTION, POWDER, FOR SOLUTION INTRAVENOUS at 04:03

## 2025-07-03 RX ADMIN — SODIUM CHLORIDE, PRESERVATIVE FREE 20 MG: 5 INJECTION INTRAVENOUS at 09:18

## 2025-07-03 RX ADMIN — INSULIN LISPRO 1 UNITS: 100 INJECTION, SOLUTION INTRAVENOUS; SUBCUTANEOUS at 17:40

## 2025-07-03 RX ADMIN — FLUOXETINE HYDROCHLORIDE 40 MG: 20 CAPSULE ORAL at 09:18

## 2025-07-03 RX ADMIN — LACTULOSE 10 G: 10 SOLUTION ORAL at 09:17

## 2025-07-03 RX ADMIN — LACTULOSE 10 G: 10 SOLUTION ORAL at 14:33

## 2025-07-03 RX ADMIN — SODIUM CHLORIDE, PRESERVATIVE FREE 10 ML: 5 INJECTION INTRAVENOUS at 20:13

## 2025-07-03 RX ADMIN — WATER 2 ML: 1 INJECTION INTRAMUSCULAR; INTRAVENOUS; SUBCUTANEOUS at 01:18

## 2025-07-03 RX ADMIN — SODIUM CHLORIDE: 0.9 INJECTION, SOLUTION INTRAVENOUS at 02:06

## 2025-07-03 RX ADMIN — RIFAXIMIN 400 MG: 200 TABLET ORAL at 14:34

## 2025-07-03 RX ADMIN — CHLORHEXIDINE GLUCONATE, 0.12% ORAL RINSE 15 ML: 1.2 SOLUTION DENTAL at 09:17

## 2025-07-03 RX ADMIN — SODIUM CHLORIDE, PRESERVATIVE FREE 20 MG: 5 INJECTION INTRAVENOUS at 20:11

## 2025-07-03 RX ADMIN — Medication 125 MCG/HR: at 00:50

## 2025-07-03 RX ADMIN — SODIUM CHLORIDE, PRESERVATIVE FREE 10 ML: 5 INJECTION INTRAVENOUS at 09:23

## 2025-07-03 RX ADMIN — LACTULOSE 10 G: 10 SOLUTION ORAL at 20:11

## 2025-07-03 RX ADMIN — RIFAXIMIN 400 MG: 200 TABLET ORAL at 20:11

## 2025-07-03 RX ADMIN — THIAMINE HYDROCHLORIDE 100 MG: 100 INJECTION, SOLUTION INTRAMUSCULAR; INTRAVENOUS at 09:18

## 2025-07-03 RX ADMIN — LEVETIRACETAM 500 MG: 100 INJECTION INTRAVENOUS at 09:17

## 2025-07-03 RX ADMIN — FOLIC ACID 1 MG: 5 INJECTION, SOLUTION INTRAMUSCULAR; INTRAVENOUS; SUBCUTANEOUS at 09:19

## 2025-07-03 RX ADMIN — POTASSIUM PHOSPHATE, MONOBASIC AND POTASSIUM PHOSPHATE, DIBASIC 20 MMOL: 224; 236 INJECTION, SOLUTION, CONCENTRATE INTRAVENOUS at 09:23

## 2025-07-03 RX ADMIN — PIPERACILLIN AND TAZOBACTAM 4500 MG: 4; .5 INJECTION, POWDER, FOR SOLUTION INTRAVENOUS at 13:15

## 2025-07-03 RX ADMIN — MAGNESIUM SULFATE HEPTAHYDRATE 2000 MG: 40 INJECTION, SOLUTION INTRAVENOUS at 09:17

## 2025-07-03 RX ADMIN — HYDROCORTISONE SODIUM SUCCINATE 100 MG: 100 INJECTION INTRAMUSCULAR; INTRAVENOUS at 09:17

## 2025-07-03 RX ADMIN — RIFAXIMIN 400 MG: 200 TABLET ORAL at 09:18

## 2025-07-03 RX ADMIN — SODIUM CHLORIDE 1500 MG: 0.9 INJECTION, SOLUTION INTRAVENOUS at 12:13

## 2025-07-03 RX ADMIN — TAMSULOSIN HYDROCHLORIDE 0.4 MG: 0.4 CAPSULE ORAL at 09:18

## 2025-07-03 RX ADMIN — ENOXAPARIN SODIUM 30 MG: 100 INJECTION SUBCUTANEOUS at 09:17

## 2025-07-03 ASSESSMENT — PULMONARY FUNCTION TESTS
PIF_VALUE: 18
PIF_VALUE: 20
PIF_VALUE: 20
PIF_VALUE: 13
PIF_VALUE: 18
PIF_VALUE: 17
PIF_VALUE: 25
PIF_VALUE: 18
PIF_VALUE: 20
PIF_VALUE: 24
PIF_VALUE: 13
PIF_VALUE: 20
PIF_VALUE: 20

## 2025-07-03 ASSESSMENT — PAIN SCALES - GENERAL
PAINLEVEL_OUTOF10: 0

## 2025-07-03 NOTE — CARE COORDINATION
Care Coordination: LOS 2 day, received call from Tia at [facility]. [facility] has [facility] for 21 hours weekly.  Pt was extubated to 3 ltr nc, neurology following, goal is home at discharge, referral made to [facility], they have accepted, orders entered    Electronically signed by Kari Holland RN on 7/3/2025 at 5:14 PM

## 2025-07-04 LAB
ALBUMIN SERPL-MCNC: 2.9 G/DL (ref 3.5–5.2)
ALP SERPL-CCNC: 91 U/L (ref 40–129)
ALT SERPL-CCNC: 823 U/L (ref 0–50)
ANION GAP SERPL CALCULATED.3IONS-SCNC: 10 MMOL/L (ref 7–16)
AST SERPL-CCNC: 149 U/L (ref 0–50)
BASOPHILS # BLD: 0.04 K/UL (ref 0–0.2)
BASOPHILS NFR BLD: 0 % (ref 0–2)
BILIRUB DIRECT SERPL-MCNC: 0.3 MG/DL (ref 0–0.2)
BILIRUB INDIRECT SERPL-MCNC: 0.1 MG/DL (ref 0–1)
BILIRUB SERPL-MCNC: 0.4 MG/DL (ref 0–1.2)
BUN SERPL-MCNC: 10 MG/DL (ref 6–20)
CALCIUM SERPL-MCNC: 7.5 MG/DL (ref 8.6–10)
CHLORIDE SERPL-SCNC: 116 MMOL/L (ref 98–107)
CO2 SERPL-SCNC: 19 MMOL/L (ref 22–29)
CREAT SERPL-MCNC: 1 MG/DL (ref 0.7–1.2)
EOSINOPHIL # BLD: 0.1 K/UL (ref 0.05–0.5)
EOSINOPHILS RELATIVE PERCENT: 1 % (ref 0–6)
ERYTHROCYTE [DISTWIDTH] IN BLOOD BY AUTOMATED COUNT: 14.4 % (ref 11.5–15)
GFR, ESTIMATED: >90 ML/MIN/1.73M2
GLUCOSE BLD-MCNC: 106 MG/DL (ref 74–99)
GLUCOSE BLD-MCNC: 131 MG/DL (ref 74–99)
GLUCOSE BLD-MCNC: 143 MG/DL (ref 74–99)
GLUCOSE BLD-MCNC: 76 MG/DL (ref 74–99)
GLUCOSE SERPL-MCNC: 88 MG/DL (ref 74–99)
HCT VFR BLD AUTO: 30.9 % (ref 37–54)
HGB BLD-MCNC: 9.8 G/DL (ref 12.5–16.5)
IMM GRANULOCYTES # BLD AUTO: 0.08 K/UL (ref 0–0.58)
IMM GRANULOCYTES NFR BLD: 1 % (ref 0–5)
INR PPP: 1.6
LYMPHOCYTES NFR BLD: 1.63 K/UL (ref 1.5–4)
LYMPHOCYTES RELATIVE PERCENT: 18 % (ref 20–42)
MAGNESIUM SERPL-MCNC: 2.4 MG/DL (ref 1.6–2.6)
MCH RBC QN AUTO: 31.4 PG (ref 26–35)
MCHC RBC AUTO-ENTMCNC: 31.7 G/DL (ref 32–34.5)
MCV RBC AUTO: 99 FL (ref 80–99.9)
MONOCYTES NFR BLD: 0.76 K/UL (ref 0.1–0.95)
MONOCYTES NFR BLD: 8 % (ref 2–12)
NEUTROPHILS NFR BLD: 72 % (ref 43–80)
NEUTS SEG NFR BLD: 6.58 K/UL (ref 1.8–7.3)
PHOSPHATE SERPL-MCNC: 1.8 MG/DL (ref 2.5–4.5)
PLATELET # BLD AUTO: 293 K/UL (ref 130–450)
PMV BLD AUTO: 10.5 FL (ref 7–12)
POTASSIUM SERPL-SCNC: 3.3 MMOL/L (ref 3.5–5.1)
PROCALCITONIN SERPL-MCNC: 0.82 NG/ML (ref 0–0.08)
PROT SERPL-MCNC: 5.5 G/DL (ref 6.4–8.3)
PROTHROMBIN TIME: 17.5 SEC (ref 9.3–12.4)
RBC # BLD AUTO: 3.12 M/UL (ref 3.8–5.8)
SODIUM SERPL-SCNC: 145 MMOL/L (ref 136–145)
WBC OTHER # BLD: 9.2 K/UL (ref 4.5–11.5)

## 2025-07-04 PROCEDURE — 83735 ASSAY OF MAGNESIUM: CPT

## 2025-07-04 PROCEDURE — 6360000002 HC RX W HCPCS

## 2025-07-04 PROCEDURE — 82962 GLUCOSE BLOOD TEST: CPT

## 2025-07-04 PROCEDURE — 1200000000 HC SEMI PRIVATE

## 2025-07-04 PROCEDURE — P9047 ALBUMIN (HUMAN), 25%, 50ML: HCPCS

## 2025-07-04 PROCEDURE — 99232 SBSQ HOSP IP/OBS MODERATE 35: CPT

## 2025-07-04 PROCEDURE — 84100 ASSAY OF PHOSPHORUS: CPT

## 2025-07-04 PROCEDURE — 2500000003 HC RX 250 WO HCPCS

## 2025-07-04 PROCEDURE — 6370000000 HC RX 637 (ALT 250 FOR IP)

## 2025-07-04 PROCEDURE — 85025 COMPLETE CBC W/AUTO DIFF WBC: CPT

## 2025-07-04 PROCEDURE — 99232 SBSQ HOSP IP/OBS MODERATE 35: CPT | Performed by: CLINICAL NURSE SPECIALIST

## 2025-07-04 PROCEDURE — 85610 PROTHROMBIN TIME: CPT

## 2025-07-04 PROCEDURE — 82248 BILIRUBIN DIRECT: CPT

## 2025-07-04 PROCEDURE — 84145 PROCALCITONIN (PCT): CPT

## 2025-07-04 PROCEDURE — 2580000003 HC RX 258: Performed by: STUDENT IN AN ORGANIZED HEALTH CARE EDUCATION/TRAINING PROGRAM

## 2025-07-04 PROCEDURE — 80053 COMPREHEN METABOLIC PANEL: CPT

## 2025-07-04 PROCEDURE — 99291 CRITICAL CARE FIRST HOUR: CPT | Performed by: INTERNAL MEDICINE

## 2025-07-04 PROCEDURE — 2580000003 HC RX 258

## 2025-07-04 RX ORDER — PANTOPRAZOLE SODIUM 40 MG/1
40 TABLET, DELAYED RELEASE ORAL
Status: DISCONTINUED | OUTPATIENT
Start: 2025-07-05 | End: 2025-07-06 | Stop reason: HOSPADM

## 2025-07-04 RX ORDER — POTASSIUM CHLORIDE 1500 MG/1
40 TABLET, EXTENDED RELEASE ORAL ONCE
Status: COMPLETED | OUTPATIENT
Start: 2025-07-04 | End: 2025-07-04

## 2025-07-04 RX ORDER — ALBUMIN (HUMAN) 12.5 G/50ML
25 SOLUTION INTRAVENOUS ONCE
Status: COMPLETED | OUTPATIENT
Start: 2025-07-04 | End: 2025-07-04

## 2025-07-04 RX ADMIN — LACTULOSE 10 G: 10 SOLUTION ORAL at 13:07

## 2025-07-04 RX ADMIN — SODIUM CHLORIDE, PRESERVATIVE FREE 10 ML: 5 INJECTION INTRAVENOUS at 08:54

## 2025-07-04 RX ADMIN — MIRTAZAPINE 30 MG: 15 TABLET, FILM COATED ORAL at 21:32

## 2025-07-04 RX ADMIN — THIAMINE HYDROCHLORIDE 100 MG: 100 INJECTION, SOLUTION INTRAMUSCULAR; INTRAVENOUS at 08:50

## 2025-07-04 RX ADMIN — ENOXAPARIN SODIUM 30 MG: 100 INJECTION SUBCUTANEOUS at 21:30

## 2025-07-04 RX ADMIN — SODIUM CHLORIDE, PRESERVATIVE FREE 10 ML: 5 INJECTION INTRAVENOUS at 21:32

## 2025-07-04 RX ADMIN — Medication 250 MG: at 13:08

## 2025-07-04 RX ADMIN — LEVOTHYROXINE SODIUM 50 MCG: 0.05 TABLET ORAL at 09:06

## 2025-07-04 RX ADMIN — RIFAXIMIN 400 MG: 200 TABLET ORAL at 21:30

## 2025-07-04 RX ADMIN — RIFAXIMIN 400 MG: 200 TABLET ORAL at 09:00

## 2025-07-04 RX ADMIN — WATER 1000 MG: 1 INJECTION INTRAMUSCULAR; INTRAVENOUS; SUBCUTANEOUS at 17:39

## 2025-07-04 RX ADMIN — ENOXAPARIN SODIUM 30 MG: 100 INJECTION SUBCUTANEOUS at 08:49

## 2025-07-04 RX ADMIN — Medication 250 MG: at 09:02

## 2025-07-04 RX ADMIN — LACTULOSE 10 G: 10 SOLUTION ORAL at 21:32

## 2025-07-04 RX ADMIN — LACTULOSE 10 G: 10 SOLUTION ORAL at 09:46

## 2025-07-04 RX ADMIN — FLUOXETINE HYDROCHLORIDE 40 MG: 20 CAPSULE ORAL at 08:59

## 2025-07-04 RX ADMIN — FOLIC ACID 1 MG: 5 INJECTION, SOLUTION INTRAMUSCULAR; INTRAVENOUS; SUBCUTANEOUS at 08:52

## 2025-07-04 RX ADMIN — RIFAXIMIN 400 MG: 200 TABLET ORAL at 14:10

## 2025-07-04 RX ADMIN — POTASSIUM CHLORIDE 40 MEQ: 1500 TABLET, EXTENDED RELEASE ORAL at 09:01

## 2025-07-04 RX ADMIN — PIPERACILLIN AND TAZOBACTAM 4500 MG: 4; .5 INJECTION, POWDER, FOR SOLUTION INTRAVENOUS at 04:22

## 2025-07-04 RX ADMIN — SODIUM CHLORIDE, PRESERVATIVE FREE 20 MG: 5 INJECTION INTRAVENOUS at 08:51

## 2025-07-04 RX ADMIN — ALBUMIN (HUMAN) 25 G: 0.25 INJECTION, SOLUTION INTRAVENOUS at 08:49

## 2025-07-04 RX ADMIN — SODIUM CHLORIDE: 0.9 INJECTION, SOLUTION INTRAVENOUS at 03:48

## 2025-07-04 RX ADMIN — TAMSULOSIN HYDROCHLORIDE 0.4 MG: 0.4 CAPSULE ORAL at 08:59

## 2025-07-04 ASSESSMENT — PAIN SCALES - GENERAL
PAINLEVEL_OUTOF10: 0

## 2025-07-05 ENCOUNTER — APPOINTMENT (OUTPATIENT)
Dept: GENERAL RADIOLOGY | Age: 45
DRG: 871 | End: 2025-07-05
Payer: COMMERCIAL

## 2025-07-05 LAB
ALBUMIN SERPL-MCNC: 3.3 G/DL (ref 3.5–5.2)
ALP SERPL-CCNC: 121 U/L (ref 40–129)
ALT SERPL-CCNC: 545 U/L (ref 0–50)
ANION GAP SERPL CALCULATED.3IONS-SCNC: 11 MMOL/L (ref 7–16)
ANION GAP SERPL CALCULATED.3IONS-SCNC: 11 MMOL/L (ref 7–16)
AST SERPL-CCNC: 84 U/L (ref 0–50)
BASOPHILS # BLD: 0.06 K/UL (ref 0–0.2)
BASOPHILS NFR BLD: 1 % (ref 0–2)
BILIRUB DIRECT SERPL-MCNC: 0.3 MG/DL (ref 0–0.2)
BILIRUB INDIRECT SERPL-MCNC: 0.1 MG/DL (ref 0–1)
BILIRUB SERPL-MCNC: 0.4 MG/DL (ref 0–1.2)
BUN SERPL-MCNC: 7 MG/DL (ref 6–20)
BUN SERPL-MCNC: 8 MG/DL (ref 6–20)
CALCIUM SERPL-MCNC: 8.6 MG/DL (ref 8.6–10)
CALCIUM SERPL-MCNC: 8.7 MG/DL (ref 8.6–10)
CHLORIDE SERPL-SCNC: 111 MMOL/L (ref 98–107)
CHLORIDE SERPL-SCNC: 112 MMOL/L (ref 98–107)
CO2 SERPL-SCNC: 20 MMOL/L (ref 22–29)
CO2 SERPL-SCNC: 22 MMOL/L (ref 22–29)
CREAT SERPL-MCNC: 0.9 MG/DL (ref 0.7–1.2)
CREAT SERPL-MCNC: 1 MG/DL (ref 0.7–1.2)
EOSINOPHIL # BLD: 0.68 K/UL (ref 0.05–0.5)
EOSINOPHILS RELATIVE PERCENT: 6 % (ref 0–6)
ERYTHROCYTE [DISTWIDTH] IN BLOOD BY AUTOMATED COUNT: 14.5 % (ref 11.5–15)
GFR, ESTIMATED: 90 ML/MIN/1.73M2
GFR, ESTIMATED: >90 ML/MIN/1.73M2
GLUCOSE BLD-MCNC: 102 MG/DL (ref 74–99)
GLUCOSE BLD-MCNC: 116 MG/DL (ref 74–99)
GLUCOSE BLD-MCNC: 89 MG/DL (ref 74–99)
GLUCOSE BLD-MCNC: 99 MG/DL (ref 74–99)
GLUCOSE SERPL-MCNC: 145 MG/DL (ref 74–99)
GLUCOSE SERPL-MCNC: 146 MG/DL (ref 74–99)
HCT VFR BLD AUTO: 32.9 % (ref 37–54)
HGB BLD-MCNC: 11 G/DL (ref 12.5–16.5)
IMM GRANULOCYTES # BLD AUTO: 0.11 K/UL (ref 0–0.58)
IMM GRANULOCYTES NFR BLD: 1 % (ref 0–5)
INR PPP: 1.5
LYMPHOCYTES NFR BLD: 1.81 K/UL (ref 1.5–4)
LYMPHOCYTES RELATIVE PERCENT: 16 % (ref 20–42)
MAGNESIUM SERPL-MCNC: 2.4 MG/DL (ref 1.6–2.6)
MCH RBC QN AUTO: 32.4 PG (ref 26–35)
MCHC RBC AUTO-ENTMCNC: 33.4 G/DL (ref 32–34.5)
MCV RBC AUTO: 96.8 FL (ref 80–99.9)
MONOCYTES NFR BLD: 0.8 K/UL (ref 0.1–0.95)
MONOCYTES NFR BLD: 7 % (ref 2–12)
NEUTROPHILS NFR BLD: 70 % (ref 43–80)
NEUTS SEG NFR BLD: 8.15 K/UL (ref 1.8–7.3)
PHOSPHATE SERPL-MCNC: 2.6 MG/DL (ref 2.5–4.5)
PLATELET # BLD AUTO: 315 K/UL (ref 130–450)
PMV BLD AUTO: 10.4 FL (ref 7–12)
POTASSIUM SERPL-SCNC: 3.1 MMOL/L (ref 3.5–5.1)
POTASSIUM SERPL-SCNC: 3.7 MMOL/L (ref 3.5–5.1)
PROT SERPL-MCNC: 6 G/DL (ref 6.4–8.3)
PROTHROMBIN TIME: 16.3 SEC (ref 9.3–12.4)
RBC # BLD AUTO: 3.4 M/UL (ref 3.8–5.8)
SODIUM SERPL-SCNC: 143 MMOL/L (ref 136–145)
SODIUM SERPL-SCNC: 144 MMOL/L (ref 136–145)
WBC OTHER # BLD: 11.6 K/UL (ref 4.5–11.5)

## 2025-07-05 PROCEDURE — 6360000002 HC RX W HCPCS

## 2025-07-05 PROCEDURE — 85025 COMPLETE CBC W/AUTO DIFF WBC: CPT

## 2025-07-05 PROCEDURE — 2500000003 HC RX 250 WO HCPCS

## 2025-07-05 PROCEDURE — 2580000003 HC RX 258: Performed by: STUDENT IN AN ORGANIZED HEALTH CARE EDUCATION/TRAINING PROGRAM

## 2025-07-05 PROCEDURE — 80048 BASIC METABOLIC PNL TOTAL CA: CPT

## 2025-07-05 PROCEDURE — 6370000000 HC RX 637 (ALT 250 FOR IP)

## 2025-07-05 PROCEDURE — 82248 BILIRUBIN DIRECT: CPT

## 2025-07-05 PROCEDURE — 83735 ASSAY OF MAGNESIUM: CPT

## 2025-07-05 PROCEDURE — 84100 ASSAY OF PHOSPHORUS: CPT

## 2025-07-05 PROCEDURE — 82962 GLUCOSE BLOOD TEST: CPT

## 2025-07-05 PROCEDURE — 99232 SBSQ HOSP IP/OBS MODERATE 35: CPT

## 2025-07-05 PROCEDURE — 1200000000 HC SEMI PRIVATE

## 2025-07-05 PROCEDURE — 85610 PROTHROMBIN TIME: CPT

## 2025-07-05 PROCEDURE — 80053 COMPREHEN METABOLIC PANEL: CPT

## 2025-07-05 PROCEDURE — 99291 CRITICAL CARE FIRST HOUR: CPT | Performed by: INTERNAL MEDICINE

## 2025-07-05 RX ADMIN — ENOXAPARIN SODIUM 30 MG: 100 INJECTION SUBCUTANEOUS at 08:48

## 2025-07-05 RX ADMIN — THIAMINE HYDROCHLORIDE 100 MG: 100 INJECTION, SOLUTION INTRAMUSCULAR; INTRAVENOUS at 08:14

## 2025-07-05 RX ADMIN — LEVOTHYROXINE SODIUM 50 MCG: 0.05 TABLET ORAL at 08:14

## 2025-07-05 RX ADMIN — LACTULOSE 10 G: 10 SOLUTION ORAL at 08:48

## 2025-07-05 RX ADMIN — ACETAMINOPHEN 650 MG: 325 TABLET ORAL at 20:29

## 2025-07-05 RX ADMIN — SODIUM CHLORIDE, PRESERVATIVE FREE 10 ML: 5 INJECTION INTRAVENOUS at 08:16

## 2025-07-05 RX ADMIN — WATER 1000 MG: 1 INJECTION INTRAMUSCULAR; INTRAVENOUS; SUBCUTANEOUS at 08:48

## 2025-07-05 RX ADMIN — RIFAXIMIN 400 MG: 200 TABLET ORAL at 14:29

## 2025-07-05 RX ADMIN — LACTULOSE 10 G: 10 SOLUTION ORAL at 14:29

## 2025-07-05 RX ADMIN — TAMSULOSIN HYDROCHLORIDE 0.4 MG: 0.4 CAPSULE ORAL at 08:15

## 2025-07-05 RX ADMIN — SODIUM CHLORIDE: 0.9 INJECTION, SOLUTION INTRAVENOUS at 18:25

## 2025-07-05 RX ADMIN — METOPROLOL SUCCINATE 100 MG: 100 TABLET, EXTENDED RELEASE ORAL at 10:52

## 2025-07-05 RX ADMIN — POTASSIUM BICARBONATE 40 MEQ: 782 TABLET, EFFERVESCENT ORAL at 08:16

## 2025-07-05 RX ADMIN — SODIUM CHLORIDE, PRESERVATIVE FREE 10 ML: 5 INJECTION INTRAVENOUS at 20:30

## 2025-07-05 RX ADMIN — ENOXAPARIN SODIUM 30 MG: 100 INJECTION SUBCUTANEOUS at 20:29

## 2025-07-05 RX ADMIN — MIRTAZAPINE 30 MG: 15 TABLET, FILM COATED ORAL at 20:30

## 2025-07-05 RX ADMIN — FLUOXETINE HYDROCHLORIDE 40 MG: 20 CAPSULE ORAL at 08:15

## 2025-07-05 RX ADMIN — METOPROLOL SUCCINATE 100 MG: 100 TABLET, EXTENDED RELEASE ORAL at 20:29

## 2025-07-05 RX ADMIN — FOLIC ACID 1 MG: 5 INJECTION, SOLUTION INTRAMUSCULAR; INTRAVENOUS; SUBCUTANEOUS at 10:52

## 2025-07-05 RX ADMIN — RIFAXIMIN 400 MG: 200 TABLET ORAL at 21:47

## 2025-07-05 RX ADMIN — PANTOPRAZOLE SODIUM 40 MG: 40 TABLET, DELAYED RELEASE ORAL at 06:04

## 2025-07-05 RX ADMIN — RIFAXIMIN 400 MG: 200 TABLET ORAL at 08:48

## 2025-07-05 RX ADMIN — POTASSIUM BICARBONATE 20 MEQ: 782 TABLET, EFFERVESCENT ORAL at 08:16

## 2025-07-05 ASSESSMENT — PAIN SCALES - GENERAL
PAINLEVEL_OUTOF10: 4
PAINLEVEL_OUTOF10: 0

## 2025-07-06 VITALS
BODY MASS INDEX: 39.63 KG/M2 | RESPIRATION RATE: 18 BRPM | TEMPERATURE: 96.9 F | WEIGHT: 299 LBS | SYSTOLIC BLOOD PRESSURE: 152 MMHG | DIASTOLIC BLOOD PRESSURE: 90 MMHG | HEART RATE: 63 BPM | OXYGEN SATURATION: 96 % | HEIGHT: 73 IN

## 2025-07-06 LAB
ALBUMIN SERPL-MCNC: 3.3 G/DL (ref 3.5–5.2)
ALP SERPL-CCNC: 132 U/L (ref 40–129)
ALT SERPL-CCNC: 438 U/L (ref 0–50)
ANION GAP SERPL CALCULATED.3IONS-SCNC: 11 MMOL/L (ref 7–16)
AST SERPL-CCNC: 54 U/L (ref 0–50)
BASOPHILS # BLD: 0.05 K/UL (ref 0–0.2)
BASOPHILS NFR BLD: 1 % (ref 0–2)
BILIRUB DIRECT SERPL-MCNC: 0.3 MG/DL (ref 0–0.2)
BILIRUB INDIRECT SERPL-MCNC: 0.3 MG/DL (ref 0–1)
BILIRUB SERPL-MCNC: 0.6 MG/DL (ref 0–1.2)
BUN SERPL-MCNC: 6 MG/DL (ref 6–20)
CALCIUM SERPL-MCNC: 8.5 MG/DL (ref 8.6–10)
CHLORIDE SERPL-SCNC: 108 MMOL/L (ref 98–107)
CO2 SERPL-SCNC: 21 MMOL/L (ref 22–29)
CREAT SERPL-MCNC: 0.9 MG/DL (ref 0.7–1.2)
EOSINOPHIL # BLD: 0.58 K/UL (ref 0.05–0.5)
EOSINOPHILS RELATIVE PERCENT: 6 % (ref 0–6)
ERYTHROCYTE [DISTWIDTH] IN BLOOD BY AUTOMATED COUNT: 14.2 % (ref 11.5–15)
GFR, ESTIMATED: >90 ML/MIN/1.73M2
GLUCOSE BLD-MCNC: 109 MG/DL (ref 74–99)
GLUCOSE BLD-MCNC: 96 MG/DL (ref 74–99)
GLUCOSE SERPL-MCNC: 114 MG/DL (ref 74–99)
HCT VFR BLD AUTO: 34 % (ref 37–54)
HGB BLD-MCNC: 11.2 G/DL (ref 12.5–16.5)
IMM GRANULOCYTES # BLD AUTO: 0.09 K/UL (ref 0–0.58)
IMM GRANULOCYTES NFR BLD: 1 % (ref 0–5)
LYMPHOCYTES NFR BLD: 1.81 K/UL (ref 1.5–4)
LYMPHOCYTES RELATIVE PERCENT: 18 % (ref 20–42)
MAGNESIUM SERPL-MCNC: 2.2 MG/DL (ref 1.6–2.6)
MCH RBC QN AUTO: 31.5 PG (ref 26–35)
MCHC RBC AUTO-ENTMCNC: 32.9 G/DL (ref 32–34.5)
MCV RBC AUTO: 95.8 FL (ref 80–99.9)
MICROORGANISM SPEC CULT: NORMAL
MICROORGANISM SPEC CULT: NORMAL
MONOCYTES NFR BLD: 0.63 K/UL (ref 0.1–0.95)
MONOCYTES NFR BLD: 6 % (ref 2–12)
NEUTROPHILS NFR BLD: 68 % (ref 43–80)
NEUTS SEG NFR BLD: 6.67 K/UL (ref 1.8–7.3)
PHOSPHATE SERPL-MCNC: 3.5 MG/DL (ref 2.5–4.5)
PLATELET # BLD AUTO: 326 K/UL (ref 130–450)
PMV BLD AUTO: 10.6 FL (ref 7–12)
POTASSIUM SERPL-SCNC: 3.6 MMOL/L (ref 3.5–5.1)
PROT SERPL-MCNC: 6.1 G/DL (ref 6.4–8.3)
RBC # BLD AUTO: 3.55 M/UL (ref 3.8–5.8)
SERVICE CMNT-IMP: NORMAL
SERVICE CMNT-IMP: NORMAL
SODIUM SERPL-SCNC: 139 MMOL/L (ref 136–145)
SPECIMEN DESCRIPTION: NORMAL
SPECIMEN DESCRIPTION: NORMAL
WBC OTHER # BLD: 9.8 K/UL (ref 4.5–11.5)

## 2025-07-06 PROCEDURE — 2500000003 HC RX 250 WO HCPCS

## 2025-07-06 PROCEDURE — 36415 COLL VENOUS BLD VENIPUNCTURE: CPT

## 2025-07-06 PROCEDURE — 99239 HOSP IP/OBS DSCHRG MGMT >30: CPT

## 2025-07-06 PROCEDURE — 6360000002 HC RX W HCPCS

## 2025-07-06 PROCEDURE — 80053 COMPREHEN METABOLIC PANEL: CPT

## 2025-07-06 PROCEDURE — 85025 COMPLETE CBC W/AUTO DIFF WBC: CPT

## 2025-07-06 PROCEDURE — 6370000000 HC RX 637 (ALT 250 FOR IP)

## 2025-07-06 PROCEDURE — 83735 ASSAY OF MAGNESIUM: CPT

## 2025-07-06 PROCEDURE — 82962 GLUCOSE BLOOD TEST: CPT

## 2025-07-06 PROCEDURE — 82248 BILIRUBIN DIRECT: CPT

## 2025-07-06 PROCEDURE — 84100 ASSAY OF PHOSPHORUS: CPT

## 2025-07-06 RX ORDER — CEFDINIR 300 MG/1
300 CAPSULE ORAL 2 TIMES DAILY
Qty: 4 CAPSULE | Refills: 0 | Status: SHIPPED | OUTPATIENT
Start: 2025-07-06 | End: 2025-07-08

## 2025-07-06 RX ADMIN — LEVOTHYROXINE SODIUM 50 MCG: 0.05 TABLET ORAL at 08:59

## 2025-07-06 RX ADMIN — SODIUM CHLORIDE, PRESERVATIVE FREE 10 ML: 5 INJECTION INTRAVENOUS at 09:03

## 2025-07-06 RX ADMIN — METOPROLOL SUCCINATE 100 MG: 100 TABLET, EXTENDED RELEASE ORAL at 08:59

## 2025-07-06 RX ADMIN — TAMSULOSIN HYDROCHLORIDE 0.4 MG: 0.4 CAPSULE ORAL at 08:58

## 2025-07-06 RX ADMIN — FLUOXETINE HYDROCHLORIDE 40 MG: 20 CAPSULE ORAL at 08:58

## 2025-07-06 RX ADMIN — WATER 1000 MG: 1 INJECTION INTRAMUSCULAR; INTRAVENOUS; SUBCUTANEOUS at 09:00

## 2025-07-06 RX ADMIN — THIAMINE HYDROCHLORIDE 100 MG: 100 INJECTION, SOLUTION INTRAMUSCULAR; INTRAVENOUS at 09:03

## 2025-07-06 RX ADMIN — PANTOPRAZOLE SODIUM 40 MG: 40 TABLET, DELAYED RELEASE ORAL at 05:36

## 2025-07-06 RX ADMIN — FOLIC ACID 1 MG: 5 INJECTION, SOLUTION INTRAMUSCULAR; INTRAVENOUS; SUBCUTANEOUS at 08:56

## 2025-07-06 RX ADMIN — RIFAXIMIN 400 MG: 200 TABLET ORAL at 08:58

## 2025-07-06 RX ADMIN — ENOXAPARIN SODIUM 30 MG: 100 INJECTION SUBCUTANEOUS at 08:58

## 2025-07-06 NOTE — DISCHARGE INSTRUCTIONS
Advised to follow up with pcp within 1 week with cmp result. Please discussed with pcp with cmp result and after can resume statin if cmp normal.

## 2025-07-06 NOTE — PLAN OF CARE
Problem: Chronic Conditions and Co-morbidities  Goal: Patient's chronic conditions and co-morbidity symptoms are monitored and maintained or improved  7/2/2025 0717 by Roberto Carlos Briones RN  Outcome: Progressing  7/2/2025 0717 by Roberto Carlos Briones RN  Outcome: Progressing  Flowsheets  Taken 7/2/2025 0700 by Roberto Carlos Briones RN  Care Plan - Patient's Chronic Conditions and Co-Morbidity Symptoms are Monitored and Maintained or Improved: Monitor and assess patient's chronic conditions and comorbid symptoms for stability, deterioration, or improvement  Taken 7/1/2025 1930 by Saúl Coker RN  Care Plan - Patient's Chronic Conditions and Co-Morbidity Symptoms are Monitored and Maintained or Improved:   Monitor and assess patient's chronic conditions and comorbid symptoms for stability, deterioration, or improvement   Collaborate with multidisciplinary team to address chronic and comorbid conditions and prevent exacerbation or deterioration   Update acute care plan with appropriate goals if chronic or comorbid symptoms are exacerbated and prevent overall improvement and discharge     Problem: Discharge Planning  Goal: Discharge to home or other facility with appropriate resources  7/2/2025 0717 by Roberto Carlos Briones RN  Outcome: Progressing  7/2/2025 0717 by Roberto Carlos Briones RN  Outcome: Progressing  Flowsheets  Taken 7/2/2025 0700 by Roberto Carlos Briones RN  Discharge to home or other facility with appropriate resources: Identify barriers to discharge with patient and caregiver  Taken 7/1/2025 1930 by Saúl Coker RN  Discharge to home or other facility with appropriate resources:   Identify barriers to discharge with patient and caregiver   Arrange for needed discharge resources and transportation as appropriate   Identify discharge learning needs (meds, wound care, etc)   Arrange for interpreters to assist at discharge as needed   Refer to discharge planning if patient needs post-hospital services based 
  Problem: Chronic Conditions and Co-morbidities  Goal: Patient's chronic conditions and co-morbidity symptoms are monitored and maintained or improved  7/2/2025 1914 by Saúl Coker RN  Outcome: Progressing  7/2/2025 0717 by Roberto Carlos Briones RN  Outcome: Progressing  7/2/2025 0717 by Roberto Carlos Briones RN  Outcome: Progressing     Problem: Discharge Planning  Goal: Discharge to home or other facility with appropriate resources  7/2/2025 1914 by Saúl Coker RN  Outcome: Progressing  7/2/2025 0717 by Roberto Carlos Briones RN  Outcome: Progressing  7/2/2025 0717 by Roberto Carlos Briones RN  Outcome: Progressing     Problem: Safety - Medical Restraint  Goal: Remains free of injury from restraints (Restraint for Interference with Medical Device)  7/2/2025 1914 by Saúl Coker RN  Outcome: Progressing  7/2/2025 0717 by Roberto Carlos Briones RN  Outcome: Progressing  7/2/2025 0717 by Roberto Carlos Briones RN  Outcome: Progressing     Problem: Safety - Adult  Goal: Free from fall injury  7/2/2025 1914 by Saúl Coker RN  Outcome: Progressing  7/2/2025 0717 by Roberto Carlos Briones RN  Outcome: Progressing  7/2/2025 0717 by Roberto Carlos Briones RN  Outcome: Progressing     Problem: Skin/Tissue Integrity  Goal: Skin integrity remains intact  7/2/2025 1914 by Saúl Coker RN  Outcome: Progressing  7/2/2025 0717 by Roberto Carlos Briones RN  Outcome: Progressing  7/2/2025 0717 by Roberto Carlos Briones RN  Outcome: Progressing     Problem: Pain  Goal: Verbalizes/displays adequate comfort level or baseline comfort level  7/2/2025 1914 by Saúl Coker RN  Outcome: Progressing  7/2/2025 0717 by Roberto Carlos Briones RN  Outcome: Progressing  7/2/2025 0717 by Roberto Carlos Briones RN  Outcome: Progressing     
  Problem: Chronic Conditions and Co-morbidities  Goal: Patient's chronic conditions and co-morbidity symptoms are monitored and maintained or improved  7/5/2025 1834 by Loretta Walker RN  Outcome: Progressing  7/5/2025 1136 by Saúl Wheatley RN  Outcome: Progressing     Problem: Discharge Planning  Goal: Discharge to home or other facility with appropriate resources  7/5/2025 1834 by Loretta Walker RN  Outcome: Progressing  7/5/2025 1136 by Saúl Wheatley RN  Outcome: Progressing  Flowsheets (Taken 7/5/2025 1136)  Discharge to home or other facility with appropriate resources:   Identify barriers to discharge with patient and caregiver   Arrange for needed discharge resources and transportation as appropriate   Identify discharge learning needs (meds, wound care, etc)   Refer to discharge planning if patient needs post-hospital services based on physician order or complex needs related to functional status, cognitive ability or social support system   Arrange for interpreters to assist at discharge as needed     Problem: Safety - Adult  Goal: Free from fall injury  7/5/2025 1834 by Loretta Walker RN  Outcome: Progressing  7/5/2025 1136 by Saúl Wheatley RN  Outcome: Progressing  Flowsheets (Taken 7/5/2025 1136)  Free From Fall Injury:   Instruct family/caregiver on patient safety   Based on caregiver fall risk screen, instruct family/caregiver to ask for assistance with transferring infant if caregiver noted to have fall risk factors     Problem: Skin/Tissue Integrity  Goal: Skin integrity remains intact  Description: 1.  Monitor for areas of redness and/or skin breakdown  2.  Assess vascular access sites hourly  3.  Every 4-6 hours minimum:  Change oxygen saturation probe site  4.  Every 4-6 hours:  If on nasal continuous positive airway pressure, respiratory therapy assess nares and determine need for appliance change or resting period  7/5/2025 1834 by Loretta Walker RN  Outcome: 
  Problem: Chronic Conditions and Co-morbidities  Goal: Patient's chronic conditions and co-morbidity symptoms are monitored and maintained or improved  7/6/2025 1059 by Collette Moura RN  Outcome: Adequate for Discharge  7/6/2025 0228 by Lisette Arthur RN  Outcome: Progressing  7/6/2025 0106 by Lisette Arthur RN  Outcome: Progressing     Problem: Discharge Planning  Goal: Discharge to home or other facility with appropriate resources  7/6/2025 1059 by Collette Moura RN  Outcome: Adequate for Discharge  7/6/2025 0909 by Collette Moura RN  Outcome: Progressing  7/6/2025 0228 by Lisette Arthur RN  Outcome: Progressing  7/6/2025 0106 by Lisette Arthur RN  Outcome: Progressing     Problem: Safety - Adult  Goal: Free from fall injury  7/6/2025 1059 by Collette Moura RN  Outcome: Adequate for Discharge  7/6/2025 0909 by Collette Moura RN  Outcome: Progressing  7/6/2025 0228 by Lisette Arthur RN  Outcome: Progressing  7/6/2025 0106 by Lisette Arthur RN  Outcome: Progressing     Problem: Skin/Tissue Integrity  Goal: Skin integrity remains intact  Description: 1.  Monitor for areas of redness and/or skin breakdown  2.  Assess vascular access sites hourly  3.  Every 4-6 hours minimum:  Change oxygen saturation probe site  4.  Every 4-6 hours:  If on nasal continuous positive airway pressure, respiratory therapy assess nares and determine need for appliance change or resting period  7/6/2025 1059 by Collette Moura RN  Outcome: Adequate for Discharge  7/6/2025 0909 by Collette Moura RN  Outcome: Progressing  7/6/2025 0228 by Lisette Arthur RN  Outcome: Progressing  7/6/2025 0106 by Lisette Arthur RN  Outcome: Progressing     Problem: Pain  Goal: Verbalizes/displays adequate comfort level or baseline comfort level  7/6/2025 1059 by Collette Moura RN  Outcome: Adequate for Discharge  7/6/2025 0909 by Collette Moura RN  Outcome: Progressing  7/6/2025 0228 by Lisette Arthur RN  Outcome: 
  Problem: Chronic Conditions and Co-morbidities  Goal: Patient's chronic conditions and co-morbidity symptoms are monitored and maintained or improved  Outcome: Progressing  Flowsheets  Taken 7/2/2025 0700 by Roberto Carlos Briones RN  Care Plan - Patient's Chronic Conditions and Co-Morbidity Symptoms are Monitored and Maintained or Improved: Monitor and assess patient's chronic conditions and comorbid symptoms for stability, deterioration, or improvement  Taken 7/1/2025 1930 by Saúl Coker RN  Care Plan - Patient's Chronic Conditions and Co-Morbidity Symptoms are Monitored and Maintained or Improved:   Monitor and assess patient's chronic conditions and comorbid symptoms for stability, deterioration, or improvement   Collaborate with multidisciplinary team to address chronic and comorbid conditions and prevent exacerbation or deterioration   Update acute care plan with appropriate goals if chronic or comorbid symptoms are exacerbated and prevent overall improvement and discharge     Problem: Discharge Planning  Goal: Discharge to home or other facility with appropriate resources  Outcome: Progressing  Flowsheets  Taken 7/2/2025 0700 by Roberto Carlos Briones RN  Discharge to home or other facility with appropriate resources: Identify barriers to discharge with patient and caregiver  Taken 7/1/2025 1930 by Saúl Coker RN  Discharge to home or other facility with appropriate resources:   Identify barriers to discharge with patient and caregiver   Arrange for needed discharge resources and transportation as appropriate   Identify discharge learning needs (meds, wound care, etc)   Arrange for interpreters to assist at discharge as needed   Refer to discharge planning if patient needs post-hospital services based on physician order or complex needs related to functional status, cognitive ability or social support system     Problem: Safety - Medical Restraint  Goal: Remains free of injury from restraints 
  Problem: Chronic Conditions and Co-morbidities  Goal: Patient's chronic conditions and co-morbidity symptoms are monitored and maintained or improved  Outcome: Progressing  Flowsheets (Taken 7/3/2025 2147)  Care Plan - Patient's Chronic Conditions and Co-Morbidity Symptoms are Monitored and Maintained or Improved:   Monitor and assess patient's chronic conditions and comorbid symptoms for stability, deterioration, or improvement   Collaborate with multidisciplinary team to address chronic and comorbid conditions and prevent exacerbation or deterioration   Update acute care plan with appropriate goals if chronic or comorbid symptoms are exacerbated and prevent overall improvement and discharge     Problem: Safety - Medical Restraint  Goal: Remains free of injury from restraints (Restraint for Interference with Medical Device)  Description: INTERVENTIONS:  1. Determine that other, less restrictive measures have been tried or would not be effective before applying the restraint  2. Evaluate the patient's condition at the time of restraint application  3. Inform patient/family regarding the reason for restraint  4. Q2H: Monitor safety, psychosocial status, comfort, nutrition and hydration  7/3/2025 2147 by Vivienne Gonzalez RN  Outcome: Progressing  Flowsheets (Taken 7/3/2025 2147)  Remains free of injury from restraints (restraint for interference with medical device):   Determine that other, less restrictive measures have been tried or would not be effective before applying the restraint   Evaluate the patient's condition at the time of restraint application   Every 2 hours: Monitor safety, psychosocial status, comfort, nutrition and hydration   Inform patient/family regarding the reason for restraint  7/3/2025 0850 by Luisa Beyer RN  Outcome: Progressing  Flowsheets  Taken 7/3/2025 0746 by Luisa Beyer RN  Remains free of injury from restraints (restraint for interference with medical 
  Problem: Discharge Planning  Goal: Discharge to home or other facility with appropriate resources  7/4/2025 0919 by Ranjeet Verdugo RN  Outcome: Progressing  7/3/2025 2147 by Vivienne Gonzalez RN  Outcome: Not Progressing  Flowsheets (Taken 7/3/2025 2147)  Discharge to home or other facility with appropriate resources:   Identify barriers to discharge with patient and caregiver   Arrange for needed discharge resources and transportation as appropriate     Problem: Neurosensory - Adult  Goal: Achieves maximal functionality and self care  7/4/2025 0919 by Ranjeet Verdugo RN  Outcome: Progressing  7/3/2025 2147 by Vivienne Gonzalez RN  Outcome: Not Progressing  Flowsheets (Taken 7/3/2025 2147)  Achieves maximal functionality and self care: Encourage and assist patient to increase activity and self care with guidance from physical therapy/occupational therapy     Problem: Musculoskeletal - Adult  Goal: Return mobility to safest level of function  7/4/2025 0919 by Ranjeet Verdugo RN  Outcome: Progressing  7/3/2025 2147 by Vivienne Gonzalez RN  Outcome: Not Progressing  Flowsheets (Taken 7/3/2025 2147)  Return Mobility to Safest Level of Function:   Assess patient stability and activity tolerance for standing, transferring and ambulating with or without assistive devices   Instruct patient/family in ordered activity level  Goal: Return ADL status to a safe level of function  7/4/2025 0919 by Ranjeet Verdugo RN  Outcome: Progressing  7/3/2025 2147 by Vivienne Gonzalez RN  Outcome: Not Progressing  Flowsheets (Taken 7/3/2025 2147)  Return ADL Status to a Safe Level of Function:   Administer medication as ordered   Assist and instruct patient to increase activity and self care as tolerated     
  Problem: Safety - Medical Restraint  Goal: Remains free of injury from restraints (Restraint for Interference with Medical Device)  7/3/2025 0850 by Luisa Beyer RN  Outcome: Progressing  Flowsheets  Taken 7/3/2025 0600 by Saúl Coker RN  Remains free of injury from restraints (restraint for interference with medical device): Every 2 hours: Monitor safety, psychosocial status, comfort, nutrition and hydration  Taken 7/3/2025 0400 by Saúl Coker RN  Remains free of injury from restraints (restraint for interference with medical device): Every 2 hours: Monitor safety, psychosocial status, comfort, nutrition and hydration  Taken 7/3/2025 0200 by Saúl Coker RN  Remains free of injury from restraints (restraint for interference with medical device): Every 2 hours: Monitor safety, psychosocial status, comfort, nutrition and hydration  Taken 7/3/2025 0000 by Saúl Coker RN  Remains free of injury from restraints (restraint for interference with medical device): Every 2 hours: Monitor safety, psychosocial status, comfort, nutrition and hydration  Taken 7/2/2025 2200 by Saúl Coker RN  Remains free of injury from restraints (restraint for interference with medical device): Every 2 hours: Monitor safety, psychosocial status, comfort, nutrition and hydration  Taken 7/2/2025 2000 by Saúl Coker RN  Remains free of injury from restraints (restraint for interference with medical device): Every 2 hours: Monitor safety, psychosocial status, comfort, nutrition and hydration     Problem: Safety - Adult  Goal: Free from fall injury  7/3/2025 0850 by Luisa Beyer RN  Outcome: Progressing     
Called the patient brother Ranjeet Wellington, to update on patient present health status. Patient brother has some concerns regarding the Labs and he is reassured that it is improving compared to when he was admitted. All other questions and concerns were addressed as appropriate.  
Patient has been extubated, currently saturating fine on low-flow nasal cannula.  
Spoke with the patient's mother and brother who both agree that for code status discussion, the patient would be a limited code -   NO to CPR and Shocks. Yes to intubation and resuscitative medications.     Electronically signed by Steffen Eaton MD on 7/1/2025 at 8:56 PM    
- Adult  Goal: Achieves maximal functionality and self care  7/6/2025 0228 by Lisette Arthur RN  Outcome: Progressing  7/6/2025 0106 by Lisette Arthur RN  Outcome: Progressing  7/5/2025 1834 by Loretta Walker RN  Outcome: Progressing     Problem: Respiratory - Adult  Goal: Achieves optimal ventilation and oxygenation  7/6/2025 0228 by Lisette Arthur RN  Outcome: Progressing  7/6/2025 0106 by Lisette Arthur RN  Outcome: Progressing  7/5/2025 1834 by Loretta Walker RN  Outcome: Progressing     Problem: Cardiovascular - Adult  Goal: Maintains optimal cardiac output and hemodynamic stability  7/6/2025 0228 by Lisette Arthur RN  Outcome: Progressing  7/6/2025 0106 by Lisette Arthur RN  Outcome: Progressing  7/5/2025 1834 by Loretta Walker RN  Outcome: Progressing     Problem: Skin/Tissue Integrity - Adult  Goal: Oral mucous membranes remain intact  7/6/2025 0228 by Lisette Arthur RN  Outcome: Progressing     Problem: Musculoskeletal - Adult  Goal: Return mobility to safest level of function  7/6/2025 0228 by Lisette Arthur RN  Outcome: Progressing  7/6/2025 0106 by Lisette Arthur RN  Outcome: Progressing  7/5/2025 1834 by Loretta Walker RN  Outcome: Progressing  Goal: Return ADL status to a safe level of function  7/6/2025 0228 by Lisette Arthur RN  Outcome: Progressing  7/6/2025 0106 by Lisette Arthur RN  Outcome: Progressing  7/5/2025 1834 by Loretta Walker RN  Outcome: Progressing     Problem: Metabolic/Fluid and Electrolytes - Adult  Goal: Electrolytes maintained within normal limits  7/6/2025 0228 by Lisette Arthur RN  Outcome: Progressing  7/6/2025 0106 by Lisette Arthur RN  Outcome: Progressing  7/5/2025 1834 by Loretta Walker RN  Outcome: Progressing  Goal: Hemodynamic stability and optimal renal function maintained  7/6/2025 0228 by Lisette Arthur, RN  Outcome: Progressing  7/6/2025 0106 by Lisette Arthur, 
0228 by Lisette Arthur RN  Outcome: Progressing  7/6/2025 0106 by Lisette Arthur RN  Outcome: Progressing     Problem: Respiratory - Adult  Goal: Achieves optimal ventilation and oxygenation  7/6/2025 0228 by Lisette Arthur RN  Outcome: Progressing  7/6/2025 0106 by Lisette Arthur RN  Outcome: Progressing     Problem: Cardiovascular - Adult  Goal: Maintains optimal cardiac output and hemodynamic stability  7/6/2025 0228 by Lisette Arthur RN  Outcome: Progressing  7/6/2025 0106 by Lisette Arthur RN  Outcome: Progressing     Problem: Skin/Tissue Integrity - Adult  Goal: Skin integrity remains intact  Description: 1.  Monitor for areas of redness and/or skin breakdown  2.  Assess vascular access sites hourly  3.  Every 4-6 hours minimum:  Change oxygen saturation probe site  4.  Every 4-6 hours:  If on nasal continuous positive airway pressure, respiratory therapy assess nares and determine need for appliance change or resting period  7/6/2025 0909 by Collette Moura RN  Outcome: Progressing  7/6/2025 0228 by Lisette Arthur RN  Outcome: Progressing  7/6/2025 0106 by Lisette Arthur RN  Outcome: Progressing  Goal: Oral mucous membranes remain intact  7/6/2025 0228 by Lisette Arthur RN  Outcome: Progressing  7/6/2025 0106 by Lisette Arthur RN  Outcome: Progressing     Problem: Musculoskeletal - Adult  Goal: Return mobility to safest level of function  7/6/2025 0228 by Lisette Arthur RN  Outcome: Progressing  7/6/2025 0106 by Lisette Arthur RN  Outcome: Progressing  Goal: Return ADL status to a safe level of function  7/6/2025 0228 by Lisette Arthur RN  Outcome: Progressing  7/6/2025 0106 by Lisette Arthur RN  Outcome: Progressing     Problem: Infection - Adult  Goal: Absence of infection at discharge  7/6/2025 0228 by Lisette Arthur RN  Outcome: Progressing  7/6/2025 0106 by Lisette Arthur RN  Outcome: Progressing     Problem: Metabolic/Fluid and Electrolytes - Adult  Goal: 
RN  Outcome: Progressing     Problem: Metabolic/Fluid and Electrolytes - Adult  Goal: Glucose maintained within prescribed range  7/6/2025 0106 by Lisette Arthur RN  Outcome: Progressing  7/5/2025 1834 by Loretta Walker RN  Outcome: Progressing  7/5/2025 1136 by Saúl Wheatley RN  Outcome: Progressing  Flowsheets (Taken 7/5/2025 1136)  Glucose maintained within prescribed range: Monitor blood glucose as ordered     Problem: Hematologic - Adult  Goal: Maintains hematologic stability  7/6/2025 0106 by Lisette Arthur RN  Outcome: Progressing  7/5/2025 1834 by Loretta Walker RN  Outcome: Progressing     
lab/diagnostic results   Monitor all insertion sites i.e., indwelling lines, tubes and drains   [place] appropriate cooling/warming therapies per order   Administer medications as ordered   Identify and instruct in appropriate isolation precautions for identified infection/condition   Monitor endotracheal (as able) and nasal secretions for changes in amount and color   Instruct and encourage patient and family to use good hand hygiene technique  7/4/2025 0919 by Ranjeet Verdugo RN  Outcome: Progressing     Problem: Metabolic/Fluid and Electrolytes - Adult  Goal: Electrolytes maintained within normal limits  7/4/2025 2233 by Vivienne Gonzalez RN  Outcome: Progressing  Flowsheets (Taken 7/4/2025 2233)  Electrolytes maintained within normal limits:   Monitor labs and assess patient for signs and symptoms of electrolyte imbalances   Monitor response to electrolyte replacements, including repeat lab results as appropriate   Administer electrolyte replacement as ordered  7/4/2025 0919 by Ranjeet Verdugo RN  Outcome: Progressing  Goal: Hemodynamic stability and optimal renal function maintained  7/4/2025 2233 by Vivienne Gonzalez RN  Outcome: Progressing  Flowsheets (Taken 7/4/2025 2233)  Hemodynamic stability and optimal renal function maintained:   Monitor labs and assess for signs and symptoms of volume excess or deficit   Monitor intake, output and patient weight   Monitor response to interventions for patient's volume status, including labs, urine output, blood pressure (other measures as available)  7/4/2025 0919 by Ranjeet Verdugo RN  Outcome: Progressing  Goal: Glucose maintained within prescribed range  7/4/2025 2233 by Vivienne Gonzalez RN  Outcome: Progressing  Flowsheets (Taken 7/4/2025 2233)  Glucose maintained within prescribed range:   Monitor blood glucose as ordered   Assess for signs and symptoms of hyperglycemia and hypoglycemia   Administer ordered medications to maintain glucose within target 
disorders

## 2025-07-06 NOTE — PROGRESS NOTES
[facility] Hospitalist Progress Note    Admitting Date and Time: 7/1/2025  2:25 PM  Admit Dx: Transaminitis [R74.01]  Polysubstance abuse (HCC) [F19.10]  [place] coma scale total score 3-8, in the field (EMT or ambulance) ([facility]) [R40.2431]  AMS (altered mental status) [R41.82]    Synopsis: 44-year-old male with a past medical history of sinus node dysfunction status post PPM, neurocardiogenic syncope, anxiety, seizure disorder, diabetes mellitus, hyperlipidemia to the hospital by EMS for unresponsiveness.  On arrival patient was found to have hypotension send need to be intubated and started with vasopressor and admitted to the ICU.   Patient was found to have pneumonia, started with Vanco and Zosyn.  EP consulted.     Subjective:  Patient is being followed for Transaminitis [R74.01]  Polysubstance abuse (HCC) [F19.10]  [place] coma scale total score 3-8, in the field (EMT or ambulance) ([facility]) [R40.2431]  AMS (altered mental status) [R41.82]     Patient was seen at bedside.  Sedated and intubated    ROS: Unable to obtain as patient is sedated and intubated     thiamine  100 mg IntraVENous Daily    vancomycin  1,500 mg IntraVENous Q12H    folic acid  1 mg IntraVENous Daily    hydrocortisone sodium succinate PF  100 mg IntraVENous Q8H    enoxaparin  30 mg SubCUTAneous BID    sodium chloride flush  5-40 mL IntraVENous 2 times per day    chlorhexidine  15 mL Mouth/Throat BID    famotidine (PEPCID) injection  20 mg IntraVENous BID    piperacillin-tazobactam  4,500 mg IntraVENous Q8H    [Held by provider] atorvastatin  40 mg Oral Daily    FLUoxetine  40 mg Oral Daily    lactulose  10 g Oral TID    levothyroxine  50 mcg Oral Daily    [Held by provider] metoprolol succinate  100 mg Oral BID    mirtazapine  30 mg Oral Nightly    rifAXIMin  400 mg Oral TID    acetylcysteine (ACETADOTE) 10,000 mg in dextrose 5 % 1,000 mL infusion  10,000 mg IntraVENous Once    tamsulosin  0.4 mg Oral Daily    insulin lispro  0-4 Units 
       [facility] Hospitalist Progress Note    Admitting Date and Time: 7/1/2025  2:25 PM  Admit Dx: Transaminitis [R74.01]  Polysubstance abuse (HCC) [F19.10]  [place] coma scale total score 3-8, in the field (EMT or ambulance) ([facility]) [R40.2431]  AMS (altered mental status) [R41.82]    Synopsis: 44-year-old male with a past medical history of sinus node dysfunction status post PPM, neurocardiogenic syncope, anxiety, seizure disorder, diabetes mellitus, hyperlipidemia to the hospital by EMS for unresponsiveness.  On arrival patient was found to have hypotension send need to be intubated and started with vasopressor and admitted to the ICU.   Patient was found to have pneumonia, started with Vanco and Zosyn.  Patient was extubated on 7/3/2024.     Subjective:  Patient is being followed for Transaminitis [R74.01]  Polysubstance abuse (HCC) [F19.10]  [place] coma scale total score 3-8, in the field (EMT or ambulance) ([facility]) [R40.2431]  AMS (altered mental status) [R41.82]     Patient was seen at bedside.  Denies any complaint.  Feels better and better.  Oxygen saturation maintaining well on room air    No acute overnight event    ROS: Denies fever, chills, nausea/vomiting, chest pain, shortness of breath etc     cefTRIAXone (ROCEPHIN) IV  1,000 mg IntraVENous Daily    pantoprazole  40 mg Oral QAM AC    thiamine  100 mg IntraVENous Daily    folic acid  1 mg IntraVENous Daily    enoxaparin  30 mg SubCUTAneous BID    sodium chloride flush  5-40 mL IntraVENous 2 times per day    [Held by provider] atorvastatin  40 mg Oral Daily    FLUoxetine  40 mg Oral Daily    lactulose  10 g Oral TID    levothyroxine  50 mcg Oral Daily    metoprolol succinate  100 mg Oral BID    mirtazapine  30 mg Oral Nightly    rifAXIMin  400 mg Oral TID    tamsulosin  0.4 mg Oral Daily    insulin lispro  0-4 Units SubCUTAneous 4x Daily AC & HS     sodium chloride flush, 5-40 mL, PRN  sodium chloride, , PRN  ondansetron, 4 mg, Q8H PRN   
  [facility] Quality Flow/Interdisciplinary Rounds Progress Note        Quality Flow Rounds held on July 4, 2025    Disciplines Attending:  Bedside Nurse, , , and Nursing Unit Leadership    Kranthi Wellington was admitted on 7/1/2025  2:25 PM    Anticipated Discharge Date:       Disposition:       Donn Score:  Donn Scale Score: 17    [facility] RISK OF UNPLANNED READMISSION 2.0             15.2 Total Score        Discussed patient goal for the day, patient clinical progression, and barriers to discharge.  The following Goal(s) of the Day/Commitment(s) have been identified:  continue ICU care, possible transfer       Ranjeet Verdugo RN  July 4, 2025      
  [facility] Quality Flow/Interdisciplinary Rounds Progress Note        Quality Flow Rounds held on July 2, 2025    Disciplines Attending:  Nursing Unit Leadership    Kranthi Wellington was admitted on 7/1/2025  2:25 PM    Anticipated Discharge Date:       Disposition:       Donn Score:  Donn Scale Score: 12    BS RISK OF UNPLANNED READMISSION 2.0             13 Total Score        Discussed patient goal for the day, patient clinical progression, and barriers to discharge.  The following Goal(s) of the Day/Commitment(s) have been identified:  wean ventilator as tolerated       Roberto Carlos Briones RN  July 2, 2025       
 [facility]  Internal Medicine Residency Program  MICU Progress Note      Patient:  Kranthi Wellington 44 y.o. male MRN: [id number] : 1980   Admitted:  2025  2:25 PM    Date of Service:  7/3/2025    Room: [address] [place] [address]A    Chief complaint: had concerns including Altered Mental Status (Pt found unresponsive at home by family. LKW was 1300. PT being bagged upon arrival).       Subjective     Hospital Day: 3  ICU LOS: 1d 15h    Today's course:  The patient was seen was examined at bedside.  Patient has been extubated, currently saturating fine on low-flow nasal cannula.  No active complaints today.    REVIEW OF SYSTEMS:  Review of Systems   Respiratory:  Negative for cough, choking, chest tightness, shortness of breath, wheezing and stridor.    Gastrointestinal:  Negative for abdominal distention, abdominal pain, anal bleeding and blood in stool.   Endocrine: Negative for cold intolerance and heat intolerance.   Genitourinary:  Negative for difficulty urinating, dysuria, enuresis, flank pain, frequency, genital sores, hematuria, penile discharge and penile pain.   Musculoskeletal:  Negative for arthralgias, back pain and gait problem.   Neurological:  Negative for dizziness, facial asymmetry, light-headedness, numbness and headaches.       Objective     Vitals  Range   /72 BP  Min: 93/57  Max: 127/72   HR 75 Pulse  Av.6  Min: 64  Max: 79   RR 17 Resp  Av.7  Min: 12  Max: 23   Temp. 97.8 °F (36.6 °C) Temp  Min: 97.8 °F (36.6 °C)  Max: 97.8 °F (36.6 °C)   O2sat 97 % SpO2  Av.1 %  Min: 90 %  Max: 99 %   Weight (!) 138.1 kg (304 lb 7.3 oz)        Intake/Output Summary (Last 24 hours) at 7/3/2025 1001  Last data filed at 7/3/2025 0803  Gross per 24 hour   Intake 2424.08 ml   Output 3210 ml   Net -785.92 ml     Net IO Since Admission: 861.42 mL [25 1001]    PHYSICAL EXAM:  Physical Exam  Constitutional:       Appearance: He is not ill-appearing.   HENT:      Head: 
 Kranthi Wellington is a 44 y.o.  man    Past Medical History:     Past Medical History:   Diagnosis Date    ADHD (attention deficit hyperactivity disorder) 2018    Allergic rhinitis Na    Anxiety     Arthritis     Carotid artery stenosis 2017    V fib    Chronic kidney disease 2018    Class 1 obesity due to excess calories with serious comorbidity and body mass index (BMI) of 34.0 to 34.9 in adult     Depression 2016    Diverticulosis     Diverticulitis Feb. 2023    First degree AV block     GERD (gastroesophageal reflux disease)     Hyperlipidemia     Hypothyroidism     IBS (irritable bowel syndrome)     Liver disease 2018    LVH (left ventricular hypertrophy)     Migraines     Mixed calcium oxalate kidney stones 02/17/2023    Obesity     ENOC (obstructive sleep apnea)     Osteoarthritis 2019    Psychiatric problem     Psychogenic nonepileptic seizure     Tremors of nervous system     Type 2 diabetes mellitus with complication, without long-term current use of insulin ([facility])        Past Surgical History:     Past Surgical History:   Procedure Laterality Date    BLADDER SURGERY Right 03/16/2023    cystoscopy, retrograde pyelography, right ureteroscopy with laser lithotripsy, right JJ ureteral stent insertion [place]one [place] [facility] [place]    HERNIA REPAIR      As a toddler age 1    INSERTABLE CARDIAC MONITOR  07/14/2017    Loop Monitor    KNEE ARTHROPLASTY  2017    KNEE SURGERY Right     Arthroscopy    PACEMAKER INSERTION  12/28/2017    D-PPM   ([employer])   RAHEJA    TONSILLECTOMY         Allergies:     Fish-derived products and Indomethacin    Medications:     Prior to Admission medications    Medication Sig Start Date End Date Taking? Authorizing Provider   metFORMIN (GLUCOPHAGE) 1000 MG tablet TAKE ONE TABLET BY MOUTH TWICE A DAY WITH MEALS 6/20/25   Drake Mauro, DO   atorvastatin (LIPITOR) 40 MG tablet TAKE ONE TABLET BY MOUTH DAILY 6/20/25   Drake Mauro, DO   levothyroxine (SYNTHROID) 50 
 Kranthi Wellington is a 44 y.o.  man    Patient was brought to the hospital for period of unresponsiveness.  Follows with neurology for history of nonepileptic seizures as well as episodic migraines without aura.    At home prior to admission he was reportedly found by family with last known well being 1 PM.  EMS was called he was given IV fluids and was bagged while being brought to the emergency department.  Intubated while he was in the ER    No visualized generalized tonic-clonic seizures reported  Neurology was consulted    Family monitors medications-he was found to be minimally hypotensive on arrival    Patient does have a pacemaker-EP evaluated device found to be working well.    Extubated yesterday  In bed with no complaints -hoping to go home today    Objective:   /78   Pulse 72   Temp 98.6 °F (37 °C) (Temporal)   Resp 16   Ht 1.854 m (6' 1\")   Wt (!) 138.3 kg (304 lb 14.3 oz)   SpO2 94%   BMI 40.23 kg/m²      General appearance: Awake looking around the room  Head: Normocephalic, without obvious abnormality, atraumatic  Extremities: no cyanosis or edema  Pulses: 2+ and symmetric  Skin: no rashes or lesions     Mental Status: Awake looking around the room smiling oriented to person place and year    Speech spastic  Language appropriate    Cranial Nerves:  I: smell    II: visual acuity     II: visual fields Full    II: pupils TEJINDER   III,VII: ptosis None   III,IV,VI: extraocular muscles  Follows examiner around the room without nystagmus   V: mastication Normal   V: facial light touch sensation  Normal   V,VII: corneal reflex  Present   VII: facial muscle function - upper     VII: facial muscle function - lower Normal   VIII: hearing Normal   IX: soft palate elevation     IX,X: gag reflex    XI: trapezius strength     XI: sternocleidomastoid strength    XI: neck extension strength     XII: tongue strength  Normal     Moving all limbs symmetrically     Appreciates LT in all limbs    FN without 
 Kranthi Wellington is a 44 y.o.  man    Patient was brought to the hospital for period of unresponsiveness.  Follows with neurology for history of nonepileptic seizures as well as episodic migraines without aura.    At home prior to admission he was reportedly found by family with last known well being 1 PM.  EMS was called he was given IV fluids and was bagged while being brought to the emergency department.  Intubated while he was in the ER    No visualized generalized tonic-clonic seizures reported  Neurology was consulted    Family monitors medications-he was found to be minimally hypotensive on arrival    Patient does have a pacemaker-EP evaluated device found to be working well.    ICU team plans on extubation this morning    Objective:   /78   Pulse 76   Temp 98 °F (36.7 °C) (Temporal)   Resp 15   Ht 1.854 m (6' 1\")   Wt (!) 138.1 kg (304 lb 7.3 oz)   SpO2 96%   BMI 40.17 kg/m²      General appearance: Awake looking around the room  Head: Normocephalic, without obvious abnormality, atraumatic  Extremities: no cyanosis or edema  Pulses: 2+ and symmetric  Skin: no rashes or lesions     Mental Status: Awake looking around the room smiling     Nonverbal on vent    But follows all simple commands    Cranial Nerves:  I: smell    II: visual acuity     II: visual fields Full    II: pupils TEJINDER   III,VII: ptosis None   III,IV,VI: extraocular muscles  Follows examiner around the room without nystagmus   V: mastication Normal   V: facial light touch sensation  Normal   V,VII: corneal reflex  Present   VII: facial muscle function - upper     VII: facial muscle function - lower Normal   VIII: hearing Normal   IX: soft palate elevation     IX,X: gag reflex    XI: trapezius strength     XI: sternocleidomastoid strength    XI: neck extension strength     XII: tongue strength  Normal     Moving all limbs symmetrically    Gave me a thumbs up bilateral      Laboratory/Radiology:     CBC with Differential:    Lab 
 [facility]  Internal Medicine Residency Program  MICU Progress Note      Patient:  Kranthi Wellington 44 y.o. male MRN: [id number] : 1980   Admitted:  2025  2:25 PM    Date of Service:  2025    Room: [address] [place] [address]A    Chief complaint: had concerns including Altered Mental Status (Pt found unresponsive at home by family. LKW was 1300. PT being bagged upon arrival).       Subjective     Hospital Day: 2  ICU LOS: 15h    Today's course:  The patient was seen was examined at bedside. Currently intubated and sedated on Fentanyl. Patient is limited code, no to shock and compression.    REVIEW OF SYSTEMS:  Review of Systems   Reason unable to perform ROS: Patient is intubated and sedated.       Objective     Vitals  Range   /63 BP  Min: 99/51  Max: 122/70   HR 73 Pulse  Av.7  Min: 68  Max: 80   RR 19 Resp  Av  Min: 17  Max: 19   Temp. 98 °F (36.7 °C) Temp  Min: 96.9 °F (36.1 °C)  Max: 98.9 °F (37.2 °C)   O2sat 100 % SpO2  Av.9 %  Min: 99 %  Max: 100 %   Weight 130 kg (286 lb 9.6 oz)        Intake/Output Summary (Last 24 hours) at 2025 1010  Last data filed at 2025 0601  Gross per 24 hour   Intake 2617.34 ml   Output 970 ml   Net 1647.34 ml     Net IO Since Admission: 1,647.34 mL [25 1010]    PHYSICAL EXAM:  Physical Exam  Constitutional:       Appearance: He is ill-appearing.   HENT:      Head: Normocephalic and atraumatic.   Cardiovascular:      Rate and Rhythm: Normal rate and regular rhythm.      Pulses: Normal pulses.      Heart sounds: Normal heart sounds.   Pulmonary:      Breath sounds: Rhonchi and rales present.      Comments: Intubated and Vented.   Musculoskeletal:         General: No swelling or deformity.   Skin:     General: Skin is dry.   Neurological:      Comments: Responsive to painful stimuli, on sedation.          Lines Location / date Lines Location / date   [x] TLC   [] Arterial line     [] PICC   [] Hemoaccess       FLUIDS:    fentaNYL 
 [facility]  Internal Medicine Residency Program  MICU Progress Note      Patient:  Kranthi Wellington 44 y.o. male MRN: [id number] : 1980   Admitted:  2025  2:25 PM    Date of Service:  2025    Room: [address] [place] [address]A    Chief complaint: had concerns including Altered Mental Status (Pt found unresponsive at home by family. LKW was 1300. PT being bagged upon arrival).     Subjective     Hospital Day: 5  ICU LOS: 3d 14h    Today's course:  The patient was seen at bedside this morning. He has no subjective complaints. Was eating breakfast, good appetite, good bowel movements.    REVIEW OF SYSTEMS:  Review of Systems   All other systems reviewed and are negative.      Objective     Vitals  Range   BP (!) 140/79 BP  Min: 111/63  Max: 140/79   HR 75 Pulse  Av.6  Min: 72  Max: 82   RR 20 Resp  Av.9  Min: 17  Max: 21   Temp. 98.9 °F (37.2 °C) Temp  Min: 98 °F (36.7 °C)  Max: 98.9 °F (37.2 °C)   O2sat 97 % SpO2  Av.4 %  Min: 81 %  Max: 97 %   Weight (!) 139 kg (306 lb 7 oz)        Intake/Output Summary (Last 24 hours) at 2025 0854  Last data filed at 2025 0600  Gross per 24 hour   Intake 1680 ml   Output 955 ml   Net 725 ml     Net IO Since Admission: 3,753.57 mL [25 0854]    PHYSICAL EXAM:  Physical Exam  Vitals reviewed.   Constitutional:       Appearance: Normal appearance.   HENT:      Head: Normocephalic and atraumatic.      Mouth/Throat:      Mouth: Mucous membranes are moist.   Cardiovascular:      Rate and Rhythm: Normal rate and regular rhythm.   Pulmonary:      Effort: Pulmonary effort is normal.      Breath sounds: Normal breath sounds.   Skin:     General: Skin is warm and dry.      Capillary Refill: Capillary refill takes less than 2 seconds.   Neurological:      General: No focal deficit present.      Mental Status: He is alert.         Lines Location / date Lines Location / date   [] TLC   [] Arterial line     [] PICC   [] Hemoaccess       FLUIDS:    
 [facility]  Internal Medicine Residency Program  MICU Progress Note      Patient:  Kranthi Wellington 44 y.o. male MRN: [id number] : 1980   Admitted:  2025  2:25 PM    Date of Service:  2025    Room: [address] [place] [address]A    Chief complaint: had concerns including Altered Mental Status (Pt found unresponsive at home by family. LKW was 1300. PT being bagged upon arrival).       Subjective     Hospital Day: 4  ICU LOS: 2d 14h    Today's course:  The patient was seen was examined at bedside.  Patient has been extubated, currently saturating fine on low-flow nasal cannula.  No active complaints today.  Patient is stable to be transferred out of ICU.    REVIEW OF SYSTEMS:  Review of Systems   Respiratory:  Negative for cough, choking, chest tightness, shortness of breath, wheezing and stridor.    Gastrointestinal:  Negative for abdominal distention, abdominal pain, anal bleeding and blood in stool.   Endocrine: Negative for cold intolerance and heat intolerance.   Genitourinary:  Negative for difficulty urinating, dysuria, enuresis, flank pain, frequency, genital sores, hematuria, penile discharge and penile pain.   Musculoskeletal:  Negative for arthralgias, back pain and gait problem.   Neurological:  Negative for dizziness, facial asymmetry, light-headedness, numbness and headaches.       Objective     Vitals  Range   /78 BP  Min: 110/71  Max: 130/78   HR 72 Pulse  Av.2  Min: 69  Max: 87   RR 16 Resp  Av.1  Min: 9  Max: 16   Temp. 98.6 °F (37 °C) Temp  Min: 98.4 °F (36.9 °C)  Max: 98.6 °F (37 °C)   O2sat 94 % SpO2  Av.6 %  Min: 93 %  Max: 94 %   Weight (!) 138.3 kg (304 lb 14.3 oz)        Intake/Output Summary (Last 24 hours) at 2025 0909  Last data filed at 2025 0600  Gross per 24 hour   Intake 2818.15 ml   Output 856 ml   Net 1962.15 ml     Net IO Since Admission: 2,608.57 mL [25 0909]    PHYSICAL EXAM:  Physical Exam  Constitutional:       Appearance: He 
4 Eyes Skin Assessment     NAME:  Kranthi Wellington  YOB: 1980  MEDICAL RECORD NUMBER:  [id number]    The patient is being assessed for  Admission    I agree that at least one RN has performed a thorough Head to Toe Skin Assessment on the patient. ALL assessment sites listed below have been assessed.      Areas assessed by both nurses:    Head, Face, Ears, Shoulders, Back, Chest, Arms, Elbows, Hands, Sacrum. Buttock, Coccyx, Ischium, Legs. Feet and Heels, and Under Medical Devices         Does the Patient have a Wound? No noted wound(s)       Donn Prevention initiated by RN: Yes  Wound Care Orders initiated by RN: No    Pressure Injury (Stage 3,4, Unstageable, DTI, NWPT, and Complex wounds) if present, place Wound referral order by RN under : No    New Ostomies, if present place, Ostomy referral order under : No     Nurse 1 eSignature: Electronically signed by Saúl Coker RN on 7/1/25 at 8:18 PM EDT    **SHARE this note so that the co-signing nurse can place an eSignature**    Nurse 2 eSignature: Electronically signed by Karen Encarnacion RN on 7/1/25 at 8:22 PM EDT  
4 Eyes Skin Assessment     NAME:  Kranthi Wellington  YOB: 1980  MEDICAL RECORD NUMBER:  [id number]    The patient is being assessed for  Admission    I agree that at least one RN has performed a thorough Head to Toe Skin Assessment on the patient. ALL assessment sites listed below have been assessed.      Areas assessed by both nurses:    Head, Face, Ears, Shoulders, Back, Chest, Arms, Elbows, Hands, Sacrum. Buttock, Coccyx, Ischium, Legs. Feet and Heels, and Under Medical Devices         Does the Patient have a Wound? No noted wound(s)       Donn Prevention initiated by RN: Yes  Wound Care Orders initiated by RN: No    Pressure Injury (Stage 1,2,3,4, Unstageable, DTI, NWPT, and Complex wounds) if present, place Wound referral order by RN under : No    New Ostomies, if present place, Ostomy referral order under : No     Nurse 1 eSignature: Electronically signed by Loretta Oquendo RN on 7/5/25 at 6:27 PM EDT    **SHARE this note so that the co-signing nurse can place an eSignature**    Nurse 2 eSignature: Electronically signed by Silvana Delgado RN on 7/5/25 at 6:35 PM EDT  
Antibiotic Extended Infusion Policy     This patient is on medication that requires renal, weight, and/or indication dose adjustment.      Date Body Weight IBW  Adjusted BW SCr  CrCl Dialysis status BMI   7/1/2025 130 kg (286 lb 9.6 oz) Ideal body weight: 79.9 kg (176 lb 2.4 oz)  Adjusted ideal body weight: 99.9 kg (220 lb 5.2 oz) Serum creatinine: 1.5 mg/dL (H) 07/01/25 1434  Estimated creatinine clearance: 87 mL/min (A) N/a Body mass index is 37.81 kg/m².       Pharmacy has dose-adjusted the following medication(s):    Ordered Medication: Zosyn 4,500 mg Q6H  Order Changed/converted to: 4500 mg load + Zosyn 4500mg q8h    These changes were made per protocol according to the [facility]   Automatic Extended Infusion Dose Adjustment Policy.     *Please note this dose may need readjusted if patient's condition changes.    Please contact pharmacy with any questions regarding these changes.    Adi Whatley RPH  7/1/2025  9:18 PM   
Call placed to mother of patient per his request, no answer.   
Cuff leak preformed yes.      Patient was extubated to 5 liters/min via nasal cannula. Breath Sounds post extubation were clear. Stridor was not present post extubation. SPO2 was 97%.      Performed by  Maria Ferrer RCP  
DVT Prophylaxis Adjustment Policy (DVT Prophylaxis)     This patient is on DVT Prophylaxis medication that requires a dose adjustment      Date Body Weight IBW  Adjusted BW SCr  CrCl Dialysis status   7/2/2025 130 kg (286 lb 9.6 oz) Ideal body weight: 79.9 kg (176 lb 2.4 oz)  Adjusted ideal body weight: 99.9 kg (220 lb 5.2 oz) Serum creatinine: 1.1 mg/dL 07/02/25 0413  Estimated creatinine clearance: 119 mL/min N/a       Pharmacy has dose-adjusted the DVT Prophylaxis regimen to match   the recommendations from the following table        Ordered Medication:Lovenox 30mg daily    Order Changed/converted to: Lovenox 30mg BID      These changes were made per protocol according to the [facility] Pharmacist   Review for Appropriate Use and Automatic Dose Adjustments of   Subcutaneous Anticoagulants Policy     *Please note this dose may need readjusted if patient's condition changes.    Please contact pharmacy with any questions regarding these changes.    Giovana Rene RPH  7/2/2025  10:08 AM   
Headband: removed  Date/Time: 07/02/2025 0555    Recorder: recording stopped  Skin: intact  Highest Seizure [place] Percentage past hour: 0      General info regarding Seizure [place] %:  Minimum duration of study is 2 hours. If Seizure [place] has remained 0% throughout the entire 2-hour duration, communicate with provider to stop the recording.     Seizure [place] 0-10% - Continue to monitor and complete 2-hour study.  Seizure [place] 11-89% - Epileptiform activity present. Notify provider for next steps.  Seizure [place] >/= 90% - Epileptiform activity consistent with Status Epilepticus. Immediately notify provider.     *Patients with Seizure [place] above 10% that persists may require a study longer than 2 hours. Maximum recording duration is 24 hours. Please update provider with a persistent increase in Seizure [place] above 10%.       No seizure burden  
PATIENT STRAIGHT CATH FOR URINE 450 CC CLEAR YELLOW URINE   
Patient admitted to MICU with the following belongings: Glasses. The following belongings were sent home with the patient's family:  ALL patient belongings noted on admission.    PATIENT HAD NO BELONGINGS ON ADMISSION.  FAMILY BROUGHT GLASSES ON ARRIVAL NO OTHER PERSONAL BELONGING PRESENT  
Patient discharged.  Pulmonary to follow up outpatient.    Vitaliy Mckeon MD  
Patient offered hygiene and patient care, patient decline at this time.   
Patient was anointed.    , BCC  
Patients family brought in shorts, shirt, socks, and underwear, as well as a phone .  
Pharmacy Consultation Note  (Antibiotic Dosing and Monitoring)    Initial consult date: 7/1/25  Consulting physician/provider: Dr. Hebert  Drug: Vancomycin  Indication: sepsis of unknown etiology     Age/  Gender Height Weight IBW  Allergy Information   44 y.o./male 185.4 cm (6' 1\") 130 kg (286 lb 9.6 oz)     Ideal body weight: 79.9 kg (176 lb 2.4 oz)  Adjusted ideal body weight: 103.2 kg (227 lb 7.5 oz)   Fish-derived products and Indomethacin      Renal Function:  Recent Labs     07/01/25  1434 07/02/25  0413 07/03/25  0406   BUN 23* 18 12   CREATININE 1.5* 1.1 1.1       Intake/Output Summary (Last 24 hours) at 7/3/2025 1201  Last data filed at 7/3/2025 1117  Gross per 24 hour   Intake 2534.08 ml   Output 2835 ml   Net -300.92 ml       Vancomycin Monitoring:  Trough:    Recent Labs     07/03/25  0956   VANCOTROUGH 12.9     Random:  No results for input(s): \"VANCORANDOM\" in the last 72 hours.    Vancomycin Administration Times:  Recent vancomycin administrations                     vancomycin (VANCOCIN) 1,500 mg in sodium chloride 0.9 % 250 mL IVPB ([id number]) (mg) 1,500 mg New Bag 07/02/25 0923    vancomycin (VANCOCIN) 2500 mg in sodium chloride 0.9 % 500 mL IVPB (mg) 2,500 mg New Bag 07/01/25 2218                  Assessment:  Patient is a 44 y.o. male who has been initiated on vancomycin  Estimated Creatinine Clearance: 122 mL/min (based on SCr of 1.1 mg/dL).  To dose vancomycin, pharmacy will be utilizing trough based dosing  Trough today = 12.9 mcg/mL  MRSA nares negative    Plan:  Vancomycin 1500 mg IV every 12 hours  Will check vancomycin levels as needed  Will continue to monitor renal function   Pharmacy to follow    Thank you for your consult    Ermelinda Iglesias, PharmD, BCPS, BCCCP 7/3/2025 12:01 PM  [phone]      
Pharmacy Consultation Note  (Antibiotic Dosing and Monitoring)    Initial consult date: 7/1/25  Consulting physician/provider: Dr. Hebert  Drug: Vancomycin  Indication: sepsis of unknown etiology     Age/  Gender Height Weight IBW  Allergy Information   44 y.o./male 185.4 cm (6' 1\") 130 kg (286 lb 9.6 oz)     Ideal body weight: 79.9 kg (176 lb 2.4 oz)  Adjusted ideal body weight: 99.9 kg (220 lb 5.2 oz)   Fish-derived products and Indomethacin      Renal Function:  Recent Labs     07/01/25  1434   BUN 23*   CREATININE 1.5*     No intake or output data in the 24 hours ending 07/01/25 2143    Vancomycin Monitoring:  Trough:  No results for input(s): \"VANCOTROUGH\" in the last 72 hours.  Random:  No results for input(s): \"VANCORANDOM\" in the last 72 hours.    Vancomycin Administration Times:  Recent vancomycin administrations        No vancomycin IV orders with administrations found.            Orders not given:            vancomycin (VANCOCIN) 2500 mg in sodium chloride 0.9 % 500 mL IVPB                    Assessment:  Patient is a 44 y.o. male who has been initiated on vancomycin  Estimated Creatinine Clearance: 87 mL/min (A) (based on SCr of 1.5 mg/dL (H)).  To dose vancomycin, pharmacy will be utilizing trough based dosing  7/1 Received 2500 mg load of vancomycin    Plan:  Will continue vancomycin 1500 mg IV every 12 hours  Will check vancomycin levels when appropriate  Will continue to monitor renal function   Pharmacy to follow    Thank you for your consult    Cristela Hunter PharmD BCPS 7/1/2025 9:43 PM  [phone]    
Pharmacy Consultation Note  (Antibiotic Dosing and Monitoring)    Initial consult date: 7/1/25  Consulting physician/provider: Dr. Hebert  Drug: Vancomycin  Indication: sepsis of unknown etiology     Age/  Gender Height Weight IBW  Allergy Information   44 y.o./male 185.4 cm (6' 1\") 130 kg (286 lb 9.6 oz)     Ideal body weight: 79.9 kg (176 lb 2.4 oz)  Adjusted ideal body weight: 99.9 kg (220 lb 5.2 oz)   Fish-derived products and Indomethacin      Renal Function:  Recent Labs     07/01/25  1434 07/02/25  0413   BUN 23* 18   CREATININE 1.5* 1.1       Intake/Output Summary (Last 24 hours) at 7/2/2025 1201  Last data filed at 7/2/2025 0601  Gross per 24 hour   Intake 2617.34 ml   Output 970 ml   Net 1647.34 ml       Vancomycin Monitoring:  Trough:  No results for input(s): \"VANCOTROUGH\" in the last 72 hours.  Random:  No results for input(s): \"VANCORANDOM\" in the last 72 hours.    Vancomycin Administration Times:  Recent vancomycin administrations                     vancomycin (VANCOCIN) 1,500 mg in sodium chloride 0.9 % 250 mL IVPB ([id number]) (mg) 1,500 mg New Bag 07/02/25 0923    vancomycin (VANCOCIN) 2500 mg in sodium chloride 0.9 % 500 mL IVPB (mg) 2,500 mg New Bag 07/01/25 2218                  Assessment:  Patient is a 44 y.o. male who has been initiated on vancomycin  Estimated Creatinine Clearance: 119 mL/min (based on SCr of 1.1 mg/dL).  To dose vancomycin, pharmacy will be utilizing trough based dosing    Plan:  Will continue vancomycin 1500 mg IV every 12 hours  Will check vancomycin level tomorrow   Will continue to monitor renal function   Pharmacy to follow    Thank you for your consult    Ermelinda Iglesias, PharmD, BCPS, BCCCP 7/2/2025 12:01 PM  [phone]      
Pharmacy Consultation Note  (Antibiotic Dosing and Monitoring)    Note vancomycin discontinued. Clinical pharmacy will sign-off. Please re-consult if we can be of further assistance.    Ermelinda Iglesias, PharmD, BCPS, BCCCP 7/4/2025 9:49 AM  [phone]      
Report called to 8WE nurse  
SPEECH/LANGUAGE PATHOLOGY  CLINICAL ASSESSMENT OF SWALLOWING FUNCTION   and PLAN OF CARE      PATIENT NAME:  Kranthi Wellington  (male)     MRN:  [id number]    :  1980  (44 y.o.)  STATUS:  Inpatient: Room 4430/4430-A    TODAY'S DATE:  7/3/2025  ORDER DATE, DESCRIPTION AND REFERRING PROVIDER:    25 1345  SLP eval and treat  Start:  25 1345,   End:  25 1345,   ONE TIME,   Standing Count:  1 Occurrences,   R         Darrian Chavez MD     REASON FOR REFERRAL: DYSPHAGIA   EVALUATING THERAPIST: RAYMOND Paulson                 ASSESSMENT:    DYSPHAGIA DIAGNOSIS:   functional oropharyngeal swallow for age/premorbid functioning      DIET RECOMMENDATIONS:  Regular consistency solids (IDDSI level 7) with  thin liquids (IDDSI level 0)     FEEDING RECOMMENDATIONS:     Assistance level:  No assistance needed      Compensatory strategies recommended: No strategies are recommended at this time      Discussed recommendations with:  Patient  and patient nurse in person    SPEECH THERAPY  PLAN OF CARE   The dysphagia POC is established based on physician order, dysphagia diagnosis and results of clinical assessment     Dysphagia therapy is not recommended     Conditions Requiring Skilled Therapeutic Intervention for dysphagia:    not applicable    Specific dysphagia interventions to include:     Not applicable    Specific instructions for next treatment:  not applicable   Patient Treatment Goals:    Short Term Goals:  Not applicable no therapy warranted     Long Term Goals:   Not applicable no therapy warranted      Patient/family Goal:    not applicable                    ADMITTING DIAGNOSIS: Transaminitis [R74.01]  Polysubstance abuse (HCC) [F19.10]  Oxford coma scale total score 3-8, in the field (EMT or ambulance) (HCC) [R40.2431]  AMS (altered mental status) [R41.82]    VISIT DIAGNOSIS:      PATIENT REPORT/COMPLAINT: patient currently NPO pending results of this evaluation  RN cleared patient for 
Spontaneous Parameters performed on PS 5    VT = 567 ml  f = 17  B/M  Ve = 9.64 L/M  NIF = -29  cmH2O  VC = 793 ml  RSBI = 29    Pt has a partial leak, could hear minimal air leak.       Performed by Maria Ferrer RCP  
Spontaneous Parameters performed on PS 5    VT = 676 ml  f = 13  B/M  Ve = 8.79 L/M  NIF = -27  cmH2O  VC = N/A   RSBI = 20    Pt has audible cuff leak.     Performed by Maria Ferrer RCP  
Stage  CI HR MAP TPRI SVI   Baseline 4.1 69   59   Challenge 4.1 69   64   Result (%?)     9.3     
ICU  -Intubated, off of sedation  - off vasopressor  - On Vanco and Zosyn  - Neurology consulted, EEG does not show seizures burden  - EP consulted, recommended does not require any intervention.  - Echo pending  - Transaminitis improving  - Status post N-acetylcysteine  - Plan for extubation today    NOTE: This report was transcribed using voice recognition software. Every effort was made to ensure accuracy; however, inadvertent computerized transcription errors may be present.  Electronically signed by BILLY BAÑUELOS MD on 7/3/2025 at 2:52 PM     
node dysfunction status post permanent pacemaker  Hyperlipidemia  History of neurocardiogenic syncope with positive tilt table test  Liver shock    -Admitted to medical ICU  - Patient was extubated on 7/3/25  -Maintaining oxygen saturation well on room air  - off vasopressor  - s/p vancomycin and Zosyn.  Started with ceftriaxone  - Neurology consulted, EEG does not show seizures burden  - EP consulted, pacemaker interrogation, recommended does not require any intervention.  - Echo show EF of 55 to 60%  - Transaminitis improving  - Status post N-acetylcysteine    NOTE: This report was transcribed using voice recognition software. Every effort was made to ensure accuracy; however, inadvertent computerized transcription errors may be present.  Electronically signed by BILLY BAÑUELOS MD on 7/4/2025 at 1:20 PM

## 2025-07-06 NOTE — CARE COORDINATION
7/6:  Update CM note:  CM called the floor to verify if pt is being dc today.  Per RN pt is being dc today home with family to transport.  CM advise [facility] of dc via Care port.  Electronically signed by Jaqueline Greene RN on 7/6/2025 at 12:05 PM

## 2025-07-06 NOTE — DISCHARGE SUMMARY
MG tablet  Commonly known as: GLUCOPHAGE  TAKE ONE TABLET BY MOUTH TWICE A DAY WITH MEALS     metoprolol succinate 100 MG extended release tablet  Commonly known as: TOPROL XL  TAKE ONE TABLET BY MOUTH TWO TIMES A DAY     mirtazapine 30 MG tablet  Commonly known as: REMERON     Nurtec 75 MG Tbdp  Generic drug: rimegepant sulfate  DISSOLVE ONE TABLET IN MOUTH EVERY OTHER DAY. NO MORE THAN 1 TABLET IN 24 HOURS     pioglitazone 30 MG tablet  Commonly known as: ACTOS  Take 1 tablet by mouth daily     rifAXIMin 550 MG tablet  Commonly known as: XIFAXAN     temazepam 30 MG capsule  Commonly known as: RESTORIL     zonisamide 50 MG capsule  Commonly known as: ZONEGRAN  Take 2 capsules by mouth nightly            STOP taking these medications      ammonium lactate 12 % lotion  Commonly known as: LAC-HYDRIN     azelastine 0.1 % nasal spray  Commonly known as: ASTELIN     blood glucose test strips     clonazePAM 1 MG tablet  Commonly known as: KLONOPIN     lactulose 10 GM/15ML solution  Commonly known as: CHRONULAC     NONFORMULARY     tamsulosin 0.4 MG capsule  Commonly known as: Flomax               Where to Get Your Medications        These medications were sent to [id number] - [address] -  [phone] -  [phone]  [address] [place] [address] [place] [address]      Phone: [phone]   cefdinir 300 MG capsule           Note that more than 30 minutes was spent in preparing discharge papers, discussing discharge with patient, medication review, etc.    Signed:  Electronically signed by BILLY BAÑUELOS MD on 7/6/2025 at 9:36 AM

## 2025-07-07 ENCOUNTER — TELEPHONE (OUTPATIENT)
Dept: PRIMARY CARE CLINIC | Age: 45
End: 2025-07-07

## 2025-07-07 NOTE — TELEPHONE ENCOUNTER
Care Transitions Initial Follow Up Call    Outreach made within 2 business days of discharge: Yes    Patient: Kranthi Wellington Patient : 1980   MRN: [id number]  Reason for Admission: AMS   Discharge Date: 25       Spoke with: Left message to return call to ask TCM questions.     Discharge department/facility: [facility]  Yana Jerome MA

## 2025-07-09 ENCOUNTER — OFFICE VISIT (OUTPATIENT)
Dept: PRIMARY CARE CLINIC | Age: 45
End: 2025-07-09
Payer: COMMERCIAL

## 2025-07-09 VITALS
HEART RATE: 85 BPM | TEMPERATURE: 98.4 F | OXYGEN SATURATION: 94 % | HEIGHT: 73 IN | DIASTOLIC BLOOD PRESSURE: 76 MMHG | BODY MASS INDEX: 37.91 KG/M2 | RESPIRATION RATE: 19 BRPM | WEIGHT: 286 LBS | SYSTOLIC BLOOD PRESSURE: 132 MMHG

## 2025-07-09 DIAGNOSIS — E03.9 HYPOTHYROIDISM, UNSPECIFIED TYPE: ICD-10-CM

## 2025-07-09 DIAGNOSIS — R74.01 TRANSAMINITIS: Primary | ICD-10-CM

## 2025-07-09 DIAGNOSIS — J18.9 PNEUMONIA OF LEFT LOWER LOBE DUE TO INFECTIOUS ORGANISM: ICD-10-CM

## 2025-07-09 DIAGNOSIS — Z09 HOSPITAL DISCHARGE FOLLOW-UP: ICD-10-CM

## 2025-07-09 DIAGNOSIS — E11.8 TYPE 2 DIABETES MELLITUS WITH COMPLICATION, WITHOUT LONG-TERM CURRENT USE OF INSULIN (HCC): ICD-10-CM

## 2025-07-09 DIAGNOSIS — R26.2 AMBULATORY DYSFUNCTION: ICD-10-CM

## 2025-07-09 LAB
ALBUMIN: 3.8 G/DL (ref 3.5–5.2)
ALP BLD-CCNC: 122 U/L (ref 40–129)
ALT SERPL-CCNC: 208 U/L (ref 0–50)
ANION GAP SERPL CALCULATED.3IONS-SCNC: 15 MMOL/L (ref 7–16)
AST SERPL-CCNC: 32 U/L (ref 0–50)
BASOPHILS ABSOLUTE: 0.06 K/UL (ref 0–0.2)
BASOPHILS RELATIVE PERCENT: 1 % (ref 0–2)
BILIRUB SERPL-MCNC: 0.5 MG/DL (ref 0–1.2)
BILIRUBIN DIRECT: 0.2 MG/DL (ref 0–0.2)
BILIRUBIN, INDIRECT: 0.3 MG/DL (ref 0–1)
BUN BLDV-MCNC: 6 MG/DL (ref 6–20)
CALCIUM SERPL-MCNC: 9.7 MG/DL (ref 8.6–10)
CHLORIDE BLD-SCNC: 104 MMOL/L (ref 98–107)
CHOLESTEROL, TOTAL: 138 MG/DL
CO2: 22 MMOL/L (ref 22–29)
CREAT SERPL-MCNC: 0.9 MG/DL (ref 0.7–1.2)
EOSINOPHILS ABSOLUTE: 0.89 K/UL (ref 0.05–0.5)
EOSINOPHILS RELATIVE PERCENT: 7 % (ref 0–6)
FOLATE: 24 NG/ML (ref 4.6–34.8)
GFR, ESTIMATED: >90 ML/MIN/1.73M2
GLUCOSE BLD-MCNC: 125 MG/DL (ref 74–99)
HBA1C MFR BLD: 6.3 % (ref 4–5.6)
HCT VFR BLD CALC: 38.5 % (ref 37–54)
HDLC SERPL-MCNC: 35 MG/DL
HEMOGLOBIN: 12.6 G/DL (ref 12.5–16.5)
IMMATURE GRANULOCYTES %: 1 % (ref 0–5)
IMMATURE GRANULOCYTES ABSOLUTE: 0.08 K/UL (ref 0–0.58)
LDL CHOLESTEROL: 82 MG/DL
LYMPHOCYTES ABSOLUTE: 2.38 K/UL (ref 1.5–4)
LYMPHOCYTES RELATIVE PERCENT: 19 % (ref 20–42)
MCH RBC QN AUTO: 31.4 PG (ref 26–35)
MCHC RBC AUTO-ENTMCNC: 32.7 G/DL (ref 32–34.5)
MCV RBC AUTO: 96 FL (ref 80–99.9)
MONOCYTES ABSOLUTE: 1.05 K/UL (ref 0.1–0.95)
MONOCYTES RELATIVE PERCENT: 9 % (ref 2–12)
NEUTROPHILS ABSOLUTE: 7.79 K/UL (ref 1.8–7.3)
NEUTROPHILS RELATIVE PERCENT: 64 % (ref 43–80)
PDW BLD-RTO: 14.4 % (ref 11.5–15)
PLATELET # BLD: 446 K/UL (ref 130–450)
PMV BLD AUTO: 11 FL (ref 7–12)
POTASSIUM SERPL-SCNC: 3.5 MMOL/L (ref 3.5–5.1)
RBC # BLD: 4.01 M/UL (ref 3.8–5.8)
SODIUM BLD-SCNC: 140 MMOL/L (ref 136–145)
T4 FREE: 1.4 NG/DL (ref 0.9–1.7)
TOTAL PROTEIN: 7.1 G/DL (ref 6.4–8.3)
TRIGL SERPL-MCNC: 105 MG/DL
TSH SERPL DL<=0.05 MIU/L-ACNC: 4.26 UIU/ML (ref 0.27–4.2)
URIC ACID: 2.9 MG/DL (ref 3.4–7)
VITAMIN B-12: >2000 PG/ML (ref 232–1245)
VLDLC SERPL CALC-MCNC: 21 MG/DL
WBC # BLD: 12.3 K/UL (ref 4.5–11.5)

## 2025-07-09 PROCEDURE — 1036F TOBACCO NON-USER: CPT | Performed by: FAMILY MEDICINE

## 2025-07-09 PROCEDURE — 1111F DSCHRG MED/CURRENT MED MERGE: CPT | Performed by: FAMILY MEDICINE

## 2025-07-09 PROCEDURE — G8427 DOCREV CUR MEDS BY ELIG CLIN: HCPCS | Performed by: FAMILY MEDICINE

## 2025-07-09 PROCEDURE — G8417 CALC BMI ABV UP PARAM F/U: HCPCS | Performed by: FAMILY MEDICINE

## 2025-07-09 PROCEDURE — 99214 OFFICE O/P EST MOD 30 MIN: CPT | Performed by: FAMILY MEDICINE

## 2025-07-09 NOTE — PROGRESS NOTES
MG Tbdp  Generic drug: rimegepant sulfate  DISSOLVE ONE TABLET IN MOUTH EVERY OTHER DAY. NO MORE THAN 1 TABLET IN 24 HOURS     pioglitazone 30 MG tablet  Commonly known as: ACTOS  Take 1 tablet by mouth daily     rifAXIMin 550 MG tablet  Commonly known as: XIFAXAN     temazepam 30 MG capsule  Commonly known as: RESTORIL     zonisamide 50 MG capsule  Commonly known as: ZONEGRAN  Take 2 capsules by mouth nightly                Medications marked \"taking\" at this time  No outpatient medications have been marked as taking for the 7/9/25 encounter (Office Visit) with Drake Mauro DO.        Medications patient taking as of now reconciled against medications ordered at time of hospital discharge: Yes    A comprehensive review of systems was negative except for what was noted in the HPI.    Objective:       Vitals:    07/09/25 0952   BP: 132/76   Pulse: 85   Resp: 19   Temp: 98.4 °F (36.9 °C)   SpO2: 94%   Weight: 129.7 kg (286 lb)   Height: 1.854 m (6' 0.99\")      Body mass index is 37.74 kg/m².         Physical Exam  Vitals reviewed.   Constitutional:       Appearance: He is obese.   HENT:      Head: Normocephalic and atraumatic.      Right Ear: There is no impacted cerumen.      Left Ear: There is no impacted cerumen.   Eyes:      General: No scleral icterus.     Conjunctiva/sclera: Conjunctivae normal.      Pupils: Pupils are equal, round, and reactive to light.   Neck:      Thyroid: No thyromegaly.   Cardiovascular:      Rate and Rhythm: Normal rate and regular rhythm.      Heart sounds: Normal heart sounds. No murmur heard.  Pulmonary:      Effort: Pulmonary effort is normal.      Breath sounds: Normal breath sounds. No rales.   Abdominal:      General: Bowel sounds are normal. There is no distension.      Palpations: Abdomen is soft.      Tenderness: There is no abdominal tenderness.   Musculoskeletal:         General: No swelling or tenderness.      Left shoulder: Decreased range of motion.      Cervical back:

## 2025-07-10 ENCOUNTER — RESULTS FOLLOW-UP (OUTPATIENT)
Dept: FAMILY MEDICINE CLINIC | Age: 45
End: 2025-07-10

## (undated) DEVICE — CYSTO: Brand: MEDLINE INDUSTRIES, INC.

## (undated) DEVICE — SOLUTION IRRIG 3000ML STRL H2O USP UROMATIC PLAS CONT

## (undated) DEVICE — SEAL ENDOSCP INSTR 7FR BX SELF SEAL

## (undated) DEVICE — BEDSIDE KIT XXXX ENDOSCP 500 CC TBLR SPNG LO FOAM DISP

## (undated) DEVICE — BAG DRAINAGE CONTAINER 15 LT FLUID COLLCTN

## (undated) DEVICE — GUIDEWIRE ENDOSCP L150CM DIA0.035IN TIP 3CM PTFE NIT

## (undated) DEVICE — GLOVE SURG SIGNATURE LTX ESS LTX PF 7.5

## (undated) DEVICE — SOLUTION IRRIG 1000ML STRL H2O USP PLAS POUR BTL

## (undated) DEVICE — CATHETER URET 5FR L70CM OPN END SGL LUMN INJ HUB FLEXIMA

## (undated) DEVICE — GLOVE ORANGE PI 7 1/2   MSG9075

## (undated) DEVICE — FLEXIVA  PULSE  AND  FLEXIVA  PULSE  TRACTIP  LASER  FIBERS  ARE  HIGH  POWER  SINGLE-USE FIBER: Brand: FLEXIVA PULSE ID

## (undated) DEVICE — GUIDEWIRE URO L145CM DIA0.035IN TIP L7CM S STL PTFE BENT